# Patient Record
Sex: MALE | Race: WHITE | Employment: OTHER | URBAN - METROPOLITAN AREA
[De-identification: names, ages, dates, MRNs, and addresses within clinical notes are randomized per-mention and may not be internally consistent; named-entity substitution may affect disease eponyms.]

---

## 2017-01-09 PROBLEM — N40.0 BENIGN PROSTATIC HYPERPLASIA WITHOUT LOWER URINARY TRACT SYMPTOMS: Status: ACTIVE | Noted: 2017-01-09

## 2017-01-09 PROBLEM — M75.50 CHRONIC SHOULDER BURSITIS: Status: ACTIVE | Noted: 2017-01-09

## 2017-07-14 PROBLEM — R97.20 ELEVATED PSA: Status: ACTIVE | Noted: 2017-07-14

## 2017-07-14 PROBLEM — M19.012 PRIMARY OSTEOARTHRITIS OF LEFT SHOULDER: Status: ACTIVE | Noted: 2017-07-14

## 2017-09-10 ENCOUNTER — APPOINTMENT (OUTPATIENT)
Dept: CT IMAGING | Age: 77
DRG: 479 | End: 2017-09-10
Attending: EMERGENCY MEDICINE
Payer: MEDICARE

## 2017-09-10 ENCOUNTER — APPOINTMENT (OUTPATIENT)
Dept: GENERAL RADIOLOGY | Age: 77
DRG: 479 | End: 2017-09-10
Attending: HOSPITALIST
Payer: MEDICARE

## 2017-09-10 ENCOUNTER — APPOINTMENT (OUTPATIENT)
Dept: MRI IMAGING | Age: 77
DRG: 479 | End: 2017-09-10
Attending: HOSPITALIST
Payer: MEDICARE

## 2017-09-10 ENCOUNTER — HOSPITAL ENCOUNTER (INPATIENT)
Age: 77
LOS: 2 days | Discharge: HOME | DRG: 479 | End: 2017-09-13
Attending: EMERGENCY MEDICINE | Admitting: HOSPITALIST
Payer: MEDICARE

## 2017-09-10 ENCOUNTER — APPOINTMENT (OUTPATIENT)
Dept: CT IMAGING | Age: 77
DRG: 479 | End: 2017-09-10
Attending: HOSPITALIST
Payer: MEDICARE

## 2017-09-10 ENCOUNTER — APPOINTMENT (OUTPATIENT)
Dept: GENERAL RADIOLOGY | Age: 77
DRG: 479 | End: 2017-09-10
Attending: EMERGENCY MEDICINE
Payer: MEDICARE

## 2017-09-10 DIAGNOSIS — R56.9 NEW ONSET SEIZURE (HCC): Primary | ICD-10-CM

## 2017-09-10 LAB
ALBUMIN SERPL-MCNC: 3.3 G/DL (ref 3.2–4.6)
ALBUMIN/GLOB SERPL: 0.9 {RATIO} (ref 1.2–3.5)
ALP SERPL-CCNC: 74 U/L (ref 50–136)
ALT SERPL-CCNC: 33 U/L (ref 12–65)
ANION GAP SERPL CALC-SCNC: 13 MMOL/L (ref 7–16)
AST SERPL-CCNC: 25 U/L (ref 15–37)
ATRIAL RATE: 104 BPM
ATRIAL RATE: 104 BPM
BASOPHILS # BLD: 0 K/UL (ref 0–0.2)
BASOPHILS NFR BLD: 0 % (ref 0–2)
BILIRUB SERPL-MCNC: 0.4 MG/DL (ref 0.2–1.1)
BUN SERPL-MCNC: 14 MG/DL (ref 8–23)
CALCIUM SERPL-MCNC: 8.7 MG/DL (ref 8.3–10.4)
CALCULATED R AXIS, ECG10: 38 DEGREES
CALCULATED R AXIS, ECG10: 38 DEGREES
CALCULATED T AXIS, ECG11: 44 DEGREES
CALCULATED T AXIS, ECG11: 44 DEGREES
CHLORIDE SERPL-SCNC: 108 MMOL/L (ref 98–107)
CK SERPL-CCNC: 117 U/L (ref 21–215)
CO2 SERPL-SCNC: 23 MMOL/L (ref 21–32)
CREAT SERPL-MCNC: 1.16 MG/DL (ref 0.8–1.5)
DIAGNOSIS, 93000: NORMAL
DIAGNOSIS, 93000: NORMAL
DIFFERENTIAL METHOD BLD: ABNORMAL
EOSINOPHIL # BLD: 0.2 K/UL (ref 0–0.8)
EOSINOPHIL NFR BLD: 2 % (ref 0.5–7.8)
ERYTHROCYTE [DISTWIDTH] IN BLOOD BY AUTOMATED COUNT: 12.3 % (ref 11.9–14.6)
EST. AVERAGE GLUCOSE BLD GHB EST-MCNC: 117 MG/DL
GLOBULIN SER CALC-MCNC: 3.6 G/DL (ref 2.3–3.5)
GLUCOSE SERPL-MCNC: 154 MG/DL (ref 65–100)
HBA1C MFR BLD: 5.7 % (ref 4.8–6)
HCT VFR BLD AUTO: 43.7 % (ref 41.1–50.3)
HGB BLD-MCNC: 15.1 G/DL (ref 13.6–17.2)
IMM GRANULOCYTES # BLD: 0.1 K/UL (ref 0–0.5)
IMM GRANULOCYTES NFR BLD: 0.4 % (ref 0–5)
LACTATE BLD-SCNC: 5.5 MMOL/L (ref 0.5–1.9)
LIPASE SERPL-CCNC: 289 U/L (ref 73–393)
LYMPHOCYTES # BLD: 2.3 K/UL (ref 0.5–4.6)
LYMPHOCYTES NFR BLD: 17 % (ref 13–44)
MAGNESIUM SERPL-MCNC: 2.2 MG/DL (ref 1.8–2.4)
MCH RBC QN AUTO: 30.6 PG (ref 26.1–32.9)
MCHC RBC AUTO-ENTMCNC: 34.6 G/DL (ref 31.4–35)
MCV RBC AUTO: 88.5 FL (ref 79.6–97.8)
MONOCYTES # BLD: 0.8 K/UL (ref 0.1–1.3)
MONOCYTES NFR BLD: 5 % (ref 4–12)
MYOGLOBIN SERPL-MCNC: 478 NG/ML (ref 16–96)
NEUTS SEG # BLD: 10.4 K/UL (ref 1.7–8.2)
NEUTS SEG NFR BLD: 76 % (ref 43–78)
PLATELET # BLD AUTO: 198 K/UL (ref 150–450)
PMV BLD AUTO: 10 FL (ref 10.8–14.1)
POTASSIUM SERPL-SCNC: 3.9 MMOL/L (ref 3.5–5.1)
PROT SERPL-MCNC: 6.9 G/DL (ref 6.3–8.2)
Q-T INTERVAL, ECG07: 374 MS
Q-T INTERVAL, ECG07: 374 MS
QRS DURATION, ECG06: 90 MS
QRS DURATION, ECG06: 90 MS
QTC CALCULATION (BEZET), ECG08: 406 MS
QTC CALCULATION (BEZET), ECG08: 406 MS
RBC # BLD AUTO: 4.94 M/UL (ref 4.23–5.67)
SODIUM SERPL-SCNC: 144 MMOL/L (ref 136–145)
T4 FREE SERPL-MCNC: 1.2 NG/DL (ref 0.78–1.46)
TSH SERPL DL<=0.005 MIU/L-ACNC: 3.68 UIU/ML (ref 0.36–3.74)
VENTRICULAR RATE, ECG03: 71 BPM
VENTRICULAR RATE, ECG03: 71 BPM
WBC # BLD AUTO: 13.8 K/UL (ref 4.3–11.1)

## 2017-09-10 PROCEDURE — 85025 COMPLETE CBC W/AUTO DIFF WBC: CPT | Performed by: EMERGENCY MEDICINE

## 2017-09-10 PROCEDURE — 84439 ASSAY OF FREE THYROXINE: CPT | Performed by: HOSPITALIST

## 2017-09-10 PROCEDURE — 83690 ASSAY OF LIPASE: CPT | Performed by: EMERGENCY MEDICINE

## 2017-09-10 PROCEDURE — A9577 INJ MULTIHANCE: HCPCS | Performed by: HOSPITALIST

## 2017-09-10 PROCEDURE — 72100 X-RAY EXAM L-S SPINE 2/3 VWS: CPT

## 2017-09-10 PROCEDURE — 72128 CT CHEST SPINE W/O DYE: CPT

## 2017-09-10 PROCEDURE — 70450 CT HEAD/BRAIN W/O DYE: CPT

## 2017-09-10 PROCEDURE — 93005 ELECTROCARDIOGRAM TRACING: CPT | Performed by: HOSPITALIST

## 2017-09-10 PROCEDURE — 80053 COMPREHEN METABOLIC PANEL: CPT | Performed by: EMERGENCY MEDICINE

## 2017-09-10 PROCEDURE — 74011000258 HC RX REV CODE- 258: Performed by: HOSPITALIST

## 2017-09-10 PROCEDURE — 95816 EEG AWAKE AND DROWSY: CPT | Performed by: HOSPITALIST

## 2017-09-10 PROCEDURE — 74011250636 HC RX REV CODE- 250/636: Performed by: HOSPITALIST

## 2017-09-10 PROCEDURE — 81003 URINALYSIS AUTO W/O SCOPE: CPT | Performed by: EMERGENCY MEDICINE

## 2017-09-10 PROCEDURE — 99285 EMERGENCY DEPT VISIT HI MDM: CPT | Performed by: EMERGENCY MEDICINE

## 2017-09-10 PROCEDURE — 74176 CT ABD & PELVIS W/O CONTRAST: CPT

## 2017-09-10 PROCEDURE — 70553 MRI BRAIN STEM W/O & W/DYE: CPT

## 2017-09-10 PROCEDURE — 96365 THER/PROPH/DIAG IV INF INIT: CPT | Performed by: EMERGENCY MEDICINE

## 2017-09-10 PROCEDURE — 96375 TX/PRO/DX INJ NEW DRUG ADDON: CPT | Performed by: EMERGENCY MEDICINE

## 2017-09-10 PROCEDURE — 83605 ASSAY OF LACTIC ACID: CPT

## 2017-09-10 PROCEDURE — 83874 ASSAY OF MYOGLOBIN: CPT | Performed by: EMERGENCY MEDICINE

## 2017-09-10 PROCEDURE — 93005 ELECTROCARDIOGRAM TRACING: CPT | Performed by: EMERGENCY MEDICINE

## 2017-09-10 PROCEDURE — 0PS43ZZ REPOSITION THORACIC VERTEBRA, PERCUTANEOUS APPROACH: ICD-10-PCS | Performed by: NEUROLOGICAL SURGERY

## 2017-09-10 PROCEDURE — 84443 ASSAY THYROID STIM HORMONE: CPT | Performed by: HOSPITALIST

## 2017-09-10 PROCEDURE — 0P943ZX DRAINAGE OF THORACIC VERTEBRA, PERCUTANEOUS APPROACH, DIAGNOSTIC: ICD-10-PCS | Performed by: NEUROLOGICAL SURGERY

## 2017-09-10 PROCEDURE — 82550 ASSAY OF CK (CPK): CPT | Performed by: EMERGENCY MEDICINE

## 2017-09-10 PROCEDURE — 83735 ASSAY OF MAGNESIUM: CPT | Performed by: EMERGENCY MEDICINE

## 2017-09-10 PROCEDURE — 74011250637 HC RX REV CODE- 250/637: Performed by: HOSPITALIST

## 2017-09-10 PROCEDURE — 71010 XR CHEST PORT: CPT

## 2017-09-10 PROCEDURE — 74011250636 HC RX REV CODE- 250/636: Performed by: EMERGENCY MEDICINE

## 2017-09-10 PROCEDURE — 0PU43JZ SUPPLEMENT THORACIC VERTEBRA WITH SYNTHETIC SUBSTITUTE, PERCUTANEOUS APPROACH: ICD-10-PCS | Performed by: NEUROLOGICAL SURGERY

## 2017-09-10 PROCEDURE — 94762 N-INVAS EAR/PLS OXIMTRY CONT: CPT | Performed by: EMERGENCY MEDICINE

## 2017-09-10 PROCEDURE — 4A10X4Z MONITORING OF CENTRAL NERVOUS ELECTRICAL ACTIVITY, EXTERNAL APPROACH: ICD-10-PCS | Performed by: NEUROLOGICAL SURGERY

## 2017-09-10 PROCEDURE — 99218 HC RM OBSERVATION: CPT

## 2017-09-10 PROCEDURE — 83036 HEMOGLOBIN GLYCOSYLATED A1C: CPT | Performed by: HOSPITALIST

## 2017-09-10 RX ORDER — SODIUM CHLORIDE 0.9 % (FLUSH) 0.9 %
10 SYRINGE (ML) INJECTION
Status: COMPLETED | OUTPATIENT
Start: 2017-09-10 | End: 2017-09-10

## 2017-09-10 RX ORDER — AMLODIPINE BESYLATE 5 MG/1
5 TABLET ORAL DAILY
Status: DISCONTINUED | OUTPATIENT
Start: 2017-09-11 | End: 2017-09-13 | Stop reason: HOSPADM

## 2017-09-10 RX ORDER — ONDANSETRON 2 MG/ML
4 INJECTION INTRAMUSCULAR; INTRAVENOUS
Status: DISCONTINUED | OUTPATIENT
Start: 2017-09-10 | End: 2017-09-13 | Stop reason: HOSPADM

## 2017-09-10 RX ORDER — HEPARIN SODIUM 5000 [USP'U]/ML
5000 INJECTION, SOLUTION INTRAVENOUS; SUBCUTANEOUS EVERY 12 HOURS
Status: DISCONTINUED | OUTPATIENT
Start: 2017-09-10 | End: 2017-09-10 | Stop reason: SDUPTHER

## 2017-09-10 RX ORDER — LEVETIRACETAM 500 MG/1
500 TABLET ORAL 2 TIMES DAILY
Status: DISCONTINUED | OUTPATIENT
Start: 2017-09-10 | End: 2017-09-13 | Stop reason: HOSPADM

## 2017-09-10 RX ORDER — ASPIRIN 81 MG/1
81 TABLET ORAL DAILY
Status: DISCONTINUED | OUTPATIENT
Start: 2017-09-11 | End: 2017-09-13 | Stop reason: HOSPADM

## 2017-09-10 RX ORDER — SODIUM CHLORIDE 0.9 % (FLUSH) 0.9 %
5-10 SYRINGE (ML) INJECTION AS NEEDED
Status: DISCONTINUED | OUTPATIENT
Start: 2017-09-10 | End: 2017-09-13 | Stop reason: HOSPADM

## 2017-09-10 RX ORDER — SODIUM CHLORIDE 0.9 % (FLUSH) 0.9 %
5-10 SYRINGE (ML) INJECTION EVERY 8 HOURS
Status: DISCONTINUED | OUTPATIENT
Start: 2017-09-10 | End: 2017-09-13 | Stop reason: HOSPADM

## 2017-09-10 RX ORDER — BISACODYL 5 MG
5 TABLET, DELAYED RELEASE (ENTERIC COATED) ORAL DAILY PRN
Status: DISCONTINUED | OUTPATIENT
Start: 2017-09-10 | End: 2017-09-13 | Stop reason: HOSPADM

## 2017-09-10 RX ORDER — HEPARIN SODIUM 5000 [USP'U]/ML
5000 INJECTION, SOLUTION INTRAVENOUS; SUBCUTANEOUS EVERY 8 HOURS
Status: DISCONTINUED | OUTPATIENT
Start: 2017-09-10 | End: 2017-09-10

## 2017-09-10 RX ORDER — LIDOCAINE 50 MG/G
2 PATCH TOPICAL EVERY 24 HOURS
Status: DISCONTINUED | OUTPATIENT
Start: 2017-09-10 | End: 2017-09-13 | Stop reason: HOSPADM

## 2017-09-10 RX ORDER — LOSARTAN POTASSIUM 50 MG/1
50 TABLET ORAL DAILY
Status: DISCONTINUED | OUTPATIENT
Start: 2017-09-11 | End: 2017-09-13 | Stop reason: HOSPADM

## 2017-09-10 RX ORDER — LEVETIRACETAM 500 MG/1
500 TABLET ORAL 2 TIMES DAILY
Status: DISCONTINUED | OUTPATIENT
Start: 2017-09-10 | End: 2017-09-10 | Stop reason: SDUPTHER

## 2017-09-10 RX ORDER — DIPHENHYDRAMINE HCL 25 MG
25 CAPSULE ORAL
Status: DISCONTINUED | OUTPATIENT
Start: 2017-09-10 | End: 2017-09-13 | Stop reason: HOSPADM

## 2017-09-10 RX ORDER — ONDANSETRON 2 MG/ML
4 INJECTION INTRAMUSCULAR; INTRAVENOUS
Status: DISPENSED | OUTPATIENT
Start: 2017-09-10 | End: 2017-09-10

## 2017-09-10 RX ORDER — HEPARIN SODIUM 5000 [USP'U]/ML
5000 INJECTION, SOLUTION INTRAVENOUS; SUBCUTANEOUS EVERY 12 HOURS
Status: DISCONTINUED | OUTPATIENT
Start: 2017-09-10 | End: 2017-09-13 | Stop reason: HOSPADM

## 2017-09-10 RX ORDER — LORAZEPAM 2 MG/ML
1 INJECTION INTRAMUSCULAR
Status: DISCONTINUED | OUTPATIENT
Start: 2017-09-10 | End: 2017-09-13 | Stop reason: HOSPADM

## 2017-09-10 RX ORDER — HYDROMORPHONE HYDROCHLORIDE 1 MG/ML
1 INJECTION, SOLUTION INTRAMUSCULAR; INTRAVENOUS; SUBCUTANEOUS
Status: DISCONTINUED | OUTPATIENT
Start: 2017-09-10 | End: 2017-09-13 | Stop reason: HOSPADM

## 2017-09-10 RX ORDER — NALOXONE HYDROCHLORIDE 0.4 MG/ML
0.4 INJECTION, SOLUTION INTRAMUSCULAR; INTRAVENOUS; SUBCUTANEOUS AS NEEDED
Status: DISCONTINUED | OUTPATIENT
Start: 2017-09-10 | End: 2017-09-13 | Stop reason: HOSPADM

## 2017-09-10 RX ORDER — MORPHINE SULFATE 2 MG/ML
2 INJECTION, SOLUTION INTRAMUSCULAR; INTRAVENOUS
Status: COMPLETED | OUTPATIENT
Start: 2017-09-10 | End: 2017-09-10

## 2017-09-10 RX ORDER — EZETIMIBE 10 MG/1
10 TABLET ORAL
Status: DISCONTINUED | OUTPATIENT
Start: 2017-09-11 | End: 2017-09-13 | Stop reason: HOSPADM

## 2017-09-10 RX ORDER — ONDANSETRON 2 MG/ML
4 INJECTION INTRAMUSCULAR; INTRAVENOUS
Status: COMPLETED | OUTPATIENT
Start: 2017-09-10 | End: 2017-09-10

## 2017-09-10 RX ORDER — HYDROCODONE BITARTRATE AND ACETAMINOPHEN 5; 325 MG/1; MG/1
1 TABLET ORAL
Status: DISCONTINUED | OUTPATIENT
Start: 2017-09-10 | End: 2017-09-13 | Stop reason: HOSPADM

## 2017-09-10 RX ORDER — PANTOPRAZOLE SODIUM 40 MG/1
40 TABLET, DELAYED RELEASE ORAL
Status: DISCONTINUED | OUTPATIENT
Start: 2017-09-10 | End: 2017-09-13 | Stop reason: HOSPADM

## 2017-09-10 RX ORDER — SODIUM CHLORIDE 9 MG/ML
75 INJECTION, SOLUTION INTRAVENOUS CONTINUOUS
Status: DISCONTINUED | OUTPATIENT
Start: 2017-09-10 | End: 2017-09-13 | Stop reason: HOSPADM

## 2017-09-10 RX ADMIN — ONDANSETRON 4 MG: 2 INJECTION INTRAMUSCULAR; INTRAVENOUS at 09:27

## 2017-09-10 RX ADMIN — HYDROCODONE BITARTRATE AND ACETAMINOPHEN 1 TABLET: 5; 325 TABLET ORAL at 23:56

## 2017-09-10 RX ADMIN — LEVETIRACETAM 500 MG: 500 TABLET, FILM COATED ORAL at 20:18

## 2017-09-10 RX ADMIN — GADOBENATE DIMEGLUMINE 20 ML: 529 INJECTION, SOLUTION INTRAVENOUS at 17:18

## 2017-09-10 RX ADMIN — Medication 10 ML: at 17:18

## 2017-09-10 RX ADMIN — LEVETIRACETAM 1000 MG: 100 INJECTION, SOLUTION INTRAVENOUS at 13:10

## 2017-09-10 RX ADMIN — SODIUM CHLORIDE 75 ML/HR: 900 INJECTION, SOLUTION INTRAVENOUS at 13:10

## 2017-09-10 RX ADMIN — MORPHINE SULFATE 2 MG: 2 INJECTION, SOLUTION INTRAMUSCULAR; INTRAVENOUS at 11:29

## 2017-09-10 RX ADMIN — HYDROCODONE BITARTRATE AND ACETAMINOPHEN 1 TABLET: 5; 325 TABLET ORAL at 13:16

## 2017-09-10 RX ADMIN — Medication 10 ML: at 14:39

## 2017-09-10 RX ADMIN — HYDROCODONE BITARTRATE AND ACETAMINOPHEN 1 TABLET: 5; 325 TABLET ORAL at 18:35

## 2017-09-10 RX ADMIN — Medication 5 ML: at 20:18

## 2017-09-10 NOTE — ED TRIAGE NOTES
Brought in via EMS from home. States pt was in bed and began having seizure like activity witnessed by wife. States on their arrival pt was found to be postictal. No hx of seizures. States pt was combative at first with confusion. Pt is less confused on arrival but still repeating questions. 18G LFA. Pt is alert on arrival. Respirations are even and unlabored at this time.

## 2017-09-10 NOTE — IP AVS SNAPSHOT
[address] 
[phone] Patient: Onesimo Black MRN: [id number] SOFIA:[id number] You are allergic to the following Allergen Reactions Nka (No Known Allergies) Other (comments) Recent Documentation Height Weight BMI Smoking Status 1.803 m 98.4 kg 30.27 kg/m2 Never Smoker Emergency Contacts Name Discharge Info Relation Home Work Mobile Favian Kennedy  Spouse [3] [phone] Emmanuel Cooper  Child [2] [phone] [facility]  Child [2] [phone] About your hospitalization You were admitted on:  September 10, 2017 You last received care in the:  [facility] 7 MED SURG You were discharged on:  September 13, 2017 Unit phone number:  [phone] Why you were hospitalized Your primary diagnosis was:  Not on File Your diagnoses also included:  Seizure (Hcc), New Onset Seizure (Hcc) Providers Seen During Your Hospitalizations Provider Role Specialty Primary office phone Bry Chu DO Attending Provider Emergency Medicine [phone] Torey Rodriguez MD Attending Provider Internal Medicine [phone] Your Primary Care Physician (PCP) Primary Care Physician Office Phone Office Fax Tony Jose [phone] Follow-up Information Follow up With Details Comments Contact Info Amanda Esteves MD Call As needed [address] [address] 
[phone] Teofilo Springer MD On 9/21/2017 11:00 AM [address] [facility] [address] 
[phone] Marcelo Bergman [facility] [place] On 9/19/2017 at 9:15 a.m. with Dr. Kumar [address] 
General Record [id number] [phone] Your Appointments  Tuesday September 19, 2017  9:30 AM EDT  
 New Patient with Marilyn Skillern, DO Will[facility] [place] ([facility]) Rajiv [address]  
[phone] Thursday September 21, 2017 11:00 AM EDT  
WOUND CHECK with [facility] NURSE  
[facility] ([facility]) [address] [address] [place] [address] Amber Arthur 42  
[phone] Current Discharge Medication List  
  
START taking these medications Dose & Instructions Dispensing Information Comments Morning Noon Evening Bedtime  
 levETIRAcetam 500 mg tablet Commonly known as:  KEPPRA Your next dose is:  TODAY, evening Dose:  500 mg Take 1 Tab by mouth two (2) times a day. Quantity:  60 Tab Refills:  5 CONTINUE these medications which have NOT CHANGED Dose & Instructions Dispensing Information Comments Morning Noon Evening Bedtime  
 amLODIPine 5 mg tablet Commonly known as:  Haig Sleepy Eye Your next dose is:  Tomorrow Morning Dose:  5 mg Take 1 Tab by mouth daily. Quantity:  90 Tab Refills:  3  
     
  
   
   
   
  
 aspirin delayed-release 81 mg tablet Your next dose is:  Tomorrow Morning Dose:  81 mg Take 81 mg by mouth daily. Refills:  0  
     
  
   
   
   
  
 esomeprazole 40 mg capsule Commonly known as:  NexIUM Your next dose is:  Tomorrow Morning Dose:  40 mg Take 1 Cap by mouth daily. Quantity:  90 Cap Refills:  3  
     
  
   
   
   
  
 ezetimibe 10 mg tablet Commonly known as:  Ira Arrant Your next dose is:  Friday 9/15 Dose:  10 mg Take 1 Tab by mouth every Monday, Wednesday, Friday. Quantity:  90 Tab Refills:  3  
     
  
   
   
   
  
 losartan 50 mg tablet Commonly known as:  COZAAR Your next dose is:  Tomorrow Morning Dose:  50 mg Take 1 Tab by mouth daily. Quantity:  90 Tab Refills:  3 pitavastatin calcium 4 mg Tab tablet Commonly known as:  LIVALO Your next dose is:  Tomorrow Morning Dose:  2 mg Take 1 Tab by mouth daily. Quantity:  90 Tab Refills:  3 Where to Get Your Medications Information on where to get these meds will be given to you by the nurse or doctor. ! Ask your nurse or doctor about these medications  
  levETIRAcetam 500 mg tablet Discharge Instructions Kyphoplasty: What to Expect at [facility] Your Recovery After kyphoplasty to relieve pain from compression fractures, your back may feel sore where the hollow needle (trocar) went into your back. This should go away in a few days. Most people are able to return to their daily activities within a day after kyphoplasty. This care sheet gives you a general idea about how long it will take for you to recover. But each person recovers at a different pace. Follow the steps below to get better as quickly as possible. How can you care for yourself at home? Activity · Take it easy for the first 24 hours. Rest when you feel tired. Getting enough sleep will help you recover. · For the first day after the procedure, avoid lifting anything that would make you strain. This may include heavy grocery bags and milk containers, a heavy briefcase or backpack, cat litter or dog food bags, a vacuum , or a child. Diet · You can eat your normal diet. If your stomach is upset, try bland, low-fat foods like plain rice, broiled chicken, toast, and yogurt. Medicines · Your doctor will tell you if and when you can restart your medicines. He or she will also give you instructions about taking any new medicines. · If you take blood thinners, such as warfarin (Coumadin), clopidogrel (Plavix), or aspirin, be sure to talk to your doctor. He or she will tell you if and when to start taking those medicines again.  Make sure that you understand exactly what your doctor wants you to do. · Be safe with medicines. Take pain medicines exactly as directed. ¨ If the doctor gave you a prescription medicine for pain, take it as prescribed. ¨ If you are not taking a prescription pain medicine, ask your doctor if you can take an over-the-counter medicine. ¨ Do not take two or more pain medicines at the same time unless your doctor told you to. Many pain medicines have acetaminophen, which is Tylenol. Too much acetaminophen (Tylenol) can be harmful. Incision care · You will have a dressing over the cut (incision). A dressing helps the incision heal and protects it. Your doctor will tell you how to take care of this. Ice 
· If you are sore where the needle was inserted, put ice or a cold pack on your back for 10 to 20 minutes at a time. Try to do this every 1 to 2 hours for the next 3 days (when you are awake) or until the swelling goes down. Put a thin cloth between the ice and your skin. Follow-up care is a key part of your treatment and safety. Be sure to make and go to all appointments, and call your doctor if you are having problems. It's also a good idea to know your test results and keep a list of the medicines you take. When should you call for help? Call 911 anytime you think you may need emergency care. For example, call if: 
· You passed out (lost consciousness). · You have severe trouble breathing. · You have sudden chest pain and shortness of breath, or you cough up blood. · You are unable to move a leg at all. Call your doctor now or seek immediate medical care if: 
· You have new or worse symptoms in your legs, belly, or buttocks. Symptoms may include: ¨ Numbness or tingling. ¨ Weakness. ¨ Pain. · You lose bladder or bowel control. · You have signs of infection, such as: 
¨ Increased pain, swelling, warmth, or redness. ¨ Red streaks leading from the incision. ¨ Pus draining from the incision. ¨ Swollen lymph nodes in your neck, armpits, or groin. ¨ A fever. Watch closely for any changes in your health, and be sure to contact your doctor if: 
· You do not get better as expected. Where can you learn more? Go to http://cole-mark.info/. Enter [phone] in the search box to learn more about \"Kyphoplasty: What to Expect at Home. \" Current as of: March 21, 2017 Content Version: 11.3 © 9032-9005 [employer]. Care instructions adapted under license by [employer] (which disclaims liability or warranty for this information). If you have questions about a medical condition or this instruction, always ask your healthcare professional. [address] any warranty or liability for your use of this information. Seizure: Care Instructions Your Care Instructions Seizures are caused by abnormal patterns of electrical signals in the brain. They are different for each person. Seizures can affect movement, speech, vision, or awareness. Some people have only slight shaking of a hand and do not pass out. Other people may pass out and have violent shaking of the whole body. Some people appear to stare into space. They are awake, but they can't respond normally. Later, they may not remember what happened. You may need tests to identify the type and cause of the seizures. A seizure may occur only once, or you may have them more than one time. Taking medicines as directed and following up with your doctor may help keep you from having more seizures. The doctor has checked you carefully, but problems can develop later. If you notice any problems or new symptoms, get medical treatment right away. Follow-up care is a key part of your treatment and safety. Be sure to make and go to all appointments, and call your doctor if you are having problems. It's also a good idea to know your test results and keep a list of the medicines you take. How can you care for yourself at home? · Be safe with medicines. Take your medicines exactly as prescribed. Call your doctor if you think you are having a problem with your medicine. · Do not do any activity that could be dangerous to you or others until your doctor says it is safe to do so. For example, do not drive a car, operate machinery, swim, or climb ladders. · Be sure that anyone treating you for any health problem knows that you have had a seizure and what medicines you are taking for it. · Identify and avoid things that may make you more likely to have a seizure. These may include lack of sleep, alcohol or drug use, stress, or not eating. · Make sure you go to your follow-up appointment. When should you call for help? Call 911 anytime you think you may need emergency care. For example, call if: 
· You have another seizure. · You have more than one seizure in 24 hours. · You have new symptoms, such as trouble walking, speaking, or thinking clearly. Call your doctor now or seek immediate medical care if: 
· You are not acting normally. Watch closely for changes in your health, and be sure to contact your doctor if you have any problems. Where can you learn more? Go to http://cole-mark.info/. Enter O606 in the search box to learn more about \"Seizure: Care Instructions. \" Current as of: October 14, 2016 Content Version: 11.3 © 8054-3620 [employer]. Care instructions adapted under license by [employer] (which disclaims liability or warranty for this information). If you have questions about a medical condition or this instruction, always ask your healthcare professional. [address] any warranty or liability for your use of this information. DISCHARGE SUMMARY from Nurse The following personal items are in your possession at time of discharge: 
 
Dental Appliances: None Home Medications: None Jewelry: None Clothing: None Other Valuables: None PATIENT INSTRUCTIONS: 
 
 
F-face looks uneven A-arms unable to move or move unevenly S-speech slurred or non-existent T-time-call 911 as soon as signs and symptoms begin-DO NOT go Back to bed or wait to see if you get better-TIME IS BRAIN. Warning Signs of HEART ATTACK Call 911 if you have these symptoms: 
? Chest discomfort. Most heart attacks involve discomfort in the center of the chest that lasts more than a few minutes, or that goes away and comes back. It can feel like uncomfortable pressure, squeezing, fullness, or pain. ? Discomfort in other areas of the upper body. Symptoms can include pain or discomfort in one or both arms, the back, neck, jaw, or stomach. ? Shortness of breath with or without chest discomfort. ? Other signs may include breaking out in a cold sweat, nausea, or lightheadedness. Don't wait more than five minutes to call [address]! Fast action can save your life. Calling 911 is almost always the fastest way to get lifesaving treatment. Emergency Medical Services staff can begin treatment when they arrive  up to an hour sooner than if someone gets to the hospital by car. The discharge information has been reviewed with the patient. The patient verbalized understanding. Discharge medications reviewed with the patient and appropriate educational materials and side effects teaching were provided. Discharge Orders None ACO Transitions of Care Introducing [facility] [address] Ángela Kahn offers a voluntary care coordination program to provide high quality service and care to [facility] fee-for-service beneficiaries.   
 
Jason Reyez was designed to help you enhance your health and well-being through the following services: ? Transitions of Care  support for individuals who are transitioning from one care setting to another (example: Hospital to home). ? Chronic and Complex Care Coordination  support for individuals and caregivers of those with serious or chronic illnesses or with more than one chronic (ongoing) condition and those who take a number of different medications. If you meet specific medical criteria, a [facility] [address] may call you directly to coordinate your care with your primary care physician and your other care providers. For questions about the [facility] programs, please, contact your physicians office. For general questions or additional information about Accountable Care Organizations: 
Please visit www.medicare.gov/acos. html or call 1-800-MEDICARE (6-257.390.2037) TTY users should call 6-951.393.3106. [email or web address] Announcement We are excited to announce that we are making your provider's discharge notes available to you in [email or web address]. You will see these notes when they are completed and signed by the physician that discharged you from your recent hospital stay. If you have any questions or concerns about any information you see in [email or web address], please call the Health Information Department where you were seen or reach out to your Primary Care Provider for more information about your plan of care. Introducing [facility]! Severo Hazel introduces [email or web address] patient portal. Now you can access parts of your medical record, email your doctor's office, and request medication refills online. 1. In your internet browser, go to [email or web address] 2. Click on the First Time User? Click Here link in the Sign In box. You will see the New Member Sign Up page. 3. Enter your [email or web address] Access Code exactly as it appears below. You will not need to use this code after youve completed the sign-up process.  If you do not sign up before the expiration date, you must request a new code. · Merkle Access Code: [email or web address] Expires: 10/12/2017 12:05 PM 
 
4. Enter the last four digits of your Social Security Number (xxxx) and Date of Birth (mm/dd/yyyy) as indicated and click Submit. You will be taken to the next sign-up page. 5. Create a Merkle ID. This will be your Merkle login ID and cannot be changed, so think of one that is secure and easy to remember. 6. Create a Merkle password. You can change your password at any time. 7. Enter your Password Reset Question and Answer. This can be used at a later time if you forget your password. 8. Enter your e-mail address. You will receive e-mail notification when new information is available in [address] 9. Click Sign Up. You can now view and download portions of your medical record. 10. Click the Download Summary menu link to download a portable copy of your medical information. If you have questions, please visit the Frequently Asked Questions section of the Merkle website. Remember, Merkle is NOT to be used for urgent needs. For medical emergencies, dial 911. Now available from your iPhone and Android! General Information Please provide this summary of care documentation to your next provider. Patient Signature:  ____________________________________________________________ Date:  ____________________________________________________________  
  
Lissy Herringamento Provider Signature:  ____________________________________________________________ Date:  ____________________________________________________________

## 2017-09-10 NOTE — H&P
HOSPITALIST H&P/CONSULT  NAME:  Soheila Morris   Age:  78 y.o.  :   1940   MRN:   [id number]  PCP: Demario Brothers MD  Consulting MD:  Treatment Team: Primary Nurse: Nabil Godoy RN  HPI:   Most of the history was  Obtained from  His family at the bedside  Including his son who is  A cardiologist at this hospital.  Earlier this morning EMS  Was called by the patient wife after she noticed seizures like  Activity resembling a tonic clonic episode. The  Patient has no history of a seizure disorder in the past  and  According to the wife/ Son she awoke this morning with the bed shaking looked over and saw her  shaking and jerking with his arms in a tonic-clonic-type manner. He did not have any  Loss of bowel function, then a  short pause after he had  resumption of the same seizure-like activity there was some drooling and some foaming of the mouth noted followed by this sonorous respirations upon cessation of the seizure activity. The patient  Was postictal with  Confusion in the ambulance  Agitated   Requiring restraints this  Improved in the ED . He is now complaining of  lower lumbar back  pain    Lipase 370's . He denies any  etoh use  Recent change in medications,  Seizures in the past or trauma. Head CT was negative , CXR  Was negative for infections, latic acid was elevated UA analysis is currently pending. Past Medical History:   Diagnosis Date    Hypertension       Past Surgical History:   Procedure Laterality Date    HX HERNIA REPAIR      HX HIP REPLACEMENT      bilateral      Prior to Admission Medications   Prescriptions Last Dose Informant Patient Reported? Taking? amLODIPine (NORVASC) 5 mg tablet 2017 at Unknown time  No Yes   Sig: Take 1 Tab by mouth daily. aspirin delayed-release 81 mg tablet 2017 at Unknown time  Yes Yes   Sig: Take 81 mg by mouth daily.    esomeprazole (NEXIUM) 40 mg capsule 2017 at Unknown time  No Yes   Sig: Take 1 Cap by mouth daily. ezetimibe (ZETIA) 10 mg tablet 2017 at Unknown time  No Yes   Sig: Take 1 Tab by mouth every Monday, Wednesday, Friday. losartan (COZAAR) 50 mg tablet 2017 at Unknown time  No Yes   Sig: Take 1 Tab by mouth daily. pitavastatin (LIVALO) 4 mg tab tablet 2017 at Unknown time  No Yes   Sig: Take 1 Tab by mouth daily. Facility-Administered Medications: None     Home meds reconciled. Allergies   Allergen Reactions    Nka [No Known Allergies] Other (comments)      Social History   Substance Use Topics    Smoking status: Never Smoker    Smokeless tobacco: Never Used    Alcohol use No      Family History   Problem Relation Age of Onset    Family history unknown: Yes      Immunization History   Administered Date(s) Administered    Influenza High Dose Vaccine PF 2016     Objective:     Visit Vitals    /64    Pulse 73    Temp 97.6 °F (36.4 °C)    Resp 19    Ht 5' 11\" (1.803 m)    Wt 98.4 kg (217 lb)    SpO2 95%    BMI 30.27 kg/m2      Temp (24hrs), Av.6 °F (36.4 °C), Min:97.6 °F (36.4 °C), Max:97.6 °F (36.4 °C)    Oxygen Therapy  O2 Sat (%): 95 % (09/10/17 0941)  Pulse via Oximetry: 74 beats per minute (09/10/17 0941)  O2 Device: Room air (09/10/17 0858)  Physical Exam:  General:    Alert, cooperative, no distress on 2 L NC   Head:   NCAT. No obvious deformity  Nose:  Nares normal. No drainage  Lungs:   CTABL. No wheezing/rhonchi/rales  Heart:   RRR. No m/r/g. Abdomen:   S/nt/nd. Bowel sounds normal.  Protuberant   Extremities: No cyanosis. Skin:     No rashes or lesions. Not Jaundiced  Neurologic: Moves all extremities.   no gross focal deficits      Data Review:   Recent Results (from the past 24 hour(s))   EKG, 12 LEAD, INITIAL    Collection Time: 09/10/17  9:02 AM   Result Value Ref Range    Ventricular Rate 71 BPM    Atrial Rate 104 BPM    QRS Duration 90 ms    Q-T Interval 374 ms    QTC Calculation (Bezet) 406 ms    Calculated R Axis 38 degrees Calculated T Axis 44 degrees    Diagnosis       !! AGE AND GENDER SPECIFIC ECG ANALYSIS !! Accelerated Junctional rhythm  Abnormal ECG  No previous ECGs available     CBC WITH AUTOMATED DIFF    Collection Time: 09/10/17  9:09 AM   Result Value Ref Range    WBC 13.8 (H) 4.3 - 11.1 K/uL    RBC 4.94 4.23 - 5.67 M/uL    HGB 15.1 13.6 - 17.2 g/dL    HCT 43.7 41.1 - 50.3 %    MCV 88.5 79.6 - 97.8 FL    MCH 30.6 26.1 - 32.9 PG    MCHC 34.6 31.4 - 35.0 g/dL    RDW 12.3 11.9 - 14.6 %    PLATELET 317 461 - 710 K/uL    MPV 10.0 (L) 10.8 - 14.1 FL    DF AUTOMATED      NEUTROPHILS 76 43 - 78 %    LYMPHOCYTES 17 13 - 44 %    MONOCYTES 5 4.0 - 12.0 %    EOSINOPHILS 2 0.5 - 7.8 %    BASOPHILS 0 0.0 - 2.0 %    IMMATURE GRANULOCYTES 0.4 0.0 - 5.0 %    ABS. NEUTROPHILS 10.4 (H) 1.7 - 8.2 K/UL    ABS. LYMPHOCYTES 2.3 0.5 - 4.6 K/UL    ABS. MONOCYTES 0.8 0.1 - 1.3 K/UL    ABS. EOSINOPHILS 0.2 0.0 - 0.8 K/UL    ABS. BASOPHILS 0.0 0.0 - 0.2 K/UL    ABS. IMM. GRANS. 0.1 0.0 - 0.5 K/UL   MAGNESIUM    Collection Time: 09/10/17  9:09 AM   Result Value Ref Range    Magnesium 2.2 1.8 - 2.4 mg/dL   METABOLIC PANEL, COMPREHENSIVE    Collection Time: 09/10/17  9:09 AM   Result Value Ref Range    Sodium 144 136 - 145 mmol/L    Potassium 3.9 3.5 - 5.1 mmol/L    Chloride 108 (H) 98 - 107 mmol/L    CO2 23 21 - 32 mmol/L    Anion gap 13 7 - 16 mmol/L    Glucose 154 (H) 65 - 100 mg/dL    BUN 14 8 - 23 MG/DL    Creatinine 1.16 0.8 - 1.5 MG/DL    GFR est AA >60 >60 ml/min/1.73m2    GFR est non-AA >60 >60 ml/min/1.73m2    Calcium 8.7 8.3 - 10.4 MG/DL    Bilirubin, total 0.4 0.2 - 1.1 MG/DL    ALT (SGPT) 33 12 - 65 U/L    AST (SGOT) 25 15 - 37 U/L    Alk.  phosphatase 74 50 - 136 U/L    Protein, total 6.9 6.3 - 8.2 g/dL    Albumin 3.3 3.2 - 4.6 g/dL    Globulin 3.6 (H) 2.3 - 3.5 g/dL    A-G Ratio 0.9 (L) 1.2 - 3.5     LIPASE    Collection Time: 09/10/17  9:09 AM   Result Value Ref Range    Lipase 289 73 - 393 U/L   CK    Collection Time: 09/10/17  9:09 AM Result Value Ref Range     21 - 215 U/L   MYOGLOBIN    Collection Time: 09/10/17  9:09 AM   Result Value Ref Range    Myoglobin 478 (H) 16 - 96 ng/mL   POC LACTIC ACID    Collection Time: 09/10/17  9:11 AM   Result Value Ref Range    Lactic Acid (POC) 5.5 (H) 0.5 - 1.9 mmol/L     Imaging /Procedures /Studies:  I personally reviewed all labs, imaging, and other studies this admission:  CXR Results  (Last 48 hours)               09/10/17 0916  XR CHEST PORT Final result    Impression:  IMPRESSION: No acute abnormality       Narrative:  Portable AP upright chest dated 9/10/2017       CLINICAL INFORMATION: Seizure       Heart is upper normal in size and mediastinum unremarkable. Lungs clear and   pulmonary vascularity normal. No pleural effusion. CT Results  (Last 48 hours)               09/10/17 1003  CT UROGRAM WO CONT Final result    Impression:  IMPRESSION:        1. Nonobstructing renal collecting system stones. 2. No hydronephrosis or visible ureteral calculi. 3. Please note that the epigastric region and superior aspect of the abdomen is   excluded from this study. Narrative:  CT ABDOMEN AND PELVIS WITHOUT CONTRAST 9/10/2017 10:03 AM       History:  Abdominal pain and mid back pain. Nephrolithiasis. Technique: Noncontrast  axial images were obtained through the abdomen and   pelvis per the renal stone protocol. Coronal reformatted images were generated. Dose reduction techniques were used such as automated exposure control,   adjustment of the MA or KV according to patient size, and iterative   reconstruction. Comparison:  None       Findings: Included portions of the lung bases are clear. ABDOMEN:    Nonobstructing renal collecting system stones are present. There is   no hydronephrosis. There are no visible ureteral calculi.  Please note that the   distal ureters near the UV junction on partially obscured because of artifact   from hip prostheses. Evaluation of the abdominal viscera is limited without IV contrast.  There is a   small umbilical hernia containing fat. The unenhanced appearance of the limited   included portions of the gallbladder, liver, pancreas, spleen and adrenal glands   is normal.       The appendix is normal in appearance. PELVIS: The urinary bladder is distended. Artifact is present from bilateral hip   prostheses. Phleboliths are present in the pelvis. There is a left inguinal   hernia containing fat. The bladder is normal appearance. There is no free pelvic   fluid. Bone windows demonstrated no aggressive osseous lesions. 09/10/17 0956  CT HEAD WO CONT Final result    Impression:  IMPRESSION: No acute findings           Narrative:  HEAD CT WITHOUT CONTRAST  9/10/2017        HISTORY:   seizure       TECHNIQUE: Noncontrast axial images were obtained through the brain. All CT   scans at this facility used dose modulation, interactive reconstruction and/or   weight based dosing when appropriate to reduce radiation dose to as low as   reasonably achievable. COMPARISON: None       FINDINGS: There is no acute intracranial hemorrhage, significant mass effect or   CT evidence of acute large artery territorial infarction. Please note that a   hyperacute infarct or small vessel infarct may not be apparent on initial CT   imaging. There is no hydrocephalus , intra-axial mass or abnormal extra-axial fluid   collection. There are no displaced skull fractures. The mastoid air cells and   paranasal sinuses are clear where imaged. Assessment and Plan: Active Hospital Problems    Diagnosis Date Noted    Seizure [facility]) 09/10/2017     79 yo male HTN  HLD   with new onset seizures  While asleep.      New onset Tonicseizures with  Impaired awareness  Pt had Head imaging that was negative, will  Get  EEG   Place on cardiac monitored bed with continuous pulse oxymetry , since his oxygen saturation are currently in the low 90's  Will get MRI of the brain since this is  An atypical  Late presentation   Check  TSH  Free T4   Check urine for any infectious etiology  Place on seizure precautions  Given that this seizure occurred during sleep the potential of recurrence is possible will  Proceed  By loading with keppra and start the patient on Keppra 500mg BID   He will need outpatient  Follow up with a neurologist.    Leukocytosis  Most likely due to margination from stress episodes no signs of intections  Repeat  CBC in the AM     Acute Lumbar spinal pain  Will  Proceed and get  2-3 view lumbar series   Lidocaine patch  Percocet 5/325 mg 1 tab  q4h prn   Dilaudid 2 mg q4hr pprn       Hypertension  Cont home medications at  This ti me  Cardiac healthy diet    Hyper CPK  Most likely due to muscle release during the episode  Cont with gentle hydration     HLD  Cont statins    Lumbar series xxx   Imaging return positive for  Acute fracture of T11   Neurosurgery  Called  And stat consult placed for evaluation and further management       FEN:  Cardiac diet   DVT ppx:  Heparin   Code status:   Full  Estimated LOS:    Risk assessment:    Plan of care discussed with: patient     Signed By: Patrice Villeda MD     September 10, 2017

## 2017-09-10 NOTE — PROGRESS NOTES
Attempted to visit with patient   Staff providing care at bedside. Will continue to assess needs thru this admission.       Billy Aguirre, staff [facility] [address] [address]  C: [phone]  /  [email or web address]

## 2017-09-10 NOTE — ED PROVIDER NOTES
HPI Comments: Patient presents to the emergency department via EMS from home with a history of apparent seizure this morning. Patient has no history of a seizure disorder and  According to the wife she awoke this morning with the bed shaking looked over and saw her  shaking and jerking with his arms in a tonic-clonic-type manner with  A short pause and then resumption of the same seizure-like activity there was some drooling and some foaming of the mouth noted followed by this sonorous respirations upon cessation of the seizure activity and apparent postictal confusion. Upon arrival the patient was complaining of some lower abdominal pain as well as some back pain no incontinence was noted at the home however the patient still remains somewhat confused to date. EMS reports some confusion and combativeness during the transport which required physical restraint. Patient is a 78 y.o. male presenting with seizures. The history is provided by the patient, the spouse and a relative. Seizure    This is a new problem. The current episode started less than 1 hour ago. The problem has been resolved. There were 2 - 3 seizures. The most recent episode lasted 2 to 5 minutes. Characteristics include rhythmic jerking, loss of consciousness and apnea. Characteristics do not include eye blinking, eye deviation, bowel incontinence, bladder incontinence or cyanosis. The episode was witnessed. There was no sensation of an aura present. There was return to baseline postseizure. The seizures did not continue in the ED. Possible causes do not include medication or dosage change, sleep deprivation, missed seizure meds, recent illness, change in alcohol use, stress, head injury or missed seizure meds. There has been no fever.   There were no medications administered prior to arrival.        Past Medical History:   Diagnosis Date    Hypertension        Past Surgical History:   Procedure Laterality Date    HX HERNIA REPAIR  HX HIP REPLACEMENT      bilateral         Family History:   Problem Relation Age of Onset    Family history unknown: Yes       Social History     Social History    Marital status:      Spouse name: N/A    Number of children: N/A    Years of education: N/A     Occupational History    Not on file. Social History Main Topics    Smoking status: Never Smoker    Smokeless tobacco: Never Used    Alcohol use No    Drug use: No    Sexual activity: Not on file     Other Topics Concern    Not on file     Social History Narrative         ALLERGIES: Nka [no known allergies]    Review of Systems   Respiratory: Positive for apnea. Cardiovascular: Negative for cyanosis. Gastrointestinal: Negative for bowel incontinence. Genitourinary: Negative for bladder incontinence. Neurological: Positive for loss of consciousness. All other systems reviewed and are negative. Vitals:    09/10/17 0858   BP: 136/63   Pulse: 69   Resp: 15   Temp: 97.6 °F (36.4 °C)   SpO2: 93%   Weight: 98.4 kg (217 lb)   Height: 5' 11\" (1.803 m)            Physical Exam   Constitutional: He is oriented to person, place, and time. He appears well-developed and well-nourished. HENT:   Head: Normocephalic and atraumatic. There appears to be some trauma to the tongue   Eyes: Conjunctivae and EOM are normal. Pupils are equal, round, and reactive to light. Neck: Normal range of motion. Neck supple. Cardiovascular: Normal rate, regular rhythm and normal heart sounds. Pulmonary/Chest: Effort normal and breath sounds normal.   Abdominal: Soft. Bowel sounds are normal. There is tenderness. Some mild lower abdominal tenderness noted   Musculoskeletal: Normal range of motion. He exhibits tenderness. Patient is with tenderness noted primarily to the right flank   Neurological: He is alert and oriented to person, place, and time. Skin: Skin is warm and dry. Psychiatric: He has a normal mood and affect.  His behavior is normal.   Nursing note and vitals reviewed. MDM  Number of Diagnoses or Management Options     Amount and/or Complexity of Data Reviewed  Clinical lab tests: ordered and reviewed  Tests in the radiology section of CPT®: ordered and reviewed  Independent visualization of images, tracings, or specimens: yes    Risk of Complications, Morbidity, and/or Mortality  Presenting problems: high  Diagnostic procedures: high  Management options: moderate    Patient Progress  Patient progress: stable    ED Course       Procedures         patient mental status returned to baseline per family. There is no elevation of white blood cell count as well as lactic acid and myoglobin all suggestive, or consistent with history of seizure.   As this is a new onset of seizure and a 72-year-old male with no history of seizures or head injury the case was discussed with hospitalist for admission for further evaluation such as MRI EEG and observation

## 2017-09-10 NOTE — ED NOTES
Report from Michelle Burr, [facility] for continuation of care. Assumed pt care at this time.

## 2017-09-10 NOTE — ROUTINE PROCESS
Primary Nurse Caroline Landon and Mary Leal RN performed a dual skin assessment on this patient No impairment noted  Mateo score is 17

## 2017-09-10 NOTE — ED NOTES
TRANSFER - OUT REPORT:    Verbal report given to Jael Jacobs RN on Saud Flores  being transferred to [phone] for routine progression of care       Report consisted of patients Situation, Background, Assessment and   Recommendations(SBAR). Information from the following report(s) SBAR was reviewed with the receiving nurse. Lines:   Peripheral IV 09/10/17 Left Arm (Active)   Site Assessment Clean, dry, & intact 9/10/2017  9:30 AM   Phlebitis Assessment 0 9/10/2017  9:30 AM   Infiltration Assessment 0 9/10/2017  9:30 AM   Dressing Status Clean, dry, & intact 9/10/2017  9:30 AM   Dressing Type Tape;Transparent 9/10/2017  9:30 AM        Opportunity for questions and clarification was provided.

## 2017-09-11 ENCOUNTER — ANESTHESIA EVENT (OUTPATIENT)
Dept: SURGERY | Age: 77
DRG: 479 | End: 2017-09-11
Payer: MEDICARE

## 2017-09-11 PROBLEM — R56.9 NEW ONSET SEIZURE (HCC): Status: ACTIVE | Noted: 2017-09-11

## 2017-09-11 LAB
ANION GAP SERPL CALC-SCNC: 9 MMOL/L (ref 7–16)
BASOPHILS # BLD: 0 K/UL (ref 0–0.2)
BASOPHILS NFR BLD: 0 % (ref 0–2)
BUN SERPL-MCNC: 15 MG/DL (ref 8–23)
CALCIUM SERPL-MCNC: 8.2 MG/DL (ref 8.3–10.4)
CHLORIDE SERPL-SCNC: 107 MMOL/L (ref 98–107)
CK SERPL-CCNC: 372 U/L (ref 21–215)
CO2 SERPL-SCNC: 25 MMOL/L (ref 21–32)
CREAT SERPL-MCNC: 1.06 MG/DL (ref 0.8–1.5)
DIFFERENTIAL METHOD BLD: ABNORMAL
EOSINOPHIL # BLD: 0.2 K/UL (ref 0–0.8)
EOSINOPHIL NFR BLD: 2 % (ref 0.5–7.8)
ERYTHROCYTE [DISTWIDTH] IN BLOOD BY AUTOMATED COUNT: 12.6 % (ref 11.9–14.6)
GLUCOSE SERPL-MCNC: 133 MG/DL (ref 65–100)
HCT VFR BLD AUTO: 42.7 % (ref 41.1–50.3)
HGB BLD-MCNC: 14.9 G/DL (ref 13.6–17.2)
IMM GRANULOCYTES # BLD: 0 K/UL (ref 0–0.5)
IMM GRANULOCYTES NFR BLD: 0.2 % (ref 0–5)
LYMPHOCYTES # BLD: 1.1 K/UL (ref 0.5–4.6)
LYMPHOCYTES NFR BLD: 11 % (ref 13–44)
MCH RBC QN AUTO: 31.4 PG (ref 26.1–32.9)
MCHC RBC AUTO-ENTMCNC: 34.9 G/DL (ref 31.4–35)
MCV RBC AUTO: 89.9 FL (ref 79.6–97.8)
MONOCYTES # BLD: 1.3 K/UL (ref 0.1–1.3)
MONOCYTES NFR BLD: 14 % (ref 4–12)
NEUTS SEG # BLD: 7.1 K/UL (ref 1.7–8.2)
NEUTS SEG NFR BLD: 73 % (ref 43–78)
PLATELET # BLD AUTO: 183 K/UL (ref 150–450)
PMV BLD AUTO: 10 FL (ref 10.8–14.1)
POTASSIUM SERPL-SCNC: 3.9 MMOL/L (ref 3.5–5.1)
RBC # BLD AUTO: 4.75 M/UL (ref 4.23–5.67)
SODIUM SERPL-SCNC: 141 MMOL/L (ref 136–145)
WBC # BLD AUTO: 9.7 K/UL (ref 4.3–11.1)

## 2017-09-11 PROCEDURE — 65660000000 HC RM CCU STEPDOWN

## 2017-09-11 PROCEDURE — 99218 HC RM OBSERVATION: CPT

## 2017-09-11 PROCEDURE — 94762 N-INVAS EAR/PLS OXIMTRY CONT: CPT

## 2017-09-11 PROCEDURE — 74011250637 HC RX REV CODE- 250/637: Performed by: HOSPITALIST

## 2017-09-11 PROCEDURE — 82550 ASSAY OF CK (CPK): CPT | Performed by: HOSPITALIST

## 2017-09-11 PROCEDURE — 85025 COMPLETE CBC W/AUTO DIFF WBC: CPT | Performed by: HOSPITALIST

## 2017-09-11 PROCEDURE — 77010033678 HC OXYGEN DAILY

## 2017-09-11 PROCEDURE — 36415 COLL VENOUS BLD VENIPUNCTURE: CPT | Performed by: HOSPITALIST

## 2017-09-11 PROCEDURE — 74011250636 HC RX REV CODE- 250/636: Performed by: HOSPITALIST

## 2017-09-11 PROCEDURE — 80048 BASIC METABOLIC PNL TOTAL CA: CPT | Performed by: HOSPITALIST

## 2017-09-11 RX ADMIN — LEVETIRACETAM 500 MG: 500 TABLET, FILM COATED ORAL at 21:18

## 2017-09-11 RX ADMIN — LEVETIRACETAM 500 MG: 500 TABLET, FILM COATED ORAL at 08:31

## 2017-09-11 RX ADMIN — HYDROCODONE BITARTRATE AND ACETAMINOPHEN 1 TABLET: 5; 325 TABLET ORAL at 17:41

## 2017-09-11 RX ADMIN — HYDROCODONE BITARTRATE AND ACETAMINOPHEN 1 TABLET: 5; 325 TABLET ORAL at 09:53

## 2017-09-11 RX ADMIN — AMLODIPINE BESYLATE 5 MG: 5 TABLET ORAL at 08:31

## 2017-09-11 RX ADMIN — ASPIRIN 81 MG: 81 TABLET, COATED ORAL at 08:31

## 2017-09-11 RX ADMIN — PANTOPRAZOLE SODIUM 40 MG: 40 TABLET, DELAYED RELEASE ORAL at 05:28

## 2017-09-11 RX ADMIN — EZETIMIBE 10 MG: 10 TABLET ORAL at 17:12

## 2017-09-11 RX ADMIN — SODIUM CHLORIDE 75 ML/HR: 900 INJECTION, SOLUTION INTRAVENOUS at 21:18

## 2017-09-11 RX ADMIN — LOSARTAN POTASSIUM 50 MG: 50 TABLET ORAL at 08:30

## 2017-09-11 RX ADMIN — HEPARIN SODIUM 5000 UNITS: 5000 INJECTION, SOLUTION INTRAVENOUS; SUBCUTANEOUS at 17:13

## 2017-09-11 RX ADMIN — HEPARIN SODIUM 5000 UNITS: 5000 INJECTION, SOLUTION INTRAVENOUS; SUBCUTANEOUS at 05:27

## 2017-09-11 NOTE — PROGRESS NOTES
Problem: Interdisciplinary Rounds  Goal: Interdisciplinary Rounds  Outcome: Progressing Towards Goal  Interdisciplinary team rounds were held 9/11/2017 with the following team members:Care Management, Physical Therapy, Physician and . Plan of care discussed. See clinical pathway and/or care plan for interventions and desired outcomes.

## 2017-09-11 NOTE — ANESTHESIA PREPROCEDURE EVALUATION
Anesthetic History   No history of anesthetic complications            Review of Systems / Medical History  Patient summary reviewed and pertinent labs reviewed    Pulmonary  Within defined limits                 Neuro/Psych     seizures (New-onset seizure - normal head CT/MRI - loaded with Keppra)         Cardiovascular    Hypertension: well controlled          Hyperlipidemia    Exercise tolerance: >4 METS     GI/Hepatic/Renal     GERD: well controlled           Endo/Other        Arthritis     Other Findings              Physical Exam    Airway  Mallampati: II  TM Distance: > 6 cm  Neck ROM: normal range of motion   Mouth opening: Normal     Cardiovascular    Rhythm: regular  Rate: normal         Dental  No notable dental hx       Pulmonary  Breath sounds clear to auscultation               Abdominal         Other Findings            Anesthetic Plan    ASA: 2  Anesthesia type: general            Anesthetic plan and risks discussed with: Patient      Ton Corcoran' father-in-law
WDL

## 2017-09-11 NOTE — PROGRESS NOTES
Pt on oxinet #253 as ordered by Dr. Agata Menendez. SAT's 93% on 2 liter nasal cannula. Telemetry notified.

## 2017-09-11 NOTE — PROGRESS NOTES
Problem: Falls - Risk of  Goal: *Absence of Falls  Document Chloe Fall Risk and appropriate interventions in the flowsheet.    Outcome: Progressing Towards Goal  Fall Risk Interventions:              Medication Interventions: Bed/chair exit alarm, Evaluate medications/consider consulting pharmacy, Patient to call before getting OOB, Teach patient to arise slowly     Elimination Interventions: Call light in reach, Patient to call for help with toileting needs, Toilet paper/wipes in reach, Toileting schedule/hourly rounds

## 2017-09-11 NOTE — PHYSICIAN ADVISORY
Letter of Determination: Upgrade from Observation to Inpatient Status    This patient was originally hospitalized as observation status on 9/10/2017 for new onset seizure. This patient is appropriate for Inpatient Admission in accordance with CMS regulation Section 43 .3. Specifically, patient's stay is now expected to be more than Two Midnights and was medically necessary. The patient's stay was medically necessary based on new onset seizure, with postictal state. Lab results were significant for a CK of 372 U/L, and lactic acid of 5.5 mmol/L. Vital signs were significant for an oxygen saturation of 89% on 3 liters of oxygen by nasal canula. At presentation the patient was discovered to also have an acute T11 vertebral compression fracture sustained during the seizure event. Plan of care included evaluation by neurosurgery for vertebral fracture, EEG, and seizure precautions. Consistent with CMS guidelines, patient meets for inpatient status. It is our recommendation that this patient's hospitalization status should be upgraded from OBSERVATION to INPATIENT status.      The final decision regarding the patient's hospitalization status depends on the attending physician's judgement.     Jeffery Wilson MD, ABHIJEET,   Physician [place].

## 2017-09-11 NOTE — PROGRESS NOTES
Problem: Falls - Risk of  Goal: *Absence of Falls  Document Chloe Fall Risk and appropriate interventions in the flowsheet.    Outcome: Progressing Towards Goal  Fall Risk Interventions:              Medication Interventions: Bed/chair exit alarm, Patient to call before getting OOB, Teach patient to arise slowly     Elimination Interventions: Bed/chair exit alarm, Call light in reach, Patient to call for help with toileting needs

## 2017-09-11 NOTE — ACP (ADVANCE CARE PLANNING)
Completed advance directives with patient. Copies provided.     Yasmin Cross, staff [facility] [address] [facility]  /   [email or web address]

## 2017-09-11 NOTE — PROGRESS NOTES
Pt seen and examined, full consult to follow. T11 compression Fx, acute, sustained during a generalized tonic-clonic seizure. He wishes to proceed with a T11 kyphoplasty. Will plan surgery tomorrow early afternoon. Thanks!

## 2017-09-11 NOTE — PROCEDURES
[facility]       Name:  Katelyn Blas   MR#:  [id number]   :  1940   Account #:  [de-identified]   Date of Adm:  09/10/2017       AGE: 44-year-old male. EEG [id number]. Awake and asleep EEG. ORDERING PHYSICIAN: Torey Rodriguez MD.    TEST DATE: 2017      INDICATION: New onset of seizure. MEDICATIONS:    1. heparin. 2. Norco.   3. Keppra. 4. Protonix. TECHNIQUE: An 18-channel EEG was performed on [employer]   machine with EKG monitoring. The international 10/20   electrode placement system and standard montages were used. STATE OF CONSCIOUSNESS: Awake, drowsy, sleep. Background EEG showed diffuse theta activities mixed with   intermittent delta activities and scattered triphasic waves in   bilateral hemisphere. Posterior rhythm reached 8 Hz   occasionally while briefly awake. Beta 15-25 Hz, low voltage,   anterior regions, sometimes became diffuse and symmetrical. No   hythmic activities. No spike and slow wave complex. PHOTIC STIMULATION: Minimal driving, symmetrical.      EK/minute with PVCs. INTERPRETATION: EEG shows mild, diffuse, nonspecific cerebral   dysfunction accosociated with occasional   triphasic waves. No epileptiform discharges or rhythmic   activities are identified. No lateralized pathological slowing   activities. Findings suggest mild degree of encephalopathy. EKG   monitoring shows PVCs.         MD XUAN Benitez / Reynold Bean   D:  2017   15:28   T:  2017   16:09   Job #:  [id number]

## 2017-09-12 ENCOUNTER — ANESTHESIA (OUTPATIENT)
Dept: SURGERY | Age: 77
DRG: 479 | End: 2017-09-12
Payer: MEDICARE

## 2017-09-12 ENCOUNTER — APPOINTMENT (OUTPATIENT)
Dept: GENERAL RADIOLOGY | Age: 77
DRG: 479 | End: 2017-09-12
Attending: NEUROLOGICAL SURGERY
Payer: MEDICARE

## 2017-09-12 LAB
ALBUMIN SERPL-MCNC: 2.8 G/DL (ref 3.2–4.6)
ALBUMIN/GLOB SERPL: 0.8 {RATIO} (ref 1.2–3.5)
ALP SERPL-CCNC: 74 U/L (ref 50–136)
ALT SERPL-CCNC: 39 U/L (ref 12–65)
ANION GAP SERPL CALC-SCNC: 9 MMOL/L (ref 7–16)
AST SERPL-CCNC: 36 U/L (ref 15–37)
BILIRUB SERPL-MCNC: 0.6 MG/DL (ref 0.2–1.1)
BUN SERPL-MCNC: 9 MG/DL (ref 8–23)
CALCIUM SERPL-MCNC: 8.1 MG/DL (ref 8.3–10.4)
CHLORIDE SERPL-SCNC: 108 MMOL/L (ref 98–107)
CO2 SERPL-SCNC: 26 MMOL/L (ref 21–32)
CREAT SERPL-MCNC: 0.84 MG/DL (ref 0.8–1.5)
ERYTHROCYTE [DISTWIDTH] IN BLOOD BY AUTOMATED COUNT: 12.4 % (ref 11.9–14.6)
GLOBULIN SER CALC-MCNC: 3.7 G/DL (ref 2.3–3.5)
GLUCOSE SERPL-MCNC: 95 MG/DL (ref 65–100)
HCT VFR BLD AUTO: 42 % (ref 41.1–50.3)
HGB BLD-MCNC: 14.2 G/DL (ref 13.6–17.2)
MCH RBC QN AUTO: 30.5 PG (ref 26.1–32.9)
MCHC RBC AUTO-ENTMCNC: 33.8 G/DL (ref 31.4–35)
MCV RBC AUTO: 90.1 FL (ref 79.6–97.8)
PLATELET # BLD AUTO: 159 K/UL (ref 150–450)
PMV BLD AUTO: 9.8 FL (ref 10.8–14.1)
POTASSIUM SERPL-SCNC: 3.8 MMOL/L (ref 3.5–5.1)
PROT SERPL-MCNC: 6.5 G/DL (ref 6.3–8.2)
RBC # BLD AUTO: 4.66 M/UL (ref 4.23–5.67)
SODIUM SERPL-SCNC: 143 MMOL/L (ref 136–145)
WBC # BLD AUTO: 7.5 K/UL (ref 4.3–11.1)

## 2017-09-12 PROCEDURE — 88341 IMHCHEM/IMCYTCHM EA ADD ANTB: CPT | Performed by: NEUROLOGICAL SURGERY

## 2017-09-12 PROCEDURE — 74011250636 HC RX REV CODE- 250/636

## 2017-09-12 PROCEDURE — 77030002916 HC SUT ETHLN J&J -A: Performed by: NEUROLOGICAL SURGERY

## 2017-09-12 PROCEDURE — 77030008477 HC STYL SATN SLP COVD -A: Performed by: ANESTHESIOLOGY

## 2017-09-12 PROCEDURE — 74011250636 HC RX REV CODE- 250/636: Performed by: ANESTHESIOLOGY

## 2017-09-12 PROCEDURE — 77030020782 HC GWN BAIR PAWS FLX 3M -B: Performed by: ANESTHESIOLOGY

## 2017-09-12 PROCEDURE — 88305 TISSUE EXAM BY PATHOLOGIST: CPT | Performed by: NEUROLOGICAL SURGERY

## 2017-09-12 PROCEDURE — 88311 DECALCIFY TISSUE: CPT | Performed by: NEUROLOGICAL SURGERY

## 2017-09-12 PROCEDURE — 94762 N-INVAS EAR/PLS OXIMTRY CONT: CPT

## 2017-09-12 PROCEDURE — 65270000029 HC RM PRIVATE

## 2017-09-12 PROCEDURE — 74011000250 HC RX REV CODE- 250: Performed by: NEUROLOGICAL SURGERY

## 2017-09-12 PROCEDURE — 77030032951 HC BN CDM DEL AVAMAX PLS STRY -F: Performed by: NEUROLOGICAL SURGERY

## 2017-09-12 PROCEDURE — 77030008703 HC TU ET UNCUF COVD -A: Performed by: ANESTHESIOLOGY

## 2017-09-12 PROCEDURE — 77030019940 HC BLNKT HYPOTHRM STRY -B: Performed by: ANESTHESIOLOGY

## 2017-09-12 PROCEDURE — 76010000161 HC OR TIME 1 TO 1.5 HR INTENSV-TIER 1: Performed by: NEUROLOGICAL SURGERY

## 2017-09-12 PROCEDURE — 74011250637 HC RX REV CODE- 250/637: Performed by: HOSPITALIST

## 2017-09-12 PROCEDURE — 85027 COMPLETE CBC AUTOMATED: CPT | Performed by: INTERNAL MEDICINE

## 2017-09-12 PROCEDURE — 76210000017 HC OR PH I REC 1.5 TO 2 HR: Performed by: NEUROLOGICAL SURGERY

## 2017-09-12 PROCEDURE — 74011250636 HC RX REV CODE- 250/636: Performed by: NEUROLOGICAL SURGERY

## 2017-09-12 PROCEDURE — 77030032490 HC SLV COMPR SCD KNE COVD -B: Performed by: NEUROLOGICAL SURGERY

## 2017-09-12 PROCEDURE — 36415 COLL VENOUS BLD VENIPUNCTURE: CPT | Performed by: INTERNAL MEDICINE

## 2017-09-12 PROCEDURE — 77030037174 HC BLN VERT KYPH AVAFLEX STRY -H1: Performed by: NEUROLOGICAL SURGERY

## 2017-09-12 PROCEDURE — 80053 COMPREHEN METABOLIC PANEL: CPT | Performed by: INTERNAL MEDICINE

## 2017-09-12 PROCEDURE — 77010033678 HC OXYGEN DAILY

## 2017-09-12 PROCEDURE — 88342 IMHCHEM/IMCYTCHM 1ST ANTB: CPT | Performed by: NEUROLOGICAL SURGERY

## 2017-09-12 PROCEDURE — 76060000033 HC ANESTHESIA 1 TO 1.5 HR: Performed by: NEUROLOGICAL SURGERY

## 2017-09-12 PROCEDURE — 77030019908 HC STETH ESOPH SIMS -A: Performed by: ANESTHESIOLOGY

## 2017-09-12 PROCEDURE — 74011000250 HC RX REV CODE- 250

## 2017-09-12 PROCEDURE — 74011250636 HC RX REV CODE- 250/636: Performed by: HOSPITALIST

## 2017-09-12 DEVICE — IMPLANTABLE DEVICE: Type: IMPLANTABLE DEVICE | Site: SPINE THORACIC | Status: FUNCTIONAL

## 2017-09-12 RX ORDER — ROCURONIUM BROMIDE 10 MG/ML
INJECTION, SOLUTION INTRAVENOUS AS NEEDED
Status: DISCONTINUED | OUTPATIENT
Start: 2017-09-12 | End: 2017-09-12 | Stop reason: HOSPADM

## 2017-09-12 RX ORDER — GLYCOPYRROLATE 0.2 MG/ML
INJECTION INTRAMUSCULAR; INTRAVENOUS AS NEEDED
Status: DISCONTINUED | OUTPATIENT
Start: 2017-09-12 | End: 2017-09-12 | Stop reason: HOSPADM

## 2017-09-12 RX ORDER — CEFAZOLIN SODIUM IN 0.9 % NACL 2 G/50 ML
2 INTRAVENOUS SOLUTION, PIGGYBACK (ML) INTRAVENOUS
Status: COMPLETED | OUTPATIENT
Start: 2017-09-12 | End: 2017-09-12

## 2017-09-12 RX ORDER — SODIUM CHLORIDE, SODIUM LACTATE, POTASSIUM CHLORIDE, CALCIUM CHLORIDE 600; 310; 30; 20 MG/100ML; MG/100ML; MG/100ML; MG/100ML
75 INJECTION, SOLUTION INTRAVENOUS CONTINUOUS
Status: DISCONTINUED | OUTPATIENT
Start: 2017-09-12 | End: 2017-09-13 | Stop reason: HOSPADM

## 2017-09-12 RX ORDER — LIDOCAINE HYDROCHLORIDE 20 MG/ML
INJECTION, SOLUTION EPIDURAL; INFILTRATION; INTRACAUDAL; PERINEURAL AS NEEDED
Status: DISCONTINUED | OUTPATIENT
Start: 2017-09-12 | End: 2017-09-12 | Stop reason: HOSPADM

## 2017-09-12 RX ORDER — ONDANSETRON 2 MG/ML
INJECTION INTRAMUSCULAR; INTRAVENOUS AS NEEDED
Status: DISCONTINUED | OUTPATIENT
Start: 2017-09-12 | End: 2017-09-12 | Stop reason: HOSPADM

## 2017-09-12 RX ORDER — OXYCODONE AND ACETAMINOPHEN 10; 325 MG/1; MG/1
1 TABLET ORAL AS NEEDED
Status: DISCONTINUED | OUTPATIENT
Start: 2017-09-12 | End: 2017-09-12 | Stop reason: HOSPADM

## 2017-09-12 RX ORDER — BUPIVACAINE HYDROCHLORIDE AND EPINEPHRINE 5; 5 MG/ML; UG/ML
INJECTION, SOLUTION EPIDURAL; INTRACAUDAL; PERINEURAL AS NEEDED
Status: DISCONTINUED | OUTPATIENT
Start: 2017-09-12 | End: 2017-09-12 | Stop reason: HOSPADM

## 2017-09-12 RX ORDER — SODIUM CHLORIDE, SODIUM LACTATE, POTASSIUM CHLORIDE, CALCIUM CHLORIDE 600; 310; 30; 20 MG/100ML; MG/100ML; MG/100ML; MG/100ML
75 INJECTION, SOLUTION INTRAVENOUS CONTINUOUS
Status: CANCELLED | OUTPATIENT
Start: 2017-09-12 | End: 2017-09-13

## 2017-09-12 RX ORDER — HYDROMORPHONE HYDROCHLORIDE 1 MG/ML
0.5 INJECTION, SOLUTION INTRAMUSCULAR; INTRAVENOUS; SUBCUTANEOUS
Status: DISCONTINUED | OUTPATIENT
Start: 2017-09-12 | End: 2017-09-13 | Stop reason: HOSPADM

## 2017-09-12 RX ORDER — FENTANYL CITRATE 50 UG/ML
INJECTION, SOLUTION INTRAMUSCULAR; INTRAVENOUS AS NEEDED
Status: DISCONTINUED | OUTPATIENT
Start: 2017-09-12 | End: 2017-09-12 | Stop reason: HOSPADM

## 2017-09-12 RX ORDER — SODIUM CHLORIDE 0.9 % (FLUSH) 0.9 %
5-10 SYRINGE (ML) INJECTION AS NEEDED
Status: DISCONTINUED | OUTPATIENT
Start: 2017-09-12 | End: 2017-09-12 | Stop reason: HOSPADM

## 2017-09-12 RX ORDER — OXYCODONE HYDROCHLORIDE 5 MG/1
5 TABLET ORAL
Status: DISCONTINUED | OUTPATIENT
Start: 2017-09-12 | End: 2017-09-12 | Stop reason: HOSPADM

## 2017-09-12 RX ORDER — NEOSTIGMINE METHYLSULFATE 1 MG/ML
INJECTION INTRAVENOUS AS NEEDED
Status: DISCONTINUED | OUTPATIENT
Start: 2017-09-12 | End: 2017-09-12 | Stop reason: HOSPADM

## 2017-09-12 RX ORDER — MIDAZOLAM HYDROCHLORIDE 1 MG/ML
2 INJECTION, SOLUTION INTRAMUSCULAR; INTRAVENOUS
Status: CANCELLED | OUTPATIENT
Start: 2017-09-12

## 2017-09-12 RX ORDER — PROPOFOL 10 MG/ML
INJECTION, EMULSION INTRAVENOUS AS NEEDED
Status: DISCONTINUED | OUTPATIENT
Start: 2017-09-12 | End: 2017-09-12 | Stop reason: HOSPADM

## 2017-09-12 RX ORDER — HYDROMORPHONE HYDROCHLORIDE 2 MG/ML
0.5 INJECTION, SOLUTION INTRAMUSCULAR; INTRAVENOUS; SUBCUTANEOUS
Status: DISCONTINUED | OUTPATIENT
Start: 2017-09-12 | End: 2017-09-12 | Stop reason: HOSPADM

## 2017-09-12 RX ADMIN — LEVETIRACETAM 500 MG: 500 TABLET, FILM COATED ORAL at 06:10

## 2017-09-12 RX ADMIN — LEVETIRACETAM 500 MG: 500 TABLET, FILM COATED ORAL at 21:10

## 2017-09-12 RX ADMIN — CEFAZOLIN 2 G: 1 INJECTION, POWDER, FOR SOLUTION INTRAMUSCULAR; INTRAVENOUS; PARENTERAL at 14:25

## 2017-09-12 RX ADMIN — LIDOCAINE HYDROCHLORIDE 100 MG: 20 INJECTION, SOLUTION EPIDURAL; INFILTRATION; INTRACAUDAL; PERINEURAL at 14:14

## 2017-09-12 RX ADMIN — Medication 10 ML: at 21:10

## 2017-09-12 RX ADMIN — Medication 10 ML: at 18:08

## 2017-09-12 RX ADMIN — ONDANSETRON 4 MG: 2 INJECTION INTRAMUSCULAR; INTRAVENOUS at 15:02

## 2017-09-12 RX ADMIN — PANTOPRAZOLE SODIUM 40 MG: 40 TABLET, DELAYED RELEASE ORAL at 06:09

## 2017-09-12 RX ADMIN — NEOSTIGMINE METHYLSULFATE 3 MG: 1 INJECTION INTRAVENOUS at 15:06

## 2017-09-12 RX ADMIN — ASPIRIN 81 MG: 81 TABLET, COATED ORAL at 06:10

## 2017-09-12 RX ADMIN — HYDROMORPHONE HYDROCHLORIDE 0.5 MG: 2 INJECTION, SOLUTION INTRAMUSCULAR; INTRAVENOUS; SUBCUTANEOUS at 15:32

## 2017-09-12 RX ADMIN — FENTANYL CITRATE 50 MCG: 50 INJECTION, SOLUTION INTRAMUSCULAR; INTRAVENOUS at 14:14

## 2017-09-12 RX ADMIN — SODIUM CHLORIDE 75 ML/HR: 900 INJECTION, SOLUTION INTRAVENOUS at 21:15

## 2017-09-12 RX ADMIN — PROPOFOL 50 MG: 10 INJECTION, EMULSION INTRAVENOUS at 14:15

## 2017-09-12 RX ADMIN — PANTOPRAZOLE SODIUM 40 MG: 40 TABLET, DELAYED RELEASE ORAL at 07:30

## 2017-09-12 RX ADMIN — HYDROMORPHONE HYDROCHLORIDE 0.5 MG: 1 INJECTION, SOLUTION INTRAMUSCULAR; INTRAVENOUS; SUBCUTANEOUS at 11:32

## 2017-09-12 RX ADMIN — SODIUM CHLORIDE, SODIUM LACTATE, POTASSIUM CHLORIDE, AND CALCIUM CHLORIDE: 600; 310; 30; 20 INJECTION, SOLUTION INTRAVENOUS at 14:46

## 2017-09-12 RX ADMIN — AMLODIPINE BESYLATE 5 MG: 5 TABLET ORAL at 06:09

## 2017-09-12 RX ADMIN — SODIUM CHLORIDE, SODIUM LACTATE, POTASSIUM CHLORIDE, AND CALCIUM CHLORIDE 75 ML/HR: 600; 310; 30; 20 INJECTION, SOLUTION INTRAVENOUS at 09:51

## 2017-09-12 RX ADMIN — ROCURONIUM BROMIDE 40 MG: 10 INJECTION, SOLUTION INTRAVENOUS at 14:15

## 2017-09-12 RX ADMIN — HYDROCODONE BITARTRATE AND ACETAMINOPHEN 1 TABLET: 5; 325 TABLET ORAL at 02:37

## 2017-09-12 RX ADMIN — GLYCOPYRROLATE 0.4 MG: 0.2 INJECTION INTRAMUSCULAR; INTRAVENOUS at 15:06

## 2017-09-12 RX ADMIN — ROCURONIUM BROMIDE 10 MG: 10 INJECTION, SOLUTION INTRAVENOUS at 14:35

## 2017-09-12 RX ADMIN — PROPOFOL 100 MG: 10 INJECTION, EMULSION INTRAVENOUS at 14:14

## 2017-09-12 NOTE — OP NOTES
Fletcher Armando [id number]  [id number]    1940  78 y.o.  male       Operative Report    Date of Surgery: 9/12/2017     Preoperative Diagnosis: T11 COMPRESSION FRACTURE; OSTEOPOROSIS    Postoperative Diagnosis: SAME    Surgeon(s) and Role:     * Cherelle Sandoval MD - Primary    Anesthesia: GET     Procedure:   1. T11 kyphoplasty. 2. Intraoperative fluoroscopic guidance. 3. T11 bone biopsy. Procedure in Detail:   After appropriate informed consent was obtained, the patient was taken to the operating suite where satisfactory general anesthesia was induced. He was turned into the prone position on the operating table on chest rolls. All pressure points were carefully padded. Perioperative antibiotics were given. The thoracolumbar region was prepped and draped in the usual sterile fashion. Intraoperative fluoroscopy was used to localize an entry point for access to the left T11 pedicle. The skin at this entry point was infiltrated with 1% Lidocaine with Epinephrine. A small stab incision was placed at the entry point. A trochar was then driven down the pedicle under fluoroscopic guidance. The needle tip was parked in the posterior third of the vertebral body. A core bone biopsy was taken via the needle and was sent to the lab as specimen. A  hole was drilled into the vertebral body under continuous fluoroscopic guidance. A balloon was then inserted into the vertebral body and was inflated under fluoroscopic control. This allowed for partial reduction of the compression fracture. The balloon was then deflated and removed from the patient. Bone cement was then prepared appropriately with an acceptable consistency. This cement was then injected into the vertebral body under continuous fluoroscopic control. This allowed for a satisfactory bilateral fill of the vertebral body with the cement. The needle was then removed from the patient.   The stab incision was closed with a 3-0 nylon suture. A sterile dressing was applied. The patient was then turned back into the supine position on the stretcher where general anesthesia was reversed. He was taken to the recovery room in satisfactory condition. Estimated Blood Loss:  5cc    Specimens: T11 bone biopsy     Drains: None                Complications: None    Counts: Sponge and needle counts were correct times two.     Signed By:  Teofilo Springer MD     September 12, 2017

## 2017-09-12 NOTE — ROUTINE PROCESS
TRANSFER - OUT REPORT:    Verbal report given to Derrick RN on Nica Chamber  being transferred to [phone] for routine post - op       Report consisted of patients Situation, Background, Assessment and   Recommendations(SBAR). Information from the following report(s) SBAR, Kardex, OR Summary, Procedure Summary, Intake/Output and MAR was reviewed with the receiving nurse. Lines:   Peripheral IV 09/11/17 Right Arm (Active)   Site Assessment Clean, dry, & intact 9/12/2017  3:24 PM   Phlebitis Assessment 0 9/12/2017  3:24 PM   Infiltration Assessment 0 9/12/2017  3:24 PM   Dressing Status Clean, dry, & intact 9/12/2017  3:24 PM   Dressing Type Transparent;Tape 9/12/2017  3:24 PM   Hub Color/Line Status Capped 9/12/2017  3:24 PM   Alcohol Cap Used No 9/12/2017  3:24 PM       Peripheral IV 09/12/17 Left Arm (Active)   Site Assessment Clean, dry, & intact 9/12/2017  3:24 PM   Phlebitis Assessment 0 9/12/2017  3:24 PM   Infiltration Assessment 0 9/12/2017  3:24 PM   Dressing Status Clean, dry, & intact 9/12/2017  3:24 PM   Dressing Type Transparent;Tape 9/12/2017  3:24 PM   Hub Color/Line Status Infusing 9/12/2017  3:24 PM   Alcohol Cap Used No 9/12/2017  3:24 PM        Opportunity for questions and clarification was provided. Patient transported with:   O2 @ 3 liters    VTE prophylaxis orders have been written for Florence Chamber. Patient and family given floor number and nurses name. Family updated re: pt status after security code verified.

## 2017-09-12 NOTE — CONSULTS
Neurosurgery Consult     Subjective:              Yumiko Sanchez is a 78 y.o. [address] [facility] male who presented on 9/10 after a generalized tonic-clonic seizure. This occurred while sleeping. The patient's wife awoke to the bed shaking and witnessed the seizure activity. He did bite his tongue during this episode. He has never had a seizure before. Currently, he is doing better apart from some new pain in the mid back. This pain is purely mechanical in nature. There is no radicular component. He has no paresthesias or focal weakness with this.        Patient Active Problem List    Diagnosis Date Noted    New onset seizure ([facility] [address] 09/11/2017    Seizure ([facility] [address] 09/10/2017    Primary osteoarthritis of left shoulder 07/14/2017    Elevated PSA 07/14/2017    Benign prostatic hyperplasia without lower urinary tract symptoms 01/09/2017    Chronic shoulder bursitis 01/09/2017    Hypertension 08/05/2016    Hyperlipidemia 08/05/2016         Mark Watt MD       Past Medical History:   Diagnosis Date    Hypertension            Past Surgical History:   Procedure Laterality Date    HX HERNIA REPAIR      HX HIP REPLACEMENT      bilateral          Allergies   Allergen Reactions    Nka [No Known Allergies] Other (comments)           Family History   Problem Relation Age of Onset    Family history unknown: Yes        Current Facility-Administered Medications   Medication Dose Route Frequency    magic mouthwash (SAPNA) oral suspension 5 mL  5 mL Oral Q4H PRN    sodium chloride (NS) flush 5-10 mL  5-10 mL IntraVENous Q8H    sodium chloride (NS) flush 5-10 mL  5-10 mL IntraVENous PRN    amLODIPine (NORVASC) tablet 5 mg  5 mg Oral DAILY    aspirin delayed-release tablet 81 mg  81 mg Oral DAILY    pantoprazole (PROTONIX) tablet 40 mg  40 mg Oral ACB    ezetimibe (ZETIA) tablet 10 mg  10 mg Oral Q MON, WED & FRI    losartan (COZAAR) tablet 50 mg  50 mg Oral DAILY    pitavastatin calcium (LIVALO) tablet 4 mg  4 mg Oral DAILY    sodium chloride (NS) flush 5-10 mL  5-10 mL IntraVENous Q8H    sodium chloride (NS) flush 5-10 mL  5-10 mL IntraVENous PRN    HYDROcodone-acetaminophen (NORCO) 5-325 mg per tablet 1 Tab  1 Tab Oral Q4H PRN    naloxone (NARCAN) injection 0.4 mg  0.4 mg IntraVENous PRN    diphenhydrAMINE (BENADRYL) capsule 25 mg  25 mg Oral Q4H PRN    ondansetron (ZOFRAN) injection 4 mg  4 mg IntraVENous Q4H PRN    bisacodyl (DULCOLAX) tablet 5 mg  5 mg Oral DAILY PRN    levETIRAcetam (KEPPRA) tablet 500 mg  500 mg Oral BID    0.9% sodium chloride infusion  75 mL/hr IntraVENous CONTINUOUS    lidocaine (LIDODERM) 5 % patch 2 Patch  2 Patch TransDERmal Q24H    HYDROmorphone (PF) (DILAUDID) injection 1 mg  1 mg IntraVENous Q4H PRN    heparin (porcine) injection 5,000 Units  5,000 Units SubCUTAneous Q12H    LORazepam (ATIVAN) injection 1 mg  1 mg IntraVENous Q5MIN PRN         Review of Systems: A comprehensive review of systems was negative except for that written in the HPI. Objective:     /80 (BP 1 Location: Right arm, BP Patient Position: Supine)  Pulse 69  Temp 98.5 °F (36.9 °C)  Resp 18  Ht 5' 11\" (1.803 m)  Wt 98.4 kg (217 lb)  SpO2 94%  BMI 30.27 kg/m2    Physical Exam:   General:  Alert, cooperative, no distress, appears stated age. Eyes:  Conjunctivae/corneas clear. PERRL, EOMs intact. Fundi benign   Ears:  Normal TMs and external ear canals both ears. Nose: Nares normal. Septum midline. Mucosa normal. No drainage or sinus tenderness. Mouth/Throat: Lips, mucosa, and tongue normal. Teeth and gums normal.   Neck: Supple, symmetrical, trachea midline, no adenopathy, thyroid: no enlargment/tenderness/nodules, no carotid bruit and no JVD. Back:   Symmetric, no curvature. ROM normal. No CVA tenderness. Lungs:   Clear to auscultation bilaterally. Heart:  Regular rate and rhythm, S1, S2 normal, no murmur, click, rub or gallop. Abdomen:   Soft, non-tender.  Bowel sounds normal. No masses,  No organomegaly. Extremities: Extremities normal, atraumatic, no cyanosis or edema. Pulses: 2+ and symmetric all extremities. Skin: Skin color, texture, turgor normal. No rashes or lesions   Lymph nodes: Cervical, supraclavicular, and axillary nodes normal.   Appearance: The patient is well developed, well nourished, provides a coherent history and is in no acute distress. GCS15, A&Ox3. Speech is fluent and appropriate. Cranial Nerves:   Intact visual fields. Fundi are benign. TONNY, EOM's full, no nystagmus, no ptosis. Facial sensation is normal. Corneal reflexes are intact. Facial movement is symmetric. Hearing is normal bilaterally. Palate is midline with normal sternocleidomastoid and trapezius muscles are normal. Tongue is midline. Motor:  5/5 strength in upper and lower proximal and distal muscles. Normal bulk and tone. No fasciculations. Reflexes:   Deep tendon reflexes 2+/4 and symmetrical.  Mckay's sign is negative bilaterally. There is no clonus present. Sensory:   Normal to touch, pinprick and vibration. Gait:  Not tested   Tremor:   No tremor noted. Cerebellar:  No cerebellar signs present. Assessment:     The CT T/L-spine reveals a T11 compression Fx with about 40% loss of vertebral body height. Plan:     T11 compression Fx: This is likely due to the seizure and was likely facilitated by osteoporosis. The options for treatment include bracing for 3 months vs T11 kyphoplasty. He wishes to proceed with the kyphoplasty. He understands the nature of the procedure and the risks and benefits including but not limited to death, bleeding, infection, and paralysis. We will proceed on Tuesday. His workup for new onset seizure disorder is in progress. Thank you for allowing me to participate in his care!     I have spent more that 50% of this 30 minute visit discussing extensively with the patient various treatment options, alternative treatments, diagnostic studies and explained the findings of x-rays, MRI's, CT scans and other data such as EMG's prior notes, operative reports, etc.      ICD-10-CM ICD-9-CM    1. New onset seizure ([facility] [address] R56.9 780.39

## 2017-09-12 NOTE — PROGRESS NOTES
Problem: Falls - Risk of  Goal: *Absence of Falls  Document Chloe Fall Risk and appropriate interventions in the flowsheet.    Outcome: Progressing Towards Goal  Fall Risk Interventions:              Medication Interventions: Bed/chair exit alarm, Assess postural VS orthostatic hypotension, Patient to call before getting OOB, Teach patient to arise slowly     Elimination Interventions: Bed/chair exit alarm, Call light in reach, Patient to call for help with toileting needs, Toilet paper/wipes in reach, Toileting schedule/hourly rounds, Urinal in reach

## 2017-09-12 NOTE — ANESTHESIA POSTPROCEDURE EVALUATION
Post-Anesthesia Evaluation and Assessment    Patient: Yaima Bustamante MRN: [id number]  SSN: [id number]    YOB: 1940  Age: 78 y.o. Sex: male       Cardiovascular Function/Vital Signs  Visit Vitals    /76    Pulse 71    Temp 36.7 °C (98 °F)    Resp 14    Ht 5' 11\" (1.803 m)    Wt 98.4 kg (217 lb)    SpO2 94%    BMI 30.27 kg/m2       Patient is status post general anesthesia for Procedure(s):  KYPHOPLASTY T11  ROOM 707. Nausea/Vomiting: None    Postoperative hydration reviewed and adequate. Pain:  Pain Scale 1: Visual (09/12/17 1549)  Pain Intensity 1: 0 (09/12/17 1549)   Managed    Neurological Status:   Neuro (WDL): Exceptions to WDL (09/12/17 1524)  Neuro  Neurologic State: Drowsy (09/12/17 1524)  Orientation Level: Oriented X4 (09/12/17 1524)  Cognition: Follows commands (09/12/17 1524)  Speech: Clear (09/12/17 1524)  Assessment L Pupil: Brisk;Round (09/10/17 2013)  Size L Pupil (mm): 2 (09/10/17 2013)  Assessment R Pupil: Brisk;Round (09/10/17 2013)  Size R Pupil (mm): 2 (09/10/17 2013)  LUE Motor Response: Spontaneous ; Purposeful (09/12/17 1524)  LLE Motor Response: Spontaneous ; Purposeful (09/12/17 1524)  RUE Motor Response: Spontaneous ; Purposeful (09/12/17 1524)  RLE Motor Response: Spontaneous ; Purposeful (09/12/17 1524)   At baseline    Mental Status and Level of Consciousness: Arousable    Pulmonary Status:   O2 Device: Nasal cannula (09/12/17 1524)   Adequate oxygenation and airway patent    Complications related to anesthesia: None    Post-anesthesia assessment completed.  No concerns    Signed By: Salvatore Troncoso MD     September 12, 2017

## 2017-09-12 NOTE — PROGRESS NOTES
Problem: Falls - Risk of  Goal: *Absence of Falls  Document Chloe Fall Risk and appropriate interventions in the flowsheet.    Outcome: Progressing Towards Goal  Fall Risk Interventions:              Medication Interventions: Bed/chair exit alarm, Evaluate medications/consider consulting pharmacy, Patient to call before getting OOB, Teach patient to arise slowly     Elimination Interventions: Bed/chair exit alarm, Call light in reach, Elevated toilet seat, Patient to call for help with toileting needs, Toilet paper/wipes in reach, Toileting schedule/hourly rounds

## 2017-09-12 NOTE — PERIOP NOTES
TRANSFER - IN REPORT:    Verbal report received from MARTHA Barakat on Joan Shelly  being received from 7th floor for ordered procedure      Report consisted of patients Situation, Background, Assessment and   Recommendations(SBAR). Information from the following report(s) SBAR was reviewed with the receiving nurse. Opportunity for questions and clarification was provided. Assessment completed upon patients arrival to unit and care assumed.

## 2017-09-12 NOTE — PERIOP NOTES
Patient and family informed of surgery delay. Addressed questions and concerns. Meal tickets provided to family and DVD player offered to patient. Instructed to call for assistance/needs.

## 2017-09-12 NOTE — PROGRESS NOTES
TRANSFER - OUT REPORT:    Verbal report given to [address] [place] [address], RN (name) on Birdia Belt  being transferred to preop(unit) for ordered procedure       Report consisted of patients Situation, Background, Assessment and   Recommendations(SBAR). Information from the following report(s) SBAR, Kardex, [facility] and Recent Results was reviewed with the receiving nurse. Lines:   Peripheral IV 09/11/17 Right Arm (Active)   Site Assessment Clean, dry, & intact 9/11/2017  7:15 PM   Phlebitis Assessment 0 9/11/2017  7:15 PM   Infiltration Assessment 0 9/11/2017  7:15 PM   Dressing Status Clean, dry, & intact 9/11/2017  7:15 PM   Dressing Type Transparent 9/11/2017  7:15 PM   Hub Color/Line Status Pink; Infusing 9/11/2017  7:15 PM   Alcohol Cap Used No 9/11/2017  7:15 PM        Opportunity for questions and clarification was provided.       Patient transported with:

## 2017-09-12 NOTE — PERIOP NOTES
Resting comfortable with no s/s distress. Denies any pain or other complaints. Wife and daughter at bedside.

## 2017-09-12 NOTE — PROGRESS NOTES
Progress Note    Patient: Yaima Bustamante MRN: [id number]  SSN: [id number]    YOB: 1940  Age: 78 y.o. Sex: male      Admit Date: 9/10/2017    LOS: 1 day     Subjective:   Ricardo Mar is a 77 yo male with PMH of seizures, dyslipidemia who was admitted in light of new onset seizure. His wife noted tonic-clonic movements while he was in his bed with apparent tongue bite. He had a post-ictal episode in the ambulance, agitated. He was loaded with keppra. Brain CT and EEG unrevealing. Upon arrival to ER he started complaining of back pain and a  lumbar CT scan showed the followin. Acute T11 compression deformity causing moderate anterior height loss.     2. Age-indeterminate T7 compression deformity causing mild anterior height loss.     3. Old right-sided transverse process fractures at L1, L2 and L3.         After these findings,neurosurgery was consulted with plan to undergo T11 kyphoplasty tomorrow. ; seen at bedside, pre-operatively  No overnight events, no fever, chest/abdominal pain, SOB, nausea or vomiting. Objective:     Vitals:    17 2151 17 2239 17 0733 17 0739   BP: 166/67 156/79 154/80    Pulse: 71 72 69    Resp: 19 19 18    Temp: 99 °F (37.2 °C) 99 °F (37.2 °C) 98.5 °F (36.9 °C)    SpO2: 96% 98% 94% 94%   Weight:       Height:            Intake and Output:  Current Shift: 701 - 1900  In: -   Out: 600 [Urine:600]  Last three shifts: 09/10 1901 -  07  In:  [P.O.:420; I.V.:1583]  Out: 2265 [Urine:2265]    Physical Exam:   GENERAL: alert, cooperative, no distress, appears stated age  EYE: negative  LYMPHATIC: Cervical, supraclavicular, and axillary nodes normal.   THROAT & NECK: normal and no erythema or exudates noted. LUNG: clear to auscultation bilaterally  HEART: regular rate and rhythm, S1, S2 normal, no murmur, click, rub or gallop  ABDOMEN: soft, non-tender.  Bowel sounds normal. No masses,  no organomegaly  EXTREMITIES:  extremities normal, atraumatic, no cyanosis or edema  SKIN: Normal.  NEUROLOGIC: GCS 15, no motor or sensory deficits, CN 2-12 intact  PSYCHIATRIC: AO x 3, mood and affect appropriate    Lab/Data Review:  Lab results reviewed. For significant abnormal values and values requiring intervention, see assessment and plan.          Assessment:     Active Problems:    Seizure ([facility] [address] (9/10/2017)      New onset seizure ([facility] [address] (9/11/2017)        Plan:     #New onset Tonic-seizures with  Impaired awareness  Negative brain CT, MRI, EEG    Continue keppra 500mg po bid          Acute Lumbar T11 compression fracture:  Lidocaine patch  Percocet 5/325 mg 1 tab  q4h prn   Dilaudid 2 mg q4hr pprn     for surgery today     Hypertension  Cont home medications   Cardiac diet     Hyper CPK:    Cont with gentle hydration      HLD  Cont statins     Disposition: continue management-    Risk: high with opioids on board  Disposition: pending, will need STR    Signed By: Kandy Amanda MD     September 12, 2017

## 2017-09-13 VITALS
WEIGHT: 217 LBS | DIASTOLIC BLOOD PRESSURE: 73 MMHG | BODY MASS INDEX: 30.38 KG/M2 | RESPIRATION RATE: 18 BRPM | HEIGHT: 71 IN | HEART RATE: 73 BPM | SYSTOLIC BLOOD PRESSURE: 154 MMHG | OXYGEN SATURATION: 92 % | TEMPERATURE: 98.4 F

## 2017-09-13 PROCEDURE — 97161 PT EVAL LOW COMPLEX 20 MIN: CPT

## 2017-09-13 PROCEDURE — 74011250636 HC RX REV CODE- 250/636: Performed by: HOSPITALIST

## 2017-09-13 PROCEDURE — 97530 THERAPEUTIC ACTIVITIES: CPT

## 2017-09-13 PROCEDURE — 74011250637 HC RX REV CODE- 250/637: Performed by: HOSPITALIST

## 2017-09-13 RX ORDER — LEVETIRACETAM 500 MG/1
500 TABLET ORAL 2 TIMES DAILY
Qty: 60 TAB | Refills: 5 | Status: SHIPPED | OUTPATIENT
Start: 2017-09-13 | End: 2018-12-05 | Stop reason: SDUPTHER

## 2017-09-13 RX ADMIN — AMLODIPINE BESYLATE 5 MG: 5 TABLET ORAL at 08:28

## 2017-09-13 RX ADMIN — HEPARIN SODIUM 5000 UNITS: 5000 INJECTION, SOLUTION INTRAVENOUS; SUBCUTANEOUS at 05:38

## 2017-09-13 RX ADMIN — LEVETIRACETAM 500 MG: 500 TABLET, FILM COATED ORAL at 08:27

## 2017-09-13 RX ADMIN — ASPIRIN 81 MG: 81 TABLET, COATED ORAL at 08:28

## 2017-09-13 RX ADMIN — PANTOPRAZOLE SODIUM 40 MG: 40 TABLET, DELAYED RELEASE ORAL at 05:37

## 2017-09-13 RX ADMIN — HYDROCODONE BITARTRATE AND ACETAMINOPHEN 1 TABLET: 5; 325 TABLET ORAL at 08:27

## 2017-09-13 RX ADMIN — LOSARTAN POTASSIUM 50 MG: 50 TABLET ORAL at 08:28

## 2017-09-13 RX ADMIN — Medication 10 ML: at 05:38

## 2017-09-13 NOTE — PROGRESS NOTES
Spoke with patient and wife Jordy Corona regarding d/c planning. Alert and oriented x 4  Lives at home with wife in two story home but only lives on the main floor. No steps  Wife and daughter provide all his support and assistance. Able to perform all of his own ADL's and drive. PCP-Dr. Magan Haney and last seen 2 months ago. Patient and family do not feel the need for any follow up at home. CM will continue to follow for any further needs. Care Management Interventions  PCP Verified by CM: Yes (2 months)  Mode of Transport at Discharge:  Other (see comment) (wife Jordy Corona)  Transition of Care Consult (CM Consult): Discharge Planning  Discharge Durable Medical Equipment: No  Physical Therapy Consult: No  Occupational Therapy Consult: No  Speech Therapy Consult: No  Current Support Network: Lives with Spouse  Confirm Follow Up Transport: Family  Plan discussed with Pt/Family/Caregiver: Yes  Freedom of Choice Offered: Yes  Discharge Location  Discharge Placement: Home with family assistance

## 2017-09-13 NOTE — PROGRESS NOTES
Pt doing well, some back discomfort but preop pain gone. No N/V. Voiding, beginning to mobilize. AFVSS  Neuro intact  Wound OK    Stable, OK for home today from NSR standpoint. Pt understands that he may not drive for 6 months after a seizure.

## 2017-09-13 NOTE — DISCHARGE SUMMARY
Discharge Summary     Patient: Jus Angulo MRN: [id number]  SSN: [id number]    YOB: 1940  Age: 78 y.o. Sex: male       Admit Date: 9/10/2017    Discharge Date: 9/13/2017      Admission Diagnoses: Closed fracture of eleventh thoracic vertebra, unspecified fracture morphology, initial encounter [facility]) [G24.646F]    Discharge Diagnoses:   Problem List as of 9/13/2017  Date Reviewed: 9/12/2017          Codes Class Noted - Resolved    New onset seizure ([facility] [address] ICD-10-CM: R56.9  ICD-9-CM: 780.39  9/11/2017 - Present        Seizure ([facility] [address] ICD-10-CM: R56.9  ICD-9-CM: 780.39  9/10/2017 - Present        Primary osteoarthritis of left shoulder ICD-10-CM: M19.012  ICD-9-CM: 715.11  7/14/2017 - Present        Elevated PSA ICD-10-CM: R97.20  ICD-9-CM: 790.93  7/14/2017 - Present        Benign prostatic hyperplasia without lower urinary tract symptoms ICD-10-CM: N40.0  ICD-9-CM: 600.00  1/9/2017 - Present        Chronic shoulder bursitis ICD-10-CM: M75.50  ICD-9-CM: 726.10  1/9/2017 - Present        Hypertension ICD-10-CM: I10  ICD-9-CM: 401.9  8/5/2016 - Present        Hyperlipidemia ICD-10-CM: E78.5  ICD-9-CM: 272.4  8/5/2016 - Present               Discharge Condition: Good    Hospital Course: The pt was admitted following a witnessed tonic-clonic (new-onset) seizure. In the violent shaking that accompanied this, he had acute back pain which ended up being a compression fx of T11. He was seen by Neurosurgery who felt performed a kyphoplasty with resolution of pain. He ambulated w/o sig pain as well. Anm MRI and EEG showed no sig etiology for the seizure. He will be d/c'ed on Keppra for 6 months and outpt follow up with Neurology. Consults: Neurology and Neurosurgery    Significant Diagnostic Studies: EEG without epileptiform activity.     Disposition: home    Discharge Medications:   Current Discharge Medication List      START taking these medications    Details   levETIRAcetam (KEPPRA) 500 mg tablet Take 1 Tab by mouth two (2) times a day. Qty: 60 Tab, Refills: 5         CONTINUE these medications which have NOT CHANGED    Details   amLODIPine (NORVASC) 5 mg tablet Take 1 Tab by mouth daily. Qty: 90 Tab, Refills: 3    Associated Diagnoses: Essential hypertension      esomeprazole (NEXIUM) 40 mg capsule Take 1 Cap by mouth daily. Qty: 90 Cap, Refills: 3      ezetimibe (ZETIA) 10 mg tablet Take 1 Tab by mouth every Monday, Wednesday, Friday. Qty: 90 Tab, Refills: 3    Associated Diagnoses: Hyperlipidemia, unspecified hyperlipidemia type      losartan (COZAAR) 50 mg tablet Take 1 Tab by mouth daily. Qty: 90 Tab, Refills: 3    Associated Diagnoses: Essential hypertension      pitavastatin (LIVALO) 4 mg tab tablet Take 1 Tab by mouth daily. Qty: 90 Tab, Refills: 3    Associated Diagnoses: Hyperlipidemia, unspecified hyperlipidemia type      aspirin delayed-release 81 mg tablet Take 81 mg by mouth daily. Activity: no driving 6 months. Diet: Regular Diet  Wound Care: Ice to area for comfort    Follow-up Appointments   Procedures    FOLLOW UP VISIT Appointment in: One Week Mathieu's office for wound check     Mathieu's office for wound check     Standing Status:   Standing     Number of Occurrences:   1     Order Specific Question:   Appointment in     Answer: One Week       Signed By: Kat Carlisle MD     September 13, 2017    [facility].

## 2017-09-13 NOTE — DISCHARGE INSTRUCTIONS
Kyphoplasty: What to Expect at [address]  After kyphoplasty to relieve pain from compression fractures, your back may feel sore where the hollow needle (trocar) went into your back. This should go away in a few days. Most people are able to return to their daily activities within a day after kyphoplasty. This care sheet gives you a general idea about how long it will take for you to recover. But each person recovers at a different pace. Follow the steps below to get better as quickly as possible. How can you care for yourself at home? Activity  · Take it easy for the first 24 hours. Rest when you feel tired. Getting enough sleep will help you recover. · For the first day after the procedure, avoid lifting anything that would make you strain. This may include heavy grocery bags and milk containers, a heavy briefcase or backpack, cat litter or dog food bags, a vacuum , or a child. Diet  · You can eat your normal diet. If your stomach is upset, try bland, low-fat foods like plain rice, broiled chicken, toast, and yogurt. Medicines  · Your doctor will tell you if and when you can restart your medicines. He or she will also give you instructions about taking any new medicines. · If you take blood thinners, such as warfarin (Coumadin), clopidogrel (Plavix), or aspirin, be sure to talk to your doctor. He or she will tell you if and when to start taking those medicines again. Make sure that you understand exactly what your doctor wants you to do. · Be safe with medicines. Take pain medicines exactly as directed. ¨ If the doctor gave you a prescription medicine for pain, take it as prescribed. ¨ If you are not taking a prescription pain medicine, ask your doctor if you can take an over-the-counter medicine. ¨ Do not take two or more pain medicines at the same time unless your doctor told you to. Many pain medicines have acetaminophen, which is Tylenol.  Too much acetaminophen (Tylenol) can be harmful. Incision care  · You will have a dressing over the cut (incision). A dressing helps the incision heal and protects it. Your doctor will tell you how to take care of this. Ice  · If you are sore where the needle was inserted, put ice or a cold pack on your back for 10 to 20 minutes at a time. Try to do this every 1 to 2 hours for the next 3 days (when you are awake) or until the swelling goes down. Put a thin cloth between the ice and your skin. Follow-up care is a key part of your treatment and safety. Be sure to make and go to all appointments, and call your doctor if you are having problems. It's also a good idea to know your test results and keep a list of the medicines you take. When should you call for help? Call 911 anytime you think you may need emergency care. For example, call if:  · You passed out (lost consciousness). · You have severe trouble breathing. · You have sudden chest pain and shortness of breath, or you cough up blood. · You are unable to move a leg at all. Call your doctor now or seek immediate medical care if:  · You have new or worse symptoms in your legs, belly, or buttocks. Symptoms may include:  ¨ Numbness or tingling. ¨ Weakness. ¨ Pain. · You lose bladder or bowel control. · You have signs of infection, such as:  ¨ Increased pain, swelling, warmth, or redness. ¨ Red streaks leading from the incision. ¨ Pus draining from the incision. ¨ Swollen lymph nodes in your neck, armpits, or groin. ¨ A fever. Watch closely for any changes in your health, and be sure to contact your doctor if:  · You do not get better as expected. Where can you learn more? Go to http://cole-mark.info/. Enter [phone] in the search box to learn more about \"Kyphoplasty: What to Expect at Home. \"  Current as of: March 21, 2017  Content Version: 11.3  © 2231-9915 [employer], [employer].  Care instructions adapted under license by [employer] (which disclaims liability or warranty for this information). If you have questions about a medical condition or this instruction, always ask your healthcare professional. [address] any warranty or liability for your use of this information. Seizure: Care Instructions  Your Care Instructions    Seizures are caused by abnormal patterns of electrical signals in the brain. They are different for each person. Seizures can affect movement, speech, vision, or awareness. Some people have only slight shaking of a hand and do not pass out. Other people may pass out and have violent shaking of the whole body. Some people appear to stare into space. They are awake, but they can't respond normally. Later, they may not remember what happened. You may need tests to identify the type and cause of the seizures. A seizure may occur only once, or you may have them more than one time. Taking medicines as directed and following up with your doctor may help keep you from having more seizures. The doctor has checked you carefully, but problems can develop later. If you notice any problems or new symptoms, get medical treatment right away. Follow-up care is a key part of your treatment and safety. Be sure to make and go to all appointments, and call your doctor if you are having problems. It's also a good idea to know your test results and keep a list of the medicines you take. How can you care for yourself at home? · Be safe with medicines. Take your medicines exactly as prescribed. Call your doctor if you think you are having a problem with your medicine. · Do not do any activity that could be dangerous to you or others until your doctor says it is safe to do so. For example, do not drive a car, operate machinery, swim, or climb ladders. · Be sure that anyone treating you for any health problem knows that you have had a seizure and what medicines you are taking for it.   · Identify and avoid things that may make you more likely to have a seizure. These may include lack of sleep, alcohol or drug use, stress, or not eating. · Make sure you go to your follow-up appointment. When should you call for help? Call 911 anytime you think you may need emergency care. For example, call if:  · You have another seizure. · You have more than one seizure in 24 hours. · You have new symptoms, such as trouble walking, speaking, or thinking clearly. Call your doctor now or seek immediate medical care if:  · You are not acting normally. Watch closely for changes in your health, and be sure to contact your doctor if you have any problems. Where can you learn more? Go to http://cole-mark.info/. Enter T611 in the search box to learn more about \"Seizure: Care Instructions. \"  Current as of: October 14, 2016  Content Version: 11.3  © 2077-1130 [employer]. Care instructions adapted under license by [employer] (which disclaims liability or warranty for this information). If you have questions about a medical condition or this instruction, always ask your healthcare professional. [place] [address] any warranty or liability for your use of this information. DISCHARGE SUMMARY from Nurse    The following personal items are in your possession at time of discharge:    Dental Appliances: None        Home Medications: None  Jewelry: None  Clothing: None  Other Valuables: None             PATIENT INSTRUCTIONS:    After general anesthesia or intravenous sedation, for 24 hours or while taking prescription Narcotics:  · Limit your activities  · Do not drive and operate hazardous machinery  · Do not make important personal or business decisions  · Do  not drink alcoholic beverages  · If you have not urinated within 8 hours after discharge, please contact your surgeon on call.     Report the following to your surgeon:  · Excessive pain, swelling, redness or odor of or around the surgical area  · Temperature over 100.5  · Nausea and vomiting lasting longer than 4 hours or if unable to take medications  · Any signs of decreased circulation or nerve impairment to extremity: change in color, persistent  numbness, tingling, coldness or increase pain  · Any questions        What to do at Home:  Recommended activity: See surgical instructions, NO DRIVING, diet as tolerated. *  Please give a list of your current medications to your Primary Care Provider. *  Please update this list whenever your medications are discontinued, doses are      changed, or new medications (including over-the-counter products) are added. *  Please carry medication information at all times in case of emergency situations. These are general instructions for a healthy lifestyle:    No smoking/ No tobacco products/ Avoid exposure to second hand smoke    Surgeon General's Warning:  Quitting smoking now greatly reduces serious risk to your health. Obesity, smoking, and sedentary lifestyle greatly increases your risk for illness    A healthy diet, regular physical exercise & weight monitoring are important for maintaining a healthy lifestyle    You may be retaining fluid if you have a history of heart failure or if you experience any of the following symptoms:  Weight gain of 3 pounds or more overnight or 5 pounds in a week, increased swelling in our hands or feet or shortness of breath while lying flat in bed. Please call your doctor as soon as you notice any of these symptoms; do not wait until your next office visit. Recognize signs and symptoms of STROKE:    F-face looks uneven    A-arms unable to move or move unevenly    S-speech slurred or non-existent    T-time-call 911 as soon as signs and symptoms begin-DO NOT go       Back to bed or wait to see if you get better-TIME IS BRAIN. Warning Signs of HEART ATTACK     Call 911 if you have these symptoms:   Chest discomfort.  Most heart attacks involve discomfort in the center of the chest that lasts more than a few minutes, or that goes away and comes back. It can feel like uncomfortable pressure, squeezing, fullness, or pain.  Discomfort in other areas of the upper body. Symptoms can include pain or discomfort in one or both arms, the back, neck, jaw, or stomach.  Shortness of breath with or without chest discomfort.  Other signs may include breaking out in a cold sweat, nausea, or lightheadedness. Don't wait more than five minutes to call 911 - MINUTES MATTER! Fast action can save your life. Calling 911 is almost always the fastest way to get lifesaving treatment. Emergency Medical Services staff can begin treatment when they arrive -- up to an hour sooner than if someone gets to the hospital by car. The discharge information has been reviewed with the patient. The patient verbalized understanding. Discharge medications reviewed with the patient and appropriate educational materials and side effects teaching were provided.

## 2017-09-13 NOTE — PROGRESS NOTES
Problem: Mobility Impaired (Adult and Pediatric)  Goal: *Acute Goals and Plan of Care (Insert Text)  Discharge Goals:  (1.)Mr. Marilu Chester will perform bed mobility via log roll and transfers with INDEPENDENCE within 7 day(s). (2.)Mr. Marilu Chester will demonstrate good dynamic standing balance within 7 day(s). (3.)Mr. Marilu Chester will ambulate with INDEPENDENCE for 250+ feet with the least restrictive device within 7 day(s). (4.)Mr. Marilu Chester will verbalize and follow 3/3 spinal precautions 100% of the time with 0 verbal cues within 7 day(s). ________________________________________________________________________________________________      PHYSICAL THERAPY: INITIAL ASSESSMENT, TREATMENT DAY: DAY OF ASSESSMENT, AM    9/13/2017  INPATIENT: Hospital Day: 4  [place] / [place] / Product Type: Medicare /      NAME/AGE/GENDER: Fletcher Armando is a 78 y.o. male            PRIMARY DIAGNOSIS: Closed fracture of eleventh thoracic vertebra, unspecified fracture morphology, [facility] David [place] <principal problem not specified> <principal problem not specified>  Procedure(s) (LRB):  KYPHOPLASTY T11  ROOM 707 (N/A)  1 Day Post-Op  ICD-10: Treatment Diagnosis:       · Generalized Muscle Weakness (M62.81)  · Difficulty in walking, Not elsewhere classified (R26.2)   Precaution/Allergies:  Kevin Paul [no known allergies]       ASSESSMENT:      Mr. Marilu Chester is a 78year old male admitted seizures, compression fracture and now 1 day s/p T11 kyphoplasty. Patient seen this AM for initial physical therapy evaluation: presents supine in bed, endorses 0/10 pain, and motivated to mobilize. Patient lives with his spouse in a two story residence with all needs on first floor and 0 steps to enter. At baseline, patient is independent with ADLs, ambulates without utilizing an assistive device, and drives.  Today, B UE strength WFL, mild limitations in B shoulder flexion (patient's baseline), B LE ROM WFL, B LE strength functionally 4+/5 proximally with 5/5 dorsiflexion/plantarflexion power, and sensation is intact to light touch C4-T1 and L3-S1 B. Reviewed spinal precautions, activity modifications/progression, and home safety with patient/spouse. Mr. Stacey Peng performed bed mobility via log roll with modified independence, transfers with SBA, and ambulation x40ft within room. Demonstrated a slow, step-through gait pattern with good step clearance bilaterally, normalized mechanics, and fair+ dynamic balance throughout only requiring SBA without use of an assistive device. No increase in pain with ambulation. Mr. Stacey Peng is progressing well post operatively; presents with decreased functional activity tolerance and balance/gait status from independent baseline. Recommend continued skilled PT services to address stated deficits. Will follow with daily and progress toward stated goals during acute stay. Anticipate patient will progress quickly toward stated goals. This section established at most recent assessment   PROBLEM LIST (Impairments causing functional limitations):  1. Decreased Ambulation Ability/Technique  2. Decreased Balance  3. Decreased Activity Tolerance  4. Decreased Knowledge of Precautions  5. Decreased [place] with Home Exercise Program    INTERVENTIONS PLANNED: (Benefits and precautions of physical therapy have been discussed with the patient.)  1. Bed Mobility  2. Family Education  3. Gait Training  4. Transfer Training      TREATMENT PLAN: Frequency/Duration: daily for until stated goals are met. Rehabilitation Potential For Stated Goals: GOOD      RECOMMENDED REHABILITATION/EQUIPMENT: (at time of discharge pending progress): Due to the probability of continued deficits (see above) this patient will likely need continued skilled physical therapy after discharge.   Equipment:   · None at this time                   HISTORY:   History of Present Injury/Illness (Reason for Referral):  S/p T11 kyphoplasty  Past Medical History/Comorbidities:   Mr. Lalita Parnell  has a past medical history of Hypertension. Mr. Lalita Parnell  has a past surgical history that includes hip replacement and hernia repair. Social History/Living Environment:   Home Environment: Private residence  # Steps to Enter: 0  One/Two Story Residence: Two story, live on 1st floor  Living Alone: No  Support Systems: Spouse/Significant Other/Partner  Patient Expects to be Discharged to[de-identified] Private residence  Current DME Used/Available at Home: None  Prior Level of Function/Work/Activity:  Patient lives with his spouse in a two story residence with all needs on first floor and 0 steps to enter. At baseline, patient is independent with ADLs, ambulates without utilizing an assistive device, and drives. Number of Personal Factors/Comorbidities that affect the Plan of Care: 0: LOW COMPLEXITY   EXAMINATION:   Most Recent Physical Functioning:   Gross Assessment:  AROM: Generally decreased, functional (B shoulder flexion (baseline); B LE WFL)  Strength: Generally decreased, functional (Functionally proximal B LE)  Coordination: Within functional limits (B UE/LE)  Sensation: Intact (C4-T1 and L3-S1 B)               Posture:  Posture (WDL): Exceptions to WDL  Posture Assessment: Forward head  Balance:  Sitting: Intact  Standing: Impaired  Standing - Static: Good  Standing - Dynamic : Fair (+) Bed Mobility:  Rolling: Modified independent  Supine to Sit: Stand-by asssistance  Scooting: Modified independent  Wheelchair Mobility:     Transfers:  Sit to Stand: Stand-by asssistance  Stand to Sit: Stand-by asssistance  Bed to Chair: Stand-by asssistance  Gait:     Base of Support: Center of gravity altered  Speed/Vonda: Slow  Gait Abnormalities: Trunk sway increased (Mild)  Distance (ft): 40 Feet (ft)  Assistive Device:  (None)  Ambulation - Level of Assistance: Stand-by asssistance  Interventions: Safety awareness training; Tactile cues;Verbal cues       Body Structures Involved:  1. Nerves  2. Bones  3. Muscles Body Functions Affected:  1. Neuromusculoskeletal  2. Movement Related Activities and Participation Affected:  1. Mobility  2. Self Care   Number of elements that affect the Plan of Care: 4+: HIGH COMPLEXITY   CLINICAL PRESENTATION:   Presentation: Stable and uncomplicated: LOW COMPLEXITY   CLINICAL DECISION MAKIN[address] [place] [address] AM-PAC 6 Clicks   Basic Mobility Inpatient Short Form  How much difficulty does the patient currently have. .. Unable A Lot A Little None   1. Turning over in bed (including adjusting bedclothes, sheets and blankets)? [ ] 1   [ ] 2   [ ] 3   [X] 4   2. Sitting down on and standing up from a chair with arms ( e.g., wheelchair, bedside commode, etc.)   [ ] 1   [ ] 2   [ ] 3   [X] 4   3. Moving from lying on back to sitting on the side of the bed? [ ] 1   [ ] 2   [ ] 3   [X] 4   How much help from another person does the patient currently need. .. Total A Lot A Little None   4. Moving to and from a bed to a chair (including a wheelchair)? [ ] 1   [ ] 2   [ ] 3   [X] 4   5. Need to walk in hospital room? [ ] 1   [ ] 2   [X] 3   [ ] 4   6. Climbing 3-5 steps with a railing? [ ] 1   [ ] 2   [X] 3   [ ] 4   © , Trustees of [address] [place] [address], under license to [employer]. All rights reserved    Score:  Initial: 22 Most Recent: 22 (Date: 17 )     Interpretation of Tool:  Represents activities that are increasingly more difficult (i.e. Bed mobility, Transfers, Gait).        Score 24 23 22-20 19-15 14-10 9-7 6       Modifier CH CI CJ CK CL CM CN         · Mobility - Walking and Moving Around:               - CURRENT STATUS:    CJ - 20%-39% impaired, limited or restricted               - GOAL STATUS:           CI - 1%-19% impaired, limited or restricted               - D/C STATUS:                       ---------------To be determined---------------  [place] / [place] / Product Type: Medicare /       Medical Necessity:     · Patient demonstrates good rehab potential due to higher previous functional level. Reason for Services/Other Comments:  · Patient continues to require skilled intervention due to decreased functional activity tolerance and balance/gait status from baseline. .   Use of outcome tool(s) and clinical judgement create a POC that gives a: Clear prediction of patient's progress: LOW COMPLEXITY                 TREATMENT:   (In addition to Assessment/Re-Assessment sessions the following treatments were rendered)   Pre-treatment Symptoms/Complaints:    Pain: Initial:   Pain Intensity 1: 0  Post Session:  Unchanged 0/10      Assessment/Reassessment only, no treatment provided today     Braces/Orthotics/Lines/Etc:   · IV  · Oxinet  Treatment/Session Assessment:    · Response to Treatment:  See above. · Interdisciplinary Collaboration:  · Physical Therapist  · Registered Nurse  · After treatment position/precautions:  · Up in chair  · Bed alarm/tab alert on  · Bed in low position  · Call light within reach  · RN notified  · Family at bedside  · Instructed patient to call for assistance with changing positions within 30 minutes; verbalized understanding  · Compliance with Program/Exercises: Will assess as treatment progresses. · Recommendations/Intent for next treatment session: \"Next visit will focus on advancements to more challenging activities and reduction in assistance provided\".   Total Treatment Duration:  PT Patient Time In/Time Out  Time In: 0900  Time Out: 0920     Adan Alex DPT

## 2017-09-21 PROBLEM — M80.00XA AGE-RELATED OSTEOPOROSIS WITH CURRENT PATHOLOGICAL FRACTURE: Status: ACTIVE | Noted: 2017-09-21

## 2017-09-21 PROBLEM — M85.9 DISORDER OF BONE DENSITY AND STRUCTURE, UNSPECIFIED: Status: ACTIVE | Noted: 2017-09-21

## 2017-09-21 PROBLEM — Z87.19 HISTORY OF GASTROESOPHAGEAL REFLUX (GERD): Status: ACTIVE | Noted: 2017-09-21

## 2017-10-13 ENCOUNTER — APPOINTMENT (OUTPATIENT)
Dept: GENERAL RADIOLOGY | Age: 77
End: 2017-10-13
Attending: EMERGENCY MEDICINE
Payer: MEDICARE

## 2017-10-13 ENCOUNTER — HOSPITAL ENCOUNTER (EMERGENCY)
Age: 77
Discharge: HOME | End: 2017-10-13
Attending: EMERGENCY MEDICINE
Payer: MEDICARE

## 2017-10-13 VITALS
SYSTOLIC BLOOD PRESSURE: 122 MMHG | BODY MASS INDEX: 30.38 KG/M2 | WEIGHT: 217 LBS | DIASTOLIC BLOOD PRESSURE: 74 MMHG | OXYGEN SATURATION: 97 % | TEMPERATURE: 97.6 F | HEART RATE: 80 BPM | HEIGHT: 71 IN | RESPIRATION RATE: 16 BRPM

## 2017-10-13 DIAGNOSIS — R56.9 SEIZURE (HCC): Primary | ICD-10-CM

## 2017-10-13 LAB
ALBUMIN SERPL-MCNC: 3.3 G/DL (ref 3.2–4.6)
ALBUMIN/GLOB SERPL: 0.9 {RATIO} (ref 1.2–3.5)
ALP SERPL-CCNC: 105 U/L (ref 50–136)
ALT SERPL-CCNC: 34 U/L (ref 12–65)
ANION GAP SERPL CALC-SCNC: 10 MMOL/L (ref 7–16)
AST SERPL-CCNC: 27 U/L (ref 15–37)
ATRIAL RATE: 88 BPM
BASOPHILS # BLD: 0 K/UL (ref 0–0.2)
BASOPHILS NFR BLD: 0 % (ref 0–2)
BILIRUB SERPL-MCNC: 0.5 MG/DL (ref 0.2–1.1)
BUN SERPL-MCNC: 20 MG/DL (ref 8–23)
CALCIUM SERPL-MCNC: 8.9 MG/DL (ref 8.3–10.4)
CALCULATED P AXIS, ECG09: 1 DEGREES
CALCULATED R AXIS, ECG10: 5 DEGREES
CALCULATED T AXIS, ECG11: 4 DEGREES
CHLORIDE SERPL-SCNC: 109 MMOL/L (ref 98–107)
CO2 SERPL-SCNC: 23 MMOL/L (ref 21–32)
CREAT SERPL-MCNC: 1.2 MG/DL (ref 0.8–1.5)
DIAGNOSIS, 93000: NORMAL
DIFFERENTIAL METHOD BLD: ABNORMAL
EOSINOPHIL # BLD: 0.4 K/UL (ref 0–0.8)
EOSINOPHIL NFR BLD: 4 % (ref 0.5–7.8)
ERYTHROCYTE [DISTWIDTH] IN BLOOD BY AUTOMATED COUNT: 12.6 % (ref 11.9–14.6)
GLOBULIN SER CALC-MCNC: 3.5 G/DL (ref 2.3–3.5)
GLUCOSE BLD STRIP.AUTO-MCNC: 117 MG/DL (ref 65–100)
GLUCOSE SERPL-MCNC: 132 MG/DL (ref 65–100)
HCT VFR BLD AUTO: 43.8 % (ref 41.1–50.3)
HGB BLD-MCNC: 15.9 G/DL (ref 13.6–17.2)
IMM GRANULOCYTES # BLD: 0 K/UL (ref 0–0.5)
IMM GRANULOCYTES NFR BLD: 0.2 % (ref 0–5)
LYMPHOCYTES # BLD: 1.4 K/UL (ref 0.5–4.6)
LYMPHOCYTES NFR BLD: 15 % (ref 13–44)
MCH RBC QN AUTO: 31.6 PG (ref 26.1–32.9)
MCHC RBC AUTO-ENTMCNC: 36.3 G/DL (ref 31.4–35)
MCV RBC AUTO: 87.1 FL (ref 79.6–97.8)
MONOCYTES # BLD: 0.7 K/UL (ref 0.1–1.3)
MONOCYTES NFR BLD: 7 % (ref 4–12)
NEUTS SEG # BLD: 7 K/UL (ref 1.7–8.2)
NEUTS SEG NFR BLD: 74 % (ref 43–78)
P-R INTERVAL, ECG05: 224 MS
PLATELET # BLD AUTO: 199 K/UL (ref 150–450)
PMV BLD AUTO: 10.1 FL (ref 10.8–14.1)
POTASSIUM SERPL-SCNC: 3.8 MMOL/L (ref 3.5–5.1)
PROT SERPL-MCNC: 6.8 G/DL (ref 6.3–8.2)
Q-T INTERVAL, ECG07: 352 MS
QRS DURATION, ECG06: 90 MS
QTC CALCULATION (BEZET), ECG08: 411 MS
RBC # BLD AUTO: 5.03 M/UL (ref 4.23–5.67)
SODIUM SERPL-SCNC: 142 MMOL/L (ref 136–145)
TROPONIN I SERPL-MCNC: <0.02 NG/ML (ref 0.02–0.05)
VENTRICULAR RATE, ECG03: 82 BPM
WBC # BLD AUTO: 9.5 K/UL (ref 4.3–11.1)

## 2017-10-13 PROCEDURE — 80053 COMPREHEN METABOLIC PANEL: CPT | Performed by: EMERGENCY MEDICINE

## 2017-10-13 PROCEDURE — 99285 EMERGENCY DEPT VISIT HI MDM: CPT | Performed by: EMERGENCY MEDICINE

## 2017-10-13 PROCEDURE — 74011000258 HC RX REV CODE- 258: Performed by: EMERGENCY MEDICINE

## 2017-10-13 PROCEDURE — 84484 ASSAY OF TROPONIN QUANT: CPT | Performed by: EMERGENCY MEDICINE

## 2017-10-13 PROCEDURE — 96365 THER/PROPH/DIAG IV INF INIT: CPT | Performed by: EMERGENCY MEDICINE

## 2017-10-13 PROCEDURE — 71010 XR CHEST PORT: CPT

## 2017-10-13 PROCEDURE — 85025 COMPLETE CBC W/AUTO DIFF WBC: CPT | Performed by: EMERGENCY MEDICINE

## 2017-10-13 PROCEDURE — 82962 GLUCOSE BLOOD TEST: CPT

## 2017-10-13 PROCEDURE — 93005 ELECTROCARDIOGRAM TRACING: CPT | Performed by: EMERGENCY MEDICINE

## 2017-10-13 PROCEDURE — 74011250636 HC RX REV CODE- 250/636: Performed by: EMERGENCY MEDICINE

## 2017-10-13 RX ADMIN — SODIUM CHLORIDE 1000 MG: 900 INJECTION, SOLUTION INTRAVENOUS at 11:04

## 2017-10-13 NOTE — ED NOTES
Pt ambulated to and from restroom with no complications. States he is feeling tired but good and ready to go home. Dr. Cass Aly updated.

## 2017-10-13 NOTE — DISCHARGE INSTRUCTIONS

## 2017-10-13 NOTE — ED NOTES
I have reviewed discharge instructions with the patient and spouse. The patient and spouse verbalized understanding. Patient left ED via Discharge Method: wheelchair to Home with family. Opportunity for questions and clarification provided. Patient given 0 scripts.

## 2017-10-13 NOTE — ED PROVIDER NOTES
Patient is a 78 y.o. male presenting with seizures. The history is provided by the patient and the spouse. Seizure    This is a recurrent problem. The current episode started less than 1 hour ago. The problem has been resolved. There was 1 seizure. The most recent episode lasted more than 5 minutes. Pertinent negatives include no headaches, no sore throat, no chest pain, no cough, no nausea, no vomiting and no diarrhea. Characteristics include eye blinking, rhythmic jerking, loss of consciousness and bit tongue. The episode was witnessed. There was no return to baseline postseizure. The seizures did not continue in the ED. The seizure(s) had facial focality. Possible causes include medication or dosage change. There has been no fever. He reports no chest pain, no diarrhea, no vomiting, no headaches, no sore throat, no cough. Meds prior to arrival: IM versed. Home seizure medications include: no seizure medications (Keppra stopped 2-3 weeks ago). Past Medical History:   Diagnosis Date    Hypertension        Past Surgical History:   Procedure Laterality Date    HX HERNIA REPAIR      HX HIP REPLACEMENT      bilateral         Family History:   Problem Relation Age of Onset    Family history unknown: Yes       Social History     Social History    Marital status:      Spouse name: N/A    Number of children: N/A    Years of education: N/A     Occupational History    Not on file. Social History Main Topics    Smoking status: Never Smoker    Smokeless tobacco: Never Used    Alcohol use No    Drug use: No    Sexual activity: Not on file     Other Topics Concern    Not on file     Social History Narrative         ALLERGIES: Nka [no known allergies]    Review of Systems   Constitutional: Negative for chills and fever. HENT: Negative for congestion, rhinorrhea and sore throat. Eyes: Negative for photophobia and redness. Respiratory: Negative for cough and shortness of breath. Cardiovascular: Negative for chest pain and leg swelling. Gastrointestinal: Negative for abdominal pain, blood in stool, diarrhea, nausea and vomiting. Genitourinary: Negative for dysuria and hematuria. Musculoskeletal: Negative for back pain and myalgias. Neurological: Positive for loss of consciousness. Negative for weakness, numbness and headaches. Vitals:    10/13/17 1009 10/13/17 1010 10/13/17 1011   BP: 115/65  115/65   Pulse: 86 83 94   Resp: 28 (!) 116 16   Temp:   97.6 °F (36.4 °C)   SpO2:  (!) 85% (!) 86%   Weight:   98.4 kg (217 lb)   Height:   5' 11\" (1.803 m)            Physical Exam   Constitutional: He is oriented to person, place, and time. He appears well-developed and well-nourished. HENT:   Mouth/Throat: Oropharynx is clear and moist. No oropharyngeal exudate. Eyes: Conjunctivae are normal. Pupils are equal, round, and reactive to light. Neck: Neck supple. Cardiovascular: Normal rate, regular rhythm and normal heart sounds. Pulmonary/Chest: Effort normal and breath sounds normal.   Abdominal: Soft. Bowel sounds are normal. He exhibits no distension. There is no tenderness. There is no rebound and no guarding. Musculoskeletal: Normal range of motion. He exhibits no edema or tenderness. Lymphadenopathy:     He has no cervical adenopathy. Neurological: He is alert and oriented to person, place, and time. No cranial nerve deficit. He exhibits normal muscle tone. Skin: Skin is warm and dry. Nursing note and vitals reviewed. MDM  Number of Diagnoses or Management Options  Diagnosis management comments: Patient post ictal and hypoxic secondary to respiratory depression from IM Versed. Will monitor oxygenation for improvement. Discussed with the patient's neurologist and asked to load with IV Keppra 1000 mg. Check urine and x-ray for infection. Denies any fevers. Denies pain, cp/sob. .    11:33 AM  Patient has been weaned off oxygen.   We will ambulate him. Awaiting pointing carrier. Patient more awake and alert. Currently asymptomatic other than lower legs and a little uncomfortable from hanging off the bed. Improved with readjustment.        Amount and/or Complexity of Data Reviewed  Clinical lab tests: ordered and reviewed  Tests in the radiology section of CPT®: ordered and reviewed      ED Course       Procedures

## 2017-10-13 NOTE — ED TRIAGE NOTES
Patient brought in via EMS for focal seizure lasting approx 5 minutes. EEG done last Wednesday, taken off Keppra about 3 weeks ago. Seizure 1 month ago as well. Took unisom last evening. 5 versed given by EMS.

## 2017-10-14 ENCOUNTER — PATIENT OUTREACH (OUTPATIENT)
Dept: CASE MANAGEMENT | Age: 77
End: 2017-10-14

## 2017-10-15 NOTE — PROGRESS NOTES
Transition of Care Discharge Follow-up Questionnaire   Date/Time of Call:   10/14/17 3:39p   What was the patient hospitalized for? Seizure [facility])   Does the patient understand his/her diagnosis and/or treatment and what happened during the hospitalization? Patients spouse verbalizes that she understands the diagnosis and treatments that occurred. Did the patient receive discharge instructions? Yes   Review any discharge instructions (see notes in [facility]). Ask patient if they understand these. Do they have any questions? Patient understands discharge instructions. Were home services ordered (nursing, PT, OT, ST, etc.)? No   If so, has the first visit occurred? If not, why? (Assist with coordination of services if necessary.) n/a   Was any DME ordered? No   If so, has it been received? If not, why?  (Assist with coordination of arranging DME orders if necessary.) n/a   Complete a review of all medications (new, continued and discontinued meds per the D/C instructions and medication tab in [facility]). Patients spouse and care coordinator reviewed current medications. Were all new prescriptions filled? If not, why?  (Assist with obtainment of medications if necessary.) No medication changes   Does the patient understand the purpose and dosing instructions for all medications? (If patient has questions, provide explanation and education.) Patient understands the dosing instructions for all medications. Does the patient have any problems in performing ADLs? (If patient is unable to perform ADLs  what is the limiting factor(s)? Do they have a support system that can assist? If no support system is present, discuss possible assistance that they may be able to obtain.) Spouse does not verbalize the patient having any problems in performing ADLs. Spouse verbalizes patient is sleepy from medication.     Does the patient have all follow-up appointments scheduled?       7 day f/up with PCP?    7-14 day f/up with specialist?    If f/up has not been made  what actions has the care coordinator made to accomplish this? Has transportation been arranged? [facility] Pulmonary follow-up should be within 7 days of discharge; all others should have PCP follow-up within 7 days of discharge; follow-ups with other specialists should be within 7-14 days of discharge.) Patient has the following appt(s):  11/13 with Neurosurgery  Spouse verbalizes that patient is awaiting a follow-up appt with PCP. CC will notify PCP via [facility] of appt needs. Any other questions or concerns expressed by the patient? No. She was thankful for follow-up call. Patient provided with CC contact information in case any needs or concerns arise in the future. Schedule next appointment with EDU Peters or refer to RN Case Manager/  per the workflow guidelines. When is care coordinators next follow-up call scheduled? If referred for CCM  what RN care manager was the referral assigned? N/A      N/A   [facility] Call Completed By:   Hulen Lefort, LPN  Care Coordinator       This note will not be viewable in [address]     *late entry*

## 2017-10-18 ENCOUNTER — HOSPITAL ENCOUNTER (OUTPATIENT)
Dept: MRI IMAGING | Age: 77
Discharge: HOME | End: 2017-10-18
Attending: INTERNAL MEDICINE
Payer: MEDICARE

## 2017-10-18 DIAGNOSIS — R56.9 SEIZURE (HCC): ICD-10-CM

## 2017-10-18 PROCEDURE — A9577 INJ MULTIHANCE: HCPCS | Performed by: INTERNAL MEDICINE

## 2017-10-18 PROCEDURE — 70553 MRI BRAIN STEM W/O & W/DYE: CPT

## 2017-10-18 PROCEDURE — 74011250636 HC RX REV CODE- 250/636: Performed by: INTERNAL MEDICINE

## 2017-10-18 RX ORDER — SODIUM CHLORIDE 0.9 % (FLUSH) 0.9 %
10 SYRINGE (ML) INJECTION
Status: COMPLETED | OUTPATIENT
Start: 2017-10-18 | End: 2017-10-18

## 2017-10-18 RX ADMIN — Medication 10 ML: at 14:36

## 2017-10-18 RX ADMIN — GADOBENATE DIMEGLUMINE 20 ML: 529 INJECTION, SOLUTION INTRAVENOUS at 14:36

## 2017-10-24 ENCOUNTER — HOSPITAL ENCOUNTER (OUTPATIENT)
Dept: SLEEP MEDICINE | Age: 77
Discharge: HOME | End: 2017-10-24
Payer: MEDICARE

## 2017-10-24 PROCEDURE — 95811 POLYSOM 6/>YRS CPAP 4/> PARM: CPT

## 2017-10-30 PROBLEM — G47.00 INSOMNIA: Status: ACTIVE | Noted: 2017-10-30

## 2017-10-30 PROBLEM — E66.3 OVERWEIGHT (BMI 25.0-29.9): Status: ACTIVE | Noted: 2017-10-30

## 2017-10-30 PROBLEM — G47.34 NOCTURNAL HYPOXEMIA: Status: ACTIVE | Noted: 2017-10-30

## 2017-10-30 PROBLEM — G47.33 OBSTRUCTIVE SLEEP APNEA: Status: ACTIVE | Noted: 2017-10-30

## 2018-01-24 PROBLEM — G47.34 NOCTURNAL HYPOXEMIA: Status: RESOLVED | Noted: 2017-10-30 | Resolved: 2018-01-24

## 2018-02-21 PROBLEM — E66.9 OBESITY (BMI 30-39.9): Status: ACTIVE | Noted: 2018-02-21

## 2018-06-07 ENCOUNTER — HOSPITAL ENCOUNTER (OUTPATIENT)
Dept: SURGERY | Age: 78
Discharge: HOME | End: 2018-06-07
Payer: MEDICARE

## 2018-06-07 VITALS
RESPIRATION RATE: 14 BRPM | OXYGEN SATURATION: 96 % | DIASTOLIC BLOOD PRESSURE: 78 MMHG | SYSTOLIC BLOOD PRESSURE: 142 MMHG | HEART RATE: 57 BPM | TEMPERATURE: 97 F | BODY MASS INDEX: 29.74 KG/M2 | HEIGHT: 72 IN | WEIGHT: 219.56 LBS

## 2018-06-07 LAB
ANION GAP SERPL CALC-SCNC: 8 MMOL/L (ref 7–16)
BACTERIA SPEC CULT: NORMAL
BUN SERPL-MCNC: 16 MG/DL (ref 8–23)
CALCIUM SERPL-MCNC: 9 MG/DL (ref 8.3–10.4)
CHLORIDE SERPL-SCNC: 105 MMOL/L (ref 98–107)
CO2 SERPL-SCNC: 26 MMOL/L (ref 21–32)
CREAT SERPL-MCNC: 1.13 MG/DL (ref 0.8–1.5)
ERYTHROCYTE [DISTWIDTH] IN BLOOD BY AUTOMATED COUNT: 12.4 % (ref 11.9–14.6)
GLUCOSE SERPL-MCNC: 98 MG/DL (ref 65–100)
HCT VFR BLD AUTO: 46.1 % (ref 41.1–50.3)
HGB BLD-MCNC: 15.9 G/DL (ref 13.6–17.2)
MCH RBC QN AUTO: 30.2 PG (ref 26.1–32.9)
MCHC RBC AUTO-ENTMCNC: 34.5 G/DL (ref 31.4–35)
MCV RBC AUTO: 87.5 FL (ref 79.6–97.8)
PLATELET # BLD AUTO: 231 K/UL (ref 150–450)
PMV BLD AUTO: 9.8 FL (ref 10.8–14.1)
POTASSIUM SERPL-SCNC: 4 MMOL/L (ref 3.5–5.1)
RBC # BLD AUTO: 5.27 M/UL (ref 4.23–5.67)
SERVICE CMNT-IMP: NORMAL
SODIUM SERPL-SCNC: 139 MMOL/L (ref 136–145)
WBC # BLD AUTO: 8.6 K/UL (ref 4.3–11.1)

## 2018-06-07 PROCEDURE — 87641 MR-STAPH DNA AMP PROBE: CPT | Performed by: ORTHOPAEDIC SURGERY

## 2018-06-07 PROCEDURE — 85027 COMPLETE CBC AUTOMATED: CPT | Performed by: ANESTHESIOLOGY

## 2018-06-07 PROCEDURE — 80048 BASIC METABOLIC PNL TOTAL CA: CPT | Performed by: ANESTHESIOLOGY

## 2018-06-07 RX ORDER — GEMFIBROZIL 600 MG/1
600 TABLET, FILM COATED ORAL 2 TIMES DAILY
COMMUNITY
End: 2018-06-07

## 2018-06-07 RX ORDER — FENOFIBRATE 120 MG/1
TABLET ORAL
COMMUNITY
End: 2018-06-07

## 2018-06-07 NOTE — PERIOP NOTES
Lab results within anesthesia guidelines; MRSA swab result pending.      Recent Results (from the past 12 hour(s))   METABOLIC PANEL, BASIC    Collection Time: 06/07/18 12:58 PM   Result Value Ref Range    Sodium 139 136 - 145 mmol/L    Potassium 4.0 3.5 - 5.1 mmol/L    Chloride 105 98 - 107 mmol/L    CO2 26 21 - 32 mmol/L    Anion gap 8 7 - 16 mmol/L    Glucose 98 65 - 100 mg/dL    BUN 16 8 - 23 MG/DL    Creatinine 1.13 0.8 - 1.5 MG/DL    GFR est AA >60 >60 ml/min/1.73m2    GFR est non-AA >60 >60 ml/min/1.73m2    Calcium 9.0 8.3 - 10.4 MG/DL   CBC W/O DIFF    Collection Time: 06/07/18 12:58 PM   Result Value Ref Range    WBC 8.6 4.3 - 11.1 K/uL    RBC 5.27 4.23 - 5.67 M/uL    HGB 15.9 13.6 - 17.2 g/dL    HCT 46.1 41.1 - 50.3 %    MCV 87.5 79.6 - 97.8 FL    MCH 30.2 26.1 - 32.9 PG    MCHC 34.5 31.4 - 35.0 g/dL    RDW 12.4 11.9 - 14.6 %    PLATELET 020 518 - 903 K/uL    MPV 9.8 (L) 10.8 - 14.1 FL

## 2018-06-07 NOTE — PERIOP NOTES
Patient verified name, , and surgery as listed in [facility]. Type 3 surgery, joint PAT assessment complete. Labs per surgeon: MRSA swab collected- results pending  Labs per anesthesia protocol: CBC and BMP collected- results pending  EKG: not needed    Pt with hx of seizures x 2 (2017)- had consult with Dr Adelaide Wong (neuro) 10/2017- note in EMR for anesthesia reference. Hibiclens and instructions to return bottle on DOS given per hospital policy. Nasal Swab collected per MD order and instructions for Mupirocin nasal ointment if required. Patient provided with handouts including Guide to Surgery, Pain Management, Hand Hygiene, Blood Transfusion Education, and [facility] Brochure. Patient answered medical/surgical history questions at their best of ability. All prior to admission medications documented in [facility]. Original medication prescription bottles visualized during patient appointment. Patient instructed to hold all vitamins 7 days prior to surgery and NSAIDS 5 days prior to surgery. Medications to be held: ibuprofen. Patient instructed to continue previous medications as prescribed prior to surgery and to take the following medications the day of surgery according to anesthesia guidelines with a small sip of water: amlodipine, asa 81mg, keppra, livalo. Patient teach back successful and patient demonstrates knowledge of instruction.

## 2018-06-08 NOTE — PERIOP NOTES
6/7/2018  2:23 PM - [place], Lab In [employer]   Component Results   Component Value Flag Ref Range Units Status   Special Requests: NO SPECIAL REQUESTS     Final   Culture result:      Final   SA target not detected.                                 A MRSA NEGATIVE, SA NEGATIVE test result does not preclude MRSA or SA nasal colonization.

## 2018-06-12 ENCOUNTER — ANESTHESIA EVENT (OUTPATIENT)
Dept: SURGERY | Age: 78
DRG: 483 | End: 2018-06-12
Payer: MEDICARE

## 2018-06-13 ENCOUNTER — HOSPITAL ENCOUNTER (INPATIENT)
Age: 78
LOS: 2 days | Discharge: HOME | DRG: 483 | End: 2018-06-15
Attending: ORTHOPAEDIC SURGERY | Admitting: ORTHOPAEDIC SURGERY
Payer: MEDICARE

## 2018-06-13 ENCOUNTER — APPOINTMENT (OUTPATIENT)
Dept: GENERAL RADIOLOGY | Age: 78
DRG: 483 | End: 2018-06-13
Attending: ORTHOPAEDIC SURGERY
Payer: MEDICARE

## 2018-06-13 ENCOUNTER — ANESTHESIA (OUTPATIENT)
Dept: SURGERY | Age: 78
DRG: 483 | End: 2018-06-13
Payer: MEDICARE

## 2018-06-13 DIAGNOSIS — M19.012 PRIMARY OSTEOARTHRITIS OF LEFT SHOULDER: Primary | ICD-10-CM

## 2018-06-13 PROCEDURE — 77030027138 HC INCENT SPIROMETER -A: Performed by: ORTHOPAEDIC SURGERY

## 2018-06-13 PROCEDURE — 77010033678 HC OXYGEN DAILY

## 2018-06-13 PROCEDURE — 0RRK0JZ REPLACEMENT OF LEFT SHOULDER JOINT WITH SYNTHETIC SUBSTITUTE, OPEN APPROACH: ICD-10-PCS | Performed by: ORTHOPAEDIC SURGERY

## 2018-06-13 PROCEDURE — 77030011640 HC PAD GRND REM COVD -A: Performed by: ORTHOPAEDIC SURGERY

## 2018-06-13 PROCEDURE — 77030002933 HC SUT MCRYL J&J -A: Performed by: ORTHOPAEDIC SURGERY

## 2018-06-13 PROCEDURE — 74011250636 HC RX REV CODE- 250/636: Performed by: ORTHOPAEDIC SURGERY

## 2018-06-13 PROCEDURE — 76010010054 HC POST OP PAIN BLOCK: Performed by: ORTHOPAEDIC SURGERY

## 2018-06-13 PROCEDURE — 77030018836 HC SOL IRR NACL ICUM -A: Performed by: ORTHOPAEDIC SURGERY

## 2018-06-13 PROCEDURE — 77030003602 HC NDL NRV BLK BBMI -B: Performed by: ANESTHESIOLOGY

## 2018-06-13 PROCEDURE — 77030039266 HC ADH SKN EXOFIN S2SG -A: Performed by: ORTHOPAEDIC SURGERY

## 2018-06-13 PROCEDURE — 76210000006 HC OR PH I REC 0.5 TO 1 HR: Performed by: ORTHOPAEDIC SURGERY

## 2018-06-13 PROCEDURE — A4565 SLINGS: HCPCS | Performed by: ORTHOPAEDIC SURGERY

## 2018-06-13 PROCEDURE — 74011000250 HC RX REV CODE- 250: Performed by: ORTHOPAEDIC SURGERY

## 2018-06-13 PROCEDURE — 74011250636 HC RX REV CODE- 250/636

## 2018-06-13 PROCEDURE — 73020 X-RAY EXAM OF SHOULDER: CPT

## 2018-06-13 PROCEDURE — 94762 N-INVAS EAR/PLS OXIMTRY CONT: CPT

## 2018-06-13 PROCEDURE — 77030006777 HC BLD SAW OSC CNMD -B: Performed by: ORTHOPAEDIC SURGERY

## 2018-06-13 PROCEDURE — 74011250637 HC RX REV CODE- 250/637: Performed by: ORTHOPAEDIC SURGERY

## 2018-06-13 PROCEDURE — 77030021678 HC GLIDESCP STAT DISP VERT -B: Performed by: ANESTHESIOLOGY

## 2018-06-13 PROCEDURE — 94760 N-INVAS EAR/PLS OXIMETRY 1: CPT

## 2018-06-13 PROCEDURE — 76010000173 HC OR TIME 3 TO 3.5 HR INTENSV-TIER 1: Performed by: ORTHOPAEDIC SURGERY

## 2018-06-13 PROCEDURE — 74011250636 HC RX REV CODE- 250/636: Performed by: ANESTHESIOLOGY

## 2018-06-13 PROCEDURE — 77030020782 HC GWN BAIR PAWS FLX 3M -B: Performed by: ANESTHESIOLOGY

## 2018-06-13 PROCEDURE — 65270000029 HC RM PRIVATE

## 2018-06-13 PROCEDURE — 76942 ECHO GUIDE FOR BIOPSY: CPT | Performed by: ORTHOPAEDIC SURGERY

## 2018-06-13 PROCEDURE — 77030031139 HC SUT VCRL2 J&J -A: Performed by: ORTHOPAEDIC SURGERY

## 2018-06-13 PROCEDURE — 77030019908 HC STETH ESOPH SIMS -A: Performed by: ANESTHESIOLOGY

## 2018-06-13 PROCEDURE — 77030002913 HC SUT ETHBND J&J -B: Performed by: ORTHOPAEDIC SURGERY

## 2018-06-13 PROCEDURE — 74011000250 HC RX REV CODE- 250

## 2018-06-13 PROCEDURE — 77030018673: Performed by: ORTHOPAEDIC SURGERY

## 2018-06-13 PROCEDURE — C1776 JOINT DEVICE (IMPLANTABLE): HCPCS | Performed by: ORTHOPAEDIC SURGERY

## 2018-06-13 PROCEDURE — 77030008703 HC TU ET UNCUF COVD -A: Performed by: ANESTHESIOLOGY

## 2018-06-13 PROCEDURE — 76060000038 HC ANESTHESIA 3.5 TO 4 HR: Performed by: ORTHOPAEDIC SURGERY

## 2018-06-13 DEVICE — SCREW BNE AD PED L47MM DIA15MM CANC SHLDR NONLOCKING: Type: IMPLANTABLE DEVICE | Site: SHOULDER | Status: FUNCTIONAL

## 2018-06-13 DEVICE — STEM HUM DIA17MM SHLDR PRI PRESSFIT EQUINOXE: Type: IMPLANTABLE DEVICE | Site: SHOULDER | Status: FUNCTIONAL

## 2018-06-13 DEVICE — PLATE BNE 4.5MM ANAT REPLICATOR O/S EQUINOXE: Type: IMPLANTABLE DEVICE | Site: SHOULDER | Status: FUNCTIONAL

## 2018-06-13 DEVICE — IMPLANTABLE DEVICE: Type: IMPLANTABLE DEVICE | Site: SHOULDER | Status: FUNCTIONAL

## 2018-06-13 DEVICE — HEAD HUM DIA47MM SHT SHLDR FOR HEMIARTHROPLASTY EQUINOXE: Type: IMPLANTABLE DEVICE | Site: SHOULDER | Status: FUNCTIONAL

## 2018-06-13 RX ORDER — FENTANYL CITRATE 50 UG/ML
INJECTION, SOLUTION INTRAMUSCULAR; INTRAVENOUS
Status: DISCONTINUED
Start: 2018-06-13 | End: 2018-06-13 | Stop reason: ALTCHOICE

## 2018-06-13 RX ORDER — EZETIMIBE 10 MG/1
10 TABLET ORAL
Status: DISCONTINUED | OUTPATIENT
Start: 2018-06-13 | End: 2018-06-15 | Stop reason: HOSPADM

## 2018-06-13 RX ORDER — OXYCODONE AND ACETAMINOPHEN 5; 325 MG/1; MG/1
1 TABLET ORAL
Status: DISCONTINUED | OUTPATIENT
Start: 2018-06-13 | End: 2018-06-15 | Stop reason: HOSPADM

## 2018-06-13 RX ORDER — NEOSTIGMINE METHYLSULFATE 1 MG/ML
INJECTION INTRAVENOUS AS NEEDED
Status: DISCONTINUED | OUTPATIENT
Start: 2018-06-13 | End: 2018-06-13 | Stop reason: HOSPADM

## 2018-06-13 RX ORDER — DEXAMETHASONE SODIUM PHOSPHATE 4 MG/ML
INJECTION, SOLUTION INTRA-ARTICULAR; INTRALESIONAL; INTRAMUSCULAR; INTRAVENOUS; SOFT TISSUE AS NEEDED
Status: DISCONTINUED | OUTPATIENT
Start: 2018-06-13 | End: 2018-06-13 | Stop reason: HOSPADM

## 2018-06-13 RX ORDER — HYDROMORPHONE HYDROCHLORIDE 2 MG/ML
0.5 INJECTION, SOLUTION INTRAMUSCULAR; INTRAVENOUS; SUBCUTANEOUS
Status: DISCONTINUED | OUTPATIENT
Start: 2018-06-13 | End: 2018-06-13 | Stop reason: HOSPADM

## 2018-06-13 RX ORDER — NALOXONE HYDROCHLORIDE 0.4 MG/ML
0.04 INJECTION, SOLUTION INTRAMUSCULAR; INTRAVENOUS; SUBCUTANEOUS
Status: DISCONTINUED | OUTPATIENT
Start: 2018-06-13 | End: 2018-06-13 | Stop reason: HOSPADM

## 2018-06-13 RX ORDER — ASPIRIN 81 MG/1
81 TABLET ORAL DAILY
Status: DISCONTINUED | OUTPATIENT
Start: 2018-06-14 | End: 2018-06-15 | Stop reason: HOSPADM

## 2018-06-13 RX ORDER — ADHESIVE BANDAGE
30 BANDAGE TOPICAL DAILY PRN
Status: DISCONTINUED | OUTPATIENT
Start: 2018-06-13 | End: 2018-06-15 | Stop reason: HOSPADM

## 2018-06-13 RX ORDER — LIDOCAINE HYDROCHLORIDE 10 MG/ML
0.1 INJECTION INFILTRATION; PERINEURAL AS NEEDED
Status: DISCONTINUED | OUTPATIENT
Start: 2018-06-13 | End: 2018-06-13 | Stop reason: HOSPADM

## 2018-06-13 RX ORDER — BUPIVACAINE HYDROCHLORIDE AND EPINEPHRINE 5; 5 MG/ML; UG/ML
INJECTION, SOLUTION EPIDURAL; INTRACAUDAL; PERINEURAL AS NEEDED
Status: DISCONTINUED | OUTPATIENT
Start: 2018-06-13 | End: 2018-06-13 | Stop reason: HOSPADM

## 2018-06-13 RX ORDER — ALPRAZOLAM 0.5 MG/1
0.25 TABLET ORAL
Status: DISCONTINUED | OUTPATIENT
Start: 2018-06-13 | End: 2018-06-15 | Stop reason: HOSPADM

## 2018-06-13 RX ORDER — PANTOPRAZOLE SODIUM 40 MG/1
40 TABLET, DELAYED RELEASE ORAL
Status: DISCONTINUED | OUTPATIENT
Start: 2018-06-14 | End: 2018-06-15 | Stop reason: HOSPADM

## 2018-06-13 RX ORDER — ZOLPIDEM TARTRATE 10 MG/1
10 TABLET ORAL
Status: DISCONTINUED | OUTPATIENT
Start: 2018-06-13 | End: 2018-06-13

## 2018-06-13 RX ORDER — SODIUM CHLORIDE, SODIUM LACTATE, POTASSIUM CHLORIDE, CALCIUM CHLORIDE 600; 310; 30; 20 MG/100ML; MG/100ML; MG/100ML; MG/100ML
100 INJECTION, SOLUTION INTRAVENOUS CONTINUOUS
Status: DISCONTINUED | OUTPATIENT
Start: 2018-06-13 | End: 2018-06-13 | Stop reason: HOSPADM

## 2018-06-13 RX ORDER — SODIUM CHLORIDE 0.9 % (FLUSH) 0.9 %
5-10 SYRINGE (ML) INJECTION AS NEEDED
Status: DISCONTINUED | OUTPATIENT
Start: 2018-06-13 | End: 2018-06-15 | Stop reason: HOSPADM

## 2018-06-13 RX ORDER — FENTANYL CITRATE 50 UG/ML
100 INJECTION, SOLUTION INTRAMUSCULAR; INTRAVENOUS ONCE
Status: COMPLETED | OUTPATIENT
Start: 2018-06-13 | End: 2018-06-13

## 2018-06-13 RX ORDER — IBUPROFEN 600 MG/1
600 TABLET ORAL AS NEEDED
Status: DISCONTINUED | OUTPATIENT
Start: 2018-06-13 | End: 2018-06-13 | Stop reason: ALTCHOICE

## 2018-06-13 RX ORDER — ONDANSETRON 2 MG/ML
INJECTION INTRAMUSCULAR; INTRAVENOUS AS NEEDED
Status: DISCONTINUED | OUTPATIENT
Start: 2018-06-13 | End: 2018-06-13 | Stop reason: HOSPADM

## 2018-06-13 RX ORDER — PROPOFOL 10 MG/ML
INJECTION, EMULSION INTRAVENOUS AS NEEDED
Status: DISCONTINUED | OUTPATIENT
Start: 2018-06-13 | End: 2018-06-13 | Stop reason: HOSPADM

## 2018-06-13 RX ORDER — DEXTROSE MONOHYDRATE AND SODIUM CHLORIDE 5; .45 G/100ML; G/100ML
100 INJECTION, SOLUTION INTRAVENOUS CONTINUOUS
Status: DISCONTINUED | OUTPATIENT
Start: 2018-06-13 | End: 2018-06-13

## 2018-06-13 RX ORDER — MIDAZOLAM HYDROCHLORIDE 1 MG/ML
2 INJECTION, SOLUTION INTRAMUSCULAR; INTRAVENOUS
Status: DISCONTINUED | OUTPATIENT
Start: 2018-06-13 | End: 2018-06-13 | Stop reason: HOSPADM

## 2018-06-13 RX ORDER — HYDROMORPHONE HYDROCHLORIDE 1 MG/ML
0.5 INJECTION, SOLUTION INTRAMUSCULAR; INTRAVENOUS; SUBCUTANEOUS
Status: ACTIVE | OUTPATIENT
Start: 2018-06-13 | End: 2018-06-14

## 2018-06-13 RX ORDER — GLYCOPYRROLATE 0.2 MG/ML
INJECTION INTRAMUSCULAR; INTRAVENOUS AS NEEDED
Status: DISCONTINUED | OUTPATIENT
Start: 2018-06-13 | End: 2018-06-13 | Stop reason: HOSPADM

## 2018-06-13 RX ORDER — OXYCODONE HYDROCHLORIDE 5 MG/1
5 TABLET ORAL
Status: DISCONTINUED | OUTPATIENT
Start: 2018-06-13 | End: 2018-06-13 | Stop reason: HOSPADM

## 2018-06-13 RX ORDER — FENTANYL CITRATE 50 UG/ML
INJECTION, SOLUTION INTRAMUSCULAR; INTRAVENOUS AS NEEDED
Status: DISCONTINUED | OUTPATIENT
Start: 2018-06-13 | End: 2018-06-13 | Stop reason: HOSPADM

## 2018-06-13 RX ORDER — CEFAZOLIN SODIUM/WATER 2 G/20 ML
2 SYRINGE (ML) INTRAVENOUS
Status: COMPLETED | OUTPATIENT
Start: 2018-06-13 | End: 2018-06-13

## 2018-06-13 RX ORDER — LIDOCAINE HYDROCHLORIDE 20 MG/ML
INJECTION, SOLUTION EPIDURAL; INFILTRATION; INTRACAUDAL; PERINEURAL AS NEEDED
Status: DISCONTINUED | OUTPATIENT
Start: 2018-06-13 | End: 2018-06-13 | Stop reason: HOSPADM

## 2018-06-13 RX ORDER — LEVETIRACETAM 500 MG/1
500 TABLET ORAL 2 TIMES DAILY
Status: DISCONTINUED | OUTPATIENT
Start: 2018-06-13 | End: 2018-06-13 | Stop reason: SDUPTHER

## 2018-06-13 RX ORDER — MIDAZOLAM HYDROCHLORIDE 1 MG/ML
2 INJECTION, SOLUTION INTRAMUSCULAR; INTRAVENOUS ONCE
Status: COMPLETED | OUTPATIENT
Start: 2018-06-13 | End: 2018-06-13

## 2018-06-13 RX ORDER — LEVETIRACETAM 500 MG/1
500 TABLET ORAL 2 TIMES DAILY
Status: DISCONTINUED | OUTPATIENT
Start: 2018-06-13 | End: 2018-06-15 | Stop reason: HOSPADM

## 2018-06-13 RX ORDER — LOSARTAN POTASSIUM 50 MG/1
50 TABLET ORAL DAILY
Status: DISCONTINUED | OUTPATIENT
Start: 2018-06-14 | End: 2018-06-15 | Stop reason: HOSPADM

## 2018-06-13 RX ORDER — ACETAMINOPHEN 325 MG/1
650 TABLET ORAL
Status: DISCONTINUED | OUTPATIENT
Start: 2018-06-13 | End: 2018-06-15 | Stop reason: HOSPADM

## 2018-06-13 RX ORDER — AMLODIPINE BESYLATE 5 MG/1
5 TABLET ORAL DAILY
Status: DISCONTINUED | OUTPATIENT
Start: 2018-06-14 | End: 2018-06-15 | Stop reason: HOSPADM

## 2018-06-13 RX ORDER — SUCCINYLCHOLINE CHLORIDE 20 MG/ML
INJECTION INTRAMUSCULAR; INTRAVENOUS AS NEEDED
Status: DISCONTINUED | OUTPATIENT
Start: 2018-06-13 | End: 2018-06-13 | Stop reason: HOSPADM

## 2018-06-13 RX ORDER — CEFAZOLIN SODIUM IN 0.9 % NACL 2 G/50 ML
2 INTRAVENOUS SOLUTION, PIGGYBACK (ML) INTRAVENOUS EVERY 8 HOURS
Status: DISCONTINUED | OUTPATIENT
Start: 2018-06-13 | End: 2018-06-13

## 2018-06-13 RX ORDER — EPHEDRINE SULFATE 50 MG/ML
INJECTION, SOLUTION INTRAVENOUS AS NEEDED
Status: DISCONTINUED | OUTPATIENT
Start: 2018-06-13 | End: 2018-06-13 | Stop reason: HOSPADM

## 2018-06-13 RX ORDER — ROCURONIUM BROMIDE 10 MG/ML
INJECTION, SOLUTION INTRAVENOUS AS NEEDED
Status: DISCONTINUED | OUTPATIENT
Start: 2018-06-13 | End: 2018-06-13 | Stop reason: HOSPADM

## 2018-06-13 RX ADMIN — LIDOCAINE HYDROCHLORIDE 60 MG: 20 INJECTION, SOLUTION EPIDURAL; INFILTRATION; INTRACAUDAL; PERINEURAL at 07:40

## 2018-06-13 RX ADMIN — EPHEDRINE SULFATE 5 MG: 50 INJECTION, SOLUTION INTRAVENOUS at 09:30

## 2018-06-13 RX ADMIN — OXYCODONE HYDROCHLORIDE AND ACETAMINOPHEN 1 TABLET: 5; 325 TABLET ORAL at 21:52

## 2018-06-13 RX ADMIN — ONDANSETRON 4 MG: 2 INJECTION INTRAMUSCULAR; INTRAVENOUS at 09:34

## 2018-06-13 RX ADMIN — NEOSTIGMINE METHYLSULFATE 1 MG: 1 INJECTION INTRAVENOUS at 10:48

## 2018-06-13 RX ADMIN — ROCURONIUM BROMIDE 10 MG: 10 INJECTION, SOLUTION INTRAVENOUS at 07:40

## 2018-06-13 RX ADMIN — FENTANYL CITRATE 100 MCG: 50 INJECTION, SOLUTION INTRAMUSCULAR; INTRAVENOUS at 07:40

## 2018-06-13 RX ADMIN — GLYCOPYRROLATE 0.2 MG: 0.2 INJECTION INTRAMUSCULAR; INTRAVENOUS at 10:48

## 2018-06-13 RX ADMIN — EPHEDRINE SULFATE 10 MG: 50 INJECTION, SOLUTION INTRAVENOUS at 09:56

## 2018-06-13 RX ADMIN — EPHEDRINE SULFATE 10 MG: 50 INJECTION, SOLUTION INTRAVENOUS at 08:10

## 2018-06-13 RX ADMIN — GLYCOPYRROLATE 0.2 MG: 0.2 INJECTION INTRAMUSCULAR; INTRAVENOUS at 10:47

## 2018-06-13 RX ADMIN — SODIUM CHLORIDE, SODIUM LACTATE, POTASSIUM CHLORIDE, AND CALCIUM CHLORIDE: 600; 310; 30; 20 INJECTION, SOLUTION INTRAVENOUS at 08:29

## 2018-06-13 RX ADMIN — EPHEDRINE SULFATE 10 MG: 50 INJECTION, SOLUTION INTRAVENOUS at 09:11

## 2018-06-13 RX ADMIN — Medication 2 G: at 07:30

## 2018-06-13 RX ADMIN — ROCURONIUM BROMIDE 15 MG: 10 INJECTION, SOLUTION INTRAVENOUS at 09:07

## 2018-06-13 RX ADMIN — DEXAMETHASONE SODIUM PHOSPHATE 10 MG: 4 INJECTION, SOLUTION INTRA-ARTICULAR; INTRALESIONAL; INTRAMUSCULAR; INTRAVENOUS; SOFT TISSUE at 09:43

## 2018-06-13 RX ADMIN — EPHEDRINE SULFATE 10 MG: 50 INJECTION, SOLUTION INTRAVENOUS at 09:03

## 2018-06-13 RX ADMIN — EPHEDRINE SULFATE 5 MG: 50 INJECTION, SOLUTION INTRAVENOUS at 08:32

## 2018-06-13 RX ADMIN — FENTANYL CITRATE 100 MCG: 50 INJECTION INTRAMUSCULAR; INTRAVENOUS at 06:57

## 2018-06-13 RX ADMIN — EPHEDRINE SULFATE 10 MG: 50 INJECTION, SOLUTION INTRAVENOUS at 08:08

## 2018-06-13 RX ADMIN — LEVETIRACETAM 500 MG: 500 TABLET ORAL at 21:52

## 2018-06-13 RX ADMIN — ROCURONIUM BROMIDE 15 MG: 10 INJECTION, SOLUTION INTRAVENOUS at 09:43

## 2018-06-13 RX ADMIN — PROPOFOL 200 MG: 10 INJECTION, EMULSION INTRAVENOUS at 07:40

## 2018-06-13 RX ADMIN — DEXAMETHASONE SODIUM PHOSPHATE 8 MG: 4 INJECTION, SOLUTION INTRA-ARTICULAR; INTRALESIONAL; INTRAMUSCULAR; INTRAVENOUS; SOFT TISSUE at 07:48

## 2018-06-13 RX ADMIN — ROCURONIUM BROMIDE 40 MG: 10 INJECTION, SOLUTION INTRAVENOUS at 07:44

## 2018-06-13 RX ADMIN — NEOSTIGMINE METHYLSULFATE 1 MG: 1 INJECTION INTRAVENOUS at 10:50

## 2018-06-13 RX ADMIN — SODIUM CHLORIDE, SODIUM LACTATE, POTASSIUM CHLORIDE, AND CALCIUM CHLORIDE 100 ML/HR: 600; 310; 30; 20 INJECTION, SOLUTION INTRAVENOUS at 06:02

## 2018-06-13 RX ADMIN — SUCCINYLCHOLINE CHLORIDE 180 MG: 20 INJECTION INTRAMUSCULAR; INTRAVENOUS at 07:40

## 2018-06-13 RX ADMIN — EPHEDRINE SULFATE 10 MG: 50 INJECTION, SOLUTION INTRAVENOUS at 08:29

## 2018-06-13 RX ADMIN — MIDAZOLAM HYDROCHLORIDE 2 MG: 1 INJECTION, SOLUTION INTRAMUSCULAR; INTRAVENOUS at 06:56

## 2018-06-13 RX ADMIN — NEOSTIGMINE METHYLSULFATE 1 MG: 1 INJECTION INTRAVENOUS at 10:47

## 2018-06-13 RX ADMIN — EZETIMIBE 10 MG: 10 TABLET ORAL at 21:52

## 2018-06-13 RX ADMIN — ROCURONIUM BROMIDE 10 MG: 10 INJECTION, SOLUTION INTRAVENOUS at 08:43

## 2018-06-13 RX ADMIN — EPHEDRINE SULFATE 15 MG: 50 INJECTION, SOLUTION INTRAVENOUS at 07:53

## 2018-06-13 NOTE — BRIEF OP NOTE
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 Item Serial No.  Lot No. LRB No. Used Action   STEM HUM PF PRIMARY 17. 0MM -- Tulio Kimbrough - [id number]  STEM HUM PF PRIMARY 17. 0MM -- Tulio Kimbrough [id number] [employer] [id number] Left 1 Implanted   CAGE DONY EQUIN BETA LRG --  - [id number]  CAGE [place] Rolling BETA LRG --  [id number] [facility] [employer] [id number] Left 1 Implanted   PLATE JENARO REPLICTR 9.0IU O/S -- [employer] - [id number]  PLATE JENARO REPLICTR 8.1OD O/S -- [employer] [id number] [employer] [id number] Left 1 Implanted   SCR KIT BNE SHLDR TORQ DEFIN 2 -- [employer] - [id number]  SCR KIT BNE SHLDR TORQ DEFIN 2 -- Tulio Ctahys [id number] [employer] [id number] Left 1 Implanted   HEAD HUM SHORT BETA 47MM -- Tulio Kimbrough - [id number]   HEAD HUM SHORT BETA 47MM -- Tulio Kimbrough [id number] [employer] [id number] Left 1 Implanted

## 2018-06-13 NOTE — PROGRESS NOTES
06/13/18 1620   Oxygen Therapy   O2 Sat (%) 93 %   Pulse via Oximetry 87 beats per minute   O2 Device Nasal cannula   O2 Flow Rate (L/min) 2 l/min   C/S monitor #10 at the bedside.

## 2018-06-13 NOTE — ANESTHESIA PROCEDURE NOTES
Peripheral Block    Start time: 6/13/2018 6:57 AM  End time: 6/13/2018 7:02 AM  Performed by: Juni De Anda  Authorized by: Juni De Anda       Pre-procedure: Indications: at surgeon's request, post-op pain management and procedure for pain    Preanesthetic Checklist: patient identified, risks and benefits discussed, site marked, timeout performed, anesthesia consent given and patient being monitored    Timeout Time: 06:56          Block Type:   Block Type: Interscalene  Laterality:  Left  Monitoring:  Standard ASA monitoring, continuous pulse ox, frequent vital sign checks, heart rate, oxygen and responsive to questions  Injection Technique:  Single shot  Procedures: ultrasound guided and nerve stimulator    Prep: chlorhexidine    Needle Type:  Stimuplex (4 Inch)  Needle Gauge:  20 G  Needle Localization:  Ultrasound guidance and nerve stimulator  Motor Response: minimal motor response >0.4 mA    Medication Injected:  0.375%  ropivacaine  Adds:  Epi 1:400K  Volume (mL):  40    Assessment:  Number of attempts:  1  Injection Assessment:  Incremental injection every 5 mL, local visualized surrounding nerve on ultrasound, negative aspiration for blood, no paresthesia, no intravascular symptoms and ultrasound image on chart  Patient tolerance:  Patient tolerated the procedure well with no immediate complications  3 cc 1% lidocaine injected at site of needle insertion.

## 2018-06-13 NOTE — PROGRESS NOTES
Admission Assessment Complete. Pt had a LTSA today. Pt is A&Ox 3.  +2 radial pulses with LUE pharmacologically paralyzed. . Dermabond is clean, dry and intact. Shoulder in sling. IVF inusing. Pt denies any pain or need at this time. Bed low and locked. Side rails x3. Call light with in reach. Pt verbalizes understanding of call light.

## 2018-06-13 NOTE — PROGRESS NOTES
TRANSFER - IN REPORT:    Verbal report received from [facility] (name) on [employer]  being received from PACU (unit) for routine progression of care      Report consisted of patients Situation, Background, Assessment and   Recommendations(SBAR). Information from the following report(s) SBAR, Kardex, OR Summary, Procedure Summary, Intake/Output, MAR, Accordion and Recent Results was reviewed with the receiving nurse. Opportunity for questions and clarification was provided. Assessment completed upon patients arrival to unit and care assumed.

## 2018-06-13 NOTE — ANESTHESIA POSTPROCEDURE EVALUATION
Post-Anesthesia Evaluation and Assessment    Patient: Beverly Mckoy MRN: [id number]  SSN: [id number]    YOB: 1940  Age: 70 y.o. Sex: male       Cardiovascular Function/Vital Signs  Visit Vitals    /73 (BP 1 Location: Right arm, BP Patient Position: At rest)    Pulse 87    Temp 37.7 °C (99.8 °F)    Resp 16    Ht 6' (1.829 m)    Wt 99.3 kg (219 lb)    SpO2 94%    BMI 29.7 kg/m2       Patient is status post general anesthesia for Procedure(s):  LEFT SHOULDER ARTHROPLASTY/TOTAL . Nausea/Vomiting: None    Postoperative hydration reviewed and adequate. Pain:  Pain Scale 1: Numeric (0 - 10) (06/13/18 1147)  Pain Intensity 1: 0 (06/13/18 1147)   Managed    Neurological Status:   Left arm numb. Mental Status and Level of Consciousness: Awake. Pulmonary Status:   O2 Device: Nasal cannula (06/13/18 1147)   Adequate oxygenation and airway patent    Complications related to anesthesia: None    Post-anesthesia assessment completed.  No concerns    Signed By: Wenceslao Moreno MD     June 13, 2018

## 2018-06-13 NOTE — PROGRESS NOTES
06/13/18 1302   Oxygen Therapy   O2 Sat (%) 94 %   Pulse via Oximetry 89 beats per minute   O2 Device Nasal cannula   O2 Flow Rate (L/min) 4 l/min   Incentive Spirometry Treatment   Actual Volume (ml) 1750 ml   Number of Attempts 505 Debra Robert Notes Reviewed. Pt working on IS. Pt encouraged to do 10 breaths per hour while awake on IS. Good NPC. No respiratory distress noted at this time. No complications noted at this time.  Pt did not bring home CPAP, he states the doctor told him \"to take a time-out from the CPAP for a month\"

## 2018-06-13 NOTE — ANESTHESIA PREPROCEDURE EVALUATION
Anesthetic History   No history of anesthetic complications            Review of Systems / Medical History  Pertinent labs reviewed    Pulmonary        Sleep apnea: No treatment           Neuro/Psych     seizures: well controlled         Cardiovascular    Hypertension              Exercise tolerance: >4 METS     GI/Hepatic/Renal     GERD: well controlled           Endo/Other        Obesity and arthritis     Other Findings            Physical Exam    Airway  Mallampati: III  TM Distance: 4 - 6 cm  Neck ROM: normal range of motion   Mouth opening: Normal     Cardiovascular  Regular rate and rhythm,  S1 and S2 normal,  no murmur, click, rub, or gallop             Dental  No notable dental hx       Pulmonary  Breath sounds clear to auscultation               Abdominal  GI exam deferred       Other Findings            Anesthetic Plan    ASA: 2  Anesthesia type: general      Post-op pain plan if not by surgeon: peripheral nerve block single    Induction: Intravenous  Anesthetic plan and risks discussed with: Patient

## 2018-06-13 NOTE — IP AVS SNAPSHOT
[address] 
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 [address] 
[phone] Lizette Griffin MD On 2018 9:00 am @ [address] [facility] [address] 
[phone] Discharge Orders None A check vale indicates which time of day the medication should be taken. My Medications START taking these medications Instructions Each Dose to Equal  
 Morning Noon Evening Bedtime  
 oxyCODONE-acetaminophen 5-325 mg per tablet Commonly known as:  PERCOCET Your next dose is: Today Take 1 Tab by mouth every four (4) hours as needed. Max Daily Amount: 6 Tabs. 1 Tab CHANGE how you take these medications Instructions Each Dose to Equal  
 Morning Noon Evening Bedtime  
 ibuprofen 600 mg tablet Commonly known as:  MOTRIN What changed:  when to take this Your next dose is: Today Take 1 Tab by mouth every eight (8) hours as needed for Pain. 600 mg CONTINUE taking these medications Instructions Each Dose to Equal  
 Morning Noon Evening Bedtime ALPRAZolam 0.25 mg tablet Commonly known as:  Bennett Al Your next dose is: Today Take 1 Tab by mouth nightly as needed for Anxiety or Sleep. Max Daily Amount: 0.25 mg.  
 0.25 mg  
    
   
   
   
  
  
 amLODIPine 5 mg tablet Commonly known as:  Liss Kimbrough Your next dose is:  Tomorrow Take 1 Tab by mouth daily. 5 mg  
    
  
   
   
   
  
 aspirin delayed-release 81 mg tablet Your next dose is:  Tomorrow Take 81 mg by mouth daily. 81 mg  
    
  
   
   
   
  
 cpap machine kit Your next dose is: Today  
   
 by Does Not Apply route.  
     
   
   
   
  
  
 esomeprazole 40 mg capsule Commonly known as:  NexIUM Your next dose is:  Tomorrow Take 1 Cap by mouth daily. 40 mg  
    
  
   
   
   
  
 ezetimibe 10 mg tablet Commonly known as:  Rikki Hadley Your next dose is:  As scheduled Take 1 Tab by mouth every Monday, Wednesday, Friday. 10 mg  
    
   
   
   
  
 * levETIRAcetam 500 mg tablet Commonly known as:  KEPPRA Your next dose is: Today Take 1 Tab by mouth two (2) times a day. 500 mg  
    
   
   
  
   
  
 * levETIRAcetam 500 mg tablet Commonly known as:  KEPPRA Your next dose is: Today One in AM and 2 at hs  
     
   
   
   
  
  
 losartan 50 mg tablet Commonly known as:  COZAAR Your next dose is:  Tomorrow Take 1 Tab by mouth daily. 50 mg  
    
  
   
   
   
  
 pitavastatin calcium 2 mg tablet Commonly known as:  LIVALO Your next dose is:  Tomorrow Take 1 Tab by mouth daily. 2 mg * Notice: This list has 2 medication(s) that are the same as other medications prescribed for you. Read the directions carefully, and ask your doctor or other care provider to review them with you. Where to Get Your Medications Information on where to get these meds will be given to you by the nurse or doctor. ! Ask your nurse or doctor about these medications  
  oxyCODONE-acetaminophen 5-325 mg per tablet Opioid Education  Prescription Opioids: What You Need to Know: 
 
Prescription opioids can be used to help relieve moderate-to-severe pain and are often prescribed following a surgery or injury, or for certain health conditions. These medications can be an important part of treatment but also come with serious risks. Opioids are strong pain medicines. Examples include hydrocodone, oxycodone, fentanyl, and morphine. Heroin is an example of an illegal opioid. It is important to work with your health care provider to make sure you are getting the safest, most effective care. WHAT ARE THE RISKS AND SIDE EFFECTS OF OPIOID USE? Prescription opioids carry serious risks of addiction and overdose, especially with prolonged use. An opioid overdose, often marked by slow breathing, can cause sudden death. The use of prescription opioids can have a number of side effects as well, even when taken as directed. · Tolerance-meaning you might need to take more of a medication for the same pain relief · Physical dependence-meaning you have symptoms of withdrawal when the medication is stopped. Withdrawal symptoms can include nausea, sweating, chills, diarrhea, stomach cramps, and muscle aches. Withdrawal can last up to several weeks, depending on which drug you took and how long you took it. · Increased sensitivity to pain · Constipation · Nausea, vomiting, and dry mouth · Sleepiness and dizziness · Confusion · Depression · Low levels of testosterone that can result in lower sex drive, energy, and strength · Itching and sweating RISKS ARE GREATER WITH:      
· History of drug misuse, substance use disorder, or overdose · Mental health conditions (such as depression or anxiety) · Sleep apnea · Older age (72 years or older) · Pregnancy Avoid alcohol while taking prescription opioids. Also, unless specifically advised by your health care provider, medications to avoid include: · Benzodiazepines (such as Xanax or Valium) · Muscle relaxants (such as Soma or Flexeril) · Hypnotics (such as Ambien or Lunesta) · Other prescription opioids KNOW YOUR OPTIONS Talk to your health care provider about ways to manage your pain that don't involve prescription opioids. Some of these options may actually work better and have fewer risks and side effects. Options may include: 
· Pain relievers such as acetaminophen, ibuprofen, and naproxen · Some medications that are also used for depression or seizures · Physical therapy and exercise · Counseling to help patients learn how to cope better with triggers of pain and stress. · Application of heat or cold compress · Massage therapy · Relaxation techniques Be Informed Make sure you know the name of your medication, how much and how often to take it, and its potential risks & side effects. IF YOU ARE PRESCRIBED OPIOIDS FOR PAIN: 
· Never take opioids in greater amounts or more often than prescribed. Remember the goal is not to be pain-free but to manage your pain at a tolerable level. · Follow up with your primary care provider to: · Work together to create a plan on how to manage your pain. · Talk about ways to help manage your pain that don't involve prescription opioids. · Talk about any and all concerns and side effects. · Help prevent misuse and abuse. · Never sell or share prescription opioids · Help prevent misuse and abuse. · Store prescription opioids in a secure place and out of reach of others (this may include visitors, children, friends, and family). · Safely dispose of unused/unwanted prescription opioids: Find your community drug take-back program or your pharmacy mail-back program, or flush them down the toilet, following guidance from the Food and Drug Administration (www.fda.gov/Drugs/ResourcesForYou). · Visit www.cdc.gov/drugoverdose to learn about the risks of opioid abuse and overdose. · If you believe you may be struggling with addiction, tell your health care provider and ask for guidance or call [employer] at 2-640-450-EHGR. Discharge Instructions Orthopedic Total  Shoulder Discharge Instructions ACTIVITY:  
- As tolerated and as directed by Dr. Liz Murphy - Remember to \"stay within the box\" with shoulder movements - Use arm sling as needed. It's fine to go without it if you're comfortable without it - Bath or shower is OK - Please use the incentive spirometer for at least two more days DIET: 
- Please resume your usual diet PAIN: 
- Take pain medication(s) as directed by the prescription you were given - Remember that it often helps to take acetaminophen with your pain medicine IF IT DOES NOT CONTAIN ACETAMINOPHEN. You may take up to 6 extra-strength tylenol or up to 9 regular tylenol per 24 hours. - You may also use ibuprofen 800 mg every six hours or aleve 440 mg every 8 hours IN ADDITION TO your prescribed pain medicine - Call Tr Zambrano if pain is NOT relieved by medication. If it's after hours please call our answering service at [phone]. They will have the provider on call for our group call you. - If nausea and vomiting occurs,use the phenergan prescription you were given by Dr Liz Murphy. If the phenergan doesn't work please call our doctor on call. (Call [phone] if it  is after regular office hours) DRESSING CARE: 
- The surgical glue on your wound may be treated like normal skin. It is OK to wash the wound. It is fine to get it wet. CALL YOUR DOCTOR IF: 
- Excessive bleeding that does not stop after holding mild pressure over the area - Temperature of 100.5°F or greater - Redness, excessive swelling or bruising, and/or green or yellow, smelly discharge from incision MEDICATIONS: 
- Continue home medications as previously prescribed DISCHARGE SUMMARY from Nurse The following personal items collected during your admission are returned to you:  
Dental Appliance: Dental Appliances: None Vision: Visual Aid: Glasses Hearing Aid:   NA 
Jewelry: Jewelry: None Clothing:   self Other Valuables:   na 
Valuables sent to safe:   na 
 
PATIENT INSTRUCTIONS: 
 
After general anesthesia or intravenous sedation, for 24 hours or while taking prescription Narcotics: · Limit your activities · Do not drive and operate hazardous machinery · Do not make important personal or business decisions · Do  not drink alcoholic beverages · If you have not urinated within 8 hours after discharge, please contact your surgeon on call. Report the following to your surgeon: 
· Excessive pain, swelling, redness or odor of or around the surgical area · Temperature over 101 · Nausea and vomiting lasting longer than 4 hours or if unable to take medications · Any signs of decreased circulation or nerve impairment to extremity: change in color, persistent  numbness, tingling, coldness or increase pain · Any questions, call office @ [phone] Keep scheduled follow up appointment. If need to change, call office @ [phone]. *  Please give a list of your current medications to your Primary Care Provider. *  Please update this list whenever your medications are discontinued, doses are 
    changed, or new medications (including over-the-counter products) are added. *  Please carry medication information at all times in case of emergency situations. Shoulder Replacement Surgery: What to Expect at Home Your Recovery Shoulder replacement surgery replaces the worn parts of your shoulder joint. When you leave the hospital, your arm will be in a sling. It will be helpful if there is someone to help you at home for the next few weeks or until you have more energy and can move around better. You will go home with a bandage and stitches or staples. You can remove the bandage when your doctor tells you to. If the stitches are not the type that dissolve, your doctor will remove them in 10 to 14 days.  You may still have some mild pain, and the area may be swollen for several months after surgery. Your doctor will give you medicine for the pain. You will continue the rehabilitation program (rehab) you started in the hospital. The better you do with your rehab exercises, the sooner you will get your strength and movement back. Depending on your job, you may be able to return to work as early as 2 to 3 weeks after surgery, as long as you avoid certain arm movements, such as lifting. This care sheet gives you a general idea about how long it will take for you to recover. But each person recovers at a different pace. Follow the steps below to get better as quickly as possible. How can you care for yourself at home? Activity ? · Rest when you feel tired. You may take a nap, but don't stay in bed all day. ? · Work with your physical therapist to learn the best way to exercise. ? · You will have a sling to wear at night. And it's a good idea to also put a small stack of folded sheets or towels under your upper arm while you are in bed to keep your arm from dropping too far back. ? · Your arm should stay next to your body or in front of it for several weeks, both while you are up and during sleep. ? · Don't lift anything with the affected arm for 6 weeks. ? · You may need to take sponge baths until your stitches or staples have been removed. You will probably be able to shower 24 hours after they are removed. ? · Ask your doctor when you can drive again. ? · Ask your doctor when it is okay for you to have sex. ? · Your doctor may advise you to give up activities that put stress on that shoulder. This includes sports such as weight lifting or tennis, unless your tennis arm was not the one affected. Diet ? · By the time you leave the hospital, you will probably be eating your normal diet.  If your stomach is upset, try bland, low-fat foods like plain rice, broiled chicken, toast, and yogurt. Your doctor may recommend that you take iron and vitamin supplements. ? · Drink plenty of fluids (unless your doctor tells you not to). ? · You may notice that your bowel movements are not regular right after your surgery. This is common. Try to avoid constipation and straining with bowel movements. You may want to take a fiber supplement every day. If you have not had a bowel movement after a couple of days, ask your doctor about taking a mild laxative. Medicines ? · Your doctor will tell you if and when you can restart your medicines. He or she will also give you instructions about taking any new medicines. ? · If you take blood thinners, such as warfarin (Coumadin), clopidogrel (Plavix), or aspirin, be sure to talk to your doctor. He or she will tell you if and when to start taking those medicines again. Make sure that you understand exactly what your doctor wants you to do. ? · Be safe with medicines. Take pain medicines exactly as directed. ¨ If the doctor gave you a prescription medicine for pain, take it as prescribed. ¨ If you are not taking a prescription pain medicine, ask your doctor if you can take an over-the-counter medicine. ? · If you think your pain medicine is making you sick to your stomach: 
¨ Take your medicine after meals (unless your doctor has told you not to). ¨ Ask your doctor for a different pain medicine. ? · If your doctor prescribed antibiotics, take them as directed. Don't stop taking them just because you feel better. You need to take the full course of antibiotics. ? · If you take a blood thinner, be sure you get instructions about how to take your medicine safely. Blood thinners can cause serious bleeding problems. Incision care ? · You will have a dressing over the cut (incision). A dressing helps the incision heal and protects it. Your doctor will tell you how to take care of this. ? · Keep the area clean and dry. Exercise ? · Shoulder rehabilitation is a series of exercises you do after your surgery. This helps you get back your shoulder's range of motion and strength. You will work with your doctor and physical therapist to plan this exercise program. To get the best results, you need to do the exercises correctly and as often and as long as your doctor tells you. ?Ice 
? · For pain, put ice or a cold pack on the area for 10 to 20 minutes at a time. Put a thin cloth between the ice and your skin. Follow-up care is a key part of your treatment and safety. Be sure to make and go to all appointments, and call your doctor if you are having problems. It's also a good idea to know your test results and keep a list of the medicines you take. When should you call for help? Call 911 anytime you think you may need emergency care. For example, call if: 
? · You have severe trouble breathing. ? · You have symptoms of a blood clot in your lung (called a pulmonary embolism). These may include: 
¨ Sudden chest pain. ¨ Trouble breathing. ¨ Coughing up blood. ?Call your doctor now or seek immediate medical care if: 
? · You have severe or increasing pain. ? · You have symptoms of infection, such as: 
¨ Increased pain, swelling, warmth, or redness. ¨ Red streaks or pus. ¨ A fever. ? · You have tingling, weakness, or numbness in your arm. ? · Your arm turns cold or changes color. ? · You have symptoms of a blood clot in your leg (called a deep vein thrombosis). These may include: 
¨ Pain in the calf, back of the knee, thigh, or groin. ¨ Redness and swelling in the leg or groin. ? Watch closely for changes in your health, and be sure to contact your doctor if: 
? · You do not get better as expected. Where can you learn more? Go to http://cole-mark.info/. Enter O835 in the search box to learn more about \"Shoulder Replacement Surgery: What to Expect at Home. \" 
 Current as of: March 21, 2017 Content Version: 11.4 © 6308-3361 [employer]. Care instructions adapted under license by [employer] (which disclaims liability or warranty for this information). If you have questions about a medical condition or this instruction, always ask your healthcare professional. [address] any warranty or liability for your use of this information. These are general instructions for a healthy lifestyle: No smoking/ No tobacco products/ Avoid exposure to second hand smoke Surgeon General's Warning:  Quitting smoking now greatly reduces serious risk to your health. Obesity, smoking, and sedentary lifestyle greatly increases your risk for illness A healthy diet, regular physical exercise & weight monitoring are important for maintaining a healthy lifestyle You may be retaining fluid if you have a history of heart failure or if you experience any of the following symptoms:  Weight gain of 3 pounds or more overnight or 5 pounds in a week, increased swelling in our hands or feet or shortness of breath while lying flat in bed. Please call your doctor as soon as you notice any of these symptoms; do not wait until your next office visit. Recognize signs and symptoms of STROKE: 
 
F-face looks uneven A-arms unable to move or move even S-speech slurred or non-existent T-time-call 911 as soon as signs and symptoms begin-DO NOT go Back to bed or wait to see if you get better-TIME IS BRAIN. The discharge information has been reviewed with the patient. The patient verbalized understanding. Signed: Caryn May MD 
6/15/2018 ACO Transitions of Care Introducing [facility] [address] [facility] offers a voluntary care coordination program to provide high quality service and care to [facility] fee-for-service beneficiaries. Susana Moreno was designed to help you enhance your health and well-being through the following services: ? Transitions of Care  support for individuals who are transitioning from one care setting to another (example: Hospital to home). ? Chronic and Complex Care Coordination  support for individuals and caregivers of those with serious or chronic illnesses or with more than one chronic (ongoing) condition and those who take a number of different medications. If you meet specific medical criteria, a [address] [place] [address] Rd may call you directly to coordinate your care with your primary care physician and your other care providers. For questions about the [facility] programs, please, contact your physicians office. For general questions or additional information about Accountable Care Organizations: 
Please visit www.medicare.gov/acos. html or call 1-800-MEDICARE (7-289.782.4847) TTY users should call 7-720.543.2941. Introducing [facility]! [employer] introduces [employer] patient portal. Now you can access parts of your medical record, email your doctor's office, and request medication refills online. 1. In your internet browser, go to [email or web address] 2. Click on the First Time User? Click Here link in the Sign In box. You will see the New Member Sign Up page. 3. Enter your ColorChip Access Code exactly as it appears below. You will not need to use this code after youve completed the sign-up process. If you do not sign up before the expiration date, you must request a new code. · ColorChip Access Code: [email or web address] Expires: 7/24/2018  2:09 PM 
 
4. Enter the last four digits of your Social Security Number (xxxx) and Date of Birth (mm/dd/yyyy) as indicated and click Submit. You will be taken to the next sign-up page. 5. Create a ColorChip ID.  This will be your ColorChip login ID and cannot be changed, so think of one that is secure and easy to remember. 6. Create a [employer] password. You can change your password at any time. 7. Enter your Password Reset Question and Answer. This can be used at a later time if you forget your password. 8. Enter your e-mail address. You will receive e-mail notification when new information is available in [address] 9. Click Sign Up. You can now view and download portions of your medical record. 10. Click the Download Summary menu link to download a portable copy of your medical information. If you have questions, please visit the Frequently Asked Questions section of the [employer] website. Remember, [employer] is NOT to be used for urgent needs. For medical emergencies, dial 911. Now available from your iPhone and Android! Introducing Bari Baeza As a [employer] [facility] patient, I wanted to make you aware of our electronic visit tool called Bari Baeza. [employer] allows you to connect within minutes with a medical provider 24 hours a day, seven days a week via a mobile device or tablet or logging into a secure website from your computer. You can access Bari Baeza from anywhere in the [place]. A virtual visit might be right for you when you have a simple condition and feel like you just dont want to get out of bed, or cant get away from work for an appointment, when your regular [facility] Vera "[employer] [facility] provider is not available (evenings, weekends or holidays), or when youre out of town and need minor care. Electronic visits cost only $49 and if the [employer] provider determines a prescription is needed to treat your condition, one can be electronically transmitted to a nearby pharmacy*. Please take a moment to enroll today if you have not already done so. The enrollment process is free and takes just a few minutes.   To enroll, please download the [employer] madison to your tablet or phone, or visit [email or web address] to enroll on your computer. And, as an [address] [place] [address] patient with a [employer] account, the results of your visits will be scanned into your electronic medical record and your primary care provider will be able to view the scanned results. We urge you to continue to see your regular [facility] provider for your ongoing medical care. And while your primary care provider may not be the one available when you seek a Bari MooBellawilburfin virtual visit, the peace of mind you get from getting a real diagnosis real time can be priceless. For more information on [employer], view our Frequently Asked Questions (FAQs) at [email or web address]. Sincerely, 
 
Isi Camargo MD 
Chief Medical Officer [facility] Ángela Kahn *:  certain medications cannot be prescribed via [employer] Unresulted tests-please follow up with your PCP on these results Procedure/Test Authorizing Provider Abrahan Barber MD  
 XR SHOULDER LT 1 V Jennifer Greene MD  
  
Providers Seen During Your Hospitalization Provider Specialty Primary office phone Jennifer Greene MD Orthopedic Surgery [phone] Your Primary Care Physician (PCP) Primary Care Physician Office Phone Office Fax [facility] [phone] You are allergic to the following Allergen Reactions Nka (No Known Allergies) Other (comments) Recent Documentation Height Weight BMI Smoking Status 1.829 m 99.3 kg 29.7 kg/m2 Never Smoker Emergency Contacts Name Discharge Info Relation Home Work Mobile Teresa Public  Spouse [3] [phone] Emmanuel Rider  Child [2] [phone] [employer]  Child [2] [phone] Patient Belongings The following personal items are in your possession at time of discharge: Dental Appliances: None  Visual Aid: Glasses          Jewelry: None Please provide this summary of care documentation to your next provider. Signatures-by signing, you are acknowledging that this After Visit Summary has been reviewed with you and you have received a copy. Patient Signature:  ____________________________________________________________ Date:  ____________________________________________________________  
  
Delma Oakland Provider Signature:  ____________________________________________________________ Date:  ____________________________________________________________

## 2018-06-13 NOTE — OP NOTES
[facility] REPORT    Yeimi Powers  MR#: [id number]  : 1940  ACCOUNT #: [de-identified]   DATE OF SERVICE: 2018    PREOPERATIVE DIAGNOSIS:  Painful osteoarthritis of the left shoulder. POSTOPERATIVE DIAGNOSIS:  Painful osteoarthritis of the left shoulder. PROCEDURE:  Left total shoulder arthroplasty. SURGEON:  Alan Pineda MD    ASSISTANT:      ANESTHESIA:  .    COMPLICATIONS:  none. IMPLANTS:  .    FINDINGS:  His humeral head was bare bone in a central area of 38 x 30 mm. The entirety of the glenoid was bare bone. His rotator cuff tendon was intact. His subscapularis tendon was very healthy. At the completion of the arthroplasty, I could easily touch his hand to his contralateral shoulder and I could perch the humeral head inferiorly or posteriorly. PROCEDURE IN DETAIL:  In the operating room, he was anesthetized. In the beach chair position, his left shoulder was prepped and draped in a sterile fashion. A deltopectoral incision was made through the subcutaneous fat layer. The cephalic vein was retracted laterally and the deltopectoral interval was bluntly developed. A bridging vein was tied with silk. The anterior circumflex vessels were ligated with silk. The biceps tendon sheath was incised and the biceps tendon was transected about 2 inches below the transverse humeral ligament. It was markedly abnormal with severe synovitis and longitudinal tearing. The subscapularis was transected a few millimeters from its insertion along with the underlying capsule. The shoulder was dislocated. The proximal cutting guide was used to guide a saw cut that removed the humeral head. The peripheral osteophytes were removed with a rongeur and a chisel. The labrum was removed, as was the biceps tendon stump.   I reamed the medullary canal to 17, broached to 17, and impacted an [employer] uncemented humeral component in place after using the rotary reamer to smooth the cut surface in appropriate alignment. A vertical crack 1.5 cm was noted at the 5 o'clock position. A central hole was drilled in the glenoid and this was used to guide rotary reamers that reamed a concentric hard bone surface. Three holes were made around that using the appropriate guide and a glenoid was chosen by trial reduction. A large glenoid was packed with bone graft taken from the humeral head and the glenoid was impacted into the 4 holes with an excellent interference fit and excellent peripheral support from the bone. A replicator was secured in appropriate rotation and alignment to the humeral component and head was chosen by trial reduction. The actual head was impacted onto the [facility] and the joint was reduced with the above findings noted. The rotator cuff interval was closed with simple interrupted Ethibond sutures. The subscapularis was reapproximated with vertical mattress Ethibond sutures. The deltopectoral interval was closed with running Vicryl, interrupted Monocryl, subcuticular Monocryl, and surgical glue on the skin. A shoulder immobilizer was applied. He tolerated the procedure very well. ESTIMATED BLOOD LOSS:  His blood loss was estimated at 300 mL. SPECIMENS:  There were no specimens sent. He left for the recovery room in good condition.       Magdalene Morrow MD       [facility] / Michi Wheeler  D: 06/13/2018 10:55     T: 06/13/2018 11:14  JOB #: [id number]  CC: Analy Phillip MD

## 2018-06-13 NOTE — PERIOP NOTES
TRANSFER - OUT REPORT:    Verbal report given to MARTHA Sanchez on [employer]  being transferred to [address] [place] [address] for routine post - op       Report consisted of patients Situation, Background, Assessment and   Recommendations(SBAR). Information from the following report(s) SBAR, Kardex, OR Summary, Intake/Output and MAR was reviewed with the receiving nurse. Lines:   Peripheral IV 06/13/18 Right Hand (Active)   Site Assessment Clean, dry, & intact 6/13/2018 11:03 AM   Phlebitis Assessment 0 6/13/2018 11:03 AM   Infiltration Assessment 0 6/13/2018 11:03 AM   Dressing Status Clean, dry, & intact 6/13/2018 11:03 AM   Dressing Type Tape;Transparent 6/13/2018 11:03 AM   Hub Color/Line Status Green; Infusing 6/13/2018 11:03 AM        Opportunity for questions and clarification was provided.       Patient transported with:   [employer]

## 2018-06-14 PROCEDURE — 97530 THERAPEUTIC ACTIVITIES: CPT

## 2018-06-14 PROCEDURE — 74011250637 HC RX REV CODE- 250/637: Performed by: ORTHOPAEDIC SURGERY

## 2018-06-14 PROCEDURE — 65270000029 HC RM PRIVATE

## 2018-06-14 PROCEDURE — 94760 N-INVAS EAR/PLS OXIMETRY 1: CPT

## 2018-06-14 PROCEDURE — 94762 N-INVAS EAR/PLS OXIMTRY CONT: CPT

## 2018-06-14 PROCEDURE — 97161 PT EVAL LOW COMPLEX 20 MIN: CPT

## 2018-06-14 PROCEDURE — 97110 THERAPEUTIC EXERCISES: CPT

## 2018-06-14 RX ORDER — POLYETHYLENE GLYCOL 3350 17 G/17G
17 POWDER, FOR SOLUTION ORAL DAILY
Status: DISCONTINUED | OUTPATIENT
Start: 2018-06-14 | End: 2018-06-15 | Stop reason: HOSPADM

## 2018-06-14 RX ORDER — SODIUM CHLORIDE 0.9 % (FLUSH) 0.9 %
5-10 SYRINGE (ML) INJECTION EVERY 8 HOURS
Status: DISCONTINUED | OUTPATIENT
Start: 2018-06-14 | End: 2018-06-15 | Stop reason: HOSPADM

## 2018-06-14 RX ORDER — ONDANSETRON 8 MG/1
8 TABLET, ORALLY DISINTEGRATING ORAL
Status: DISCONTINUED | OUTPATIENT
Start: 2018-06-14 | End: 2018-06-15 | Stop reason: HOSPADM

## 2018-06-14 RX ADMIN — OXYCODONE HYDROCHLORIDE AND ACETAMINOPHEN 1 TABLET: 5; 325 TABLET ORAL at 11:02

## 2018-06-14 RX ADMIN — LEVETIRACETAM 500 MG: 500 TABLET ORAL at 08:28

## 2018-06-14 RX ADMIN — MAGNESIUM HYDROXIDE 30 ML: 400 SUSPENSION ORAL at 21:41

## 2018-06-14 RX ADMIN — AMLODIPINE BESYLATE 5 MG: 5 TABLET ORAL at 08:28

## 2018-06-14 RX ADMIN — Medication 5 ML: at 22:30

## 2018-06-14 RX ADMIN — ONDANSETRON 8 MG: 8 TABLET, ORALLY DISINTEGRATING ORAL at 10:58

## 2018-06-14 RX ADMIN — LEVETIRACETAM 500 MG: 500 TABLET ORAL at 21:37

## 2018-06-14 RX ADMIN — LOSARTAN POTASSIUM 50 MG: 50 TABLET ORAL at 08:27

## 2018-06-14 RX ADMIN — OXYCODONE HYDROCHLORIDE AND ACETAMINOPHEN 1 TABLET: 5; 325 TABLET ORAL at 08:27

## 2018-06-14 RX ADMIN — POLYETHYLENE GLYCOL (3350) 17 G: 17 POWDER, FOR SOLUTION ORAL at 09:41

## 2018-06-14 RX ADMIN — PANTOPRAZOLE SODIUM 40 MG: 40 TABLET, DELAYED RELEASE ORAL at 05:31

## 2018-06-14 RX ADMIN — ASPIRIN 81 MG: 81 TABLET, COATED ORAL at 08:27

## 2018-06-14 RX ADMIN — OXYCODONE HYDROCHLORIDE AND ACETAMINOPHEN 1 TABLET: 5; 325 TABLET ORAL at 13:44

## 2018-06-14 RX ADMIN — OXYCODONE HYDROCHLORIDE AND ACETAMINOPHEN 1 TABLET: 5; 325 TABLET ORAL at 05:30

## 2018-06-14 RX ADMIN — OXYCODONE HYDROCHLORIDE AND ACETAMINOPHEN 1 TABLET: 5; 325 TABLET ORAL at 02:13

## 2018-06-14 NOTE — PROGRESS NOTES
Pt resting in bed. Call light within reach. Bed low to ground. Side rails up x 3. Shift assessment complete. Radial pulses +2. Moves fingers well. Pain well controled at this time. IS at bedside, pt educated with teach back. No need voiced at this time.

## 2018-06-14 NOTE — PROGRESS NOTES
06/13/18 2213   Oxygen Therapy   O2 Sat (%) 95 %   Pulse via Oximetry 82 beats per minute   O2 Device Nasal cannula   O2 Flow Rate (L/min) 2 l/min     Pt placed on cs monitor#10 no signs of sob or distress.

## 2018-06-14 NOTE — PROGRESS NOTES
Problem: Mobility Impaired (Adult and Pediatric)  Goal: *Acute Goals and Plan of Care (Insert Text)  GOALS (1-4 days):  (1.)  Patient will move from supine to sit and sit to supine  in bed with STAND BY ASSIST.    (2.)  Patient will transfer from bed to chair and chair to bed with INDEPENDENT using the least restrictive device. (3.)  Patient will be independent with shoulder HEP to increase range of motion per MD orders. ________________________________________________________________________________________________    PHYSICAL THERAPY: Daily Note, PM 6/14/2018  INPATIENT: Hospital Day: 2  [place] / Product Type: Medicare /      NAME/AGE/GENDER: Génesis Guthrie is a 70 y.o. male   PRIMARY DIAGNOSIS: Localized osteoarthritis of shoulder, left [M19.012] Arthritis of shoulder region, left, degenerative Arthritis of shoulder region, left, degenerative  Procedure(s) (LRB):  LEFT SHOULDER ARTHROPLASTY/TOTAL  (Left)  1 Day Post-Op  ICD-10: Treatment Diagnosis:   · Pain in Left Shoulder (M25.512)  · Stiffness of Left Shoulder, Not elsewhere classified (M25.612)  · Other abnormalities of gait and mobility (R26.89)   Precaution/Allergies:  Nka [no known allergies]      ASSESSMENT:     Mr. Lori Cheney presents with limited ROM and strength following his L TSA. He participated well with therapy and will benefit from PT to increase his knowledge of his HEP and functional independence. He will go home tomorrow. He ambulated with therapy in the halls working on dynamic balance and returned to sit EOB to perform therex. He was very dizzy this afternoon after walking so did not do standing therex. He returned supine following therapy. RN notified that patient wants to skip next pain med dose. He feels that the medicine is too strong for him. This section established at most recent assessment   PROBLEM LIST (Impairments causing functional limitations):  1.  Decreased [place] with Bed Mobility  2. Decreased [place] with Transfers  3. Decreased [place] with Ambulation   4. Decreased [place] with shoulder HEP   INTERVENTIONS PLANNED: (Benefits and precautions of physical therapy have been discussed with the patient.)  1. Bed Mobility Training  2. Transfer Training  3. Gait Training  4. Therapeutic Exercises per MD orders  5. Modalities for Pain     TREATMENT PLAN: Frequency/Duration: twice daily for duration of hospital stay  Rehabilitation Potential For Stated Goals: Excellent     RECOMMENDED REHABILITATION/EQUIPMENT: (at time of discharge pending progress): Continue Skilled Therapy. HISTORY:   History of Present Injury/Illness (Reason for Referral): Admitted for L TSA  Past Medical History/Comorbidities:   Mr. Veda Hanna  has a past medical history of GERD (gastroesophageal reflux disease); Hyperlipidemia (8/5/2016); Hypertension; Insomnia (10/30/2017); Obstructive sleep apnea (10/30/2017); Overweight (BMI 25.0-29.9) (10/30/2017); Primary osteoarthritis of left shoulder (7/14/2017); and Seizure ([address] (09/10/2017). Mr. Veda Hanna  has a past surgical history that includes hx hip replacement (Bilateral); hx hernia repair; and hx colonoscopy. Social History/Living Environment:   Home Environment: Private residence  One/Two Story Residence: One story  Living Alone: No  Support Systems: Spouse/Significant Other/Partner  Patient Expects to be Discharged to[de-identified] Private residence  Current DME Used/Available at Home: None  Prior Level of Function/Work/Activity:  Independent prior to admit   Number of Personal Factors/Comorbidities that affect the Plan of Care: 0: LOW COMPLEXITY   EXAMINATION:   Most Recent Physical Functioning:   Gross Assessment:  AROM: Within functional limits (limited L UE)  Strength: Within functional limits (limited L UE)               Posture:  Posture (WDL): Within defined limits  Balance:  Sitting: Intact  Standing: Pull to stand; Without support Bed Mobility:  Supine to Sit: Minimum assistance  Sit to Supine: Stand-by assistance  Wheelchair Mobility:     Transfers:  Sit to Stand: Stand-by assistance  Stand to Sit: Stand-by assistance  Bed to Chair: Stand-by assistance  Gait:     Distance (ft): 320 Feet (ft)  Assistive Device:  (none)  Ambulation - Level of Assistance: Stand-by assistance      Body Structures Involved:  1. Joints  2. Muscles Body Functions Affected:  1. Movement Related Activities and Participation Affected:  1. Mobility   Number of elements that affect the Plan of Care: 4+: HIGH COMPLEXITY   CLINICAL PRESENTATION:   Presentation: Stable and uncomplicated: LOW COMPLEXITY   CLINICAL DECISION MAKIN[address] [place] [address] AM-PAC 6 Clicks   Basic Mobility Inpatient Short Form  How much difficulty does the patient currently have. .. Unable A Lot A Little None   1. Turning over in bed (including adjusting bedclothes, sheets and blankets)? [] 1   [] 2   [x] 3   [] 4   2. Sitting down on and standing up from a chair with arms ( e.g., wheelchair, bedside commode, etc.)   [] 1   [] 2   [] 3   [x] 4   3. Moving from lying on back to sitting on the side of the bed? [] 1   [] 2   [x] 3   [] 4   How much help from another person does the patient currently need. .. Total A Lot A Little None   4. Moving to and from a bed to a chair (including a wheelchair)? [] 1   [] 2   [] 3   [x] 4   5. Need to walk in hospital room? [] 1   [] 2   [] 3   [x] 4   6. Climbing 3-5 steps with a railing? [] 1   [] 2   [] 3   [x] 4   © 2007, Trustees of [address] [place] [address], under license to [employer]. All rights reserved      Score:  Initial: 22 Most Recent: X (Date: -- )    Interpretation of Tool:  Represents activities that are increasingly more difficult (i.e. Bed mobility, Transfers, Gait). Score 24 23 22-20 19-15 14-10 9-7 6     Modifier CH CI CJ CK CL CM CN      ?  Mobility - Walking and Moving Around:     - CURRENT STATUS: CJ - 20%-39% impaired, limited or restricted    - GOAL STATUS: CI - 1%-19% impaired, limited or restricted    - D/C STATUS:  ---------------To be determined---------------  [place] / Product Type: Medicare /      Medical Necessity:     · Patient is expected to demonstrate progress in strength and range of motion to increase independence with HEP and functional mobility. Reason for Services/Other Comments:  · Patient continues to require skilled intervention due to limited functional independence. Use of outcome tool(s) and clinical judgement create a POC that gives a: Clear prediction of patient's progress: LOW COMPLEXITY            TREATMENT:   (In addition to Assessment/Re-Assessment sessions the following treatments were rendered)   Pre-treatment Symptoms/Complaints:  none  Pain: Initial:   Pain Intensity 1: 0 0 Post Session:  0   Therapeutic Activity: (    15 minutes): Therapeutic activities including Bed transfers and Ambulation on level ground to improve mobility, strength and balance. Required minimal  tactile cues to promote dynamic balance in standing. Therapeutic Exercise: (10 Minutes):  Exercises per grid below to improve mobility and strength. Required minimal verbal cues to promote proper body alignment.     Date:  6/14 Date:   Date:     ACTIVITY/EXERCISE AM PM AM PM AM PM   Gripping 10 10       Wrist Flexion/Extension 10 10       Wrist Ulnar/Radial Deviation         Pronation/Supination 10 10       Elbow Flexion/Extension 10        Shoulder Flexion/Extension         Shoulder AB/ADduction         Shoulder IR/ER         Pulleys         Pendulums 10        Shrugs 10        Isometric:                 Flexion         Extension         ABduction         ADduction         Biceps/Triceps                  B = bilateral; AA = active assistive; A = active; P = passive  Education:  [x]  Home Exercises  [x]  Sling Application   [x]  Movement Precautions   []  Pulleys   [x] Use of Ice   []  Other:   Treatment/Session Assessment:    · Response to Treatment:  Willing to participate but very dizzy due to pain meds so did not feel like doing as much this afternoon. · Interdisciplinary Collaboration:   o Physical Therapist  o Registered Nurse  · After treatment position/precautions:   o Supine in bed  o Bed/Chair-wheels locked  o Bed in low position  o Call light within reach  o RN notified   · Compliance with Program/Exercises: compliant all of the time. · Recommendations/Intent for next treatment session:  Treatment next visit will focus on increasing Mr. Chelita Song independence with bed mobility, transfers, gait training, strength/ROM exercises, modalities for pain, and patient education.    Total Treatment Duration:  PT Patient Time In/Time Out  Time In: 1505  Time Out: [facility], PT

## 2018-06-14 NOTE — PROGRESS NOTES
Problem: Mobility Impaired (Adult and Pediatric)  Goal: *Acute Goals and Plan of Care (Insert Text)  GOALS (1-4 days):  (1.)  Patient will move from supine to sit and sit to supine  in bed with STAND BY ASSIST.    (2.)  Patient will transfer from bed to chair and chair to bed with INDEPENDENT using the least restrictive device. (3.)  Patient will be independent with shoulder HEP to increase range of motion per MD orders. ________________________________________________________________________________________________    PHYSICAL THERAPY: Initial Assessment 6/14/2018  INPATIENT: Hospital Day: 2  [place] / Product Type: Medicare /      NAME/AGE/GENDER: Jimmie Perkins is a 70 y.o. male   PRIMARY DIAGNOSIS: Localized osteoarthritis of shoulder, left [M19.012] Arthritis of shoulder region, left, degenerative Arthritis of shoulder region, left, degenerative  Procedure(s) (LRB):  LEFT SHOULDER ARTHROPLASTY/TOTAL  (Left)  1 Day Post-Op  ICD-10: Treatment Diagnosis:   · Pain in Left Shoulder (M25.512)  · Stiffness of Left Shoulder, Not elsewhere classified (M25.612)  · Other abnormalities of gait and mobility (R26.89)   Precaution/Allergies:  Nka [no known allergies]      ASSESSMENT:     Mr. Tucker Low presents with limited ROM and strength following his L TSA. He participated well with therapy and will benefit from PT to increase his knowledge of his HEP and functional independence. He will go home tomorrow. This section established at most recent assessment   PROBLEM LIST (Impairments causing functional limitations):  1. Decreased [place] with Bed Mobility  2. Decreased [place] with Transfers  3. Decreased [place] with Ambulation   4. Decreased [place] with shoulder HEP   INTERVENTIONS PLANNED: (Benefits and precautions of physical therapy have been discussed with the patient.)  1. Bed Mobility Training  2. Transfer Training  3.  Gait Training  4. Therapeutic Exercises per MD orders  5. Modalities for Pain     TREATMENT PLAN: Frequency/Duration: twice daily for duration of hospital stay  Rehabilitation Potential For Stated Goals: Excellent     RECOMMENDED REHABILITATION/EQUIPMENT: (at time of discharge pending progress): Continue Skilled Therapy. HISTORY:   History of Present Injury/Illness (Reason for Referral): Admitted for L TSA  Past Medical History/Comorbidities:   Mr. Jt Laughlin  has a past medical history of GERD (gastroesophageal reflux disease); Hyperlipidemia (8/5/2016); Hypertension; Insomnia (10/30/2017); Obstructive sleep apnea (10/30/2017); Overweight (BMI 25.0-29.9) (10/30/2017); Primary osteoarthritis of left shoulder (7/14/2017); and Seizure ([facility] [address] (09/10/2017). Mr. Jt Laughlin  has a past surgical history that includes hx hip replacement (Bilateral); hx hernia repair; and hx colonoscopy. Social History/Living Environment:   Home Environment: Private residence  One/Two Story Residence: One story  Living Alone: No  Support Systems: Spouse/Significant Other/Partner  Patient Expects to be Discharged to[de-identified] Private residence  Current DME Used/Available at Home: None  Prior Level of Function/Work/Activity:  Independent prior to admit   Number of Personal Factors/Comorbidities that affect the Plan of Care: 0: LOW COMPLEXITY   EXAMINATION:   Most Recent Physical Functioning:   Gross Assessment:  AROM: Within functional limits (limited L UE)  Strength: Within functional limits (limited L UE)               Posture:  Posture (WDL): Within defined limits  Balance:  Sitting: Intact  Standing: Pull to stand; Without support Bed Mobility:  Supine to Sit: Minimum assistance  Sit to Supine: Stand-by assistance  Wheelchair Mobility:     Transfers:  Sit to Stand: Stand-by assistance  Stand to Sit: Stand-by assistance  Bed to Chair: Stand-by assistance  Gait:            Body Structures Involved:  1. Joints  2.  Muscles Body Functions Affected:  1. Movement Related Activities and Participation Affected:  1. Mobility   Number of elements that affect the Plan of Care: 4+: HIGH COMPLEXITY   CLINICAL PRESENTATION:   Presentation: Stable and uncomplicated: LOW COMPLEXITY   CLINICAL DECISION MAKIN [facility] [address] [address] AM-PAC 6 Clicks   Basic Mobility Inpatient Short Form  How much difficulty does the patient currently have. .. Unable A Lot A Little None   1. Turning over in bed (including adjusting bedclothes, sheets and blankets)? [] 1   [] 2   [x] 3   [] 4   2. Sitting down on and standing up from a chair with arms ( e.g., wheelchair, bedside commode, etc.)   [] 1   [] 2   [] 3   [x] 4   3. Moving from lying on back to sitting on the side of the bed? [] 1   [] 2   [x] 3   [] 4   How much help from another person does the patient currently need. .. Total A Lot A Little None   4. Moving to and from a bed to a chair (including a wheelchair)? [] 1   [] 2   [] 3   [x] 4   5. Need to walk in hospital room? [] 1   [] 2   [] 3   [x] 4   6. Climbing 3-5 steps with a railing? [] 1   [] 2   [] 3   [x] 4   © , Trustees of [address] [place] [address] [address] [address], under license to [employer]. All rights reserved      Score:  Initial: 22 Most Recent: X (Date: -- )    Interpretation of Tool:  Represents activities that are increasingly more difficult (i.e. Bed mobility, Transfers, Gait). Score 24 23 22-20 19-15 14-10 9-7 6     Modifier CH CI CJ CK CL CM CN      ? Mobility - Walking and Moving Around:     - CURRENT STATUS: CJ - 20%-39% impaired, limited or restricted    - GOAL STATUS: CI - 1%-19% impaired, limited or restricted    - D/C STATUS:  ---------------To be determined---------------  [place] / Product Type: Medicare /      Medical Necessity:     · Patient is expected to demonstrate progress in strength and range of motion to increase independence with HEP and functional mobility.   Reason for Services/Other Comments:  · Patient continues to require skilled intervention due to limited functional independence. Use of outcome tool(s) and clinical judgement create a POC that gives a: Clear prediction of patient's progress: LOW COMPLEXITY            TREATMENT:   (In addition to Assessment/Re-Assessment sessions the following treatments were rendered)   Pre-treatment Symptoms/Complaints:  none  Pain: Initial:     0 Post Session:  0     Therapeutic Exercise: (10 Minutes):  Exercises per grid below to improve mobility and strength. Required minimal verbal cues to promote proper body alignment. Date:  6/14 Date:   Date:     ACTIVITY/EXERCISE AM PM AM PM AM PM   Gripping 10        Wrist Flexion/Extension 10        Wrist Ulnar/Radial Deviation         Pronation/Supination 10        Elbow Flexion/Extension 10        Shoulder Flexion/Extension         Shoulder AB/ADduction         Shoulder IR/ER         Pulleys         Pendulums 10        Shrugs 10        Isometric:                 Flexion         Extension         ABduction         ADduction         Biceps/Triceps                  B = bilateral; AA = active assistive; A = active; P = passive  Education:  [x]  Home Exercises  [x]  Sling Application   []  Movement Precautions   []  Pulleys   [x]  Use of Ice   []  Other:   Treatment/Session Assessment:    · Response to Treatment:  Tolerated well but got nauseated at the end of therapy  · Interdisciplinary Collaboration:   o Physical Therapist  o Registered Nurse  · After treatment position/precautions:   o Up in chair   · Compliance with Program/Exercises: compliant all of the time. · Recommendations/Intent for next treatment session:  Treatment next visit will focus on increasing Mr. Vika Damico independence with bed mobility, transfers, gait training, strength/ROM exercises, modalities for pain, and patient education.    Total Treatment Duration:  PT Patient Time In/Time Out  Time In: 1030  Time Out: [facility]

## 2018-06-14 NOTE — PROGRESS NOTES
06/14/18 0912   Oxygen Therapy   O2 Sat (%) 90 %   Pulse via Oximetry 86 beats per minute   O2 Device Room air   O2 Flow Rate (L/min) 0 l/min   Good npc. Pt working on IS. Pt encouraged to do 10 breaths per hour while awake on IS. No respiratory distress noted at this time.

## 2018-06-15 VITALS
HEART RATE: 70 BPM | SYSTOLIC BLOOD PRESSURE: 148 MMHG | RESPIRATION RATE: 20 BRPM | WEIGHT: 219 LBS | OXYGEN SATURATION: 93 % | HEIGHT: 72 IN | BODY MASS INDEX: 29.66 KG/M2 | DIASTOLIC BLOOD PRESSURE: 72 MMHG | TEMPERATURE: 98 F

## 2018-06-15 LAB — HCT VFR BLD AUTO: 38.5 % (ref 41.1–50.3)

## 2018-06-15 PROCEDURE — 85014 HEMATOCRIT: CPT | Performed by: ORTHOPAEDIC SURGERY

## 2018-06-15 PROCEDURE — 97116 GAIT TRAINING THERAPY: CPT

## 2018-06-15 PROCEDURE — 74011250637 HC RX REV CODE- 250/637: Performed by: ORTHOPAEDIC SURGERY

## 2018-06-15 PROCEDURE — 36415 COLL VENOUS BLD VENIPUNCTURE: CPT | Performed by: ORTHOPAEDIC SURGERY

## 2018-06-15 PROCEDURE — 94760 N-INVAS EAR/PLS OXIMETRY 1: CPT

## 2018-06-15 PROCEDURE — 97110 THERAPEUTIC EXERCISES: CPT

## 2018-06-15 RX ORDER — OXYCODONE AND ACETAMINOPHEN 5; 325 MG/1; MG/1
1 TABLET ORAL
Qty: 20 TAB | Refills: 0 | Status: SHIPPED | OUTPATIENT
Start: 2018-06-15 | End: 2020-06-23

## 2018-06-15 RX ADMIN — ACETAMINOPHEN 650 MG: 325 TABLET ORAL at 08:11

## 2018-06-15 RX ADMIN — LEVETIRACETAM 500 MG: 500 TABLET ORAL at 08:15

## 2018-06-15 RX ADMIN — PANTOPRAZOLE SODIUM 40 MG: 40 TABLET, DELAYED RELEASE ORAL at 06:19

## 2018-06-15 RX ADMIN — ASPIRIN 81 MG: 81 TABLET, COATED ORAL at 08:15

## 2018-06-15 RX ADMIN — AMLODIPINE BESYLATE 5 MG: 5 TABLET ORAL at 08:15

## 2018-06-15 RX ADMIN — POLYETHYLENE GLYCOL (3350) 17 G: 17 POWDER, FOR SOLUTION ORAL at 08:12

## 2018-06-15 RX ADMIN — LOSARTAN POTASSIUM 50 MG: 50 TABLET ORAL at 08:15

## 2018-06-15 RX ADMIN — OXYCODONE HYDROCHLORIDE AND ACETAMINOPHEN 1 TABLET: 5; 325 TABLET ORAL at 11:33

## 2018-06-15 RX ADMIN — ONDANSETRON 8 MG: 8 TABLET, ORALLY DISINTEGRATING ORAL at 09:00

## 2018-06-15 RX ADMIN — Medication 5 ML: at 06:21

## 2018-06-15 NOTE — PROGRESS NOTES
Alert and oriented  Wound area is benign with no obvious infection  Acute blood loss anemia does not require transfusion .  Hct 38  Denies chest pain or dyspnea  No inordinate pain  OK to go home today

## 2018-06-15 NOTE — PROGRESS NOTES
Problem: Mobility Impaired (Adult and Pediatric)  Goal: *Acute Goals and Plan of Care (Insert Text)  GOALS (1-4 days):  (1.)  Patient will move from supine to sit and sit to supine  in bed with STAND BY ASSIST.    (2.)  Patient will transfer from bed to chair and chair to bed with INDEPENDENT using the least restrictive device. (3.)  Patient will be independent with shoulder HEP to increase range of motion per MD orders. ________________________________________________________________________________________________    PHYSICAL THERAPY: Daily Note, AM 6/15/2018  INPATIENT: Hospital Day: 3  [place] / [place] / Product Type: Medicare /      NAME/AGE/GENDER: Nuvia Tobias is a 70 y.o. male   PRIMARY DIAGNOSIS: Localized osteoarthritis of shoulder, left [M19.012] Arthritis of shoulder region, left, degenerative Arthritis of shoulder region, left, degenerative  Procedure(s) (LRB):  LEFT SHOULDER ARTHROPLASTY/TOTAL  (Left)  2 Days Post-Op  ICD-10: Treatment Diagnosis:   · Pain in Left Shoulder (M25.512)  · Stiffness of Left Shoulder, Not elsewhere classified (M25.612)  · Other abnormalities of gait and mobility (R26.89)   Precaution/Allergies:  Nka [no known allergies]      ASSESSMENT:     Mr. Zack Bass is doing well with mobility and participated well with mobility but felt like he was going to pass out at the end of exercises. Vitals WNL. Pt had nausea after exercises and returned to supine. Pt does well with gait and requires SBA. This section established at most recent assessment   PROBLEM LIST (Impairments causing functional limitations):  1. Decreased [place] with Bed Mobility  2. Decreased [place] with Transfers  3. Decreased [place] with Ambulation   4. Decreased [place] with shoulder HEP   INTERVENTIONS PLANNED: (Benefits and precautions of physical therapy have been discussed with the patient.)  1. Bed Mobility Training  2.  Transfer Training  3. Gait Training  4. Therapeutic Exercises per MD orders  5. Modalities for Pain     TREATMENT PLAN: Frequency/Duration: twice daily for duration of hospital stay  Rehabilitation Potential For Stated Goals: Excellent     RECOMMENDED REHABILITATION/EQUIPMENT: (at time of discharge pending progress): Continue Skilled Therapy. HISTORY:   History of Present Injury/Illness (Reason for Referral): Admitted for L TSA  Past Medical History/Comorbidities:   Mr. Franko Ignacio  has a past medical history of GERD (gastroesophageal reflux disease); Hyperlipidemia (8/5/2016); Hypertension; Insomnia (10/30/2017); Obstructive sleep apnea (10/30/2017); Overweight (BMI 25.0-29.9) (10/30/2017); Primary osteoarthritis of left shoulder (7/14/2017); and Seizure ([facility] [address] (09/10/2017). Mr. Franko Ignacio  has a past surgical history that includes hx hip replacement (Bilateral); hx hernia repair; and hx colonoscopy. Social History/Living Environment:   Home Environment: Private residence  One/Two Story Residence: One story  Living Alone: No  Support Systems: Spouse/Significant Other/Partner  Patient Expects to be Discharged to[de-identified] Private residence  Current DME Used/Available at Home: None  Prior Level of Function/Work/Activity:  Independent prior to admit   Number of Personal Factors/Comorbidities that affect the Plan of Care: 0: LOW COMPLEXITY   EXAMINATION:   Most Recent Physical Functioning:   Gross Assessment:                  Posture:     Balance:  Sitting: Intact  Standing: Impaired Bed Mobility:  Supine to Sit: Minimum assistance  Sit to Supine: Stand-by assistance  Wheelchair Mobility:     Transfers:  Sit to Stand: Stand-by assistance  Stand to Sit: Stand-by assistance  Gait:     Distance (ft): 300 Feet (ft)  Ambulation - Level of Assistance: Stand-by assistance  Duration: 10 Minutes      Body Structures Involved:  1. Joints  2. Muscles Body Functions Affected:  1.  Movement Related Activities and Participation Affected:  1. Mobility   Number of elements that affect the Plan of Care: 4+: HIGH COMPLEXITY   CLINICAL PRESENTATION:   Presentation: Stable and uncomplicated: LOW COMPLEXITY   CLINICAL DECISION MAKIN [facility] [address] [id number] AM-PAC 6 Clicks   Basic Mobility Inpatient Short Form  How much difficulty does the patient currently have. .. Unable A Lot A Little None   1. Turning over in bed (including adjusting bedclothes, sheets and blankets)? [] 1   [] 2   [x] 3   [] 4   2. Sitting down on and standing up from a chair with arms ( e.g., wheelchair, bedside commode, etc.)   [] 1   [] 2   [] 3   [x] 4   3. Moving from lying on back to sitting on the side of the bed? [] 1   [] 2   [x] 3   [] 4   How much help from another person does the patient currently need. .. Total A Lot A Little None   4. Moving to and from a bed to a chair (including a wheelchair)? [] 1   [] 2   [] 3   [x] 4   5. Need to walk in hospital room? [] 1   [] 2   [] 3   [x] 4   6. Climbing 3-5 steps with a railing? [] 1   [] 2   [] 3   [x] 4   © , Trustees of [facility] [address] [address], under license to [employer]. All rights reserved      Score:  Initial: 22 Most Recent: X (Date: -- )    Interpretation of Tool:  Represents activities that are increasingly more difficult (i.e. Bed mobility, Transfers, Gait). Score 24 23 22-20 19-15 14-10 9-7 6     Modifier CH CI CJ CK CL CM CN      ? Mobility - Walking and Moving Around:    Y209 - CURRENT STATUS: CJ - 20%-39% impaired, limited or restricted    - GOAL STATUS: CI - 1%-19% impaired, limited or restricted    - D/C STATUS:  ---------------To be determined---------------  [place] / [place] / Product Type: Medicare /      Medical Necessity:     · Patient is expected to demonstrate progress in strength and range of motion to increase independence with HEP and functional mobility.   Reason for Services/Other Comments:  · Patient continues to require skilled intervention due to limited functional independence. Use of outcome tool(s) and clinical judgement create a POC that gives a: Clear prediction of patient's progress: LOW COMPLEXITY            TREATMENT:   (In addition to Assessment/Re-Assessment sessions the following treatments were rendered)   Pre-treatment Symptoms/Complaints:  No complaints  Pain: Initial:   Pain Intensity 1: 0 (before and after treatment) 0 Post Session:  0     Gait Training (10 Minutes):  Gait training to improve and/or restore physical functioning as related to mobility, strength and balance. Ambulated 300 Feet (ft) with Stand-by assistance using a   and minimal   related to their maintaining straight path to promote proper body alignment, promote proper body posture and promote proper body mechanics. Instruction in performance of the above deficits to correct overall gait pattern. Therapeutic Exercise: (15 Minutes):  Exercises per grid below to improve mobility and strength. Required minimal verbal cues to promote proper body alignment.     Date:  6/14 Date:  6/15 Date:     ACTIVITY/EXERCISE AM PM AM PM AM PM   Gripping 10 10 20      Wrist Flexion/Extension 10 10 20      Wrist Ulnar/Radial Deviation         Pronation/Supination 10 10 20      Elbow Flexion/Extension 10  20      Shoulder Flexion/Extension         Shoulder AB/ADduction         Shoulder IR/ER         Pulleys         Pendulums 10  20      Shrugs 10  20      Isometric:                 Flexion         Extension         ABduction         ADduction         Biceps/Triceps                  B = bilateral; AA = active assistive; A = active; P = passive  Education:  [x]  Home Exercises  [x]  Sling Application   [x]  Movement Precautions   []  Pulleys   [x]  Use of Ice   []  Other:   Treatment/Session Assessment:    · Response to Treatment: feels like he is going to pass out after exercises  · Interdisciplinary Collaboration:   o Physical Therapist  o Registered Nurse  · After treatment position/precautions:   o Supine in bed  o Bed/Chair-wheels locked  o Bed in low position  o Call light within reach  o RN notified   · Compliance with Program/Exercises: compliant all of the time. · Recommendations/Intent for next treatment session:  Treatment next visit will focus on increasing Mr. Parul Fleming independence with bed mobility, transfers, gait training, strength/ROM exercises, modalities for pain, and patient education.    Total Treatment Duration:  PT Patient Time In/Time Out  Time In: 0830  Time Out: Bhavin Sandoval PTA

## 2018-06-15 NOTE — PROGRESS NOTES
Patient sitting on side of bed eating breakfast. Lungs clear to auscultation, using IS. Left shoulder dressing dry and intact with sling in place. Patient denies needs at present. Neurovascular status WDL. Palpable pulses. Strong equal  to bilateral hands. Instructed to call for assistance or any needs. Patient verbalized understanding. Call bell within reach. Side rails up x3. Bed low and locked. No distress noted. Family member at bedside.

## 2018-06-15 NOTE — DISCHARGE INSTRUCTIONS
Orthopedic Total  Shoulder Discharge Instructions    ACTIVITY:   - As tolerated and as directed by Dr. Liz Murphy  - Remember to \"stay within the box\" with shoulder movements  - Use arm sling as needed. It's fine to go without it if you're comfortable without it  - Bath or shower is OK  - Please use the incentive spirometer for at least two more days    DIET:  - Please resume your usual diet    PAIN:  - Take pain medication(s) as directed by the prescription you were given  - Remember that it often helps to take acetaminophen with your pain medicine IF IT DOES NOT CONTAIN ACETAMINOPHEN. You may take up to 6 extra-strength tylenol or up to 9 regular tylenol per 24 hours. - You may also use ibuprofen 800 mg every six hours or aleve 440 mg every 8 hours IN ADDITION TO your prescribed pain medicine  - Call [facility]ano if pain is NOT relieved by medication. If it's after hours please call our answering service at [phone]. They will have the provider on call for our group call you. - If nausea and vomiting occurs,use the phenergan prescription you were given by Dr Liz Murphy. If the phenergan doesn't work please call our doctor on call. (Call [phone] if it  is after regular office hours)        DRESSING CARE:  - The surgical glue on your wound may be treated like normal skin. It is OK to wash the wound. It is fine to get it wet.      CALL YOUR DOCTOR IF:  - Excessive bleeding that does not stop after holding mild pressure over the area  - Temperature of 100.5°F or greater  - Redness, excessive swelling or bruising, and/or green or yellow, smelly discharge from incision    MEDICATIONS:  - Continue home medications as previously prescribed       DISCHARGE SUMMARY from Nurse    The following personal items collected during your admission are returned to you:   Dental Appliance: Dental Appliances: None  Vision: Visual Aid: Glasses  Hearing Aid:   NA  Jewelry: Jewelry: None  Clothing:   self  Other Valuables:   na  Valuables sent to safe:   na    PATIENT INSTRUCTIONS:    After general anesthesia or intravenous sedation, for 24 hours or while taking prescription Narcotics:  · Limit your activities  · Do not drive and operate hazardous machinery  · Do not make important personal or business decisions  · Do  not drink alcoholic beverages  · If you have not urinated within 8 hours after discharge, please contact your surgeon on call. Report the following to your surgeon:  · Excessive pain, swelling, redness or odor of or around the surgical area  · Temperature over 101  · Nausea and vomiting lasting longer than 4 hours or if unable to take medications  · Any signs of decreased circulation or nerve impairment to extremity: change in color, persistent  numbness, tingling, coldness or increase pain  · Any questions, call office @ [phone]      Keep scheduled follow up appointment. If need to change, call office @ [phone]. *  Please give a list of your current medications to your Primary Care Provider. *  Please update this list whenever your medications are discontinued, doses are      changed, or new medications (including over-the-counter products) are added. *  Please carry medication information at all times in case of emergency situations. Shoulder Replacement Surgery: What to Expect at Home  Your Recovery    Shoulder replacement surgery replaces the worn parts of your shoulder joint. When you leave the hospital, your arm will be in a sling. It will be helpful if there is someone to help you at home for the next few weeks or until you have more energy and can move around better. You will go home with a bandage and stitches or staples. You can remove the bandage when your doctor tells you to. If the stitches are not the type that dissolve, your doctor will remove them in 10 to 14 days. You may still have some mild pain, and the area may be swollen for several months after surgery.  Your doctor will give you medicine for the pain. You will continue the rehabilitation program (rehab) you started in the hospital. The better you do with your rehab exercises, the sooner you will get your strength and movement back. Depending on your job, you may be able to return to work as early as 2 to 3 weeks after surgery, as long as you avoid certain arm movements, such as lifting. This care sheet gives you a general idea about how long it will take for you to recover. But each person recovers at a different pace. Follow the steps below to get better as quickly as possible. How can you care for yourself at home? Activity  ? · Rest when you feel tired. You may take a nap, but don't stay in bed all day. ? · Work with your physical therapist to learn the best way to exercise. ? · You will have a sling to wear at night. And it's a good idea to also put a small stack of folded sheets or towels under your upper arm while you are in bed to keep your arm from dropping too far back. ? · Your arm should stay next to your body or in front of it for several weeks, both while you are up and during sleep. ? · Don't lift anything with the affected arm for 6 weeks. ? · You may need to take sponge baths until your stitches or staples have been removed. You will probably be able to shower 24 hours after they are removed. ? · Ask your doctor when you can drive again. ? · Ask your doctor when it is okay for you to have sex. ? · Your doctor may advise you to give up activities that put stress on that shoulder. This includes sports such as weight lifting or tennis, unless your tennis arm was not the one affected. Diet  ? · By the time you leave the hospital, you will probably be eating your normal diet. If your stomach is upset, try bland, low-fat foods like plain rice, broiled chicken, toast, and yogurt. Your doctor may recommend that you take iron and vitamin supplements. ? · Drink plenty of fluids (unless your doctor tells you not to).    ? · You may notice that your bowel movements are not regular right after your surgery. This is common. Try to avoid constipation and straining with bowel movements. You may want to take a fiber supplement every day. If you have not had a bowel movement after a couple of days, ask your doctor about taking a mild laxative. Medicines  ? · Your doctor will tell you if and when you can restart your medicines. He or she will also give you instructions about taking any new medicines. ? · If you take blood thinners, such as warfarin (Coumadin), clopidogrel (Plavix), or aspirin, be sure to talk to your doctor. He or she will tell you if and when to start taking those medicines again. Make sure that you understand exactly what your doctor wants you to do. ? · Be safe with medicines. Take pain medicines exactly as directed. ¨ If the doctor gave you a prescription medicine for pain, take it as prescribed. ¨ If you are not taking a prescription pain medicine, ask your doctor if you can take an over-the-counter medicine. ? · If you think your pain medicine is making you sick to your stomach:  ¨ Take your medicine after meals (unless your doctor has told you not to). ¨ Ask your doctor for a different pain medicine. ? · If your doctor prescribed antibiotics, take them as directed. Don't stop taking them just because you feel better. You need to take the full course of antibiotics. ? · If you take a blood thinner, be sure you get instructions about how to take your medicine safely. Blood thinners can cause serious bleeding problems. Incision care  ? · You will have a dressing over the cut (incision). A dressing helps the incision heal and protects it. Your doctor will tell you how to take care of this. ? · Keep the area clean and dry. Exercise  ? · Shoulder rehabilitation is a series of exercises you do after your surgery. This helps you get back your shoulder's range of motion and strength.  You will work with your doctor and physical therapist to plan this exercise program. To get the best results, you need to do the exercises correctly and as often and as long as your doctor tells you. ?Ice  ? · For pain, put ice or a cold pack on the area for 10 to 20 minutes at a time. Put a thin cloth between the ice and your skin. Follow-up care is a key part of your treatment and safety. Be sure to make and go to all appointments, and call your doctor if you are having problems. It's also a good idea to know your test results and keep a list of the medicines you take. When should you call for help? Call 911 anytime you think you may need emergency care. For example, call if:  ? · You have severe trouble breathing. ? · You have symptoms of a blood clot in your lung (called a pulmonary embolism). These may include:  ¨ Sudden chest pain. ¨ Trouble breathing. ¨ Coughing up blood. ?Call your doctor now or seek immediate medical care if:  ? · You have severe or increasing pain. ? · You have symptoms of infection, such as:  ¨ Increased pain, swelling, warmth, or redness. ¨ Red streaks or pus. ¨ A fever. ? · You have tingling, weakness, or numbness in your arm. ? · Your arm turns cold or changes color. ? · You have symptoms of a blood clot in your leg (called a deep vein thrombosis). These may include:  ¨ Pain in the calf, back of the knee, thigh, or groin. ¨ Redness and swelling in the leg or groin. ? Watch closely for changes in your health, and be sure to contact your doctor if:  ? · You do not get better as expected. Where can you learn more? Go to http://cole-mark.info/. Enter W592 in the search box to learn more about \"Shoulder Replacement Surgery: What to Expect at Home. \"  Current as of: March 21, 2017  Content Version: 11.4  © 8289-4575 [employer].  Care instructions adapted under license by [employer] (which disclaims liability or warranty for this information). If you have questions about a medical condition or this instruction, always ask your healthcare professional. [address] any warranty or liability for your use of this information. These are general instructions for a healthy lifestyle:    No smoking/ No tobacco products/ Avoid exposure to second hand smoke    Surgeon General's Warning:  Quitting smoking now greatly reduces serious risk to your health. Obesity, smoking, and sedentary lifestyle greatly increases your risk for illness    A healthy diet, regular physical exercise & weight monitoring are important for maintaining a healthy lifestyle    You may be retaining fluid if you have a history of heart failure or if you experience any of the following symptoms:  Weight gain of 3 pounds or more overnight or 5 pounds in a week, increased swelling in our hands or feet or shortness of breath while lying flat in bed. Please call your doctor as soon as you notice any of these symptoms; do not wait until your next office visit. Recognize signs and symptoms of STROKE:    F-face looks uneven    A-arms unable to move or move even    S-speech slurred or non-existent    T-time-call 911 as soon as signs and symptoms begin-DO NOT go       Back to bed or wait to see if you get better-TIME IS BRAIN. The discharge information has been reviewed with the patient. The patient verbalized understanding.           Signed:  Nnamdi Lundy MD  6/15/2018

## 2018-06-16 NOTE — DISCHARGE SUMMARY
[facility]    DISCHARGE SUMMARY    Rachel Mejia  MR#: [id number]  : 1940  ACCOUNT #: [de-identified]   ADMIT DATE: 2018  DISCHARGE DATE: 06/15/2018    ADMISSION DIAGNOSIS:  Painful osteoarthritis of the left shoulder. DISCHARGE DIAGNOSIS:  Painful osteoarthritis of the left shoulder. HOSPITAL COURSE:  He underwent elective left total shoulder arthroplasty. His hematocrit stabilized at 38. By the time of discharge, his pain was well controlled with oral medication and he was doing his exercises well. DISPOSITION:  He is discharged home with detailed instructions in the electronic portion of this chart.       MD TYRELL Payne/LEANNE  D: 06/15/2018 11:47     T: 2018 06:44  JOB #: [id number]  CC: Camila Lerner MD

## 2018-06-18 ENCOUNTER — PATIENT OUTREACH (OUTPATIENT)
Dept: CASE MANAGEMENT | Age: 78
End: 2018-06-18

## 2018-06-18 NOTE — PROGRESS NOTES
This note will not be viewable in [address] Date/Time of Call:   06/18/2018 2:49pm   What was the patient hospitalized for? L TSA 6/13/18   Does the patient understand his/her diagnosis and/or treatment and what happened during the hospitalization? Spoke with patient, who verbalized understanding of Dx and Tx plan. Reports he is doing well, with minimal pain controlled with pain meds. Denies any new problems. Did the patient receive discharge instructions? yes   CM Assessed Risk for Readmission:      Patient stated Risk for Readmission:      Low risk for readmit related to complications from surgery. Per patient, problems from my surgery, but I dont expect any. Review any discharge instructions (see discharge instructions/AVS in [facility]). Ask patient if they understand these. Do they have any questions? Discharge instructions reviewed with patient, who verbalized understanding. Focused on education, medication compliance, and follow up appointments. Were home services ordered (nursing, PT, OT, ST, etc.)? No   If so, has the first visit occurred? If not, why? (Assist with coordination of services if necessary.)   N/A   Was any DME ordered? no   If so, has it been received? If not, why?  (Assist patient in obtaining DME orders &/or equipment if necessary.) N/A   Complete a review of all medications (new, continued and discontinued meds per the D/C instructions and medication tab in [facility]). Medications reviewed with patient, who verbalized understanding. Were all new prescriptions filled? If not, why?  (Assist patient in obtaining medications if necessary  escalate for CCM &/or SW if ongoing issues are verbalized by pt or anticipated)   New Rxs were filled and in the home. Patient denies any problems obtaining medications. Does the patient understand the purpose and dosing instructions for all medications?   (If patient has questions, provide explanation and education.)   Mr. Chasidy Valencia states understanding of purpose for and dosing for medications. Does the patient have any problems in performing ADLs? (If patient is unable to perform ADLs  what is the limiting factor(s)? Do they have a support system that can assist? If no support system is present, discuss possible assistance that they may be able to obtain. Escalate for CCM/SW if ongoing issues are verbalized by pt or anticipated)   Patient is normally independent with ADLs and drives. Lives with spouse who is available to assist.   Does the patient have all follow-up appointments scheduled? 7 day f/up with PCP?   (f/up with PCP may be w/in 14 days if patient has a f/up with their specialist w/in 7 days)    7-14 day f/up with specialist?   (or per discharge instructions)    If f/up has not been made  what actions has the care coordinator made to accomplish this? Has transportation been arranged? Follow Up appointments scheduled with orthopedic surgeon. Patient declined to schedule with PCP at this time. Patient denies any problems with transportation. Any other questions or concerns expressed by the patient? No other questions or concerns were verbalized. No immediate needs identified. No further [facility] outreach indicated. Schedule next appointment with [facility] Coordinator or refer to RN Case Manager/ per the workflow guidelines. When is care coordinators next follow-up call scheduled? If referred for CCM  what RN care manager was the referral assigned? Patient has Care Coordinator contact information if needed for any new concerns or questions.    [facility] Call Completed By: Tierra Little LPN, [facility] Coordinator

## 2018-07-02 ENCOUNTER — HOSPITAL ENCOUNTER (OUTPATIENT)
Dept: PHYSICAL THERAPY | Age: 78
Discharge: HOME | End: 2018-07-02
Payer: MEDICARE

## 2018-07-02 PROCEDURE — G8984 CARRY CURRENT STATUS: HCPCS

## 2018-07-02 PROCEDURE — 97161 PT EVAL LOW COMPLEX 20 MIN: CPT

## 2018-07-02 PROCEDURE — G8985 CARRY GOAL STATUS: HCPCS

## 2018-07-02 PROCEDURE — 97140 MANUAL THERAPY 1/> REGIONS: CPT

## 2018-07-02 NOTE — THERAPY EVALUATION
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 5 = High Risk)  MD Orders: evaluate and treat  MEDICAL/REFERRING DIAGNOSIS:  Left shoulder pain [M25.512]   DATE OF ONSET: Date of surgery: 18  REFERRING PHYSICIAN: Cyndee Vora MD  RETURN PHYSICIAN APPOINTMENT: unknown      INITIAL ASSESSMENT:  Mr. Remy Sandoval is a 70year old male with left shoulder pain and stiffness following a total shoulder arthroplasty performed by Dr Franci White on 18. He reports a long history of progressively worsening shoulder pain and dysfunction. He presents to PT with decreased passive ROM, active ROM, functional reaching that limits his ability to get dressed, reach, and open a door. Pt will benefit from skilled PT to address above listed impairments and functional limitations to facilitate return to prior level of function. Due to surgical precautions and history of significantly restricted motion and function, patient will likely require extended care. PROBLEM LIST (Impacting functional limitations):  1. Decreased Strength  2. Decreased ADL/Functional Activities  3. Increased Pain  4. Decreased Flexibility/Joint Mobility INTERVENTIONS PLANNED:  1. Cold  2. Electrical Stimulation  3. Heat  4. Home Exercise Program (HEP)  5. Manual Therapy  6. Neuromuscular Re-education/Strengthening  7. Range of Motion (ROM)  8. Therapeutic Activites  9.  Therapeutic Exercise/Strengthening   TREATMENT PLAN:  Effective Dates: 2018 TO 9/30/2018 (90 days). Frequency/Duration: 2 times a week for 90 Days  GOALS: (Goals have been discussed and agreed upon with patient.)  Short-Term Functional Goals: Time Frame: 8/13/18  1. Pt will demonstrate passive ROM of flexion to 110 degrees, ER to 50 degrees, and IR to 60 degrees. 2. Pt will report no limitation in dressing (donning socks). Discharge Goals: Time Frame: 9/30/18  1. Pt will demonstrate Apley's ER to back of head to allow independent grooming and dressing without modification. 2. Pt will demonstrate Apley's IR to L5 to allow donning belt and pants without modification. 3. Pt will demonstrate active flexion to 110 degrees to allow reaching overhead. 4. Pt will demonstrate improvement in function with quickDASH to 14% or less disability. Rehabilitation Potential For Stated Goals: Good            The information in this section was collected on 7/2/18 (except where otherwise noted). HISTORY:   History of Present Injury/Illness (Reason for Referral):  7/2/18: Cheryl Hussein reports a long history of left shoulder pain that progressively worsened and limited his UE use significantly over the last 2-3 years. He had a left TSA performed by Dr Cuate Swan on 6/13/18. He reports improvement in pain from pre-operative to post-operative and reports only discomfort at present. He reports limitation in all UE use due to weakness and post-operative restrictions. He reports difficulty dressing, reaching, opening a door, and lifting due to pain and shoulder precautions. Past Medical History/Comorbidities:   Mr. Francisco Solomon  has a past medical history of GERD (gastroesophageal reflux disease); Hyperlipidemia (8/5/2016); Hypertension; Insomnia (10/30/2017); Obstructive sleep apnea (10/30/2017); Overweight (BMI 25.0-29.9) (10/30/2017); Primary osteoarthritis of left shoulder (7/14/2017); and Seizure ([facility] [address] (09/10/2017).   Mr. Francisco Solomon  has a past surgical history that includes hx hip replacement (Bilateral); hx hernia repair; and hx colonoscopy. Social History/Living Environment:     Pt lives in private home with his family. Prior Level of Function/Work/Activity:  Pt reports long, progressive history of shoulder dysfunction with inability to lift his arm for several years prior to surgery. Dominant Side:         RIGHT  Current Medications:       Current Outpatient Prescriptions:     oxyCODONE-acetaminophen (PERCOCET) 5-325 mg per tablet, Take 1 Tab by mouth every four (4) hours as needed. Max Daily Amount: 6 Tabs., Disp: 20 Tab, Rfl: 0    cpap machine kit, by Does Not Apply route., Disp: , Rfl:     pitavastatin calcium (LIVALO) 2 mg tablet, Take 1 Tab by mouth daily. , Disp: 90 Tab, Rfl: 3    ALPRAZolam (XANAX) 0.25 mg tablet, Take 1 Tab by mouth nightly as needed for Anxiety or Sleep. Max Daily Amount: 0.25 mg., Disp: 30 Tab, Rfl: 2    amLODIPine (NORVASC) 5 mg tablet, Take 1 Tab by mouth daily. , Disp: 90 Tab, Rfl: 3    esomeprazole (NEXIUM) 40 mg capsule, Take 1 Cap by mouth daily. , Disp: 90 Cap, Rfl: 3    ezetimibe (ZETIA) 10 mg tablet, Take 1 Tab by mouth every Monday, Wednesday, Friday., Disp: 90 Tab, Rfl: 3    losartan (COZAAR) 50 mg tablet, Take 1 Tab by mouth daily. , Disp: 90 Tab, Rfl: 3    ibuprofen (MOTRIN) 600 mg tablet, Take 1 Tab by mouth every eight (8) hours as needed for Pain. (Patient taking differently: Take 600 mg by mouth as needed for Pain.), Disp: 60 Tab, Rfl: 2    levETIRAcetam (KEPPRA) 500 mg tablet, One in AM and 2 at hs, Disp: 90 Tab, Rfl: 5    levETIRAcetam (KEPPRA) 500 mg tablet, Take 1 Tab by mouth two (2) times a day., Disp: 60 Tab, Rfl: 5    aspirin delayed-release 81 mg tablet, Take 81 mg by mouth daily. , Disp: , Rfl:    Date Last Reviewed:  7/2/2018   Number of Personal Factors/Comorbidities that affect the Plan of Care: 1-2: MODERATE COMPLEXITY   EXAMINATION:   Observation/palpation/joint mobility: incision well healed. All active motion with compensatory scapular hiking. Range of Motion:  Passive (active)  Left* Right   Flexion  70 (40) 150 (150)    External Rotation  0 50   Internal Rotation  55 60      Special Tests: none performed     Strength:      Manual Muscle Test out of 5   Flexion Not assessed at initial evaluation/ surgical precautions    Abduction Not assessed at initial evaluation/ surgical precautions    External Rotation Not assessed at initial evaluation/ surgical precautions    Internal Rotaton Not assessed at initial evaluation/ surgical precautions      Functional Tests:    Apley's Internal Rotation: L: unable to perform, R: T12  Apley's External Rotation: L: unable to perform, R: T3       Body Structures Involved:  1. Bones  2. Joints  3. Muscles Body Functions Affected:  1. Sensory/Pain  2. Neuromusculoskeletal  3. Movement Related Activities and Participation Affected:  1. General Tasks and Demands  2. Mobility  3. Self Care  4. Domestic Life  5. Interpersonal Interactions and Relationships  6. Community, Social and [place]   Number of elements (examined above) that affect the Plan of Care: 4+: HIGH COMPLEXITY   CLINICAL PRESENTATION:   Presentation: Stable and uncomplicated: LOW COMPLEXITY   CLINICAL DECISION MAKING:   Outcome Measure: Tool Used: Disabilities of the Arm, Shoulder and Hand (DASH) Questionnaire - Quick Version  Score:  Initial: 32/55 (48% disability)   Most Recent: X/55 (Date: -- )   Interpretation of Score: The DASH is designed to measure the activities of daily living in person's with upper extremity dysfunction or pain. Each section is scored on a 1-5 scale, 5 representing the greatest disability. The scores of each section are added together for a total score of 55. Score 11 12-19 20-28 29-37 38-45 46-54 55   Modifier CH CI CJ CK CL CM CN     ?  Carrying, Moving, and Handling Objects:     - CURRENT STATUS: CK - 40%-59% impaired, limited or restricted    - GOAL STATUS: CI - 1%-19% impaired, limited or restricted    - D/C STATUS:  ---------------To be determined---------------      Medical Necessity:   · Patient is expected to demonstrate progress in strength and range of motion to increase independence with reaching and lifting. Reason for Services/Other Comments:  · Patient continues to require present interventions due to patient's inability to reach, dress, and lift. Use of outcome tool(s) and clinical judgement create a POC that gives a: Clear prediction of patient's progress: LOW COMPLEXITY            TREATMENT:   (In addition to Assessment/Re-Assessment sessions the following treatments were rendered)  Pre-treatment Symptoms/Complaints:  Pt reports that he has discomfort at present but that he has less pain than before surgery. Pain: Initial:   Pain Intensity 1: 1  Post Session:  0/10      Manual Therapy (    Soft Tissue Mobilization Duration  Duration: 15 Minutes): Manual techniques to facilitate improved motion and decreased pain. (Used abbreviations: MET - muscle energy technique; PNF - proprioceptive neuromuscular facilitation; NMR - neuromuscular re-education; a/p - anterior to posterior; p/a - posterior to anterior)   · Manual ROM: flexion, ER, and IR within surgical precautions    Therapeutic Exercise: ( ):  Exercises per grid below to improve mobility, strength and dynamic movement of shoulder - left to improve functional lifting, reaching and overhead activites. Required minimal visual and verbal cues to promote proper body mechanics. Progressed resistance, range, repetitions and complexity of movement as indicated. 7/2/2018   Activity/Exercise Parameters                 Patient education Plan of care, HEP with pendulum and scapular retractions                                                                                  [facility] Portal  Treatment/Session Assessment:    · Response to Treatment:  PT reports that he feels a little better after his treatment today.   · Compliance with Program/Exercises: compliant most of the time. · Recommendations/Intent for next treatment session: \"Next visit will focus on advancements to more challenging activities\".   Total Treatment Duration:  PT Patient Time In/Time Out  Time In: 1100  Time Out: 1200    Jet Tripathi PT

## 2018-07-02 NOTE — PROGRESS NOTES
Ambulatory/Rehab Services H2 Model Falls Risk Assessment    Risk Factor Pts. ·   Confusion/Disorientation/Impulsivity  []    4 ·   Symptomatic Depression  []   2 ·   Altered Elimination  []   1 ·   Dizziness/Vertigo  []   1 ·   Gender (Male)  [x]   1 ·   Any administered antiepileptics (anticonvulsants):  []   2 ·   Any administered benzodiazepines:  []   1 ·   Visual Impairment (specify):  []   1 ·   Portable Oxygen Use  []   1 ·   Orthostatic ? BP  []   1 ·   History of Recent Falls (within 3 mos.)  []   5     Ability to Rise from Chair (choose one) Pts. ·   Ability to rise in a single movement  [x]   0 ·   Pushes up, successful in one attempt  []   1 ·   Multiple attempts, but successful  []   3 ·   Unable to rise without assistance  []   4   Total: (5 or greater = High Risk) 1     Falls Prevention Plan:   []                Physical Limitations to Exercise (specify):   []                Mobility Assistance Device (type):   []                Exercise/Equipment Adaptation (specify):    ©2010 [facility] [place] Patent #[id number].  Federal Law prohibits the replication, distribution or use without written permission from [facility] [employer]

## 2018-07-10 ENCOUNTER — HOSPITAL ENCOUNTER (OUTPATIENT)
Dept: PHYSICAL THERAPY | Age: 78
Discharge: HOME | End: 2018-07-10
Payer: MEDICARE

## 2018-07-10 PROCEDURE — 97140 MANUAL THERAPY 1/> REGIONS: CPT

## 2018-07-10 PROCEDURE — 97110 THERAPEUTIC EXERCISES: CPT

## 2018-07-10 NOTE — PROGRESS NOTES
Ricki Apple  : 1940  [place]  Secondary: [address] at [facility], [address] [facility]  [phone]   [phone]       OUTPATIENT PHYSICAL THERAPY:Daily Note 7/10/2018   ICD-10: Treatment Diagnosis: pain in joint, shoulder, left (M25.512); stiffness in joint, not elsewhere classified, shoulder, left (M25.612); primary OA of shoulder, left (M19.012)  Precautions/Allergies:   Audrey Hendricks known allergies]   Fall Risk Score: 1 (? 5 = High Risk)  MD Orders: evaluate and treat  MEDICAL/REFERRING DIAGNOSIS:  Left shoulder pain [M25.512]   DATE OF ONSET: Date of surgery: 18  REFERRING PHYSICIAN: Lamar Copeland MD  RETURN PHYSICIAN APPOINTMENT: unknown      INITIAL ASSESSMENT:  Mr. Delores Rollins is a 70year old male with left shoulder pain and stiffness following a total shoulder arthroplasty performed by Dr Anastacio Stephen on 18. He reports a long history of progressively worsening shoulder pain and dysfunction. He presents to PT with decreased passive ROM, active ROM, functional reaching that limits his ability to get dressed, reach, and open a door. Pt will benefit from skilled PT to address above listed impairments and functional limitations to facilitate return to prior level of function. Due to surgical precautions and history of significantly restricted motion and function, patient will likely require extended care. PROBLEM LIST (Impacting functional limitations):  1. Decreased Strength  2. Decreased ADL/Functional Activities  3. Increased Pain  4. Decreased Flexibility/Joint Mobility INTERVENTIONS PLANNED:  1. Cold  2. Electrical Stimulation  3. Heat  4. Home Exercise Program (HEP)  5. Manual Therapy  6. Neuromuscular Re-education/Strengthening  7. Range of Motion (ROM)  8. Therapeutic Activites  9.  Therapeutic Exercise/Strengthening   TREATMENT PLAN:  Effective Dates: 2018 TO 2018 (90 days). Frequency/Duration: 2 times a week for 90 Days  GOALS: (Goals have been discussed and agreed upon with patient.)  Short-Term Functional Goals: Time Frame: 8/13/18  1. Pt will demonstrate passive ROM of flexion to 110 degrees, ER to 50 degrees, and IR to 60 degrees. ONGOING  2. Pt will report no limitation in dressing (donning socks). ONGOING  Discharge Goals: Time Frame: 9/30/18  1. Pt will demonstrate Apley's ER to back of head to allow independent grooming and dressing without modification. ONGOING  2. Pt will demonstrate Apley's IR to L5 to allow donning belt and pants without modification. ONGOING  3. Pt will demonstrate active flexion to 110 degrees to allow reaching overhead. ONGOING  4. Pt will demonstrate improvement in function with quickDASH to 14% or less disability. ONGOING  Rehabilitation Potential For Stated Goals: Good            The information in this section was collected on 7/2/18 (except where otherwise noted). HISTORY:   History of Present Injury/Illness (Reason for Referral):  7/2/18: Rick Griffin reports a long history of left shoulder pain that progressively worsened and limited his UE use significantly over the last 2-3 years. He had a left TSA performed by Dr Augustin Alpers on 6/13/18. He reports improvement in pain from pre-operative to post-operative and reports only discomfort at present. He reports limitation in all UE use due to weakness and post-operative restrictions. He reports difficulty dressing, reaching, opening a door, and lifting due to pain and shoulder precautions. Past Medical History/Comorbidities:   Mr. Villa Maldonado  has a past medical history of GERD (gastroesophageal reflux disease); Hyperlipidemia (8/5/2016); Hypertension; Insomnia (10/30/2017); Obstructive sleep apnea (10/30/2017); Overweight (BMI 25.0-29.9) (10/30/2017); Primary osteoarthritis of left shoulder (7/14/2017); and Seizure ([facility] [address] (09/10/2017).   Mr. Villa Maldonado  has a past surgical history that includes hx hip replacement (Bilateral); hx hernia repair; and hx colonoscopy. Social History/Living Environment:     Pt lives in private home with his family. Prior Level of Function/Work/Activity:  Pt reports long, progressive history of shoulder dysfunction with inability to lift his arm for several years prior to surgery. Dominant Side:         RIGHT  Current Medications:       Current Outpatient Prescriptions:     oxyCODONE-acetaminophen (PERCOCET) 5-325 mg per tablet, Take 1 Tab by mouth every four (4) hours as needed. Max Daily Amount: 6 Tabs., Disp: 20 Tab, Rfl: 0    cpap machine kit, by Does Not Apply route., Disp: , Rfl:     pitavastatin calcium (LIVALO) 2 mg tablet, Take 1 Tab by mouth daily. , Disp: 90 Tab, Rfl: 3    ALPRAZolam (XANAX) 0.25 mg tablet, Take 1 Tab by mouth nightly as needed for Anxiety or Sleep. Max Daily Amount: 0.25 mg., Disp: 30 Tab, Rfl: 2    amLODIPine (NORVASC) 5 mg tablet, Take 1 Tab by mouth daily. , Disp: 90 Tab, Rfl: 3    esomeprazole (NEXIUM) 40 mg capsule, Take 1 Cap by mouth daily. , Disp: 90 Cap, Rfl: 3    ezetimibe (ZETIA) 10 mg tablet, Take 1 Tab by mouth every Monday, Wednesday, Friday., Disp: 90 Tab, Rfl: 3    losartan (COZAAR) 50 mg tablet, Take 1 Tab by mouth daily. , Disp: 90 Tab, Rfl: 3    ibuprofen (MOTRIN) 600 mg tablet, Take 1 Tab by mouth every eight (8) hours as needed for Pain. (Patient taking differently: Take 600 mg by mouth as needed for Pain.), Disp: 60 Tab, Rfl: 2    levETIRAcetam (KEPPRA) 500 mg tablet, One in AM and 2 at hs, Disp: 90 Tab, Rfl: 5    levETIRAcetam (KEPPRA) 500 mg tablet, Take 1 Tab by mouth two (2) times a day., Disp: 60 Tab, Rfl: 5    aspirin delayed-release 81 mg tablet, Take 81 mg by mouth daily. , Disp: , Rfl:    Date Last Reviewed:  7/10/2018   Number of Personal Factors/Comorbidities that affect the Plan of Care: 1-2: MODERATE COMPLEXITY   EXAMINATION:   Observation/palpation/joint mobility: incision well healed.   All active motion with compensatory scapular hiking. Range of Motion:  Passive (active)  Left* Right   Flexion  70 (40) 150 (150)    External Rotation  0 50   Internal Rotation  55 60      Special Tests: none performed     Strength:      Manual Muscle Test out of 5   Flexion Not assessed at initial evaluation/ surgical precautions    Abduction Not assessed at initial evaluation/ surgical precautions    External Rotation Not assessed at initial evaluation/ surgical precautions    Internal Rotaton Not assessed at initial evaluation/ surgical precautions      Functional Tests:    Apley's Internal Rotation: L: unable to perform, R: T12  Apley's External Rotation: L: unable to perform, R: T3       Body Structures Involved:  1. Bones  2. Joints  3. Muscles Body Functions Affected:  1. Sensory/Pain  2. Neuromusculoskeletal  3. Movement Related Activities and Participation Affected:  1. General Tasks and Demands  2. Mobility  3. Self Care  4. Domestic Life  5. Interpersonal Interactions and Relationships  6. Community, Social and [place]   Number of elements (examined above) that affect the Plan of Care: 4+: HIGH COMPLEXITY   CLINICAL PRESENTATION:   Presentation: Stable and uncomplicated: LOW COMPLEXITY   CLINICAL DECISION MAKING:   Outcome Measure: Tool Used: Disabilities of the Arm, Shoulder and Hand (DASH) Questionnaire - Quick Version  Score:  Initial: 32/55 (48% disability)   Most Recent: X/55 (Date: -- )   Interpretation of Score: The DASH is designed to measure the activities of daily living in person's with upper extremity dysfunction or pain. Each section is scored on a 1-5 scale, 5 representing the greatest disability. The scores of each section are added together for a total score of 55. Score 11 12-19 20-28 29-37 38-45 46-54 55   Modifier CH CI CJ CK CL CM CN     ?  Carrying, Moving, and Handling Objects:     - CURRENT STATUS: CK - 40%-59% impaired, limited or restricted    - GOAL STATUS: CI - 1%-19% impaired, limited or restricted    - D/C STATUS:  ---------------To be determined---------------      Medical Necessity:   · Patient is expected to demonstrate progress in strength and range of motion to increase independence with reaching and lifting. Reason for Services/Other Comments:  · Patient continues to require present interventions due to patient's inability to reach, dress, and lift. Use of outcome tool(s) and clinical judgement create a POC that gives a: Clear prediction of patient's progress: LOW COMPLEXITY            TREATMENT:   (In addition to Assessment/Re-Assessment sessions the following treatments were rendered)  Pre-treatment Symptoms/Complaints:  Pt reports that he has been doing well with his exercises and that his shoulder is still improving, but that he cannot lift it. Pain: Initial:   Pain Intensity 1: 0  Post Session:  0/10      Manual Therapy (    Soft Tissue Mobilization Duration  Duration: 30 Minutes): Manual techniques to facilitate improved motion and decreased pain. (Used abbreviations: MET - muscle energy technique; PNF - proprioceptive neuromuscular facilitation; NMR - neuromuscular re-education; a/p - anterior to posterior; p/a - posterior to anterior)   · Manual ROM: flexion, ER, and IR within surgical precautions  · STM to upper trap and levator scapula    Therapeutic Exercise: (30 Minutes):  Exercises per grid below to improve mobility, strength and dynamic movement of shoulder - left to improve functional lifting, reaching and overhead activites. Required minimal visual and verbal cues to promote proper body mechanics. Progressed resistance, range, repetitions and complexity of movement as indicated.        7/10/2018   Activity/Exercise Parameters                 Patient education --   Supine punches  Manually assisted AAROM flexion to 90 degrees: 2 x 10    Isometric holds  Supine at 90 degrees: 5 reps x 10 sec holds   Supine IR/ER AAROM: 2 x 10    Sidelying scapular retraction  3 x 10    UE ranger  AAROM flexion: 2 x 10--manual cuing at scapula    ER PROM With cane in sitting: small range x 10 reps        [employer] Portal  Treatment/Session Assessment:    · Response to Treatment:  PT reports that he feels confident about his rehab and that he will eventually be able to raise his arm. No pain with treatment today. · Compliance with Program/Exercises: compliant most of the time. · Recommendations/Intent for next treatment session: \"Next visit will focus on advancements to more challenging activities\".   Total Treatment Duration:  PT Patient Time In/Time Out  Time In: 1300  Time Out: 1400    Skyla Epps, PT

## 2018-07-12 ENCOUNTER — HOSPITAL ENCOUNTER (OUTPATIENT)
Dept: PHYSICAL THERAPY | Age: 78
Discharge: HOME | End: 2018-07-12
Payer: MEDICARE

## 2018-07-12 PROCEDURE — 97110 THERAPEUTIC EXERCISES: CPT

## 2018-07-12 PROCEDURE — 97140 MANUAL THERAPY 1/> REGIONS: CPT

## 2018-07-12 NOTE — PROGRESS NOTES
Progress Note    Patient: Porfirio Cox MRN: [id number]  SSN: [id number]    YOB: 1940  Age: 78 y.o. Sex: male      Admit Date: 9/10/2017    LOS: 0 days     Subjective:   Giovani Black is a 77 yo male with PMH of seizures, dyslipidemia who was admitted in light of new onset seizure. His wife noted tonic-clonic movements while he was in his bed with apparent tongue bite. He had a post-ictal episode in the ambulance, agitated. He was loaded with keppra. Brain CT and EEG unrevealing. Upon arrival to ER he started complaining of back pain and a  lumbar CT scan showed the followin. Acute T11 compression deformity causing moderate anterior height loss.     2. Age-indeterminate T7 compression deformity causing mild anterior height loss.     3. Old right-sided transverse process fractures at L1, L2 and L3.         After these findings,neurosurgery was consulted with plan to undergo T11 kyphoplasty tomorrow. He was found today alert, oriented in no distress. Objective:     Vitals:    17 0014 17 0510 17 0802 17 0830   BP:  127/73 143/78 143/78   Pulse:  79 80 80   Resp:  16 17    Temp:  98.9 °F (37.2 °C) 98.3 °F (36.8 °C)    SpO2: 92% 92% 99%    Weight:       Height:            Intake and Output:  Current Shift: 701 - 1900  In: 5676 [P.O.:250; I.V.:1005]  Out: 215 [Urine:215]  Last three shifts: 1901 - 700  In: -   Out: 450 [Urine:450]    Physical Exam:   GENERAL: alert, cooperative, no distress, appears stated age  EYE: negative  LYMPHATIC: Cervical, supraclavicular, and axillary nodes normal.   THROAT & NECK: normal and no erythema or exudates noted. LUNG: clear to auscultation bilaterally  HEART: regular rate and rhythm, S1, S2 normal, no murmur, click, rub or gallop  ABDOMEN: soft, non-tender.  Bowel sounds normal. No masses,  no organomegaly  EXTREMITIES:  extremities normal, atraumatic, no cyanosis or edema  SKIN: [Provider aware of all medications taken (including OTC)] : Patient stated provider is aware of all medications ~he/she~ is taking including OTC  Normal.  NEUROLOGIC: negative  PSYCHIATRIC: non focal    Lab/Data Review:  Lab results reviewed. For significant abnormal values and values requiring intervention, see assessment and plan. Assessment:     Active Problems:    Seizure ([address] (9/10/2017)        Plan:     #New onset Tonic-seizures with  Impaired awareness  Negative brain CT, MRI, EEG  No signs of overt infection   Continue keppra 500mg po bid     Leukocytosis: resolved      Acute Lumbar T11 compression fracture:  Lidocaine patch  Percocet 5/325 mg 1 tab  q4h prn   Dilaudid 2 mg q4hr pprn     for surgery tomorrow     Hypertension  Cont home medications   Cardiac healthy diet     Hyper CPK: improving   Cont with gentle hydration      HLD  Cont statins     Disposition: continue management-    *Switched to inpatient status today.  PMH, family Hx, social Hx and ROS reviewed and remains the same as H&P typed on 9/10/17    Signed By: Jona Jorge MD     September 11, 2017 [Reports Changes In Medication (including OTC)] : Patient reports no changes in medication

## 2018-07-12 NOTE — PROGRESS NOTES
Britt Carter  : 1940  [place]  Secondary: [address] at [facility], [address] [facility]  [phone]   [phone]       OUTPATIENT PHYSICAL THERAPY:Daily Note 2018   ICD-10: Treatment Diagnosis: pain in joint, shoulder, left (M25.512); stiffness in joint, not elsewhere classified, shoulder, left (M25.612); primary OA of shoulder, left (M19.012)  Precautions/Allergies:   Tha Reach known allergies]   Fall Risk Score: 1 (? 5 = High Risk)  MD Orders: evaluate and treat  MEDICAL/REFERRING DIAGNOSIS:  Left shoulder pain [M25.512]   DATE OF ONSET: Date of surgery: 18  REFERRING PHYSICIAN: Derek Prado MD  RETURN PHYSICIAN APPOINTMENT: unknown      INITIAL ASSESSMENT:  Mr. Gregoria Adan is a 70year old male with left shoulder pain and stiffness following a total shoulder arthroplasty performed by Dr Gentry Sosa on 18. He reports a long history of progressively worsening shoulder pain and dysfunction. He presents to PT with decreased passive ROM, active ROM, functional reaching that limits his ability to get dressed, reach, and open a door. Pt will benefit from skilled PT to address above listed impairments and functional limitations to facilitate return to prior level of function. Due to surgical precautions and history of significantly restricted motion and function, patient will likely require extended care. PROBLEM LIST (Impacting functional limitations):  1. Decreased Strength  2. Decreased ADL/Functional Activities  3. Increased Pain  4. Decreased Flexibility/Joint Mobility INTERVENTIONS PLANNED:  1. Cold  2. Electrical Stimulation  3. Heat  4. Home Exercise Program (HEP)  5. Manual Therapy  6. Neuromuscular Re-education/Strengthening  7. Range of Motion (ROM)  8. Therapeutic Activites  9.  Therapeutic Exercise/Strengthening   TREATMENT PLAN:  Effective Dates: 2018 TO 2018 (90 days). Frequency/Duration: 2 times a week for 90 Days  GOALS: (Goals have been discussed and agreed upon with patient.)  Short-Term Functional Goals: Time Frame: 8/13/18  1. Pt will demonstrate passive ROM of flexion to 110 degrees, ER to 50 degrees, and IR to 60 degrees. ONGOING  2. Pt will report no limitation in dressing (donning socks). ONGOING  Discharge Goals: Time Frame: 9/30/18  1. Pt will demonstrate Apley's ER to back of head to allow independent grooming and dressing without modification. ONGOING  2. Pt will demonstrate Apley's IR to L5 to allow donning belt and pants without modification. ONGOING  3. Pt will demonstrate active flexion to 110 degrees to allow reaching overhead. ONGOING  4. Pt will demonstrate improvement in function with quickDASH to 14% or less disability. ONGOING  Rehabilitation Potential For Stated Goals: Good            The information in this section was collected on 7/2/18 (except where otherwise noted). HISTORY:   History of Present Injury/Illness (Reason for Referral):  7/2/18: Hi Arita reports a long history of left shoulder pain that progressively worsened and limited his UE use significantly over the last 2-3 years. He had a left TSA performed by Dr Gentry Sosa on 6/13/18. He reports improvement in pain from pre-operative to post-operative and reports only discomfort at present. He reports limitation in all UE use due to weakness and post-operative restrictions. He reports difficulty dressing, reaching, opening a door, and lifting due to pain and shoulder precautions. Past Medical History/Comorbidities:   Mr. Gregoria Adan  has a past medical history of GERD (gastroesophageal reflux disease); Hyperlipidemia (8/5/2016); Hypertension; Insomnia (10/30/2017); Obstructive sleep apnea (10/30/2017); Overweight (BMI 25.0-29.9) (10/30/2017); Primary osteoarthritis of left shoulder (7/14/2017); and Seizure ([facility] [address] (09/10/2017).   Mr. Gregoria Adan  has a past surgical history that includes hx hip replacement (Bilateral); hx hernia repair; and hx colonoscopy. Social History/Living Environment:     Pt lives in private home with his family. Prior Level of Function/Work/Activity:  Pt reports long, progressive history of shoulder dysfunction with inability to lift his arm for several years prior to surgery. Dominant Side:         RIGHT  Current Medications:       Current Outpatient Prescriptions:     oxyCODONE-acetaminophen (PERCOCET) 5-325 mg per tablet, Take 1 Tab by mouth every four (4) hours as needed. Max Daily Amount: 6 Tabs., Disp: 20 Tab, Rfl: 0    cpap machine kit, by Does Not Apply route., Disp: , Rfl:     pitavastatin calcium (LIVALO) 2 mg tablet, Take 1 Tab by mouth daily. , Disp: 90 Tab, Rfl: 3    ALPRAZolam (XANAX) 0.25 mg tablet, Take 1 Tab by mouth nightly as needed for Anxiety or Sleep. Max Daily Amount: 0.25 mg., Disp: 30 Tab, Rfl: 2    amLODIPine (NORVASC) 5 mg tablet, Take 1 Tab by mouth daily. , Disp: 90 Tab, Rfl: 3    esomeprazole (NEXIUM) 40 mg capsule, Take 1 Cap by mouth daily. , Disp: 90 Cap, Rfl: 3    ezetimibe (ZETIA) 10 mg tablet, Take 1 Tab by mouth every Monday, Wednesday, Friday., Disp: 90 Tab, Rfl: 3    losartan (COZAAR) 50 mg tablet, Take 1 Tab by mouth daily. , Disp: 90 Tab, Rfl: 3    ibuprofen (MOTRIN) 600 mg tablet, Take 1 Tab by mouth every eight (8) hours as needed for Pain. (Patient taking differently: Take 600 mg by mouth as needed for Pain.), Disp: 60 Tab, Rfl: 2    levETIRAcetam (KEPPRA) 500 mg tablet, One in AM and 2 at hs, Disp: 90 Tab, Rfl: 5    levETIRAcetam (KEPPRA) 500 mg tablet, Take 1 Tab by mouth two (2) times a day., Disp: 60 Tab, Rfl: 5    aspirin delayed-release 81 mg tablet, Take 81 mg by mouth daily. , Disp: , Rfl:    Date Last Reviewed:  7/12/2018   Number of Personal Factors/Comorbidities that affect the Plan of Care: 1-2: MODERATE COMPLEXITY   EXAMINATION:   Observation/palpation/joint mobility: incision well healed.   All active motion with compensatory scapular hiking. Range of Motion:  Passive (active)  Left* Right   Flexion  90 (40) 150 (150)    External Rotation  0 50   Internal Rotation  55 60      Special Tests: none performed     Strength:      Manual Muscle Test out of 5   Flexion Not assessed at initial evaluation/ surgical precautions    Abduction Not assessed at initial evaluation/ surgical precautions    External Rotation Not assessed at initial evaluation/ surgical precautions    Internal Rotaton Not assessed at initial evaluation/ surgical precautions      Functional Tests:    Apley's Internal Rotation: L: unable to perform, R: T12  Apley's External Rotation: L: unable to perform, R: T3       Body Structures Involved:  1. Bones  2. Joints  3. Muscles Body Functions Affected:  1. Sensory/Pain  2. Neuromusculoskeletal  3. Movement Related Activities and Participation Affected:  1. General Tasks and Demands  2. Mobility  3. Self Care  4. Domestic Life  5. Interpersonal Interactions and Relationships  6. Community, Social and [place]   Number of elements (examined above) that affect the Plan of Care: 4+: HIGH COMPLEXITY   CLINICAL PRESENTATION:   Presentation: Stable and uncomplicated: LOW COMPLEXITY   CLINICAL DECISION MAKING:   Outcome Measure: Tool Used: Disabilities of the Arm, Shoulder and Hand (DASH) Questionnaire - Quick Version  Score:  Initial: 32/55 (48% disability)   Most Recent: X/55 (Date: -- )   Interpretation of Score: The DASH is designed to measure the activities of daily living in person's with upper extremity dysfunction or pain. Each section is scored on a 1-5 scale, 5 representing the greatest disability. The scores of each section are added together for a total score of 55. Score 11 12-19 20-28 29-37 38-45 46-54 55   Modifier CH CI CJ CK CL CM CN     ?  Carrying, Moving, and Handling Objects:     - CURRENT STATUS: CK - 40%-59% impaired, limited or restricted    - GOAL STATUS: CI - 1%-19% impaired, limited or restricted    - D/C STATUS:  ---------------To be determined---------------      Medical Necessity:   · Patient is expected to demonstrate progress in strength and range of motion to increase independence with reaching and lifting. Reason for Services/Other Comments:  · Patient continues to require present interventions due to patient's inability to reach, dress, and lift. Use of outcome tool(s) and clinical judgement create a POC that gives a: Clear prediction of patient's progress: LOW COMPLEXITY            TREATMENT:   (In addition to Assessment/Re-Assessment sessions the following treatments were rendered)  Pre-treatment Symptoms/Complaints:  Pt reports that he is doing well and that he feels confident that he is going to be able to raise his shoulder eventually. Pain: Initial:   Pain Intensity 1: 0  Post Session:  0/10      Manual Therapy (    Soft Tissue Mobilization Duration  Duration: 30 Minutes): Manual techniques to facilitate improved motion and decreased pain. (Used abbreviations: MET - muscle energy technique; PNF - proprioceptive neuromuscular facilitation; NMR - neuromuscular re-education; a/p - anterior to posterior; p/a - posterior to anterior)   · Manual ROM: flexion, ER, and IR within surgical precautions  · STM to upper trap and levator scapula    Therapeutic Exercise: (30 Minutes):  Exercises per grid below to improve mobility, strength and dynamic movement of shoulder - left to improve functional lifting, reaching and overhead activites. Required minimal visual and verbal cues to promote proper body mechanics. Progressed resistance, range, repetitions and complexity of movement as indicated.        7/12/2018   Activity/Exercise Parameters                 Patient education --   Supine punches  Manually assisted AAROM flexion to 90 degrees: 3 x 10    Isometric holds  Supine at 90 degrees: 5 reps x 10 sec holds   Supine flexion   range 2 x 10    Supine IR/ER -- Sidelying scapular retraction  --    UE ranger  --    ER PROM --    Flexion PROM  Counter walk backs: 2 x 10    Flexion AAROM  Ball rolls on table: 2 x 10        [employer] Portal  Treatment/Session Assessment:    · Response to Treatment:  Pt demonstrated improved ROM and tolerance with AAROM exercises. · Compliance with Program/Exercises: compliant most of the time. · Recommendations/Intent for next treatment session: \"Next visit will focus on advancements to more challenging activities\".   Total Treatment Duration:  PT Patient Time In/Time Out  Time In: 1300  Time Out: 1400    Jerrod Guadalupe PT

## 2018-07-17 ENCOUNTER — HOSPITAL ENCOUNTER (OUTPATIENT)
Dept: PHYSICAL THERAPY | Age: 78
Discharge: HOME | End: 2018-07-17
Payer: MEDICARE

## 2018-07-17 PROCEDURE — 97140 MANUAL THERAPY 1/> REGIONS: CPT

## 2018-07-17 PROCEDURE — 97110 THERAPEUTIC EXERCISES: CPT

## 2018-07-17 NOTE — PROGRESS NOTES
Kaity Vicente  : 1940  [place]  Secondary: [address] at [facility], [address] [facility]  [phone]   [phone]       OUTPATIENT PHYSICAL THERAPY:Daily Note 2018   ICD-10: Treatment Diagnosis: pain in joint, shoulder, left (M25.512); stiffness in joint, not elsewhere classified, shoulder, left (M25.612); primary OA of shoulder, left (M19.012)  Precautions/Allergies:   Marlise Cap known allergies]   Fall Risk Score: 1 (? 5 = High Risk)  MD Orders: evaluate and treat  MEDICAL/REFERRING DIAGNOSIS:  Left shoulder pain [M25.512]   DATE OF ONSET: Date of surgery: 18  REFERRING PHYSICIAN: Reilly Cabezas MD  RETURN PHYSICIAN APPOINTMENT: unknown      INITIAL ASSESSMENT:  Mr. Man Barnhart is a 70year old male with left shoulder pain and stiffness following a total shoulder arthroplasty performed by Dr Ame Turner on 18. He reports a long history of progressively worsening shoulder pain and dysfunction. He presents to PT with decreased passive ROM, active ROM, functional reaching that limits his ability to get dressed, reach, and open a door. Pt will benefit from skilled PT to address above listed impairments and functional limitations to facilitate return to prior level of function. Due to surgical precautions and history of significantly restricted motion and function, patient will likely require extended care. PROBLEM LIST (Impacting functional limitations):  1. Decreased Strength  2. Decreased ADL/Functional Activities  3. Increased Pain  4. Decreased Flexibility/Joint Mobility INTERVENTIONS PLANNED:  1. Cold  2. Electrical Stimulation  3. Heat  4. Home Exercise Program (HEP)  5. Manual Therapy  6. Neuromuscular Re-education/Strengthening  7. Range of Motion (ROM)  8. Therapeutic Activites  9.  Therapeutic Exercise/Strengthening   TREATMENT PLAN:  Effective Dates: 2018 TO 2018 (90 days). Frequency/Duration: 2 times a week for 90 Days  GOALS: (Goals have been discussed and agreed upon with patient.)  Short-Term Functional Goals: Time Frame: 8/13/18  1. Pt will demonstrate passive ROM of flexion to 110 degrees, ER to 50 degrees, and IR to 60 degrees. ONGOING  2. Pt will report no limitation in dressing (donning socks). ONGOING  Discharge Goals: Time Frame: 9/30/18  1. Pt will demonstrate Apley's ER to back of head to allow independent grooming and dressing without modification. ONGOING  2. Pt will demonstrate Apley's IR to L5 to allow donning belt and pants without modification. ONGOING  3. Pt will demonstrate active flexion to 110 degrees to allow reaching overhead. ONGOING  4. Pt will demonstrate improvement in function with quickDASH to 14% or less disability. ONGOING  Rehabilitation Potential For Stated Goals: Good            The information in this section was collected on 7/2/18 (except where otherwise noted). HISTORY:   History of Present Injury/Illness (Reason for Referral):  7/2/18: Shin Valladares reports a long history of left shoulder pain that progressively worsened and limited his UE use significantly over the last 2-3 years. He had a left TSA performed by Dr Caleb German on 6/13/18. He reports improvement in pain from pre-operative to post-operative and reports only discomfort at present. He reports limitation in all UE use due to weakness and post-operative restrictions. He reports difficulty dressing, reaching, opening a door, and lifting due to pain and shoulder precautions. Past Medical History/Comorbidities:   Mr. Lyn Rodriguez  has a past medical history of GERD (gastroesophageal reflux disease); Hyperlipidemia (8/5/2016); Hypertension; Insomnia (10/30/2017); Obstructive sleep apnea (10/30/2017); Overweight (BMI 25.0-29.9) (10/30/2017); Primary osteoarthritis of left shoulder (7/14/2017); and Seizure ([facility] [address] (09/10/2017).   Mr. Lyn Rodriguez  has a past surgical history that includes hx hip replacement (Bilateral); hx hernia repair; and hx colonoscopy. Social History/Living Environment:     Pt lives in private home with his family. Prior Level of Function/Work/Activity:  Pt reports long, progressive history of shoulder dysfunction with inability to lift his arm for several years prior to surgery. Dominant Side:         RIGHT  Current Medications:       Current Outpatient Prescriptions:     oxyCODONE-acetaminophen (PERCOCET) 5-325 mg per tablet, Take 1 Tab by mouth every four (4) hours as needed. Max Daily Amount: 6 Tabs., Disp: 20 Tab, Rfl: 0    cpap machine kit, by Does Not Apply route., Disp: , Rfl:     pitavastatin calcium (LIVALO) 2 mg tablet, Take 1 Tab by mouth daily. , Disp: 90 Tab, Rfl: 3    ALPRAZolam (XANAX) 0.25 mg tablet, Take 1 Tab by mouth nightly as needed for Anxiety or Sleep. Max Daily Amount: 0.25 mg., Disp: 30 Tab, Rfl: 2    amLODIPine (NORVASC) 5 mg tablet, Take 1 Tab by mouth daily. , Disp: 90 Tab, Rfl: 3    esomeprazole (NEXIUM) 40 mg capsule, Take 1 Cap by mouth daily. , Disp: 90 Cap, Rfl: 3    ezetimibe (ZETIA) 10 mg tablet, Take 1 Tab by mouth every Monday, Wednesday, Friday., Disp: 90 Tab, Rfl: 3    losartan (COZAAR) 50 mg tablet, Take 1 Tab by mouth daily. , Disp: 90 Tab, Rfl: 3    ibuprofen (MOTRIN) 600 mg tablet, Take 1 Tab by mouth every eight (8) hours as needed for Pain. (Patient taking differently: Take 600 mg by mouth as needed for Pain.), Disp: 60 Tab, Rfl: 2    levETIRAcetam (KEPPRA) 500 mg tablet, One in AM and 2 at hs, Disp: 90 Tab, Rfl: 5    levETIRAcetam (KEPPRA) 500 mg tablet, Take 1 Tab by mouth two (2) times a day., Disp: 60 Tab, Rfl: 5    aspirin delayed-release 81 mg tablet, Take 81 mg by mouth daily. , Disp: , Rfl:    Date Last Reviewed:  7/17/2018   Number of Personal Factors/Comorbidities that affect the Plan of Care: 1-2: MODERATE COMPLEXITY   EXAMINATION:   Observation/palpation/joint mobility: incision well healed.   All active motion with compensatory scapular hiking. Range of Motion:  Passive (active)  Left* Right   Flexion  110 (40) 150 (150)    External Rotation  45 50   Internal Rotation  55 60      Special Tests: none performed     Strength:      Manual Muscle Test out of 5   Flexion Not assessed at initial evaluation/ surgical precautions    Abduction Not assessed at initial evaluation/ surgical precautions    External Rotation Not assessed at initial evaluation/ surgical precautions    Internal Rotaton Not assessed at initial evaluation/ surgical precautions      Functional Tests:    Apley's Internal Rotation: L: unable to perform, R: T12  Apley's External Rotation: L: unable to perform, R: T3       Body Structures Involved:  1. Bones  2. Joints  3. Muscles Body Functions Affected:  1. Sensory/Pain  2. Neuromusculoskeletal  3. Movement Related Activities and Participation Affected:  1. General Tasks and Demands  2. Mobility  3. Self Care  4. Domestic Life  5. Interpersonal Interactions and Relationships  6. Community, Social and [place]   Number of elements (examined above) that affect the Plan of Care: 4+: HIGH COMPLEXITY   CLINICAL PRESENTATION:   Presentation: Stable and uncomplicated: LOW COMPLEXITY   CLINICAL DECISION MAKING:   Outcome Measure: Tool Used: Disabilities of the Arm, Shoulder and Hand (DASH) Questionnaire - Quick Version  Score:  Initial: 32/55 (48% disability)   Most Recent: X/55 (Date: -- )   Interpretation of Score: The DASH is designed to measure the activities of daily living in person's with upper extremity dysfunction or pain. Each section is scored on a 1-5 scale, 5 representing the greatest disability. The scores of each section are added together for a total score of 55. Score 11 12-19 20-28 29-37 38-45 46-54 55   Modifier CH CI CJ CK CL CM CN     ?  Carrying, Moving, and Handling Objects:     - CURRENT STATUS: CK - 40%-59% impaired, limited or restricted    - GOAL STATUS: CI - 1%-19% impaired, limited or restricted    - D/C STATUS:  ---------------To be determined---------------      Medical Necessity:   · Patient is expected to demonstrate progress in strength and range of motion to increase independence with reaching and lifting. Reason for Services/Other Comments:  · Patient continues to require present interventions due to patient's inability to reach, dress, and lift. Use of outcome tool(s) and clinical judgement create a POC that gives a: Clear prediction of patient's progress: LOW COMPLEXITY            TREATMENT:   (In addition to Assessment/Re-Assessment sessions the following treatments were rendered)  Pre-treatment Symptoms/Complaints:  Pt reports that he is doing well and that he can tell that he is better able to use his arm but that it is still difficult to reach at all. Pain: Initial:   Pain Intensity 1: 0  Post Session:  0/10      Manual Therapy (    Soft Tissue Mobilization Duration  Duration: 30 Minutes): Manual techniques to facilitate improved motion and decreased pain. (Used abbreviations: MET - muscle energy technique; PNF - proprioceptive neuromuscular facilitation; NMR - neuromuscular re-education; a/p - anterior to posterior; p/a - posterior to anterior)   · Manual ROM: flexion, ER, and IR within surgical precautions  · STM to upper trap and levator scapula    Therapeutic Exercise: (35 Minutes):  Exercises per grid below to improve mobility, strength and dynamic movement of shoulder - left to improve functional lifting, reaching and overhead activites. Required minimal visual and verbal cues to promote proper body mechanics. Progressed resistance, range, repetitions and complexity of movement as indicated.        7/17/2018   Activity/Exercise Parameters                 Patient education --   Supine punches  10 punches to 90 degrees, 2 x 10 punch to enrange    Isometric holds  ER out into yellow band to neutral 2 x 10 with 5 sec hold    Supine flexion  -- Sidelying scapular retraction  10 reps    Sidelying ER  Comfortable range only: 2 x 10    UE ranger  Sidelying punches to enrange flexion: 2 x 10    ER PROM Seated with cane 10 reps    Flexion PROM  --    Flexion AAROM  Ball rolls on table: 2 x 10 on table with wedge        MedBridge Portal  Treatment/Session Assessment:    · Response to Treatment:  Pt continues to progress in passive ROM and requires less assistance in supine punches. · Compliance with Program/Exercises: compliant most of the time. · Recommendations/Intent for next treatment session: \"Next visit will focus on advancements to more challenging activities\".   Total Treatment Duration:  PT Patient Time In/Time Out  Time In: 1300  Time Out: 1305 [address],

## 2018-07-19 ENCOUNTER — HOSPITAL ENCOUNTER (OUTPATIENT)
Dept: PHYSICAL THERAPY | Age: 78
Discharge: HOME | End: 2018-07-19
Payer: MEDICARE

## 2018-07-19 PROCEDURE — 97140 MANUAL THERAPY 1/> REGIONS: CPT

## 2018-07-19 PROCEDURE — 97110 THERAPEUTIC EXERCISES: CPT

## 2018-07-19 NOTE — PROGRESS NOTES
Natacha Alvarez  : 1940  [place]  Secondary: [address] at [facility], [address] [facility]  [phone]   [phone]       OUTPATIENT PHYSICAL THERAPY:Daily Note 2018   ICD-10: Treatment Diagnosis: pain in joint, shoulder, left (M25.512); stiffness in joint, not elsewhere classified, shoulder, left (M25.612); primary OA of shoulder, left (M19.012)  Precautions/Allergies:   Kayla Bilberry known allergies]   Fall Risk Score: 1 (? 5 = High Risk)  MD Orders: evaluate and treat  MEDICAL/REFERRING DIAGNOSIS:  Left shoulder pain [M25.512]   DATE OF ONSET: Date of surgery: 18  REFERRING PHYSICIAN: Rosemary Tripathi MD  RETURN PHYSICIAN APPOINTMENT: unknown      INITIAL ASSESSMENT:  Mr. Hilton Leyden is a 70year old male with left shoulder pain and stiffness following a total shoulder arthroplasty performed by Dr Yonathan Connolly on 18. He reports a long history of progressively worsening shoulder pain and dysfunction. He presents to PT with decreased passive ROM, active ROM, functional reaching that limits his ability to get dressed, reach, and open a door. Pt will benefit from skilled PT to address above listed impairments and functional limitations to facilitate return to prior level of function. Due to surgical precautions and history of significantly restricted motion and function, patient will likely require extended care. PROBLEM LIST (Impacting functional limitations):  1. Decreased Strength  2. Decreased ADL/Functional Activities  3. Increased Pain  4. Decreased Flexibility/Joint Mobility INTERVENTIONS PLANNED:  1. Cold  2. Electrical Stimulation  3. Heat  4. Home Exercise Program (HEP)  5. Manual Therapy  6. Neuromuscular Re-education/Strengthening  7. Range of Motion (ROM)  8. Therapeutic Activites  9.  Therapeutic Exercise/Strengthening   TREATMENT PLAN:  Effective Dates: 2018 TO 2018 (90 days). Frequency/Duration: 2 times a week for 90 Days  GOALS: (Goals have been discussed and agreed upon with patient.)  Short-Term Functional Goals: Time Frame: 8/13/18  1. Pt will demonstrate passive ROM of flexion to 110 degrees, ER to 50 degrees, and IR to 60 degrees. ONGOING  2. Pt will report no limitation in dressing (donning socks). ONGOING  Discharge Goals: Time Frame: 9/30/18  1. Pt will demonstrate Apley's ER to back of head to allow independent grooming and dressing without modification. ONGOING  2. Pt will demonstrate Apley's IR to L5 to allow donning belt and pants without modification. ONGOING  3. Pt will demonstrate active flexion to 110 degrees to allow reaching overhead. ONGOING  4. Pt will demonstrate improvement in function with quickDASH to 14% or less disability. ONGOING  Rehabilitation Potential For Stated Goals: Good            The information in this section was collected on 7/2/18 (except where otherwise noted). HISTORY:   History of Present Injury/Illness (Reason for Referral):  7/2/18: Silas Mclean reports a long history of left shoulder pain that progressively worsened and limited his UE use significantly over the last 2-3 years. He had a left TSA performed by Dr Hugo Jones on 6/13/18. He reports improvement in pain from pre-operative to post-operative and reports only discomfort at present. He reports limitation in all UE use due to weakness and post-operative restrictions. He reports difficulty dressing, reaching, opening a door, and lifting due to pain and shoulder precautions. Past Medical History/Comorbidities:   Mr. Tory Lesch  has a past medical history of GERD (gastroesophageal reflux disease); Hyperlipidemia (8/5/2016); Hypertension; Insomnia (10/30/2017); Obstructive sleep apnea (10/30/2017); Overweight (BMI 25.0-29.9) (10/30/2017); Primary osteoarthritis of left shoulder (7/14/2017); and Seizure ([facility] [address] (09/10/2017).   Mr. Tory Lesch  has a past surgical history that includes hx hip replacement (Bilateral); hx hernia repair; and hx colonoscopy. Social History/Living Environment:     Pt lives in private home with his family. Prior Level of Function/Work/Activity:  Pt reports long, progressive history of shoulder dysfunction with inability to lift his arm for several years prior to surgery. Dominant Side:         RIGHT  Current Medications:       Current Outpatient Prescriptions:     oxyCODONE-acetaminophen (PERCOCET) 5-325 mg per tablet, Take 1 Tab by mouth every four (4) hours as needed. Max Daily Amount: 6 Tabs., Disp: 20 Tab, Rfl: 0    cpap machine kit, by Does Not Apply route., Disp: , Rfl:     pitavastatin calcium (LIVALO) 2 mg tablet, Take 1 Tab by mouth daily. , Disp: 90 Tab, Rfl: 3    ALPRAZolam (XANAX) 0.25 mg tablet, Take 1 Tab by mouth nightly as needed for Anxiety or Sleep. Max Daily Amount: 0.25 mg., Disp: 30 Tab, Rfl: 2    amLODIPine (NORVASC) 5 mg tablet, Take 1 Tab by mouth daily. , Disp: 90 Tab, Rfl: 3    esomeprazole (NEXIUM) 40 mg capsule, Take 1 Cap by mouth daily. , Disp: 90 Cap, Rfl: 3    ezetimibe (ZETIA) 10 mg tablet, Take 1 Tab by mouth every Monday, Wednesday, Friday., Disp: 90 Tab, Rfl: 3    losartan (COZAAR) 50 mg tablet, Take 1 Tab by mouth daily. , Disp: 90 Tab, Rfl: 3    ibuprofen (MOTRIN) 600 mg tablet, Take 1 Tab by mouth every eight (8) hours as needed for Pain. (Patient taking differently: Take 600 mg by mouth as needed for Pain.), Disp: 60 Tab, Rfl: 2    levETIRAcetam (KEPPRA) 500 mg tablet, One in AM and 2 at hs, Disp: 90 Tab, Rfl: 5    levETIRAcetam (KEPPRA) 500 mg tablet, Take 1 Tab by mouth two (2) times a day., Disp: 60 Tab, Rfl: 5    aspirin delayed-release 81 mg tablet, Take 81 mg by mouth daily. , Disp: , Rfl:    Date Last Reviewed:  7/19/2018   Number of Personal Factors/Comorbidities that affect the Plan of Care: 1-2: MODERATE COMPLEXITY   EXAMINATION:   Observation/palpation/joint mobility: incision well healed.   All active motion with compensatory scapular hiking. Range of Motion:  Passive (active)  Left* Right   Flexion  110 (40) 150 (150)    External Rotation  45 50   Internal Rotation  55 60      Special Tests: none performed     Strength:      Manual Muscle Test out of 5   Flexion Not assessed at initial evaluation/ surgical precautions    Abduction Not assessed at initial evaluation/ surgical precautions    External Rotation Not assessed at initial evaluation/ surgical precautions    Internal Rotaton Not assessed at initial evaluation/ surgical precautions      Functional Tests:    Apley's Internal Rotation: L: unable to perform, R: T12  Apley's External Rotation: L: unable to perform, R: T3       Body Structures Involved:  1. Bones  2. Joints  3. Muscles Body Functions Affected:  1. Sensory/Pain  2. Neuromusculoskeletal  3. Movement Related Activities and Participation Affected:  1. General Tasks and Demands  2. Mobility  3. Self Care  4. Domestic Life  5. Interpersonal Interactions and Relationships  6. Community, Social and [place]   Number of elements (examined above) that affect the Plan of Care: 4+: HIGH COMPLEXITY   CLINICAL PRESENTATION:   Presentation: Stable and uncomplicated: LOW COMPLEXITY   CLINICAL DECISION MAKING:   Outcome Measure: Tool Used: Disabilities of the Arm, Shoulder and Hand (DASH) Questionnaire - Quick Version  Score:  Initial: 32/55 (48% disability)   Most Recent: X/55 (Date: -- )   Interpretation of Score: The DASH is designed to measure the activities of daily living in person's with upper extremity dysfunction or pain. Each section is scored on a 1-5 scale, 5 representing the greatest disability. The scores of each section are added together for a total score of 55. Score 11 12-19 20-28 29-37 38-45 46-54 55   Modifier CH CI CJ CK CL CM CN     ?  Carrying, Moving, and Handling Objects:     - CURRENT STATUS: CK - 40%-59% impaired, limited or restricted    - GOAL STATUS: CI - 1%-19% impaired, limited or restricted    - D/C STATUS:  ---------------To be determined---------------      Medical Necessity:   · Patient is expected to demonstrate progress in strength and range of motion to increase independence with reaching and lifting. Reason for Services/Other Comments:  · Patient continues to require present interventions due to patient's inability to reach, dress, and lift. Use of outcome tool(s) and clinical judgement create a POC that gives a: Clear prediction of patient's progress: LOW COMPLEXITY            TREATMENT:   (In addition to Assessment/Re-Assessment sessions the following treatments were rendered)  Pre-treatment Symptoms/Complaints:  Pt reports that his shoulder feels a little more awkward today. Pain: Initial:   Pain Intensity 1: 0  Post Session:  0/10      Manual Therapy (    Soft Tissue Mobilization Duration  Duration: 30 Minutes): Manual techniques to facilitate improved motion and decreased pain. (Used abbreviations: MET - muscle energy technique; PNF - proprioceptive neuromuscular facilitation; NMR - neuromuscular re-education; a/p - anterior to posterior; p/a - posterior to anterior)   · Manual ROM: flexion, ER, and IR within surgical precautions  · STM to upper trap and levator scapula, subscapularis and latissimus dorsi     Therapeutic Exercise: (30 Minutes):  Exercises per grid below to improve mobility, strength and dynamic movement of shoulder - left to improve functional lifting, reaching and overhead activites. Required minimal visual and verbal cues to promote proper body mechanics. Progressed resistance, range, repetitions and complexity of movement as indicated.        7/19/2018   Activity/Exercise Parameters                 Patient education --   Supine punches  2 x 10 punches to 90 degrees   Isometric holds  Flexion into wall with 5 sec holds: 10 at neutral, 5 with small step back (10 degrees flexion)     Passive flexion  Pulleys: 3 minutes flexion, 3 minutes scaption   Sidelying scapular retraction  10 reps    Sidelying ER  Comfortable range only: 3 x 10    UE ranger  AAROM flexion 2 x 10    ER PROM --    Flexion AAROM  --       MedBridge Portal  Treatment/Session Assessment:    · Response to Treatment:  Pt with small regression of flexion ROM that required stretching and soft tissue techniques to recover. INitiated pulley flexion today and patient provided with pulleys to use at home for flexion and scaption stretches. · Compliance with Program/Exercises: compliant most of the time. · Recommendations/Intent for next treatment session: \"Next visit will focus on advancements to more challenging activities\".   Total Treatment Duration:  PT Patient Time In/Time Out  Time In: 1300  Time Out: 1400    Jayne Lao, PT

## 2018-07-24 ENCOUNTER — HOSPITAL ENCOUNTER (OUTPATIENT)
Dept: PHYSICAL THERAPY | Age: 78
Discharge: HOME | End: 2018-07-24
Payer: MEDICARE

## 2018-07-24 PROCEDURE — 97110 THERAPEUTIC EXERCISES: CPT

## 2018-07-24 PROCEDURE — 97140 MANUAL THERAPY 1/> REGIONS: CPT

## 2018-07-24 NOTE — PROGRESS NOTES
[facility]  : 1940  [place]  Secondary: [address] at [facility], [address] [facility]  [phone]   [phone]       OUTPATIENT PHYSICAL THERAPY:Daily Note 2018   ICD-10: Treatment Diagnosis: pain in joint, shoulder, left (M25.512); stiffness in joint, not elsewhere classified, shoulder, left (M25.612); primary OA of shoulder, left (M19.012)  Precautions/Allergies:   Heron Griffin known allergies]   Fall Risk Score: 1 (? 5 = High Risk)  MD Orders: evaluate and treat  MEDICAL/REFERRING DIAGNOSIS:  Left shoulder pain [M25.512]   DATE OF ONSET: Date of surgery: 18  REFERRING PHYSICIAN: Bry Cheng MD  RETURN PHYSICIAN APPOINTMENT: unknown      INITIAL ASSESSMENT:  Mr. Shin Brothers is a 70year old male with left shoulder pain and stiffness following a total shoulder arthroplasty performed by Dr Rafael Prince on 18. He reports a long history of progressively worsening shoulder pain and dysfunction. He presents to PT with decreased passive ROM, active ROM, functional reaching that limits his ability to get dressed, reach, and open a door. Pt will benefit from skilled PT to address above listed impairments and functional limitations to facilitate return to prior level of function. Due to surgical precautions and history of significantly restricted motion and function, patient will likely require extended care. PROBLEM LIST (Impacting functional limitations):  1. Decreased Strength  2. Decreased ADL/Functional Activities  3. Increased Pain  4. Decreased Flexibility/Joint Mobility INTERVENTIONS PLANNED:  1. Cold  2. Electrical Stimulation  3. Heat  4. Home Exercise Program (HEP)  5. Manual Therapy  6. Neuromuscular Re-education/Strengthening  7. Range of Motion (ROM)  8. Therapeutic Activites  9.  Therapeutic Exercise/Strengthening   TREATMENT PLAN:  Effective Dates: 2018 TO 2018 (90 days). Frequency/Duration: 2 times a week for 90 Days  GOALS: (Goals have been discussed and agreed upon with patient.)  Short-Term Functional Goals: Time Frame: 8/13/18  1. Pt will demonstrate passive ROM of flexion to 110 degrees, ER to 50 degrees, and IR to 60 degrees. ONGOING, excellent progress   2. Pt will report no limitation in dressing (donning socks). ONGOING  Discharge Goals: Time Frame: 9/30/18  1. Pt will demonstrate Apley's ER to back of head to allow independent grooming and dressing without modification. ONGOING  2. Pt will demonstrate Apley's IR to L5 to allow donning belt and pants without modification. ONGOING  3. Pt will demonstrate active flexion to 110 degrees to allow reaching overhead. ONGOING  4. Pt will demonstrate improvement in function with quickDASH to 14% or less disability. ONGOING  Rehabilitation Potential For Stated Goals: Good            The information in this section was collected on 7/2/18 (except where otherwise noted). HISTORY:   History of Present Injury/Illness (Reason for Referral):  7/2/18: Josselin Mcgarry reports a long history of left shoulder pain that progressively worsened and limited his UE use significantly over the last 2-3 years. He had a left TSA performed by Dr Yonathan Connolly on 6/13/18. He reports improvement in pain from pre-operative to post-operative and reports only discomfort at present. He reports limitation in all UE use due to weakness and post-operative restrictions. He reports difficulty dressing, reaching, opening a door, and lifting due to pain and shoulder precautions. Past Medical History/Comorbidities:   Mr. Hilton Leyden  has a past medical history of GERD (gastroesophageal reflux disease); Hyperlipidemia (8/5/2016); Hypertension; Insomnia (10/30/2017); Obstructive sleep apnea (10/30/2017); Overweight (BMI 25.0-29.9) (10/30/2017); Primary osteoarthritis of left shoulder (7/14/2017); and Seizure ([facility] [address] (09/10/2017).   Mr. Hilton Leyden  has a past surgical history that includes hx hip replacement (Bilateral); hx hernia repair; and hx colonoscopy. Social History/Living Environment:     Pt lives in private home with his family. Prior Level of Function/Work/Activity:  Pt reports long, progressive history of shoulder dysfunction with inability to lift his arm for several years prior to surgery. Dominant Side:         RIGHT  Current Medications:       Current Outpatient Prescriptions:     oxyCODONE-acetaminophen (PERCOCET) 5-325 mg per tablet, Take 1 Tab by mouth every four (4) hours as needed. Max Daily Amount: 6 Tabs., Disp: 20 Tab, Rfl: 0    cpap machine kit, by Does Not Apply route., Disp: , Rfl:     pitavastatin calcium (LIVALO) 2 mg tablet, Take 1 Tab by mouth daily. , Disp: 90 Tab, Rfl: 3    ALPRAZolam (XANAX) 0.25 mg tablet, Take 1 Tab by mouth nightly as needed for Anxiety or Sleep. Max Daily Amount: 0.25 mg., Disp: 30 Tab, Rfl: 2    amLODIPine (NORVASC) 5 mg tablet, Take 1 Tab by mouth daily. , Disp: 90 Tab, Rfl: 3    esomeprazole (NEXIUM) 40 mg capsule, Take 1 Cap by mouth daily. , Disp: 90 Cap, Rfl: 3    ezetimibe (ZETIA) 10 mg tablet, Take 1 Tab by mouth every Monday, Wednesday, Friday., Disp: 90 Tab, Rfl: 3    losartan (COZAAR) 50 mg tablet, Take 1 Tab by mouth daily. , Disp: 90 Tab, Rfl: 3    ibuprofen (MOTRIN) 600 mg tablet, Take 1 Tab by mouth every eight (8) hours as needed for Pain. (Patient taking differently: Take 600 mg by mouth as needed for Pain.), Disp: 60 Tab, Rfl: 2    levETIRAcetam (KEPPRA) 500 mg tablet, One in AM and 2 at hs, Disp: 90 Tab, Rfl: 5    levETIRAcetam (KEPPRA) 500 mg tablet, Take 1 Tab by mouth two (2) times a day., Disp: 60 Tab, Rfl: 5    aspirin delayed-release 81 mg tablet, Take 81 mg by mouth daily. , Disp: , Rfl:    Date Last Reviewed:  7/24/2018   Number of Personal Factors/Comorbidities that affect the Plan of Care: 1-2: MODERATE COMPLEXITY   EXAMINATION:   Observation/palpation/joint mobility: incision well healed.   All active motion with compensatory scapular hiking. Range of Motion:  Passive (active)  Left* Right   Flexion  120 (40) 150 (150)    External Rotation  45 50   Internal Rotation  55 60      Special Tests: none performed     Strength:      Manual Muscle Test out of 5   Flexion Not assessed at initial evaluation/ surgical precautions    Abduction Not assessed at initial evaluation/ surgical precautions    External Rotation Not assessed at initial evaluation/ surgical precautions    Internal Rotaton Not assessed at initial evaluation/ surgical precautions      Functional Tests:    Apley's Internal Rotation: L: unable to perform, R: T12  Apley's External Rotation: L: unable to perform, R: T3       Body Structures Involved:  1. Bones  2. Joints  3. Muscles Body Functions Affected:  1. Sensory/Pain  2. Neuromusculoskeletal  3. Movement Related Activities and Participation Affected:  1. General Tasks and Demands  2. Mobility  3. Self Care  4. Domestic Life  5. Interpersonal Interactions and Relationships  6. Community, Social and [place]   Number of elements (examined above) that affect the Plan of Care: 4+: HIGH COMPLEXITY   CLINICAL PRESENTATION:   Presentation: Stable and uncomplicated: LOW COMPLEXITY   CLINICAL DECISION MAKING:   Outcome Measure: Tool Used: Disabilities of the Arm, Shoulder and Hand (DASH) Questionnaire - Quick Version  Score:  Initial: 32/55 (48% disability)   Most Recent: X/55 (Date: -- )   Interpretation of Score: The DASH is designed to measure the activities of daily living in person's with upper extremity dysfunction or pain. Each section is scored on a 1-5 scale, 5 representing the greatest disability. The scores of each section are added together for a total score of 55. Score 11 12-19 20-28 29-37 38-45 46-54 55   Modifier CH CI CJ CK CL CM CN     ?  Carrying, Moving, and Handling Objects:     - CURRENT STATUS: CK - 40%-59% impaired, limited or restricted    - GOAL STATUS: CI - 1%-19% impaired, limited or restricted    - D/C STATUS:  ---------------To be determined---------------      Medical Necessity:   · Patient is expected to demonstrate progress in strength and range of motion to increase independence with reaching and lifting. Reason for Services/Other Comments:  · Patient continues to require present interventions due to patient's inability to reach, dress, and lift. Use of outcome tool(s) and clinical judgement create a POC that gives a: Clear prediction of patient's progress: LOW COMPLEXITY            TREATMENT:   (In addition to Assessment/Re-Assessment sessions the following treatments were rendered)  Pre-treatment Symptoms/Complaints:  Pt reports that his shoulder seems to be progressing. He reports that he has been using his pulleys    Pain: Initial:   Pain Intensity 1: 0  Post Session:  0/10      Manual Therapy (    Soft Tissue Mobilization Duration  Duration: 35 Minutes): Manual techniques to facilitate improved motion and decreased pain. (Used abbreviations: MET - muscle energy technique; PNF - proprioceptive neuromuscular facilitation; NMR - neuromuscular re-education; a/p - anterior to posterior; p/a - posterior to anterior)   · Manual ROM: flexion, ER, and IR within surgical precautions  · STM to upper trap and levator scapula, subscapularis and latissimus dorsi     Therapeutic Exercise: (25 Minutes):  Exercises per grid below to improve mobility, strength and dynamic movement of shoulder - left to improve functional lifting, reaching and overhead activites. Required minimal visual and verbal cues to promote proper body mechanics. Progressed resistance, range, repetitions and complexity of movement as indicated.        7/24/2018   Activity/Exercise Parameters                 Patient education --   Supine punches  2 x 10 punches to 90 degrees; punch to endrange: 10 active, 10 with 1 lb weight    Isometric holds  --   Passive flexion  --   Sidelying scapular retraction  --   Sidelying ER  Comfortable range only: 3 x 10    UE ranger  --   ER theraband  Orange: 10 reps max verbal cuing    Flexion AAROM  2 x 10 rolls on foam roller seated    Horizontal abduction  Supine: red band: 2 x 10        Med[employer] Portal  Treatment/Session Assessment:    · Response to Treatment:  Pt continues to demonstrate progress in passive motion. · Compliance with Program/Exercises: compliant most of the time. · Recommendations/Intent for next treatment session: \"Next visit will focus on advancements to more challenging activities\".   Total Treatment Duration:  PT Patient Time In/Time Out  Time In: 1300  Time Out: 1400    Bronwyn Mendez, PT

## 2018-07-26 ENCOUNTER — HOSPITAL ENCOUNTER (OUTPATIENT)
Dept: PHYSICAL THERAPY | Age: 78
Discharge: HOME | End: 2018-07-26
Payer: MEDICARE

## 2018-07-26 PROCEDURE — 97140 MANUAL THERAPY 1/> REGIONS: CPT

## 2018-07-26 PROCEDURE — 97110 THERAPEUTIC EXERCISES: CPT

## 2018-07-26 NOTE — PROGRESS NOTES
Lary Carrion  : 1940  [place]  Secondary: [address] at [facility], [address] [facility]  [phone]   [phone]       OUTPATIENT PHYSICAL THERAPY:Daily Note 2018   ICD-10: Treatment Diagnosis: pain in joint, shoulder, left (M25.512); stiffness in joint, not elsewhere classified, shoulder, left (M25.612); primary OA of shoulder, left (M19.012)  Precautions/Allergies:   Bailey Manuels known allergies]   Fall Risk Score: 1 (? 5 = High Risk)  MD Orders: evaluate and treat  MEDICAL/REFERRING DIAGNOSIS:  Left shoulder pain [M25.512]   DATE OF ONSET: Date of surgery: 18  REFERRING PHYSICIAN: Cyndee Vora MD  RETURN PHYSICIAN APPOINTMENT: unknown      INITIAL ASSESSMENT:  Mr. Remy Sandoval is a 70year old male with left shoulder pain and stiffness following a total shoulder arthroplasty performed by Dr Franci White on 18. He reports a long history of progressively worsening shoulder pain and dysfunction. He presents to PT with decreased passive ROM, active ROM, functional reaching that limits his ability to get dressed, reach, and open a door. Pt will benefit from skilled PT to address above listed impairments and functional limitations to facilitate return to prior level of function. Due to surgical precautions and history of significantly restricted motion and function, patient will likely require extended care. PROBLEM LIST (Impacting functional limitations):  1. Decreased Strength  2. Decreased ADL/Functional Activities  3. Increased Pain  4. Decreased Flexibility/Joint Mobility INTERVENTIONS PLANNED:  1. Cold  2. Electrical Stimulation  3. Heat  4. Home Exercise Program (HEP)  5. Manual Therapy  6. Neuromuscular Re-education/Strengthening  7. Range of Motion (ROM)  8. Therapeutic Activites  9.  Therapeutic Exercise/Strengthening   TREATMENT PLAN:  Effective Dates: 2018 TO 2018 (90 days). Frequency/Duration: 2 times a week for 90 Days  GOALS: (Goals have been discussed and agreed upon with patient.)  Short-Term Functional Goals: Time Frame: 8/13/18  1. Pt will demonstrate passive ROM of flexion to 110 degrees, ER to 50 degrees, and IR to 60 degrees. ONGOING, excellent progress   2. Pt will report no limitation in dressing (donning socks). ONGOING  Discharge Goals: Time Frame: 9/30/18  1. Pt will demonstrate Apley's ER to back of head to allow independent grooming and dressing without modification. ONGOING  2. Pt will demonstrate Apley's IR to L5 to allow donning belt and pants without modification. ONGOING  3. Pt will demonstrate active flexion to 110 degrees to allow reaching overhead. ONGOING  4. Pt will demonstrate improvement in function with quickDASH to 14% or less disability. ONGOING  Rehabilitation Potential For Stated Goals: Good            The information in this section was collected on 7/2/18 (except where otherwise noted). HISTORY:   History of Present Injury/Illness (Reason for Referral):  7/2/18: Rick Griffin reports a long history of left shoulder pain that progressively worsened and limited his UE use significantly over the last 2-3 years. He had a left TSA performed by Dr Augustin Alpers on 6/13/18. He reports improvement in pain from pre-operative to post-operative and reports only discomfort at present. He reports limitation in all UE use due to weakness and post-operative restrictions. He reports difficulty dressing, reaching, opening a door, and lifting due to pain and shoulder precautions. Past Medical History/Comorbidities:   Mr. Villa Maldonado  has a past medical history of GERD (gastroesophageal reflux disease); Hyperlipidemia (8/5/2016); Hypertension; Insomnia (10/30/2017); Obstructive sleep apnea (10/30/2017); Overweight (BMI 25.0-29.9) (10/30/2017); Primary osteoarthritis of left shoulder (7/14/2017); and Seizure ([facility] [address] (09/10/2017).   Mr. Villa Maldonado  has a past surgical history that includes hx hip replacement (Bilateral); hx hernia repair; and hx colonoscopy. Social History/Living Environment:     Pt lives in private home with his family. Prior Level of Function/Work/Activity:  Pt reports long, progressive history of shoulder dysfunction with inability to lift his arm for several years prior to surgery. Dominant Side:         RIGHT  Current Medications:       Current Outpatient Prescriptions:     oxyCODONE-acetaminophen (PERCOCET) 5-325 mg per tablet, Take 1 Tab by mouth every four (4) hours as needed. Max Daily Amount: 6 Tabs., Disp: 20 Tab, Rfl: 0    cpap machine kit, by Does Not Apply route., Disp: , Rfl:     pitavastatin calcium (LIVALO) 2 mg tablet, Take 1 Tab by mouth daily. , Disp: 90 Tab, Rfl: 3    ALPRAZolam (XANAX) 0.25 mg tablet, Take 1 Tab by mouth nightly as needed for Anxiety or Sleep. Max Daily Amount: 0.25 mg., Disp: 30 Tab, Rfl: 2    amLODIPine (NORVASC) 5 mg tablet, Take 1 Tab by mouth daily. , Disp: 90 Tab, Rfl: 3    esomeprazole (NEXIUM) 40 mg capsule, Take 1 Cap by mouth daily. , Disp: 90 Cap, Rfl: 3    ezetimibe (ZETIA) 10 mg tablet, Take 1 Tab by mouth every Monday, Wednesday, Friday., Disp: 90 Tab, Rfl: 3    losartan (COZAAR) 50 mg tablet, Take 1 Tab by mouth daily. , Disp: 90 Tab, Rfl: 3    ibuprofen (MOTRIN) 600 mg tablet, Take 1 Tab by mouth every eight (8) hours as needed for Pain. (Patient taking differently: Take 600 mg by mouth as needed for Pain.), Disp: 60 Tab, Rfl: 2    levETIRAcetam (KEPPRA) 500 mg tablet, One in AM and 2 at hs, Disp: 90 Tab, Rfl: 5    levETIRAcetam (KEPPRA) 500 mg tablet, Take 1 Tab by mouth two (2) times a day., Disp: 60 Tab, Rfl: 5    aspirin delayed-release 81 mg tablet, Take 81 mg by mouth daily. , Disp: , Rfl:    Date Last Reviewed:  7/26/2018   Number of Personal Factors/Comorbidities that affect the Plan of Care: 1-2: MODERATE COMPLEXITY   EXAMINATION:   Observation/palpation/joint mobility: incision well healed.   All active motion with compensatory scapular hiking. Range of Motion:  Passive (active)  Left* Right   Flexion  120 (60) 150 (150)    External Rotation  45 50   Internal Rotation  55 60      Special Tests: none performed     Strength:      Manual Muscle Test out of 5   Flexion Not assessed at initial evaluation/ surgical precautions    Abduction Not assessed at initial evaluation/ surgical precautions    External Rotation Not assessed at initial evaluation/ surgical precautions    Internal Rotaton Not assessed at initial evaluation/ surgical precautions      Functional Tests:    Apley's Internal Rotation: L: unable to perform, R: T12  Apley's External Rotation: L: unable to perform, R: T3       Body Structures Involved:  1. Bones  2. Joints  3. Muscles Body Functions Affected:  1. Sensory/Pain  2. Neuromusculoskeletal  3. Movement Related Activities and Participation Affected:  1. General Tasks and Demands  2. Mobility  3. Self Care  4. Domestic Life  5. Interpersonal Interactions and Relationships  6. Community, Social and [place]   Number of elements (examined above) that affect the Plan of Care: 4+: HIGH COMPLEXITY   CLINICAL PRESENTATION:   Presentation: Stable and uncomplicated: LOW COMPLEXITY   CLINICAL DECISION MAKING:   Outcome Measure: Tool Used: Disabilities of the Arm, Shoulder and Hand (DASH) Questionnaire - Quick Version  Score:  Initial: 32/55 (48% disability)   Most Recent: X/55 (Date: -- )   Interpretation of Score: The DASH is designed to measure the activities of daily living in person's with upper extremity dysfunction or pain. Each section is scored on a 1-5 scale, 5 representing the greatest disability. The scores of each section are added together for a total score of 55. Score 11 12-19 20-28 29-37 38-45 46-54 55   Modifier CH CI CJ CK CL CM CN     ?  Carrying, Moving, and Handling Objects:     - CURRENT STATUS: CK - 40%-59% impaired, limited or restricted    - GOAL STATUS: CI - 1%-19% impaired, limited or restricted    - D/C STATUS:  ---------------To be determined---------------      Medical Necessity:   · Patient is expected to demonstrate progress in strength and range of motion to increase independence with reaching and lifting. Reason for Services/Other Comments:  · Patient continues to require present interventions due to patient's inability to reach, dress, and lift. Use of outcome tool(s) and clinical judgement create a POC that gives a: Clear prediction of patient's progress: LOW COMPLEXITY            TREATMENT:   (In addition to Assessment/Re-Assessment sessions the following treatments were rendered)  Pre-treatment Symptoms/Complaints:  Pt reports that he is noticing more activities that he is able to do like lift his arm onto the table and reach the faucet in the kitchen. Pain: Initial:   Pain Intensity 1: 0  Post Session:  0/10      Manual Therapy (    Soft Tissue Mobilization Duration  Duration: 35 Minutes): Manual techniques to facilitate improved motion and decreased pain. (Used abbreviations: MET - muscle energy technique; PNF - proprioceptive neuromuscular facilitation; NMR - neuromuscular re-education; a/p - anterior to posterior; p/a - posterior to anterior)   · Manual ROM: flexion, ER, and IR within surgical precautions  · STM to upper trap and levator scapula, subscapularis and latissimus dorsi     Therapeutic Exercise: (25 Minutes):  Exercises per grid below to improve mobility, strength and dynamic movement of shoulder - left to improve functional lifting, reaching and overhead activites. Required minimal visual and verbal cues to promote proper body mechanics. Progressed resistance, range, repetitions and complexity of movement as indicated.        7/26/2018   Activity/Exercise Parameters                 Patient education --   Supine punches  2 x 10 punches to 90 degrees; punch to endrange: 10 active, 2 x 10 with cane supine and elevated table (10 degrees)    UE ranger  10 AAROM, 10 with endrange stretch    ER theraband  Yellow band: bilateral ER 3 x 10    Flexion AAROM  2 x 10 rolls on foam roller seated    Horizontal abduction  --       [employer] Portal  Treatment/Session Assessment:    · Response to Treatment:  Pt with improvement in flexion AROM demonstrated today. · Compliance with Program/Exercises: compliant most of the time. · Recommendations/Intent for next treatment session: \"Next visit will focus on advancements to more challenging activities\".   Total Treatment Duration:  PT Patient Time In/Time Out  Time In: 1400  Time Out: 1500    Georgiana Stratton PT

## 2018-08-01 ENCOUNTER — HOSPITAL ENCOUNTER (OUTPATIENT)
Dept: PHYSICAL THERAPY | Age: 78
Discharge: HOME | End: 2018-08-01
Payer: MEDICARE

## 2018-08-01 PROCEDURE — 97140 MANUAL THERAPY 1/> REGIONS: CPT

## 2018-08-01 PROCEDURE — 97110 THERAPEUTIC EXERCISES: CPT

## 2018-08-01 NOTE — PROGRESS NOTES
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 5 = High Risk)  MD Orders: evaluate and treat  MEDICAL/REFERRING DIAGNOSIS:  Left shoulder pain [M25.512]   DATE OF ONSET: Date of surgery: 18  REFERRING PHYSICIAN: Jelani Tan MD  RETURN PHYSICIAN APPOINTMENT: unknown      INITIAL ASSESSMENT:  Mr. Villa Maldonado is a 70year old male with left shoulder pain and stiffness following a total shoulder arthroplasty performed by Dr Augustin Alpers on 18. He reports a long history of progressively worsening shoulder pain and dysfunction. He presents to PT with decreased passive ROM, active ROM, functional reaching that limits his ability to get dressed, reach, and open a door. Pt will benefit from skilled PT to address above listed impairments and functional limitations to facilitate return to prior level of function. Due to surgical precautions and history of significantly restricted motion and function, patient will likely require extended care. PROBLEM LIST (Impacting functional limitations):  1. Decreased Strength  2. Decreased ADL/Functional Activities  3. Increased Pain  4. Decreased Flexibility/Joint Mobility INTERVENTIONS PLANNED:  1. Cold  2. Electrical Stimulation  3. Heat  4. Home Exercise Program (HEP)  5. Manual Therapy  6. Neuromuscular Re-education/Strengthening  7. Range of Motion (ROM)  8. Therapeutic Activites  9.  Therapeutic Exercise/Strengthening   TREATMENT PLAN:  Effective Dates: 2018 TO 2018 (90 days). Frequency/Duration: 2 times a week for 90 Days  GOALS: (Goals have been discussed and agreed upon with patient.)  Short-Term Functional Goals: Time Frame: 8/13/18  1. Pt will demonstrate passive ROM of flexion to 110 degrees, ER to 50 degrees, and IR to 60 degrees. ONGOING, excellent progress   2. Pt will report no limitation in dressing (donning socks). ONGOING  Discharge Goals: Time Frame: 9/30/18  1. Pt will demonstrate Apley's ER to back of head to allow independent grooming and dressing without modification. ONGOING  2. Pt will demonstrate Apley's IR to L5 to allow donning belt and pants without modification. ONGOING  3. Pt will demonstrate active flexion to 110 degrees to allow reaching overhead. ONGOING  4. Pt will demonstrate improvement in function with quickDASH to 14% or less disability. ONGOING  Rehabilitation Potential For Stated Goals: Good            The information in this section was collected on 7/2/18 (except where otherwise noted). HISTORY:   History of Present Injury/Illness (Reason for Referral):  7/2/18: Alejandro Ruggiero reports a long history of left shoulder pain that progressively worsened and limited his UE use significantly over the last 2-3 years. He had a left TSA performed by Dr Peg Jean on 6/13/18. He reports improvement in pain from pre-operative to post-operative and reports only discomfort at present. He reports limitation in all UE use due to weakness and post-operative restrictions. He reports difficulty dressing, reaching, opening a door, and lifting due to pain and shoulder precautions. Past Medical History/Comorbidities:   Mr. Peggy Romero  has a past medical history of GERD (gastroesophageal reflux disease); Hyperlipidemia (8/5/2016); Hypertension; Insomnia (10/30/2017); Obstructive sleep apnea (10/30/2017); Overweight (BMI 25.0-29.9) (10/30/2017); Primary osteoarthritis of left shoulder (7/14/2017); and Seizure ([facility] [address] (09/10/2017).   Mr. Peggy Romero  has a past surgical history that includes hx hip replacement (Bilateral); hx hernia repair; and hx colonoscopy. Social History/Living Environment:     Pt lives in private home with his family. Prior Level of Function/Work/Activity:  Pt reports long, progressive history of shoulder dysfunction with inability to lift his arm for several years prior to surgery. Dominant Side:         RIGHT  Current Medications:       Current Outpatient Prescriptions:     oxyCODONE-acetaminophen (PERCOCET) 5-325 mg per tablet, Take 1 Tab by mouth every four (4) hours as needed. Max Daily Amount: 6 Tabs., Disp: 20 Tab, Rfl: 0    cpap machine kit, by Does Not Apply route., Disp: , Rfl:     pitavastatin calcium (LIVALO) 2 mg tablet, Take 1 Tab by mouth daily. , Disp: 90 Tab, Rfl: 3    ALPRAZolam (XANAX) 0.25 mg tablet, Take 1 Tab by mouth nightly as needed for Anxiety or Sleep. Max Daily Amount: 0.25 mg., Disp: 30 Tab, Rfl: 2    amLODIPine (NORVASC) 5 mg tablet, Take 1 Tab by mouth daily. , Disp: 90 Tab, Rfl: 3    esomeprazole (NEXIUM) 40 mg capsule, Take 1 Cap by mouth daily. , Disp: 90 Cap, Rfl: 3    ezetimibe (ZETIA) 10 mg tablet, Take 1 Tab by mouth every Monday, Wednesday, Friday., Disp: 90 Tab, Rfl: 3    losartan (COZAAR) 50 mg tablet, Take 1 Tab by mouth daily. , Disp: 90 Tab, Rfl: 3    ibuprofen (MOTRIN) 600 mg tablet, Take 1 Tab by mouth every eight (8) hours as needed for Pain. (Patient taking differently: Take 600 mg by mouth as needed for Pain.), Disp: 60 Tab, Rfl: 2    levETIRAcetam (KEPPRA) 500 mg tablet, One in AM and 2 at hs, Disp: 90 Tab, Rfl: 5    levETIRAcetam (KEPPRA) 500 mg tablet, Take 1 Tab by mouth two (2) times a day., Disp: 60 Tab, Rfl: 5    aspirin delayed-release 81 mg tablet, Take 81 mg by mouth daily. , Disp: , Rfl:    Date Last Reviewed:  8/1/2018   Number of Personal Factors/Comorbidities that affect the Plan of Care: 1-2: MODERATE COMPLEXITY   EXAMINATION:   Observation/palpation/joint mobility: incision well healed.   All active motion with compensatory scapular hiking. Range of Motion:  Passive (active)  Left* Right   Flexion  120 (60) 150 (150)    External Rotation  45 50   Internal Rotation  55 60      Special Tests: none performed     Strength:      Manual Muscle Test out of 5   Flexion Not assessed at initial evaluation/ surgical precautions    Abduction Not assessed at initial evaluation/ surgical precautions    External Rotation Not assessed at initial evaluation/ surgical precautions    Internal Rotaton Not assessed at initial evaluation/ surgical precautions      Functional Tests:    Apley's Internal Rotation: L: unable to perform, R: T12  Apley's External Rotation: L: unable to perform, R: T3       Body Structures Involved:  1. Bones  2. Joints  3. Muscles Body Functions Affected:  1. Sensory/Pain  2. Neuromusculoskeletal  3. Movement Related Activities and Participation Affected:  1. General Tasks and Demands  2. Mobility  3. Self Care  4. Domestic Life  5. Interpersonal Interactions and Relationships  6. Community, Social and [place]   Number of elements (examined above) that affect the Plan of Care: 4+: HIGH COMPLEXITY   CLINICAL PRESENTATION:   Presentation: Stable and uncomplicated: LOW COMPLEXITY   CLINICAL DECISION MAKING:   Outcome Measure: Tool Used: Disabilities of the Arm, Shoulder and Hand (DASH) Questionnaire - Quick Version  Score:  Initial: 32/55 (48% disability)   Most Recent: X/55 (Date: -- )   Interpretation of Score: The DASH is designed to measure the activities of daily living in person's with upper extremity dysfunction or pain. Each section is scored on a 1-5 scale, 5 representing the greatest disability. The scores of each section are added together for a total score of 55. Score 11 12-19 20-28 29-37 38-45 46-54 55   Modifier CH CI CJ CK CL CM CN     ?  Carrying, Moving, and Handling Objects:     - CURRENT STATUS: CK - 40%-59% impaired, limited or restricted    - GOAL STATUS: CI - 1%-19% impaired, limited or restricted    - D/C STATUS:  ---------------To be determined---------------      Medical Necessity:   · Patient is expected to demonstrate progress in strength and range of motion to increase independence with reaching and lifting. Reason for Services/Other Comments:  · Patient continues to require present interventions due to patient's inability to reach, dress, and lift. Use of outcome tool(s) and clinical judgement create a POC that gives a: Clear prediction of patient's progress: LOW COMPLEXITY            TREATMENT:   (In addition to Assessment/Re-Assessment sessions the following treatments were rendered)  Pre-treatment Symptoms/Complaints:  Pt reports that he is doing well and continues to progress. Pain: Initial:   Pain Intensity 1: 0  Post Session:  0/10      Manual Therapy (    Soft Tissue Mobilization Duration  Duration: 30 Minutes): Manual techniques to facilitate improved motion and decreased pain. (Used abbreviations: MET - muscle energy technique; PNF - proprioceptive neuromuscular facilitation; NMR - neuromuscular re-education; a/p - anterior to posterior; p/a - posterior to anterior)   · Manual ROM: flexion, ER, and IR within surgical precautions  · STM to upper trap and levator scapula, subscapularis and latissimus dorsi     Therapeutic Exercise: (30 Minutes):  Exercises per grid below to improve mobility, strength and dynamic movement of shoulder - left to improve functional lifting, reaching and overhead activites. Required minimal visual and verbal cues to promote proper body mechanics. Progressed resistance, range, repetitions and complexity of movement as indicated.        8/1/2018   Activity/Exercise Parameters                 Patient education --   Supine punches  2 x 10  punch to endrange: 10 active, 2 x 10 with cane supine and elevated table (10 degrees)    UE ranger  10 AAROM, 10 with endrange stretch, 10 each diagonal     ER theraband Yellow band: bilateral ER 3 x 10    IR theraband  Light resistance cord 2 x 10    Flexion AAROM  2 x 10 rolls on foam roller seated    Horizontal abduction  --   Isometric flexion  Isometric holds with walkback from wall 2 x 5        [employer] Portal  Treatment/Session Assessment:    · Response to Treatment:  Pt continues to progress well with passive and active motion in addition to function. Plan to continue therapy to facilitate improved flexion and reaching function. · Compliance with Program/Exercises: compliant most of the time. · Recommendations/Intent for next treatment session: \"Next visit will focus on advancements to more challenging activities\".   Total Treatment Duration:  PT Patient Time In/Time Out  Time In: 1400  Time Out: 1500    Katelyn Lyons PT

## 2018-08-08 ENCOUNTER — HOSPITAL ENCOUNTER (OUTPATIENT)
Dept: PHYSICAL THERAPY | Age: 78
Discharge: HOME | End: 2018-08-08
Payer: MEDICARE

## 2018-08-08 PROCEDURE — 97140 MANUAL THERAPY 1/> REGIONS: CPT

## 2018-08-08 PROCEDURE — G8984 CARRY CURRENT STATUS: HCPCS

## 2018-08-08 PROCEDURE — G8985 CARRY GOAL STATUS: HCPCS

## 2018-08-08 PROCEDURE — 97110 THERAPEUTIC EXERCISES: CPT

## 2018-08-08 NOTE — PROGRESS NOTES
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 5 = High Risk)  MD Orders: evaluate and treat  MEDICAL/REFERRING DIAGNOSIS:  Left shoulder pain [M25.512]   DATE OF ONSET: Date of surgery: 18  REFERRING PHYSICIAN: Anthony Fermin MD  RETURN PHYSICIAN APPOINTMENT: unknown      INITIAL ASSESSMENT:  Mr. Debo Wilkerson is a 70year old male with left shoulder pain and stiffness following a total shoulder arthroplasty performed by Dr Corinne Devi on 18. He reports a long history of progressively worsening shoulder pain and dysfunction. He presents to PT with decreased passive ROM, active ROM, functional reaching that limits his ability to get dressed, reach, and open a door. Pt will benefit from skilled PT to address above listed impairments and functional limitations to facilitate return to prior level of function. Due to surgical precautions and history of significantly restricted motion and function, patient will likely require extended care. PROBLEM LIST (Impacting functional limitations):  1. Decreased Strength  2. Decreased ADL/Functional Activities  3. Increased Pain  4. Decreased Flexibility/Joint Mobility INTERVENTIONS PLANNED:  1. Cold  2. Electrical Stimulation  3. Heat  4. Home Exercise Program (HEP)  5. Manual Therapy  6. Neuromuscular Re-education/Strengthening  7. Range of Motion (ROM)  8. Therapeutic Activites  9.  Therapeutic Exercise/Strengthening   TREATMENT PLAN:  Effective Dates: 7/2/2018 TO 9/30/2018 (90 days). Frequency/Duration: 2 times a week for 90 Days  GOALS: (Goals have been discussed and agreed upon with patient.)  Short-Term Functional Goals: Time Frame: 8/13/18  1. Pt will demonstrate passive ROM of flexion to 110 degrees, ER to 50 degrees, and IR to 60 degrees. Achieved 8/8/18   2. Pt will report no limitation in dressing (donning socks). Achieved 8/8/18  Discharge Goals: Time Frame: 9/30/18  1. Pt will demonstrate Apley's ER to back of head to allow independent grooming and dressing without modification. ONGOING, good progress   2. Pt will demonstrate Apley's IR to L5 to allow donning belt and pants without modification. ONGOING, some progress   3. Pt will demonstrate active flexion to 110 degrees to allow reaching overhead. ONGOING, some progress   4. Pt will demonstrate improvement in function with quickDASH to 14% or less disability. ONGOING  Rehabilitation Potential For Stated Goals: Good            The information in this section was collected on 7/2/18 (except where otherwise noted). HISTORY:   History of Present Injury/Illness (Reason for Referral):  7/2/18: Cheryl Hussein reports a long history of left shoulder pain that progressively worsened and limited his UE use significantly over the last 2-3 years. He had a left TSA performed by Dr Cuate Swan on 6/13/18. He reports improvement in pain from pre-operative to post-operative and reports only discomfort at present. He reports limitation in all UE use due to weakness and post-operative restrictions. He reports difficulty dressing, reaching, opening a door, and lifting due to pain and shoulder precautions. Past Medical History/Comorbidities:   Mr. Francisco Solomon  has a past medical history of GERD (gastroesophageal reflux disease); Hyperlipidemia (8/5/2016); Hypertension; Insomnia (10/30/2017); Obstructive sleep apnea (10/30/2017); Overweight (BMI 25.0-29.9) (10/30/2017);  Primary osteoarthritis of left shoulder (7/14/2017); and [facility] ([facility] (09/10/2017). Mr. Rogerio Soto  has a past surgical history that includes hx hip replacement (Bilateral); hx hernia repair; and hx colonoscopy. Social History/Living Environment:     Pt lives in private home with his family. Prior Level of Function/Work/Activity:  Pt reports long, progressive history of shoulder dysfunction with inability to lift his arm for several years prior to surgery. Dominant Side:         RIGHT  Current Medications:       Current Outpatient Prescriptions:     oxyCODONE-acetaminophen (PERCOCET) 5-325 mg per tablet, Take 1 Tab by mouth every four (4) hours as needed. Max Daily Amount: 6 Tabs., Disp: 20 Tab, Rfl: 0    cpap machine kit, by Does Not Apply route., Disp: , Rfl:     pitavastatin calcium (LIVALO) 2 mg tablet, Take 1 Tab by mouth daily. , Disp: 90 Tab, Rfl: 3    ALPRAZolam (XANAX) 0.25 mg tablet, Take 1 Tab by mouth nightly as needed for Anxiety or Sleep. Max Daily Amount: 0.25 mg., Disp: 30 Tab, Rfl: 2    amLODIPine (NORVASC) 5 mg tablet, Take 1 Tab by mouth daily. , Disp: 90 Tab, Rfl: 3    esomeprazole (NEXIUM) 40 mg capsule, Take 1 Cap by mouth daily. , Disp: 90 Cap, Rfl: 3    ezetimibe (ZETIA) 10 mg tablet, Take 1 Tab by mouth every Monday, Wednesday, Friday., Disp: 90 Tab, Rfl: 3    losartan (COZAAR) 50 mg tablet, Take 1 Tab by mouth daily. , Disp: 90 Tab, Rfl: 3    ibuprofen (MOTRIN) 600 mg tablet, Take 1 Tab by mouth every eight (8) hours as needed for Pain. (Patient taking differently: Take 600 mg by mouth as needed for Pain.), Disp: 60 Tab, Rfl: 2    levETIRAcetam (KEPPRA) 500 mg tablet, One in AM and 2 at hs, Disp: 90 Tab, Rfl: 5    levETIRAcetam (KEPPRA) 500 mg tablet, Take 1 Tab by mouth two (2) times a day., Disp: 60 Tab, Rfl: 5    aspirin delayed-release 81 mg tablet, Take 81 mg by mouth daily. , Disp: , Rfl:    Date Last Reviewed:  8/8/2018   Number of Personal Factors/Comorbidities that affect the Plan of Care: 1-2: MODERATE COMPLEXITY EXAMINATION:   Observation/palpation/joint mobility: incision well healed. All active motion with compensatory scapular hiking. Range of Motion:  Passive (active)  Left* Right   Flexion  125 (84) 150 (150)    External Rotation  50 50   Internal Rotation  55 60      Special Tests: none performed     Strength:      Manual Muscle Test out of 5   Flexion Not assessed at initial evaluation/ surgical precautions    Abduction Not assessed at initial evaluation/ surgical precautions    External Rotation Not assessed at initial evaluation/ surgical precautions    Internal Rotaton Not assessed at initial evaluation/ surgical precautions      Functional Tests:    Apley's Internal Rotation: L: lateral buttock, R: T12  Apley's External Rotation: L: ear, R: T3       Body Structures Involved:  1. Bones  2. Joints  3. Muscles Body Functions Affected:  1. Sensory/Pain  2. Neuromusculoskeletal  3. Movement Related Activities and Participation Affected:  1. General Tasks and Demands  2. Mobility  3. Self Care  4. Domestic Life  5. Interpersonal Interactions and Relationships  6. Community, Social and [place]   Number of elements (examined above) that affect the Plan of Care: 4+: HIGH COMPLEXITY   CLINICAL PRESENTATION:   Presentation: Stable and uncomplicated: LOW COMPLEXITY   CLINICAL DECISION MAKING:   Outcome Measure: Tool Used: Disabilities of the Arm, Shoulder and Hand (DASH) Questionnaire - Quick Version  Score:  Initial: 32/55 (48% disability)   8/8/18: 18/55 (16% disability)    Interpretation of Score: The DASH is designed to measure the activities of daily living in person's with upper extremity dysfunction or pain. Each section is scored on a 1-5 scale, 5 representing the greatest disability. The scores of each section are added together for a total score of 55. Score 11 12-19 20-28 29-37 38-45 46-54 55   Modifier CH CI CJ CK CL CM CN     ?  Carrying, Moving, and Handling Objects:     - CURRENT STATUS: CI - 1%-19% impaired, limited or restricted    - GOAL STATUS: CI - 1%-19% impaired, limited or restricted    - D/C STATUS:  ---------------To be determined---------------      Medical Necessity:   · Patient is expected to demonstrate progress in strength and range of motion to increase independence with reaching and lifting. Reason for Services/Other Comments:  · Patient continues to require present interventions due to patient's inability to reach, dress, and lift. Use of outcome tool(s) and clinical judgement create a POC that gives a: Clear prediction of patient's progress: LOW COMPLEXITY            TREATMENT:   (In addition to Assessment/Re-Assessment sessions the following treatments were rendered)  Pre-treatment Symptoms/Complaints:  Pt reports that he continues to tell things that he is able to do now like reaching into the fridge. Pain: Initial:   Pain Intensity 1: 0  Post Session:  0/10      Manual Therapy (    Soft Tissue Mobilization Duration  Duration: 30 Minutes): Manual techniques to facilitate improved motion and decreased pain. (Used abbreviations: MET - muscle energy technique; PNF - proprioceptive neuromuscular facilitation; NMR - neuromuscular re-education; a/p - anterior to posterior; p/a - posterior to anterior)   · Manual ROM: flexion, ER, and IR within surgical precautions  · STM to upper trap and levator scapula, subscapularis and latissimus dorsi     Therapeutic Exercise: (30 Minutes):  Exercises per grid below to improve mobility, strength and dynamic movement of shoulder - left to improve functional lifting, reaching and overhead activites. Required minimal visual and verbal cues to promote proper body mechanics. Progressed resistance, range, repetitions and complexity of movement as indicated.        8/8/2018   Activity/Exercise Parameters                 Patient education --   Supine punches  2 x 10  punch to endrange: 10 active, 2 x 10 with elevated table (20 degrees), resisted 90 to endrange with yellow band   UE ranger  --   ER theraband  --   IR theraband  --   Flexion AAROM  2 x 10 rolls on foam roller seated, ball roll up incline and up wall flexion, ball roll on wall into abduction    Horizontal abduction  Supine with yellow band: 2 x 10    Isometric flexion  --       [employer] Portal  Treatment/Session Assessment:    · Response to Treatment:  Pt has demonstrated significant gains in all goals and function. Plan to continue therapy per plan. · Compliance with Program/Exercises: compliant most of the time. · Recommendations/Intent for next treatment session: \"Next visit will focus on advancements to more challenging activities\".   Total Treatment Duration:  PT Patient Time In/Time Out  Time In: 1300  Time Out: 1400    Hemanth Pappas PT

## 2018-08-10 ENCOUNTER — HOSPITAL ENCOUNTER (OUTPATIENT)
Dept: PHYSICAL THERAPY | Age: 78
Discharge: HOME | End: 2018-08-10
Payer: MEDICARE

## 2018-08-10 PROCEDURE — 97140 MANUAL THERAPY 1/> REGIONS: CPT

## 2018-08-10 PROCEDURE — 97110 THERAPEUTIC EXERCISES: CPT

## 2018-08-10 NOTE — PROGRESS NOTES
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 5 = High Risk)  MD Orders: evaluate and treat  MEDICAL/REFERRING DIAGNOSIS:  Left shoulder pain [M25.512]   DATE OF ONSET: Date of surgery: 18  REFERRING PHYSICIAN: Jitendra Bell MD  RETURN PHYSICIAN APPOINTMENT: unknown      INITIAL ASSESSMENT:  Mr. Belén Carpenter is a 70year old male with left shoulder pain and stiffness following a total shoulder arthroplasty performed by Dr Cori Rocha on 18. He reports a long history of progressively worsening shoulder pain and dysfunction. He presents to PT with decreased passive ROM, active ROM, functional reaching that limits his ability to get dressed, reach, and open a door. Pt will benefit from skilled PT to address above listed impairments and functional limitations to facilitate return to prior level of function. Due to surgical precautions and history of significantly restricted motion and function, patient will likely require extended care. PROBLEM LIST (Impacting functional limitations):  1. Decreased Strength  2. Decreased ADL/Functional Activities  3. Increased Pain  4. Decreased Flexibility/Joint Mobility INTERVENTIONS PLANNED:  1. Cold  2. Electrical Stimulation  3. Heat  4. Home Exercise Program (HEP)  5. Manual Therapy  6. Neuromuscular Re-education/Strengthening  7. Range of Motion (ROM)  8. Therapeutic Activites  9.  Therapeutic Exercise/Strengthening   TREATMENT PLAN:  Effective Dates: 2018 TO 2018 (90 days). Frequency/Duration: 2 times a week for 90 Days  GOALS: (Goals have been discussed and agreed upon with patient.)  Short-Term Functional Goals: Time Frame: 8/13/18  1. Pt will demonstrate passive ROM of flexion to 110 degrees, ER to 50 degrees, and IR to 60 degrees. Achieved 8/8/18   2. Pt will report no limitation in dressing (donning socks). Achieved 8/8/18  Discharge Goals: Time Frame: 9/30/18  1. Pt will demonstrate Apley's ER to back of head to allow independent grooming and dressing without modification. ONGOING, good progress   2. Pt will demonstrate Apley's IR to L5 to allow donning belt and pants without modification. ONGOING, some progress   3. Pt will demonstrate active flexion to 110 degrees to allow reaching overhead. ONGOING, some progress   4. Pt will demonstrate improvement in function with quickDASH to 14% or less disability. ONGOING  Rehabilitation Potential For Stated Goals: Good            The information in this section was collected on 7/2/18 (except where otherwise noted). HISTORY:   History of Present Injury/Illness (Reason for Referral):  7/2/18: Brendan Melendez reports a long history of left shoulder pain that progressively worsened and limited his UE use significantly over the last 2-3 years. He had a left TSA performed by Dr Estelita Warren on 6/13/18. He reports improvement in pain from pre-operative to post-operative and reports only discomfort at present. He reports limitation in all UE use due to weakness and post-operative restrictions. He reports difficulty dressing, reaching, opening a door, and lifting due to pain and shoulder precautions. Past Medical History/Comorbidities:   Mr. Nataliia Bravo  has a past medical history of GERD (gastroesophageal reflux disease); Hyperlipidemia (8/5/2016); Hypertension; Insomnia (10/30/2017); Obstructive sleep apnea (10/30/2017); Overweight (BMI 25.0-29.9) (10/30/2017); Primary osteoarthritis of left shoulder (7/14/2017); and Seizure ([facility] [address] (09/10/2017). Mr. Madan Uriostegui  has a past surgical history that includes hx hip replacement (Bilateral); hx hernia repair; and hx colonoscopy. Social History/Living Environment:     Pt lives in private home with his family. Prior Level of Function/Work/Activity:  Pt reports long, progressive history of shoulder dysfunction with inability to lift his arm for several years prior to surgery. Dominant Side:         RIGHT  Current Medications:       Current Outpatient Prescriptions:     oxyCODONE-acetaminophen (PERCOCET) 5-325 mg per tablet, Take 1 Tab by mouth every four (4) hours as needed. Max Daily Amount: 6 Tabs., Disp: 20 Tab, Rfl: 0    cpap machine kit, by Does Not Apply route., Disp: , Rfl:     pitavastatin calcium (LIVALO) 2 mg tablet, Take 1 Tab by mouth daily. , Disp: 90 Tab, Rfl: 3    ALPRAZolam (XANAX) 0.25 mg tablet, Take 1 Tab by mouth nightly as needed for Anxiety or Sleep. Max Daily Amount: 0.25 mg., Disp: 30 Tab, Rfl: 2    amLODIPine (NORVASC) 5 mg tablet, Take 1 Tab by mouth daily. , Disp: 90 Tab, Rfl: 3    esomeprazole (NEXIUM) 40 mg capsule, Take 1 Cap by mouth daily. , Disp: 90 Cap, Rfl: 3    ezetimibe (ZETIA) 10 mg tablet, Take 1 Tab by mouth every Monday, Wednesday, Friday., Disp: 90 Tab, Rfl: 3    losartan (COZAAR) 50 mg tablet, Take 1 Tab by mouth daily. , Disp: 90 Tab, Rfl: 3    ibuprofen (MOTRIN) 600 mg tablet, Take 1 Tab by mouth every eight (8) hours as needed for Pain. (Patient taking differently: Take 600 mg by mouth as needed for Pain.), Disp: 60 Tab, Rfl: 2    levETIRAcetam (KEPPRA) 500 mg tablet, One in AM and 2 at hs, Disp: 90 Tab, Rfl: 5    levETIRAcetam (KEPPRA) 500 mg tablet, Take 1 Tab by mouth two (2) times a day., Disp: 60 Tab, Rfl: 5    aspirin delayed-release 81 mg tablet, Take 81 mg by mouth daily. , Disp: , Rfl:    Date Last Reviewed:  8/10/2018   Number of Personal Factors/Comorbidities that affect the Plan of Care: 1-2: MODERATE COMPLEXITY   EXAMINATION: Observation/palpation/joint mobility: incision well healed. All active motion with compensatory scapular hiking. Range of Motion:  Passive (active)  Left* Right   Flexion  125 (84) 150 (150)    External Rotation  50 50   Internal Rotation  55 60      Special Tests: none performed     Strength:      Manual Muscle Test out of 5   Flexion Not assessed at initial evaluation/ surgical precautions    Abduction Not assessed at initial evaluation/ surgical precautions    External Rotation Not assessed at initial evaluation/ surgical precautions    Internal Rotaton Not assessed at initial evaluation/ surgical precautions      Functional Tests:    Apley's Internal Rotation: L: lateral buttock, R: T12  Apley's External Rotation: L: ear, R: T3       Body Structures Involved:  1. Bones  2. Joints  3. Muscles Body Functions Affected:  1. Sensory/Pain  2. Neuromusculoskeletal  3. Movement Related Activities and Participation Affected:  1. General Tasks and Demands  2. Mobility  3. Self Care  4. Domestic Life  5. Interpersonal Interactions and Relationships  6. Community, Social and [place]   Number of elements (examined above) that affect the Plan of Care: 4+: HIGH COMPLEXITY   CLINICAL PRESENTATION:   Presentation: Stable and uncomplicated: LOW COMPLEXITY   CLINICAL DECISION MAKING:   Outcome Measure: Tool Used: Disabilities of the Arm, Shoulder and Hand (DASH) Questionnaire - Quick Version  Score:  Initial: 32/55 (48% disability)   8/8/18: 18/55 (16% disability)    Interpretation of Score: The DASH is designed to measure the activities of daily living in person's with upper extremity dysfunction or pain. Each section is scored on a 1-5 scale, 5 representing the greatest disability. The scores of each section are added together for a total score of 55. Score 11 12-19 20-28 29-37 38-45 46-54 55   Modifier CH CI CJ CK CL CM CN     ?  Carrying, Moving, and Handling Objects:     - CURRENT STATUS: CI - 1%-19% impaired, limited or restricted    - GOAL STATUS: CI - 1%-19% impaired, limited or restricted    - D/C STATUS:  ---------------To be determined---------------      Medical Necessity:   · Patient is expected to demonstrate progress in strength and range of motion to increase independence with reaching and lifting. Reason for Services/Other Comments:  · Patient continues to require present interventions due to patient's inability to reach, dress, and lift. Use of outcome tool(s) and clinical judgement create a POC that gives a: Clear prediction of patient's progress: LOW COMPLEXITY            TREATMENT:   (In addition to Assessment/Re-Assessment sessions the following treatments were rendered)  Pre-treatment Symptoms/Complaints:  Pt reports that he is doing well today. Pain: Initial:   Pain Intensity 1: 0  Post Session:  0/10      Manual Therapy (    Soft Tissue Mobilization Duration  Duration: 25 Minutes): Manual techniques to facilitate improved motion and decreased pain. (Used abbreviations: MET - muscle energy technique; PNF - proprioceptive neuromuscular facilitation; NMR - neuromuscular re-education; a/p - anterior to posterior; p/a - posterior to anterior)   · Manual ROM: flexion, ER, and IR within surgical precautions  · STM to upper trap and levator scapula, subscapularis and latissimus dorsi     Therapeutic Exercise: (30 Minutes):  Exercises per grid below to improve mobility, strength and dynamic movement of shoulder - left to improve functional lifting, reaching and overhead activites. Required minimal visual and verbal cues to promote proper body mechanics. Progressed resistance, range, repetitions and complexity of movement as indicated.        8/10/2018   Activity/Exercise Parameters                 Patient education --   Supine punches  2 x 10  punch to endrange with 2 lb weight; sidelying punches    UE ranger  --   ER theraband  Orange cord: 3 x 10    IR theraband  Green cord: 2 x 10    Flexion AAROM  2 x 10 rolls on foam roller seated   Horizontal abduction  --   Seated flexion  From varying degrees of flexion on table and from full. MedBridge Portal  Treatment/Session Assessment:    · Response to Treatment:  Pt demonstrated improved control with sidelying punches and seated flexion today. · Compliance with Program/Exercises: compliant most of the time. · Recommendations/Intent for next treatment session: \"Next visit will focus on advancements to more challenging activities\".   Total Treatment Duration:  PT Patient Time In/Time Out  Time In: 1305  Time Out: 1400    Jose Miguel Chamberlain PT

## 2018-08-14 ENCOUNTER — HOSPITAL ENCOUNTER (OUTPATIENT)
Dept: PHYSICAL THERAPY | Age: 78
Discharge: HOME | End: 2018-08-14
Payer: MEDICARE

## 2018-08-14 PROCEDURE — 97140 MANUAL THERAPY 1/> REGIONS: CPT

## 2018-08-14 PROCEDURE — 97110 THERAPEUTIC EXERCISES: CPT

## 2018-08-14 NOTE — PROGRESS NOTES
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 5 = High Risk)  MD Orders: evaluate and treat  MEDICAL/REFERRING DIAGNOSIS:  Left shoulder pain [M25.512]   DATE OF ONSET: Date of surgery: 18  REFERRING PHYSICIAN: Aly Luis MD  RETURN PHYSICIAN APPOINTMENT: unknown      INITIAL ASSESSMENT:  Mr. Peggy Romero is a 70year old male with left shoulder pain and stiffness following a total shoulder arthroplasty performed by Dr Peg Jean on 18. He reports a long history of progressively worsening shoulder pain and dysfunction. He presents to PT with decreased passive ROM, active ROM, functional reaching that limits his ability to get dressed, reach, and open a door. Pt will benefit from skilled PT to address above listed impairments and functional limitations to facilitate return to prior level of function. Due to surgical precautions and history of significantly restricted motion and function, patient will likely require extended care. PROBLEM LIST (Impacting functional limitations):  1. Decreased Strength  2. Decreased ADL/Functional Activities  3. Increased Pain  4. Decreased Flexibility/Joint Mobility INTERVENTIONS PLANNED:  1. Cold  2. Electrical Stimulation  3. Heat  4. Home Exercise Program (HEP)  5. Manual Therapy  6. Neuromuscular Re-education/Strengthening  7. Range of Motion (ROM)  8. Therapeutic Activites  9.  Therapeutic Exercise/Strengthening   TREATMENT PLAN:  Effective Dates: 2018 TO 2018 (90 days). Frequency/Duration: 2 times a week for 90 Days  GOALS: (Goals have been discussed and agreed upon with patient.)  Short-Term Functional Goals: Time Frame: 8/13/18  1. Pt will demonstrate passive ROM of flexion to 110 degrees, ER to 50 degrees, and IR to 60 degrees. Achieved 8/8/18   2. Pt will report no limitation in dressing (donning socks). Achieved 8/8/18  Discharge Goals: Time Frame: 9/30/18  1. Pt will demonstrate Apley's ER to back of head to allow independent grooming and dressing without modification. ONGOING, good progress   2. Pt will demonstrate Apley's IR to L5 to allow donning belt and pants without modification. ONGOING, some progress   3. Pt will demonstrate active flexion to 110 degrees to allow reaching overhead. ONGOING, some progress   4. Pt will demonstrate improvement in function with quickDASH to 14% or less disability. ONGOING  Rehabilitation Potential For Stated Goals: Good            The information in this section was collected on 7/2/18 (except where otherwise noted). HISTORY:   History of Present Injury/Illness (Reason for Referral):  7/2/18: Ada reports a long history of left shoulder pain that progressively worsened and limited his UE use significantly over the last 2-3 years. He had a left TSA performed by Dr Sheryle England on 6/13/18. He reports improvement in pain from pre-operative to post-operative and reports only discomfort at present. He reports limitation in all UE use due to weakness and post-operative restrictions. He reports difficulty dressing, reaching, opening a door, and lifting due to pain and shoulder precautions. Past Medical History/Comorbidities:   Mr. Jose Alejandro Tapia  has a past medical history of GERD (gastroesophageal reflux disease); Hyperlipidemia (8/5/2016); Hypertension; Insomnia (10/30/2017); Obstructive sleep apnea (10/30/2017); Overweight (BMI 25.0-29.9) (10/30/2017); Primary osteoarthritis of left shoulder (7/14/2017); and Seizure ([facility] [address] (09/10/2017). Mr. Jaiden Borja  has a past surgical history that includes hx hip replacement (Bilateral); hx hernia repair; and hx colonoscopy. Social History/Living Environment:     Pt lives in private home with his family. Prior Level of Function/Work/Activity:  Pt reports long, progressive history of shoulder dysfunction with inability to lift his arm for several years prior to surgery. Dominant Side:         RIGHT  Current Medications:       Current Outpatient Prescriptions:     oxyCODONE-acetaminophen (PERCOCET) 5-325 mg per tablet, Take 1 Tab by mouth every four (4) hours as needed. Max Daily Amount: 6 Tabs., Disp: 20 Tab, Rfl: 0    cpap machine kit, by Does Not Apply route., Disp: , Rfl:     pitavastatin calcium (LIVALO) 2 mg tablet, Take 1 Tab by mouth daily. , Disp: 90 Tab, Rfl: 3    ALPRAZolam (XANAX) 0.25 mg tablet, Take 1 Tab by mouth nightly as needed for Anxiety or Sleep. Max Daily Amount: 0.25 mg., Disp: 30 Tab, Rfl: 2    amLODIPine (NORVASC) 5 mg tablet, Take 1 Tab by mouth daily. , Disp: 90 Tab, Rfl: 3    esomeprazole (NEXIUM) 40 mg capsule, Take 1 Cap by mouth daily. , Disp: 90 Cap, Rfl: 3    ezetimibe (ZETIA) 10 mg tablet, Take 1 Tab by mouth every Monday, Wednesday, Friday., Disp: 90 Tab, Rfl: 3    losartan (COZAAR) 50 mg tablet, Take 1 Tab by mouth daily. , Disp: 90 Tab, Rfl: 3    ibuprofen (MOTRIN) 600 mg tablet, Take 1 Tab by mouth every eight (8) hours as needed for Pain. (Patient taking differently: Take 600 mg by mouth as needed for Pain.), Disp: 60 Tab, Rfl: 2    levETIRAcetam (KEPPRA) 500 mg tablet, One in AM and 2 at hs, Disp: 90 Tab, Rfl: 5    levETIRAcetam (KEPPRA) 500 mg tablet, Take 1 Tab by mouth two (2) times a day., Disp: 60 Tab, Rfl: 5    aspirin delayed-release 81 mg tablet, Take 81 mg by mouth daily. , Disp: , Rfl:    Date Last Reviewed:  8/14/2018   Number of Personal Factors/Comorbidities that affect the Plan of Care: 1-2: MODERATE COMPLEXITY   EXAMINATION: Observation/palpation/joint mobility: incision well healed. All active motion with compensatory scapular hiking. Range of Motion:  Passive (active)  Left* Right   Flexion  125 (84) 150 (150)    External Rotation  50 50   Internal Rotation  55 60      Special Tests: none performed     Strength:      Manual Muscle Test out of 5   Flexion Not assessed at initial evaluation/ surgical precautions    Abduction Not assessed at initial evaluation/ surgical precautions    External Rotation Not assessed at initial evaluation/ surgical precautions    Internal Rotaton Not assessed at initial evaluation/ surgical precautions      Functional Tests:    Apley's Internal Rotation: L: lateral buttock, R: T12  Apley's External Rotation: L: ear, R: T3       Body Structures Involved:  1. Bones  2. Joints  3. Muscles Body Functions Affected:  1. Sensory/Pain  2. Neuromusculoskeletal  3. Movement Related Activities and Participation Affected:  1. General Tasks and Demands  2. Mobility  3. Self Care  4. Domestic Life  5. Interpersonal Interactions and Relationships  6. Community, Social and [place]   Number of elements (examined above) that affect the Plan of Care: 4+: HIGH COMPLEXITY   CLINICAL PRESENTATION:   Presentation: Stable and uncomplicated: LOW COMPLEXITY   CLINICAL DECISION MAKING:   Outcome Measure: Tool Used: Disabilities of the Arm, Shoulder and Hand (DASH) Questionnaire - Quick Version  Score:  Initial: 32/55 (48% disability)   8/8/18: 18/55 (16% disability)    Interpretation of Score: The DASH is designed to measure the activities of daily living in person's with upper extremity dysfunction or pain. Each section is scored on a 1-5 scale, 5 representing the greatest disability. The scores of each section are added together for a total score of 55. Score 11 12-19 20-28 29-37 38-45 46-54 55   Modifier CH CI CJ CK CL CM CN     ?  Carrying, Moving, and Handling Objects:     - CURRENT STATUS: CI - 1%-19% impaired, limited or restricted    - GOAL STATUS: CI - 1%-19% impaired, limited or restricted    - D/C STATUS:  ---------------To be determined---------------      Medical Necessity:   · Patient is expected to demonstrate progress in strength and range of motion to increase independence with reaching and lifting. Reason for Services/Other Comments:  · Patient continues to require present interventions due to patient's inability to reach, dress, and lift. Use of outcome tool(s) and clinical judgement create a POC that gives a: Clear prediction of patient's progress: LOW COMPLEXITY            TREATMENT:   (In addition to Assessment/Re-Assessment sessions the following treatments were rendered)  Pre-treatment Symptoms/Complaints:  Pt reports that he continues to feel improvements in his shoulder. Pain: Initial:   Pain Intensity 1: 0  Post Session:  0/10      Manual Therapy (    Soft Tissue Mobilization Duration  Duration: 30 Minutes): Manual techniques to facilitate improved motion and decreased pain. (Used abbreviations: MET - muscle energy technique; PNF - proprioceptive neuromuscular facilitation; NMR - neuromuscular re-education; a/p - anterior to posterior; p/a - posterior to anterior)   · Manual ROM: flexion, ER, and IR within surgical precautions  · STM to upper trap and levator scapula, subscapularis and latissimus dorsi     Therapeutic Exercise: (25 Minutes):  Exercises per grid below to improve mobility, strength and dynamic movement of shoulder - left to improve functional lifting, reaching and overhead activites. Required minimal visual and verbal cues to promote proper body mechanics. Progressed resistance, range, repetitions and complexity of movement as indicated.        8/14/2018   Activity/Exercise Parameters                 Patient education --   Supine punches  2 x 10  punch to endrange supine and table elevated    UE ranger  --   ER theraband  Bilateral yellow 3 x 10    IR theraband  --    Flexion AAROM  2 x 10 rolls on foam roller seated   Horizontal abduction  --   Seated flexion  Isometric against wall and with abduction into yellow band        MedBridge Portal  Treatment/Session Assessment:    · Response to Treatment:  Pt was able to achieve almost full active available shoulder elevation with use of yellow band to activate rotator cuff. · Compliance with Program/Exercises: compliant most of the time. · Recommendations/Intent for next treatment session: \"Next visit will focus on advancements to more challenging activities\".   Total Treatment Duration:  PT Patient Time In/Time Out  Time In: 1300  Time Out: [id number], PT

## 2018-08-16 ENCOUNTER — HOSPITAL ENCOUNTER (OUTPATIENT)
Dept: PHYSICAL THERAPY | Age: 78
Discharge: HOME | End: 2018-08-16
Payer: MEDICARE

## 2018-08-16 PROCEDURE — 97110 THERAPEUTIC EXERCISES: CPT

## 2018-08-16 PROCEDURE — 97140 MANUAL THERAPY 1/> REGIONS: CPT

## 2018-08-16 NOTE — PROGRESS NOTES
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 5 = High Risk)  MD Orders: evaluate and treat  MEDICAL/REFERRING DIAGNOSIS:  Left shoulder pain [M25.512]   DATE OF ONSET: Date of surgery: 18  REFERRING PHYSICIAN: J Carlos Abernathy MD  RETURN PHYSICIAN APPOINTMENT: unknown      INITIAL ASSESSMENT:  Mr. Hakan Peters is a 70year old male with left shoulder pain and stiffness following a total shoulder arthroplasty performed by Dr Asuncion Barrios on 18. He reports a long history of progressively worsening shoulder pain and dysfunction. He presents to PT with decreased passive ROM, active ROM, functional reaching that limits his ability to get dressed, reach, and open a door. Pt will benefit from skilled PT to address above listed impairments and functional limitations to facilitate return to prior level of function. Due to surgical precautions and history of significantly restricted motion and function, patient will likely require extended care. PROBLEM LIST (Impacting functional limitations):  1. Decreased Strength  2. Decreased ADL/Functional Activities  3. Increased Pain  4. Decreased Flexibility/Joint Mobility INTERVENTIONS PLANNED:  1. Cold  2. Electrical Stimulation  3. Heat  4. Home Exercise Program (HEP)  5. Manual Therapy  6. Neuromuscular Re-education/Strengthening  7. Range of Motion (ROM)  8. Therapeutic Activites  9.  Therapeutic Exercise/Strengthening   TREATMENT PLAN:  Effective Dates: 2018 TO 2018 (90 days). Frequency/Duration: 2 times a week for 90 Days  GOALS: (Goals have been discussed and agreed upon with patient.)  Short-Term Functional Goals: Time Frame: 8/13/18  1. Pt will demonstrate passive ROM of flexion to 110 degrees, ER to 50 degrees, and IR to 60 degrees. Achieved 8/8/18   2. Pt will report no limitation in dressing (donning socks). Achieved 8/8/18  Discharge Goals: Time Frame: 9/30/18  1. Pt will demonstrate Apley's ER to back of head to allow independent grooming and dressing without modification. ONGOING, good progress   2. Pt will demonstrate Apley's IR to L5 to allow donning belt and pants without modification. ONGOING, some progress   3. Pt will demonstrate active flexion to 110 degrees to allow reaching overhead. ONGOING, some progress   4. Pt will demonstrate improvement in function with quickDASH to 14% or less disability. ONGOING  Rehabilitation Potential For Stated Goals: Good            The information in this section was collected on 7/2/18 (except where otherwise noted). HISTORY:   History of Present Injury/Illness (Reason for Referral):  7/2/18: Luis Angel Maxwell reports a long history of left shoulder pain that progressively worsened and limited his UE use significantly over the last 2-3 years. He had a left TSA performed by Dr Romy Rodriguez on 6/13/18. He reports improvement in pain from pre-operative to post-operative and reports only discomfort at present. He reports limitation in all UE use due to weakness and post-operative restrictions. He reports difficulty dressing, reaching, opening a door, and lifting due to pain and shoulder precautions. Past Medical History/Comorbidities:   Mr. Rogerio Soto  has a past medical history of GERD (gastroesophageal reflux disease); Hyperlipidemia (8/5/2016); Hypertension; Insomnia (10/30/2017); Obstructive sleep apnea (10/30/2017); Overweight (BMI 25.0-29.9) (10/30/2017); Primary osteoarthritis of left shoulder (7/14/2017); and Seizure ([facility] [address] (09/10/2017). Mr. Wilman Dennis  has a past surgical history that includes hx hip replacement (Bilateral); hx hernia repair; and hx colonoscopy. Social History/Living Environment:     Pt lives in private home with his family. Prior Level of Function/Work/Activity:  Pt reports long, progressive history of shoulder dysfunction with inability to lift his arm for several years prior to surgery. Dominant Side:         RIGHT  Current Medications:       Current Outpatient Prescriptions:     oxyCODONE-acetaminophen (PERCOCET) 5-325 mg per tablet, Take 1 Tab by mouth every four (4) hours as needed. Max Daily Amount: 6 Tabs., Disp: 20 Tab, Rfl: 0    cpap machine kit, by Does Not Apply route., Disp: , Rfl:     pitavastatin calcium (LIVALO) 2 mg tablet, Take 1 Tab by mouth daily. , Disp: 90 Tab, Rfl: 3    ALPRAZolam (XANAX) 0.25 mg tablet, Take 1 Tab by mouth nightly as needed for Anxiety or Sleep. Max Daily Amount: 0.25 mg., Disp: 30 Tab, Rfl: 2    amLODIPine (NORVASC) 5 mg tablet, Take 1 Tab by mouth daily. , Disp: 90 Tab, Rfl: 3    esomeprazole (NEXIUM) 40 mg capsule, Take 1 Cap by mouth daily. , Disp: 90 Cap, Rfl: 3    ezetimibe (ZETIA) 10 mg tablet, Take 1 Tab by mouth every Monday, Wednesday, Friday., Disp: 90 Tab, Rfl: 3    losartan (COZAAR) 50 mg tablet, Take 1 Tab by mouth daily. , Disp: 90 Tab, Rfl: 3    ibuprofen (MOTRIN) 600 mg tablet, Take 1 Tab by mouth every eight (8) hours as needed for Pain. (Patient taking differently: Take 600 mg by mouth as needed for Pain.), Disp: 60 Tab, Rfl: 2    levETIRAcetam (KEPPRA) 500 mg tablet, One in AM and 2 at hs, Disp: 90 Tab, Rfl: 5    levETIRAcetam (KEPPRA) 500 mg tablet, Take 1 Tab by mouth two (2) times a day., Disp: 60 Tab, Rfl: 5    aspirin delayed-release 81 mg tablet, Take 81 mg by mouth daily. , Disp: , Rfl:    Date Last Reviewed:  8/16/2018   Number of Personal Factors/Comorbidities that affect the Plan of Care: 1-2: MODERATE COMPLEXITY   EXAMINATION: Observation/palpation/joint mobility: incision well healed. All active motion with compensatory scapular hiking. Range of Motion:  Passive (active)  Left* Right   Flexion  125 (84) 150 (150)    External Rotation  50 50   Internal Rotation  55 60      Special Tests: none performed     Strength:      Manual Muscle Test out of 5   Flexion Not assessed at initial evaluation/ surgical precautions    Abduction Not assessed at initial evaluation/ surgical precautions    External Rotation Not assessed at initial evaluation/ surgical precautions    Internal Rotaton Not assessed at initial evaluation/ surgical precautions      Functional Tests:    Apley's Internal Rotation: L: lateral buttock, R: T12  Apley's External Rotation: L: ear, R: T3       Body Structures Involved:  1. Bones  2. Joints  3. Muscles Body Functions Affected:  1. Sensory/Pain  2. Neuromusculoskeletal  3. Movement Related Activities and Participation Affected:  1. General Tasks and Demands  2. Mobility  3. Self Care  4. Domestic Life  5. Interpersonal Interactions and Relationships  6. Community, Social and [place]   Number of elements (examined above) that affect the Plan of Care: 4+: HIGH COMPLEXITY   CLINICAL PRESENTATION:   Presentation: Stable and uncomplicated: LOW COMPLEXITY   CLINICAL DECISION MAKING:   Outcome Measure: Tool Used: Disabilities of the Arm, Shoulder and Hand (DASH) Questionnaire - Quick Version  Score:  Initial: 32/55 (48% disability)   8/8/18: 18/55 (16% disability)    Interpretation of Score: The DASH is designed to measure the activities of daily living in person's with upper extremity dysfunction or pain. Each section is scored on a 1-5 scale, 5 representing the greatest disability. The scores of each section are added together for a total score of 55. Score 11 12-19 20-28 29-37 38-45 46-54 55   Modifier CH CI CJ CK CL CM CN     ?  Carrying, Moving, and Handling Objects:     - CURRENT STATUS: CI - 1%-19% impaired, limited or restricted    - GOAL STATUS: CI - 1%-19% impaired, limited or restricted    - D/C STATUS:  ---------------To be determined---------------      Medical Necessity:   · Patient is expected to demonstrate progress in strength and range of motion to increase independence with reaching and lifting. Reason for Services/Other Comments:  · Patient continues to require present interventions due to patient's inability to reach, dress, and lift. Use of outcome tool(s) and clinical judgement create a POC that gives a: Clear prediction of patient's progress: LOW COMPLEXITY            TREATMENT:   (In addition to Assessment/Re-Assessment sessions the following treatments were rendered)  Pre-treatment Symptoms/Complaints:  Pt reports that his shoulder feels a little stiff today. Pain: Initial:   Pain Intensity 1: 0  Post Session:  0/10      Manual Therapy (    Soft Tissue Mobilization Duration  Duration: 25 Minutes): Manual techniques to facilitate improved motion and decreased pain. (Used abbreviations: MET - muscle energy technique; PNF - proprioceptive neuromuscular facilitation; NMR - neuromuscular re-education; a/p - anterior to posterior; p/a - posterior to anterior)   · Manual ROM: flexion, ER, and IR within surgical precautions  · STM to upper trap and levator scapula, subscapularis and latissimus dorsi     Therapeutic Exercise: (30 Minutes):  Exercises per grid below to improve mobility, strength and dynamic movement of shoulder - left to improve functional lifting, reaching and overhead activites. Required minimal visual and verbal cues to promote proper body mechanics. Progressed resistance, range, repetitions and complexity of movement as indicated.        8/16/2018   Activity/Exercise Parameters                 Patient education --   UBE 2 mins each    Supine punches  10 punch to endrange, 2 x 10 with red band supine    UE ranger  Flexion, each diagonal 2 x 10    ER theraband --   IR theraband  --    Flexion AAROM  Ball rolls into flexion with force into abduction: 2 x 10    Horizontal abduction  --   Seated flexion  Isometric against wall and with abduction into yellow band        MedBridge Portal  Treatment/Session Assessment:    · Response to Treatment:  Pt continues to progress and reported improved stiffness following warm up and manual techniques. · Compliance with Program/Exercises: compliant most of the time. · Recommendations/Intent for next treatment session: \"Next visit will focus on advancements to more challenging activities\".   Total Treatment Duration:  PT Patient Time In/Time Out  Time In: 1255  Time Out: OZ [place]

## 2018-08-21 ENCOUNTER — HOSPITAL ENCOUNTER (OUTPATIENT)
Dept: PHYSICAL THERAPY | Age: 78
Discharge: HOME | End: 2018-08-21
Payer: MEDICARE

## 2018-08-21 PROCEDURE — 97110 THERAPEUTIC EXERCISES: CPT

## 2018-08-21 PROCEDURE — 97140 MANUAL THERAPY 1/> REGIONS: CPT

## 2018-08-21 NOTE — PROGRESS NOTES
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 5 = High Risk)  MD Orders: evaluate and treat  MEDICAL/REFERRING DIAGNOSIS:  Left shoulder pain [M25.512]   DATE OF ONSET: Date of surgery: 18  REFERRING PHYSICIAN: Nini Maldonado MD  RETURN PHYSICIAN APPOINTMENT: unknown      INITIAL ASSESSMENT:  Mr. Jennifer Sanchez is a 70year old male with left shoulder pain and stiffness following a total shoulder arthroplasty performed by Dr Marbella Marmolejo on 18. He reports a long history of progressively worsening shoulder pain and dysfunction. He presents to PT with decreased passive ROM, active ROM, functional reaching that limits his ability to get dressed, reach, and open a door. Pt will benefit from skilled PT to address above listed impairments and functional limitations to facilitate return to prior level of function. Due to surgical precautions and history of significantly restricted motion and function, patient will likely require extended care. PROBLEM LIST (Impacting functional limitations):  1. Decreased Strength  2. Decreased ADL/Functional Activities  3. Increased Pain  4. Decreased Flexibility/Joint Mobility INTERVENTIONS PLANNED:  1. Cold  2. Electrical Stimulation  3. Heat  4. Home Exercise Program (HEP)  5. Manual Therapy  6. Neuromuscular Re-education/Strengthening  7. Range of Motion (ROM)  8. Therapeutic Activites  9.  Therapeutic Exercise/Strengthening   TREATMENT PLAN:  Effective Dates: 2018 TO 2018 (90 days). Frequency/Duration: 2 times a week for 90 Days  GOALS: (Goals have been discussed and agreed upon with patient.)  Short-Term Functional Goals: Time Frame: 8/13/18  1. Pt will demonstrate passive ROM of flexion to 110 degrees, ER to 50 degrees, and IR to 60 degrees. Achieved 8/8/18   2. Pt will report no limitation in dressing (donning socks). Achieved 8/8/18  Discharge Goals: Time Frame: 9/30/18  1. Pt will demonstrate Apley's ER to back of head to allow independent grooming and dressing without modification. ONGOING, good progress   2. Pt will demonstrate Apley's IR to L5 to allow donning belt and pants without modification. ONGOING, some progress   3. Pt will demonstrate active flexion to 110 degrees to allow reaching overhead. ONGOING, some progress   4. Pt will demonstrate improvement in function with quickDASH to 14% or less disability. ONGOING  Rehabilitation Potential For Stated Goals: Good            The information in this section was collected on 7/2/18 (except where otherwise noted). HISTORY:   History of Present Injury/Illness (Reason for Referral):  7/2/18: Tessy Pham reports a long history of left shoulder pain that progressively worsened and limited his UE use significantly over the last 2-3 years. He had a left TSA performed by Dr Dory Levin on 6/13/18. He reports improvement in pain from pre-operative to post-operative and reports only discomfort at present. He reports limitation in all UE use due to weakness and post-operative restrictions. He reports difficulty dressing, reaching, opening a door, and lifting due to pain and shoulder precautions. Past Medical History/Comorbidities:   Mr. Wilman Dennis  has a past medical history of GERD (gastroesophageal reflux disease); Hyperlipidemia (8/5/2016); Hypertension; Insomnia (10/30/2017); Obstructive sleep apnea (10/30/2017); Overweight (BMI 25.0-29.9) (10/30/2017); Primary osteoarthritis of left shoulder (7/14/2017); and Seizure ([facility] [address] (09/10/2017). Mr. Shin Brothers  has a past surgical history that includes hx hip replacement (Bilateral); hx hernia repair; and hx colonoscopy. Social History/Living Environment:     Pt lives in private home with his family. Prior Level of Function/Work/Activity:  Pt reports long, progressive history of shoulder dysfunction with inability to lift his arm for several years prior to surgery. Dominant Side:         RIGHT  Current Medications:       Current Outpatient Prescriptions:     oxyCODONE-acetaminophen (PERCOCET) 5-325 mg per tablet, Take 1 Tab by mouth every four (4) hours as needed. Max Daily Amount: 6 Tabs., Disp: 20 Tab, Rfl: 0    cpap machine kit, by Does Not Apply route., Disp: , Rfl:     pitavastatin calcium (LIVALO) 2 mg tablet, Take 1 Tab by mouth daily. , Disp: 90 Tab, Rfl: 3    ALPRAZolam (XANAX) 0.25 mg tablet, Take 1 Tab by mouth nightly as needed for Anxiety or Sleep. Max Daily Amount: 0.25 mg., Disp: 30 Tab, Rfl: 2    amLODIPine (NORVASC) 5 mg tablet, Take 1 Tab by mouth daily. , Disp: 90 Tab, Rfl: 3    esomeprazole (NEXIUM) 40 mg capsule, Take 1 Cap by mouth daily. , Disp: 90 Cap, Rfl: 3    ezetimibe (ZETIA) 10 mg tablet, Take 1 Tab by mouth every Monday, Wednesday, Friday., Disp: 90 Tab, Rfl: 3    losartan (COZAAR) 50 mg tablet, Take 1 Tab by mouth daily. , Disp: 90 Tab, Rfl: 3    ibuprofen (MOTRIN) 600 mg tablet, Take 1 Tab by mouth every eight (8) hours as needed for Pain. (Patient taking differently: Take 600 mg by mouth as needed for Pain.), Disp: 60 Tab, Rfl: 2    levETIRAcetam (KEPPRA) 500 mg tablet, One in AM and 2 at hs, Disp: 90 Tab, Rfl: 5    levETIRAcetam (KEPPRA) 500 mg tablet, Take 1 Tab by mouth two (2) times a day., Disp: 60 Tab, Rfl: 5    aspirin delayed-release 81 mg tablet, Take 81 mg by mouth daily. , Disp: , Rfl:    Date Last Reviewed:  8/21/2018   Number of Personal Factors/Comorbidities that affect the Plan of Care: 1-2: MODERATE COMPLEXITY   EXAMINATION: Observation/palpation/joint mobility: incision well healed. All active motion with compensatory scapular hiking. Range of Motion:  Passive (active)  Left* Right   Flexion  125 (84) 150 (150)    External Rotation  50 50   Internal Rotation  55 60      Special Tests: none performed     Strength:      Manual Muscle Test out of 5   Flexion Not assessed at initial evaluation/ surgical precautions    Abduction Not assessed at initial evaluation/ surgical precautions    External Rotation Not assessed at initial evaluation/ surgical precautions    Internal Rotaton Not assessed at initial evaluation/ surgical precautions      Functional Tests:    Apley's Internal Rotation: L: lateral buttock, R: T12  Apley's External Rotation: L: ear, R: T3       Body Structures Involved:  1. Bones  2. Joints  3. Muscles Body Functions Affected:  1. Sensory/Pain  2. Neuromusculoskeletal  3. Movement Related Activities and Participation Affected:  1. General Tasks and Demands  2. Mobility  3. Self Care  4. Domestic Life  5. Interpersonal Interactions and Relationships  6. Community, Social and [place]   Number of elements (examined above) that affect the Plan of Care: 4+: HIGH COMPLEXITY   CLINICAL PRESENTATION:   Presentation: Stable and uncomplicated: LOW COMPLEXITY   CLINICAL DECISION MAKING:   Outcome Measure: Tool Used: Disabilities of the Arm, Shoulder and Hand (DASH) Questionnaire - Quick Version  Score:  Initial: 32/55 (48% disability)   8/8/18: 18/55 (16% disability)    Interpretation of Score: The DASH is designed to measure the activities of daily living in person's with upper extremity dysfunction or pain. Each section is scored on a 1-5 scale, 5 representing the greatest disability. The scores of each section are added together for a total score of 55. Score 11 12-19 20-28 29-37 38-45 46-54 55   Modifier CH CI CJ CK CL CM CN     ?  Carrying, Moving, and Handling Objects:     - CURRENT STATUS: CI - 1%-19% impaired, limited or restricted    - GOAL STATUS: CI - 1%-19% impaired, limited or restricted    - D/C STATUS:  ---------------To be determined---------------      Medical Necessity:   · Patient is expected to demonstrate progress in strength and range of motion to increase independence with reaching and lifting. Reason for Services/Other Comments:  · Patient continues to require present interventions due to patient's inability to reach, dress, and lift. Use of outcome tool(s) and clinical judgement create a POC that gives a: Clear prediction of patient's progress: LOW COMPLEXITY            TREATMENT:   (In addition to Assessment/Re-Assessment sessions the following treatments were rendered)  Pre-treatment Symptoms/Complaints:  Pt reports that he is doing well and was able to shampoo his hair with left hand today. Pain: Initial:   Pain Intensity 1: 0  Post Session:  0/10      Manual Therapy (    Soft Tissue Mobilization Duration  Duration: 25 Minutes): Manual techniques to facilitate improved motion and decreased pain. (Used abbreviations: MET - muscle energy technique; PNF - proprioceptive neuromuscular facilitation; NMR - neuromuscular re-education; a/p - anterior to posterior; p/a - posterior to anterior)   · Manual ROM: flexion, ER, and IR within surgical precautions  · STM to upper trap and levator scapula, subscapularis and latissimus dorsi     Therapeutic Exercise: (35 Minutes):  Exercises per grid below to improve mobility, strength and dynamic movement of shoulder - left to improve functional lifting, reaching and overhead activites. Required minimal visual and verbal cues to promote proper body mechanics. Progressed resistance, range, repetitions and complexity of movement as indicated.        8/21/2018   Activity/Exercise Parameters                 Patient education --   UBE 2 mins each    Supine punches  10 punch to endrange, 2 x 10 elevated table    UE ranger  --   ER theraband  -- IR theraband  --    Flexion AAROM  Ball rolls into flexion with force into abduction: 2 x 10 and ball rolls into flexion 2 x 5    Horizontal abduction  Elevated table: 2 x 10 red    Seated flexion  --   PNF  Total gym 5 lbs: 2 x 10 each diagonal pull downs        MedBridge Portal  Treatment/Session Assessment:    · Response to Treatment:  Pt demonstrates plateau in passive range, but good improvement in active range. · Compliance with Program/Exercises: compliant most of the time. · Recommendations/Intent for next treatment session: \"Next visit will focus on advancements to more challenging activities\".   Total Treatment Duration:  PT Patient Time In/Time Out  Time In: 1300  Time Out: 1400    Jet Tripathi, PT

## 2018-08-22 NOTE — PROGRESS NOTES
I am accessing Mr. Ryan Knott chart as a part of our department's internal chart auditing process. I certify that Mr. Deng Bentley is, or was, a patient in our department.   Thank you,  Colette Lucio, PT  8/22/2018

## 2018-08-23 ENCOUNTER — APPOINTMENT (OUTPATIENT)
Dept: PHYSICAL THERAPY | Age: 78
End: 2018-08-23
Payer: MEDICARE

## 2018-08-28 ENCOUNTER — HOSPITAL ENCOUNTER (OUTPATIENT)
Dept: PHYSICAL THERAPY | Age: 78
Discharge: HOME | End: 2018-08-28
Payer: MEDICARE

## 2018-08-28 PROCEDURE — 97140 MANUAL THERAPY 1/> REGIONS: CPT

## 2018-08-28 PROCEDURE — 97110 THERAPEUTIC EXERCISES: CPT

## 2018-08-28 NOTE — PROGRESS NOTES
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 5 = High Risk)  MD Orders: evaluate and treat  MEDICAL/REFERRING DIAGNOSIS:  Left shoulder pain [M25.512]   DATE OF ONSET: Date of surgery: 18  REFERRING PHYSICIAN: Cyndee Vora MD  RETURN PHYSICIAN APPOINTMENT: unknown      INITIAL ASSESSMENT:  Mr. Remy Sandoval is a 70year old male with left shoulder pain and stiffness following a total shoulder arthroplasty performed by Dr Franci White on 18. He reports a long history of progressively worsening shoulder pain and dysfunction. He presents to PT with decreased passive ROM, active ROM, functional reaching that limits his ability to get dressed, reach, and open a door. Pt will benefit from skilled PT to address above listed impairments and functional limitations to facilitate return to prior level of function. Due to surgical precautions and history of significantly restricted motion and function, patient will likely require extended care. PROBLEM LIST (Impacting functional limitations):  1. Decreased Strength  2. Decreased ADL/Functional Activities  3. Increased Pain  4. Decreased Flexibility/Joint Mobility INTERVENTIONS PLANNED:  1. Cold  2. Electrical Stimulation  3. Heat  4. Home Exercise Program (HEP)  5. Manual Therapy  6. Neuromuscular Re-education/Strengthening  7. Range of Motion (ROM)  8. Therapeutic Activites  9.  Therapeutic Exercise/Strengthening   TREATMENT PLAN:  Effective Dates: 2018 TO 2018 (90 days). Frequency/Duration: 2 times a week for 90 Days  GOALS: (Goals have been discussed and agreed upon with patient.)  Short-Term Functional Goals: Time Frame: 8/13/18  1. Pt will demonstrate passive ROM of flexion to 110 degrees, ER to 50 degrees, and IR to 60 degrees. Achieved 8/8/18   2. Pt will report no limitation in dressing (donning socks). Achieved 8/8/18  Discharge Goals: Time Frame: 9/30/18  1. Pt will demonstrate Apley's ER to back of head to allow independent grooming and dressing without modification. ONGOING, good progress   2. Pt will demonstrate Apley's IR to L5 to allow donning belt and pants without modification. ONGOING, some progress   3. Pt will demonstrate active flexion to 110 degrees to allow reaching overhead. ONGOING, some progress   4. Pt will demonstrate improvement in function with quickDASH to 14% or less disability. ONGOING  Rehabilitation Potential For Stated Goals: Good            The information in this section was collected on 7/2/18 (except where otherwise noted). HISTORY:   History of Present Injury/Illness (Reason for Referral):  7/2/18: Lorene Driver reports a long history of left shoulder pain that progressively worsened and limited his UE use significantly over the last 2-3 years. He had a left TSA performed by Dr Celestine Manzano on 6/13/18. He reports improvement in pain from pre-operative to post-operative and reports only discomfort at present. He reports limitation in all UE use due to weakness and post-operative restrictions. He reports difficulty dressing, reaching, opening a door, and lifting due to pain and shoulder precautions. Past Medical History/Comorbidities:   Mr. Claudia Pillai  has a past medical history of GERD (gastroesophageal reflux disease); Hyperlipidemia (8/5/2016); Hypertension; Insomnia (10/30/2017); Obstructive sleep apnea (10/30/2017); Overweight (BMI 25.0-29.9) (10/30/2017); Primary osteoarthritis of left shoulder (7/14/2017); and Seizure ([facility] [address] (09/10/2017). Mr. Peggy Romero  has a past surgical history that includes hx hip replacement (Bilateral); hx hernia repair; and hx colonoscopy. Social History/Living Environment:     Pt lives in private home with his family. Prior Level of Function/Work/Activity:  Pt reports long, progressive history of shoulder dysfunction with inability to lift his arm for several years prior to surgery. Dominant Side:         RIGHT  Current Medications:       Current Outpatient Prescriptions:     oxyCODONE-acetaminophen (PERCOCET) 5-325 mg per tablet, Take 1 Tab by mouth every four (4) hours as needed. Max Daily Amount: 6 Tabs., Disp: 20 Tab, Rfl: 0    cpap machine kit, by Does Not Apply route., Disp: , Rfl:     pitavastatin calcium (LIVALO) 2 mg tablet, Take 1 Tab by mouth daily. , Disp: 90 Tab, Rfl: 3    ALPRAZolam (XANAX) 0.25 mg tablet, Take 1 Tab by mouth nightly as needed for Anxiety or Sleep. Max Daily Amount: 0.25 mg., Disp: 30 Tab, Rfl: 2    amLODIPine (NORVASC) 5 mg tablet, Take 1 Tab by mouth daily. , Disp: 90 Tab, Rfl: 3    esomeprazole (NEXIUM) 40 mg capsule, Take 1 Cap by mouth daily. , Disp: 90 Cap, Rfl: 3    ezetimibe (ZETIA) 10 mg tablet, Take 1 Tab by mouth every Monday, Wednesday, Friday., Disp: 90 Tab, Rfl: 3    losartan (COZAAR) 50 mg tablet, Take 1 Tab by mouth daily. , Disp: 90 Tab, Rfl: 3    ibuprofen (MOTRIN) 600 mg tablet, Take 1 Tab by mouth every eight (8) hours as needed for Pain. (Patient taking differently: Take 600 mg by mouth as needed for Pain.), Disp: 60 Tab, Rfl: 2    levETIRAcetam (KEPPRA) 500 mg tablet, One in AM and 2 at hs, Disp: 90 Tab, Rfl: 5    levETIRAcetam (KEPPRA) 500 mg tablet, Take 1 Tab by mouth two (2) times a day., Disp: 60 Tab, Rfl: 5    aspirin delayed-release 81 mg tablet, Take 81 mg by mouth daily. , Disp: , Rfl:    Date Last Reviewed:  8/28/2018   Number of Personal Factors/Comorbidities that affect the Plan of Care: 1-2: MODERATE COMPLEXITY   EXAMINATION: Observation/palpation/joint mobility: incision well healed. All active motion with compensatory scapular hiking. Range of Motion:  Passive (active)  Left* Right   Flexion  130 (95) 150 (150)    External Rotation  50 50   Internal Rotation  55 60      Special Tests: none performed     Strength:      Manual Muscle Test out of 5   Flexion Not assessed at initial evaluation/ surgical precautions    Abduction Not assessed at initial evaluation/ surgical precautions    External Rotation Not assessed at initial evaluation/ surgical precautions    Internal Rotaton Not assessed at initial evaluation/ surgical precautions      Functional Tests:    Apley's Internal Rotation: L: L3, R: T12  Apley's External Rotation: L: posterior head--mild compensation at head , R: T3       Body Structures Involved:  1. Bones  2. Joints  3. Muscles Body Functions Affected:  1. Sensory/Pain  2. Neuromusculoskeletal  3. Movement Related Activities and Participation Affected:  1. General Tasks and Demands  2. Mobility  3. Self Care  4. Domestic Life  5. Interpersonal Interactions and Relationships  6. Community, Social and [place]   Number of elements (examined above) that affect the Plan of Care: 4+: HIGH COMPLEXITY   CLINICAL PRESENTATION:   Presentation: Stable and uncomplicated: LOW COMPLEXITY   CLINICAL DECISION MAKING:   Outcome Measure: Tool Used: Disabilities of the Arm, Shoulder and Hand (DASH) Questionnaire - Quick Version  Score:  Initial: 32/55 (48% disability)   8/8/18: 18/55 (16% disability)    Interpretation of Score: The DASH is designed to measure the activities of daily living in person's with upper extremity dysfunction or pain. Each section is scored on a 1-5 scale, 5 representing the greatest disability. The scores of each section are added together for a total score of 55. Score 11 12-19 20-28 29-37 38-45 46-54 55   Modifier CH CI CJ CK CL CM CN     ?  Carrying, Moving, and Handling Objects:     - CURRENT STATUS: CI - 1%-19% impaired, limited or restricted    - GOAL STATUS: CI - 1%-19% impaired, limited or restricted    - D/C STATUS:  ---------------To be determined---------------      Medical Necessity:   · Patient is expected to demonstrate progress in strength and range of motion to increase independence with reaching and lifting. Reason for Services/Other Comments:  · Patient continues to require present interventions due to patient's inability to reach, dress, and lift. Use of outcome tool(s) and clinical judgement create a POC that gives a: Clear prediction of patient's progress: LOW COMPLEXITY            TREATMENT:   (In addition to Assessment/Re-Assessment sessions the following treatments were rendered)  Pre-treatment Symptoms/Complaints:  Pt reports that he continues to see slow, steady gains in his shoulder. He reports that he is about 65% of function of contralateral shoulder. Pain: Initial:   Pain Intensity 1: 0  Post Session:  0/10      Manual Therapy (    Soft Tissue Mobilization Duration  Duration: 25 Minutes): Manual techniques to facilitate improved motion and decreased pain. (Used abbreviations: MET - muscle energy technique; PNF - proprioceptive neuromuscular facilitation; NMR - neuromuscular re-education; a/p - anterior to posterior; p/a - posterior to anterior)   · Manual ROM: flexion, ER, and IR within surgical precautions  · STM to upper trap and levator scapula, subscapularis and latissimus dorsi     Therapeutic Exercise: (35 Minutes):  Exercises per grid below to improve mobility, strength and dynamic movement of shoulder - left to improve functional lifting, reaching and overhead activites. Required minimal visual and verbal cues to promote proper body mechanics. Progressed resistance, range, repetitions and complexity of movement as indicated.        8/28/2018   Activity/Exercise Parameters                 Patient education --   UBE 2 mins each    Supine punches  10 punch to endrange, 2 x 10 elevated table, 10 each diagonal each supine and elevated table    UE ranger  --   ER theraband  Bilateral: 2 sets to fatigue    IR theraband  --    Flexion AAROM  ball rolls into flexion 2 x 10    Horizontal abduction  Elevated table: 2 x 10 red    Seated flexion  --   PNF  --       MedBridge Portal  Treatment/Session Assessment:    · Response to Treatment:  Pt continues to demonstrate steady gains in active motion, but requires manual stretching to maintain passive gains despite regular pulley use at home. · Compliance with Program/Exercises: compliant most of the time. · Recommendations/Intent for next treatment session: \"Next visit will focus on advancements to more challenging activities\".   Total Treatment Duration:  PT Patient Time In/Time Out  Time In: 1300  Time Out: 1400    Malgorzata Liang, PT

## 2018-08-30 ENCOUNTER — HOSPITAL ENCOUNTER (OUTPATIENT)
Dept: PHYSICAL THERAPY | Age: 78
Discharge: HOME | End: 2018-08-30
Payer: MEDICARE

## 2018-08-30 PROCEDURE — 97140 MANUAL THERAPY 1/> REGIONS: CPT

## 2018-08-30 PROCEDURE — 97110 THERAPEUTIC EXERCISES: CPT

## 2018-08-30 NOTE — PROGRESS NOTES
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 5 = High Risk)  MD Orders: evaluate and treat  MEDICAL/REFERRING DIAGNOSIS:  Left shoulder pain [M25.512]   DATE OF ONSET: Date of surgery: 18  REFERRING PHYSICIAN: Junior Rodriguez MD  RETURN PHYSICIAN APPOINTMENT: unknown      INITIAL ASSESSMENT:  Mr. Gage Islas is a 70year old male with left shoulder pain and stiffness following a total shoulder arthroplasty performed by Dr Theadore Harada on 18. He reports a long history of progressively worsening shoulder pain and dysfunction. He presents to PT with decreased passive ROM, active ROM, functional reaching that limits his ability to get dressed, reach, and open a door. Pt will benefit from skilled PT to address above listed impairments and functional limitations to facilitate return to prior level of function. Due to surgical precautions and history of significantly restricted motion and function, patient will likely require extended care. PROBLEM LIST (Impacting functional limitations):  1. Decreased Strength  2. Decreased ADL/Functional Activities  3. Increased Pain  4. Decreased Flexibility/Joint Mobility INTERVENTIONS PLANNED:  1. Cold  2. Electrical Stimulation  3. Heat  4. Home Exercise Program (HEP)  5. Manual Therapy  6. Neuromuscular Re-education/Strengthening  7. Range of Motion (ROM)  8. Therapeutic Activites  9.  Therapeutic Exercise/Strengthening   TREATMENT PLAN:  Effective Dates: 2018 TO 2018 (90 days). Frequency/Duration: 2 times a week for 90 Days  GOALS: (Goals have been discussed and agreed upon with patient.)  Short-Term Functional Goals: Time Frame: 8/13/18  1. Pt will demonstrate passive ROM of flexion to 110 degrees, ER to 50 degrees, and IR to 60 degrees. Achieved 8/8/18   2. Pt will report no limitation in dressing (donning socks). Achieved 8/8/18  Discharge Goals: Time Frame: 9/30/18  1. Pt will demonstrate Apley's ER to back of head to allow independent grooming and dressing without modification. ONGOING, good progress   2. Pt will demonstrate Apley's IR to L5 to allow donning belt and pants without modification. ONGOING, some progress   3. Pt will demonstrate active flexion to 110 degrees to allow reaching overhead. ONGOING, some progress   4. Pt will demonstrate improvement in function with quickDASH to 14% or less disability. ONGOING  Rehabilitation Potential For Stated Goals: Good            The information in this section was collected on 7/2/18 (except where otherwise noted). HISTORY:   History of Present Injury/Illness (Reason for Referral):  7/2/18: Darren Oreilly reports a long history of left shoulder pain that progressively worsened and limited his UE use significantly over the last 2-3 years. He had a left TSA performed by Dr Pankaj Mancera on 6/13/18. He reports improvement in pain from pre-operative to post-operative and reports only discomfort at present. He reports limitation in all UE use due to weakness and post-operative restrictions. He reports difficulty dressing, reaching, opening a door, and lifting due to pain and shoulder precautions. Past Medical History/Comorbidities:   Mr. Adams   has a past medical history of GERD (gastroesophageal reflux disease); Hyperlipidemia (8/5/2016); Hypertension; Insomnia (10/30/2017); Obstructive sleep apnea (10/30/2017); Overweight (BMI 25.0-29.9) (10/30/2017); Primary osteoarthritis of left shoulder (7/14/2017); and Seizure ([facility] [address] (09/10/2017). Mr. Valentín Santana  has a past surgical history that includes hx hip replacement (Bilateral); hx hernia repair; and hx colonoscopy. Social History/Living Environment:     Pt lives in private home with his family. Prior Level of Function/Work/Activity:  Pt reports long, progressive history of shoulder dysfunction with inability to lift his arm for several years prior to surgery. Dominant Side:         RIGHT  Current Medications:       Current Outpatient Prescriptions:     oxyCODONE-acetaminophen (PERCOCET) 5-325 mg per tablet, Take 1 Tab by mouth every four (4) hours as needed. Max Daily Amount: 6 Tabs., Disp: 20 Tab, Rfl: 0    cpap machine kit, by Does Not Apply route., Disp: , Rfl:     pitavastatin calcium (LIVALO) 2 mg tablet, Take 1 Tab by mouth daily. , Disp: 90 Tab, Rfl: 3    ALPRAZolam (XANAX) 0.25 mg tablet, Take 1 Tab by mouth nightly as needed for Anxiety or Sleep. Max Daily Amount: 0.25 mg., Disp: 30 Tab, Rfl: 2    amLODIPine (NORVASC) 5 mg tablet, Take 1 Tab by mouth daily. , Disp: 90 Tab, Rfl: 3    esomeprazole (NEXIUM) 40 mg capsule, Take 1 Cap by mouth daily. , Disp: 90 Cap, Rfl: 3    ezetimibe (ZETIA) 10 mg tablet, Take 1 Tab by mouth every Monday, Wednesday, Friday., Disp: 90 Tab, Rfl: 3    losartan (COZAAR) 50 mg tablet, Take 1 Tab by mouth daily. , Disp: 90 Tab, Rfl: 3    ibuprofen (MOTRIN) 600 mg tablet, Take 1 Tab by mouth every eight (8) hours as needed for Pain. (Patient taking differently: Take 600 mg by mouth as needed for Pain.), Disp: 60 Tab, Rfl: 2    levETIRAcetam (KEPPRA) 500 mg tablet, One in AM and 2 at hs, Disp: 90 Tab, Rfl: 5    levETIRAcetam (KEPPRA) 500 mg tablet, Take 1 Tab by mouth two (2) times a day., Disp: 60 Tab, Rfl: 5    aspirin delayed-release 81 mg tablet, Take 81 mg by mouth daily. , Disp: , Rfl:    Date Last Reviewed:  8/30/2018   Number of Personal Factors/Comorbidities that affect the Plan of Care: 1-2: MODERATE COMPLEXITY   EXAMINATION: Observation/palpation/joint mobility: incision well healed. All active motion with compensatory scapular hiking. Range of Motion:  Passive (active)  Left* Right   Flexion  130 (95) 150 (150)    External Rotation  50 50   Internal Rotation  55 60      Special Tests: none performed     Strength:      Manual Muscle Test out of 5   Flexion Not assessed at initial evaluation/ surgical precautions    Abduction Not assessed at initial evaluation/ surgical precautions    External Rotation Not assessed at initial evaluation/ surgical precautions    Internal Rotaton Not assessed at initial evaluation/ surgical precautions      Functional Tests:    Apley's Internal Rotation: L: L3, R: T12  Apley's External Rotation: L: posterior head--mild compensation at head , R: T3       Body Structures Involved:  1. Bones  2. Joints  3. Muscles Body Functions Affected:  1. Sensory/Pain  2. Neuromusculoskeletal  3. Movement Related Activities and Participation Affected:  1. General Tasks and Demands  2. Mobility  3. Self Care  4. Domestic Life  5. Interpersonal Interactions and Relationships  6. Community, Social and [place]   Number of elements (examined above) that affect the Plan of Care: 4+: HIGH COMPLEXITY   CLINICAL PRESENTATION:   Presentation: Stable and uncomplicated: LOW COMPLEXITY   CLINICAL DECISION MAKING:   Outcome Measure: Tool Used: Disabilities of the Arm, Shoulder and Hand (DASH) Questionnaire - Quick Version  Score:  Initial: 32/55 (48% disability)   8/8/18: 18/55 (16% disability)    Interpretation of Score: The DASH is designed to measure the activities of daily living in person's with upper extremity dysfunction or pain. Each section is scored on a 1-5 scale, 5 representing the greatest disability. The scores of each section are added together for a total score of 55. Score 11 12-19 20-28 29-37 38-45 46-54 55   Modifier CH CI CJ CK CL CM CN     ?  Carrying, Moving, and Handling Objects:     - CURRENT STATUS: CI - 1%-19% impaired, limited or restricted    - GOAL STATUS: CI - 1%-19% impaired, limited or restricted    - D/C STATUS:  ---------------To be determined---------------      Medical Necessity:   · Patient is expected to demonstrate progress in strength and range of motion to increase independence with reaching and lifting. Reason for Services/Other Comments:  · Patient continues to require present interventions due to patient's inability to reach, dress, and lift. Use of outcome tool(s) and clinical judgement create a POC that gives a: Clear prediction of patient's progress: LOW COMPLEXITY            TREATMENT:   (In addition to Assessment/Re-Assessment sessions the following treatments were rendered)  Pre-treatment Symptoms/Complaints:  Pt reports that his shoulder is doing well and that his doctor wants him to continue therapy for another month. Pain: Initial:   Pain Intensity 1: 0  Post Session:  0/10      Manual Therapy (    Soft Tissue Mobilization Duration  Duration: 20 Minutes): Manual techniques to facilitate improved motion and decreased pain. (Used abbreviations: MET - muscle energy technique; PNF - proprioceptive neuromuscular facilitation; NMR - neuromuscular re-education; a/p - anterior to posterior; p/a - posterior to anterior)   · Manual ROM: flexion, ER, and IR within surgical precautions  · STM to upper trap and levator scapula, subscapularis and latissimus dorsi     Therapeutic Exercise: (30 Minutes):  Exercises per grid below to improve mobility, strength and dynamic movement of shoulder - left to improve functional lifting, reaching and overhead activites. Required minimal visual and verbal cues to promote proper body mechanics. Progressed resistance, range, repetitions and complexity of movement as indicated.        8/30/2018   Activity/Exercise Parameters                 Patient education --   UBE 2 mins each    Supine punches  10 punch to endrange, resisted bilateral PNF: 2 x 10 each way with yellow band    UE ranger  2 x 10 flexion and abduction, 2 x 5 each diagonal    ER theraband  Orange cord: 3 x 10    IR theraband  Green cord: 3 x 10    Flexion AAROM  --   Horizontal abduction  Seated: 3 x 5    Seated flexion  --   PNF  --       MedBridge Portal  Treatment/Session Assessment:    · Response to Treatment:  Pt continues to demonstrate good improvement. · Compliance with Program/Exercises: compliant most of the time. · Recommendations/Intent for next treatment session: \"Next visit will focus on advancements to more challenging activities\".   Total Treatment Duration:  PT Patient Time In/Time Out  Time In: 1300  Time Out: [place]

## 2018-09-04 ENCOUNTER — APPOINTMENT (OUTPATIENT)
Dept: PHYSICAL THERAPY | Age: 78
End: 2018-09-04
Payer: MEDICARE

## 2018-09-07 ENCOUNTER — HOSPITAL ENCOUNTER (OUTPATIENT)
Dept: PHYSICAL THERAPY | Age: 78
Discharge: HOME | End: 2018-09-07
Payer: MEDICARE

## 2018-09-07 PROCEDURE — 97140 MANUAL THERAPY 1/> REGIONS: CPT

## 2018-09-07 PROCEDURE — 97110 THERAPEUTIC EXERCISES: CPT

## 2018-09-07 NOTE — PROGRESS NOTES
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 5 = High Risk)  MD Orders: evaluate and treat  MEDICAL/REFERRING DIAGNOSIS:  Left shoulder pain [M25.512]   DATE OF ONSET: Date of surgery: 18  REFERRING PHYSICIAN: J Carlos Abernathy MD  RETURN PHYSICIAN APPOINTMENT: unknown      INITIAL ASSESSMENT:  Mr. Hakan Peters is a 70year old male with left shoulder pain and stiffness following a total shoulder arthroplasty performed by Dr Asuncion Barrios on 18. He reports a long history of progressively worsening shoulder pain and dysfunction. He presents to PT with decreased passive ROM, active ROM, functional reaching that limits his ability to get dressed, reach, and open a door. Pt will benefit from skilled PT to address above listed impairments and functional limitations to facilitate return to prior level of function. Due to surgical precautions and history of significantly restricted motion and function, patient will likely require extended care. PROBLEM LIST (Impacting functional limitations):  1. Decreased Strength  2. Decreased ADL/Functional Activities  3. Increased Pain  4. Decreased Flexibility/Joint Mobility INTERVENTIONS PLANNED:  1. Cold  2. Electrical Stimulation  3. Heat  4. Home Exercise Program (HEP)  5. Manual Therapy  6. Neuromuscular Re-education/Strengthening  7. Range of Motion (ROM)  8. Therapeutic Activites  9.  Therapeutic Exercise/Strengthening   TREATMENT PLAN:  Effective Dates: 2018 TO 2018 (90 days). Frequency/Duration: 2 times a week for 90 Days  GOALS: (Goals have been discussed and agreed upon with patient.)  Short-Term Functional Goals: Time Frame: 8/13/18  1. Pt will demonstrate passive ROM of flexion to 110 degrees, ER to 50 degrees, and IR to 60 degrees. Achieved 8/8/18   2. Pt will report no limitation in dressing (donning socks). Achieved 8/8/18  Discharge Goals: Time Frame: 9/30/18  1. Pt will demonstrate Apley's ER to back of head to allow independent grooming and dressing without modification. ONGOING, good progress   2. Pt will demonstrate Apley's IR to L5 to allow donning belt and pants without modification. ONGOING, some progress   3. Pt will demonstrate active flexion to 110 degrees to allow reaching overhead. ONGOING, some progress   4. Pt will demonstrate improvement in function with quickDASH to 14% or less disability. ONGOING  Rehabilitation Potential For Stated Goals: Good            The information in this section was collected on 7/2/18 (except where otherwise noted). HISTORY:   History of Present Injury/Illness (Reason for Referral):  7/2/18: Rosey De La Fuente reports a long history of left shoulder pain that progressively worsened and limited his UE use significantly over the last 2-3 years. He had a left TSA performed by Dr Ame Turner on 6/13/18. He reports improvement in pain from pre-operative to post-operative and reports only discomfort at present. He reports limitation in all UE use due to weakness and post-operative restrictions. He reports difficulty dressing, reaching, opening a door, and lifting due to pain and shoulder precautions. Past Medical History/Comorbidities:   Mr. Man Barnhart  has a past medical history of GERD (gastroesophageal reflux disease); Hyperlipidemia (8/5/2016); Hypertension; Insomnia (10/30/2017); Obstructive sleep apnea (10/30/2017); Overweight (BMI 25.0-29.9) (10/30/2017); Primary osteoarthritis of left shoulder (7/14/2017); and Seizure ([facility] [address] (09/10/2017). Mr. Claudia Pillai  has a past surgical history that includes hx hip replacement (Bilateral); hx hernia repair; and hx colonoscopy. Social History/Living Environment:     Pt lives in private home with his family. Prior Level of Function/Work/Activity:  Pt reports long, progressive history of shoulder dysfunction with inability to lift his arm for several years prior to surgery. Dominant Side:         RIGHT  Current Medications:       Current Outpatient Prescriptions:     oxyCODONE-acetaminophen (PERCOCET) 5-325 mg per tablet, Take 1 Tab by mouth every four (4) hours as needed. Max Daily Amount: 6 Tabs., Disp: 20 Tab, Rfl: 0    cpap machine kit, by Does Not Apply route., Disp: , Rfl:     pitavastatin calcium (LIVALO) 2 mg tablet, Take 1 Tab by mouth daily. , Disp: 90 Tab, Rfl: 3    ALPRAZolam (XANAX) 0.25 mg tablet, Take 1 Tab by mouth nightly as needed for Anxiety or Sleep. Max Daily Amount: 0.25 mg., Disp: 30 Tab, Rfl: 2    amLODIPine (NORVASC) 5 mg tablet, Take 1 Tab by mouth daily. , Disp: 90 Tab, Rfl: 3    esomeprazole (NEXIUM) 40 mg capsule, Take 1 Cap by mouth daily. , Disp: 90 Cap, Rfl: 3    ezetimibe (ZETIA) 10 mg tablet, Take 1 Tab by mouth every Monday, Wednesday, Friday., Disp: 90 Tab, Rfl: 3    losartan (COZAAR) 50 mg tablet, Take 1 Tab by mouth daily. , Disp: 90 Tab, Rfl: 3    ibuprofen (MOTRIN) 600 mg tablet, Take 1 Tab by mouth every eight (8) hours as needed for Pain. (Patient taking differently: Take 600 mg by mouth as needed for Pain.), Disp: 60 Tab, Rfl: 2    levETIRAcetam (KEPPRA) 500 mg tablet, One in AM and 2 at hs, Disp: 90 Tab, Rfl: 5    levETIRAcetam (KEPPRA) 500 mg tablet, Take 1 Tab by mouth two (2) times a day., Disp: 60 Tab, Rfl: 5    aspirin delayed-release 81 mg tablet, Take 81 mg by mouth daily. , Disp: , Rfl:    Date Last Reviewed:  9/7/2018   Number of Personal Factors/Comorbidities that affect the Plan of Care: 1-2: MODERATE COMPLEXITY   EXAMINATION: Observation/palpation/joint mobility: incision well healed. All active motion with compensatory scapular hiking. Range of Motion:  Passive (active)  Left* Right   Flexion  130 (95) 150 (150)    External Rotation  50 50   Internal Rotation  55 60      Special Tests: none performed     Strength:      Manual Muscle Test out of 5   Flexion Not assessed at initial evaluation/ surgical precautions    Abduction Not assessed at initial evaluation/ surgical precautions    External Rotation Not assessed at initial evaluation/ surgical precautions    Internal Rotaton Not assessed at initial evaluation/ surgical precautions      Functional Tests:    Apley's Internal Rotation: L: L3, R: T12  Apley's External Rotation: L: posterior head--mild compensation at head , R: T3       Body Structures Involved:  1. Bones  2. Joints  3. Muscles Body Functions Affected:  1. Sensory/Pain  2. Neuromusculoskeletal  3. Movement Related Activities and Participation Affected:  1. General Tasks and Demands  2. Mobility  3. Self Care  4. Domestic Life  5. Interpersonal Interactions and Relationships  6. Community, Social and [place]   Number of elements (examined above) that affect the Plan of Care: 4+: HIGH COMPLEXITY   CLINICAL PRESENTATION:   Presentation: Stable and uncomplicated: LOW COMPLEXITY   CLINICAL DECISION MAKING:   Outcome Measure: Tool Used: Disabilities of the Arm, Shoulder and Hand (DASH) Questionnaire - Quick Version  Score:  Initial: 32/55 (48% disability)   8/8/18: 18/55 (16% disability)    Interpretation of Score: The DASH is designed to measure the activities of daily living in person's with upper extremity dysfunction or pain. Each section is scored on a 1-5 scale, 5 representing the greatest disability. The scores of each section are added together for a total score of 55. Score 11 12-19 20-28 29-37 38-45 46-54 55   Modifier CH CI CJ CK CL CM CN     ?  Carrying, Moving, and Handling Objects:     - CURRENT STATUS: CI - 1%-19% impaired, limited or restricted    - GOAL STATUS: CI - 1%-19% impaired, limited or restricted    - D/C STATUS:  ---------------To be determined---------------      Medical Necessity:   · Patient is expected to demonstrate progress in strength and range of motion to increase independence with reaching and lifting. Reason for Services/Other Comments:  · Patient continues to require present interventions due to patient's inability to reach, dress, and lift. Use of outcome tool(s) and clinical judgement create a POC that gives a: Clear prediction of patient's progress: LOW COMPLEXITY            TREATMENT:   (In addition to Assessment/Re-Assessment sessions the following treatments were rendered)  Pre-treatment Symptoms/Complaints:  Pt reports that he is doing well now, but that he had a few days with mild shoulder pain and that he thinks he strained a muscle. Pain: Initial:   Pain Intensity 1: 0  Post Session:  0/10      Manual Therapy (    Soft Tissue Mobilization Duration  Duration: 15 Minutes): Manual techniques to facilitate improved motion and decreased pain. (Used abbreviations: MET - muscle energy technique; PNF - proprioceptive neuromuscular facilitation; NMR - neuromuscular re-education; a/p - anterior to posterior; p/a - posterior to anterior)   · Manual ROM: flexion, ER, and IR within surgical precautions  · STM to upper trap and levator scapula, subscapularis and latissimus dorsi     Therapeutic Exercise: (40 Minutes):  Exercises per grid below to improve mobility, strength and dynamic movement of shoulder - left to improve functional lifting, reaching and overhead activites. Required minimal visual and verbal cues to promote proper body mechanics. Progressed resistance, range, repetitions and complexity of movement as indicated.        9/7/2018   Activity/Exercise Parameters                 Patient education --   UBE 2 mins each    Supine punches  10 punch to endrange, resisted 90 to endrange with red band 3 x 10     UE ranger  2 x 10 flexion and abduction, 2 x 5 each diagonal    ER theraband  Bilateral with red: 3 x 10, sidelying ER with 1 lb 2 x 10, sidelying horizontal abduction 2 x 10 no weight    IR theraband  --   Flexion AAROM  Ball on wall: 2 x 10    Horizontal abduction  --   Seated flexion  Abduction into red band: 2 x 10    PNF  --       [facility] Portal  Treatment/Session Assessment:    · Response to Treatment:  Pt continues to progress well. He will be seen 1 visit next week and then travel to [place] for 3 weeks and resume therapy when he returns. · Compliance with Program/Exercises: compliant most of the time. · Recommendations/Intent for next treatment session: \"Next visit will focus on advancements to more challenging activities\".   Total Treatment Duration:  PT Patient Time In/Time Out  Time In: 1525  Time Out: 3028 Ermias Carr, PT

## 2018-09-11 ENCOUNTER — HOSPITAL ENCOUNTER (OUTPATIENT)
Dept: PHYSICAL THERAPY | Age: 78
Discharge: HOME | End: 2018-09-11
Payer: MEDICARE

## 2018-09-11 PROCEDURE — 97140 MANUAL THERAPY 1/> REGIONS: CPT

## 2018-09-11 PROCEDURE — 97110 THERAPEUTIC EXERCISES: CPT

## 2018-09-11 NOTE — PROGRESS NOTES
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 5 = High Risk)  MD Orders: evaluate and treat  MEDICAL/REFERRING DIAGNOSIS:  Left shoulder pain [M25.512]   DATE OF ONSET: Date of surgery: 18  REFERRING PHYSICIAN: Dianna Forte MD  RETURN PHYSICIAN APPOINTMENT: unknown      INITIAL ASSESSMENT:  Mr. Tosha Newman is a 70year old male with left shoulder pain and stiffness following a total shoulder arthroplasty performed by Dr Samy Melgar on 18. He reports a long history of progressively worsening shoulder pain and dysfunction. He presents to PT with decreased passive ROM, active ROM, functional reaching that limits his ability to get dressed, reach, and open a door. Pt will benefit from skilled PT to address above listed impairments and functional limitations to facilitate return to prior level of function. Due to surgical precautions and history of significantly restricted motion and function, patient will likely require extended care. PROBLEM LIST (Impacting functional limitations):  1. Decreased Strength  2. Decreased ADL/Functional Activities  3. Increased Pain  4. Decreased Flexibility/Joint Mobility INTERVENTIONS PLANNED:  1. Cold  2. Electrical Stimulation  3. Heat  4. Home Exercise Program (HEP)  5. Manual Therapy  6. Neuromuscular Re-education/Strengthening  7. Range of Motion (ROM)  8. Therapeutic Activites  9.  Therapeutic Exercise/Strengthening   TREATMENT PLAN:  Effective Dates: 2018 TO 2018 (90 days). Frequency/Duration: 2 times a week for 90 Days  GOALS: (Goals have been discussed and agreed upon with patient.)  Short-Term Functional Goals: Time Frame: 8/13/18  1. Pt will demonstrate passive ROM of flexion to 110 degrees, ER to 50 degrees, and IR to 60 degrees. Achieved 8/8/18   2. Pt will report no limitation in dressing (donning socks). Achieved 8/8/18  Discharge Goals: Time Frame: 9/30/18  1. Pt will demonstrate Apley's ER to back of head to allow independent grooming and dressing without modification. ONGOING, good progress   2. Pt will demonstrate Apley's IR to L5 to allow donning belt and pants without modification. ONGOING, some progress   3. Pt will demonstrate active flexion to 110 degrees to allow reaching overhead. ONGOING, some progress   4. Pt will demonstrate improvement in function with quickDASH to 14% or less disability. ONGOING  Rehabilitation Potential For Stated Goals: Good            The information in this section was collected on 7/2/18 (except where otherwise noted). HISTORY:   History of Present Injury/Illness (Reason for Referral):  7/2/18: Reji Patel reports a long history of left shoulder pain that progressively worsened and limited his UE use significantly over the last 2-3 years. He had a left TSA performed by Dr Aby Garcia on 6/13/18. He reports improvement in pain from pre-operative to post-operative and reports only discomfort at present. He reports limitation in all UE use due to weakness and post-operative restrictions. He reports difficulty dressing, reaching, opening a door, and lifting due to pain and shoulder precautions. Past Medical History/Comorbidities:   Mr. Deric Stockton  has a past medical history of GERD (gastroesophageal reflux disease); Hyperlipidemia (8/5/2016); Hypertension; Insomnia (10/30/2017); Obstructive sleep apnea (10/30/2017); Overweight (BMI 25.0-29.9) (10/30/2017); Primary osteoarthritis of left shoulder (7/14/2017); and Seizure ([facility] [address] (09/10/2017). Mr. Claudia Pillai  has a past surgical history that includes hx hip replacement (Bilateral); hx hernia repair; and hx colonoscopy. Social History/Living Environment:     Pt lives in private home with his family. Prior Level of Function/Work/Activity:  Pt reports long, progressive history of shoulder dysfunction with inability to lift his arm for several years prior to surgery. Dominant Side:         RIGHT  Current Medications:       Current Outpatient Prescriptions:     ALPRAZolam (XANAX) 0.25 mg tablet, Take 1 Tab by mouth nightly as needed for Anxiety or Sleep. Max Daily Amount: 0.25 mg., Disp: 30 Tab, Rfl: 2    oxyCODONE-acetaminophen (PERCOCET) 5-325 mg per tablet, Take 1 Tab by mouth every four (4) hours as needed. Max Daily Amount: 6 Tabs., Disp: 20 Tab, Rfl: 0    cpap machine kit, by Does Not Apply route., Disp: , Rfl:     pitavastatin calcium (LIVALO) 2 mg tablet, Take 1 Tab by mouth daily. , Disp: 90 Tab, Rfl: 3    amLODIPine (NORVASC) 5 mg tablet, Take 1 Tab by mouth daily. , Disp: 90 Tab, Rfl: 3    esomeprazole (NEXIUM) 40 mg capsule, Take 1 Cap by mouth daily. , Disp: 90 Cap, Rfl: 3    ezetimibe (ZETIA) 10 mg tablet, Take 1 Tab by mouth every Monday, Wednesday, Friday., Disp: 90 Tab, Rfl: 3    losartan (COZAAR) 50 mg tablet, Take 1 Tab by mouth daily. , Disp: 90 Tab, Rfl: 3    ibuprofen (MOTRIN) 600 mg tablet, Take 1 Tab by mouth every eight (8) hours as needed for Pain. (Patient taking differently: Take 600 mg by mouth as needed for Pain.), Disp: 60 Tab, Rfl: 2    levETIRAcetam (KEPPRA) 500 mg tablet, One in AM and 2 at hs, Disp: 90 Tab, Rfl: 5    levETIRAcetam (KEPPRA) 500 mg tablet, Take 1 Tab by mouth two (2) times a day., Disp: 60 Tab, Rfl: 5    aspirin delayed-release 81 mg tablet, Take 81 mg by mouth daily. , Disp: , Rfl:    Date Last Reviewed:  9/11/2018   Number of Personal Factors/Comorbidities that affect the Plan of Care: 1-2: MODERATE COMPLEXITY   EXAMINATION: Observation/palpation/joint mobility: incision well healed. All active motion with compensatory scapular hiking. Range of Motion:  Passive (active)  Left* Right   Flexion  131 (95) 150 (150)    External Rotation  50 50   Internal Rotation  55 60      Special Tests: none performed     Strength:      Manual Muscle Test out of 5   Flexion Not assessed at initial evaluation/ surgical precautions    Abduction Not assessed at initial evaluation/ surgical precautions    External Rotation Not assessed at initial evaluation/ surgical precautions    Internal Rotaton Not assessed at initial evaluation/ surgical precautions      Functional Tests:    Apley's Internal Rotation: L: L3, R: T12  Apley's External Rotation: L: posterior head--mild compensation at head , R: T3       Body Structures Involved:  1. Bones  2. Joints  3. Muscles Body Functions Affected:  1. Sensory/Pain  2. Neuromusculoskeletal  3. Movement Related Activities and Participation Affected:  1. General Tasks and Demands  2. Mobility  3. Self Care  4. Domestic Life  5. Interpersonal Interactions and Relationships  6. Community, Social and [place]   Number of elements (examined above) that affect the Plan of Care: 4+: HIGH COMPLEXITY   CLINICAL PRESENTATION:   Presentation: Stable and uncomplicated: LOW COMPLEXITY   CLINICAL DECISION MAKING:   Outcome Measure: Tool Used: Disabilities of the Arm, Shoulder and Hand (DASH) Questionnaire - Quick Version  Score:  Initial: 32/55 (48% disability)   8/8/18: 18/55 (16% disability)    Interpretation of Score: The DASH is designed to measure the activities of daily living in person's with upper extremity dysfunction or pain. Each section is scored on a 1-5 scale, 5 representing the greatest disability. The scores of each section are added together for a total score of 55. Score 11 12-19 20-28 29-37 38-45 46-54 55   Modifier CH CI CJ CK CL CM CN     ?  Carrying, Moving, and Handling Objects:     - CURRENT STATUS: CI - 1%-19% impaired, limited or restricted    - GOAL STATUS: CI - 1%-19% impaired, limited or restricted    - D/C STATUS:  ---------------To be determined---------------      Medical Necessity:   · Patient is expected to demonstrate progress in strength and range of motion to increase independence with reaching and lifting. Reason for Services/Other Comments:  · Patient continues to require present interventions due to patient's inability to reach, dress, and lift. Use of outcome tool(s) and clinical judgement create a POC that gives a: Clear prediction of patient's progress: LOW COMPLEXITY            TREATMENT:   (In addition to Assessment/Re-Assessment sessions the following treatments were rendered)  Pre-treatment Symptoms/Complaints:  Pt reports that his shoulder feels great today. Pain: Initial:   Pain Intensity 1: 0  Post Session:  0/10      Manual Therapy (    Soft Tissue Mobilization Duration  Duration: 15 Minutes): Manual techniques to facilitate improved motion and decreased pain. (Used abbreviations: MET - muscle energy technique; PNF - proprioceptive neuromuscular facilitation; NMR - neuromuscular re-education; a/p - anterior to posterior; p/a - posterior to anterior)   · Manual ROM: flexion, ER, and IR within surgical precautions  · STM to upper trap and levator scapula, subscapularis and latissimus dorsi     Therapeutic Exercise: (40 Minutes):  Exercises per grid below to improve mobility, strength and dynamic movement of shoulder - left to improve functional lifting, reaching and overhead activites. Required minimal visual and verbal cues to promote proper body mechanics. Progressed resistance, range, repetitions and complexity of movement as indicated.        9/11/2018   Activity/Exercise Parameters                 Patient education --   UBE 2 mins each    Supine punches  10 punch to endrange supine and 20 elevated table    UE ranger  --    ER theraband Bilateral with red: 3 x 10   IR theraband  --   Flexion AAROM  Ball on wall: 2 x 10, foam roll on table with endrange stretch    Horizontal abduction  --   Seated flexion  Abduction into red band: 2 x 10    PNF  2 x 10 each supine and elevated table, ball taps to wall backwards 2 x 5 each       [facility] Portal  Treatment/Session Assessment:    · Response to Treatment:  Reviewed HEP to perform while traveling. Plan to resume therapy in 3 weeks when he returns from [place]. · Compliance with Program/Exercises: compliant most of the time. · Recommendations/Intent for next treatment session: \"Next visit will focus on advancements to more challenging activities\".   Total Treatment Duration:  PT Patient Time In/Time Out  Time In: 1400  Time Out: 1455    Georgiana Stratton PT

## 2018-09-13 ENCOUNTER — APPOINTMENT (OUTPATIENT)
Dept: PHYSICAL THERAPY | Age: 78
End: 2018-09-13
Payer: MEDICARE

## 2018-09-18 ENCOUNTER — APPOINTMENT (OUTPATIENT)
Dept: PHYSICAL THERAPY | Age: 78
End: 2018-09-18
Payer: MEDICARE

## 2018-09-20 ENCOUNTER — APPOINTMENT (OUTPATIENT)
Dept: PHYSICAL THERAPY | Age: 78
End: 2018-09-20
Payer: MEDICARE

## 2018-09-25 ENCOUNTER — APPOINTMENT (OUTPATIENT)
Dept: PHYSICAL THERAPY | Age: 78
End: 2018-09-25
Payer: MEDICARE

## 2018-09-27 ENCOUNTER — APPOINTMENT (OUTPATIENT)
Dept: PHYSICAL THERAPY | Age: 78
End: 2018-09-27
Payer: MEDICARE

## 2018-10-05 ENCOUNTER — HOSPITAL ENCOUNTER (OUTPATIENT)
Dept: ULTRASOUND IMAGING | Age: 78
Discharge: HOME | End: 2018-10-05
Attending: INTERNAL MEDICINE
Payer: MEDICARE

## 2018-10-05 DIAGNOSIS — R60.9 EDEMA, UNSPECIFIED TYPE: ICD-10-CM

## 2018-10-05 PROCEDURE — 93970 EXTREMITY STUDY: CPT

## 2018-10-09 ENCOUNTER — HOSPITAL ENCOUNTER (OUTPATIENT)
Dept: PHYSICAL THERAPY | Age: 78
Discharge: HOME | End: 2018-10-09
Payer: MEDICARE

## 2018-10-09 PROCEDURE — G8984 CARRY CURRENT STATUS: HCPCS

## 2018-10-09 PROCEDURE — 97140 MANUAL THERAPY 1/> REGIONS: CPT

## 2018-10-09 PROCEDURE — 97164 PT RE-EVAL EST PLAN CARE: CPT

## 2018-10-09 PROCEDURE — 97110 THERAPEUTIC EXERCISES: CPT

## 2018-10-09 PROCEDURE — G8985 CARRY GOAL STATUS: HCPCS

## 2018-10-09 NOTE — THERAPY RECERTIFICATION
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 5 = High Risk)  MD Orders: evaluate and treat  MEDICAL/REFERRING DIAGNOSIS:  Left shoulder pain [M25.512]   DATE OF ONSET: Date of surgery: 18  REFERRING PHYSICIAN: Elsa Luke MD  RETURN PHYSICIAN APPOINTMENT: unknown      INITIAL ASSESSMENT:  Mr. Petra Galarza is a 70year old male with left shoulder pain and stiffness following a total shoulder arthroplasty performed by Dr Carmen Miller on 18. He reports a long history of progressively worsening shoulder pain and dysfunction. He presents to PT with decreased passive ROM, active ROM, functional reaching that limits his ability to get dressed, reach, and open a door. Pt will benefit from skilled PT to address above listed impairments and functional limitations to facilitate return to prior level of function. Due to surgical precautions and history of significantly restricted motion and function, patient will likely require extended care. 10/9/18: Mr Petra Galarza returns to therapy after a 3 week break due to [place] travel. He has demonstrated excellent gains since initial evaluation, but reports a mild injury/pain since returning from his trip that he attributes to a strain from lifting luggage. This pain is partially responsible for increased quickDASH score. He has maintained passive ROM and improved in active ROM while away from therapy.   Plan to continue therapy for 4 weeks to continue to facilitate full AROM and strength to improve function. PROBLEM LIST (Impacting functional limitations):  1. Decreased Strength  2. Decreased ADL/Functional Activities  3. Increased Pain  4. Decreased Flexibility/Joint Mobility INTERVENTIONS PLANNED:  1. Cold  2. Electrical Stimulation  3. Heat  4. Home Exercise Program (HEP)  5. Manual Therapy  6. Neuromuscular Re-education/Strengthening  7. Range of Motion (ROM)  8. Therapeutic Activites  9. Therapeutic Exercise/Strengthening   TREATMENT PLAN:  Effective Dates: 10/9/2018 TO 11/6/2018. Frequency/Duration: 2 times a week for 4 weeks. GOALS: (Goals have been discussed and agreed upon with patient.)  Short-Term Functional Goals: Time Frame: 8/13/18  1. Pt will demonstrate passive ROM of flexion to 110 degrees, ER to 50 degrees, and IR to 60 degrees. Achieved 8/8/18   2. Pt will report no limitation in dressing (donning socks). Achieved 8/8/18  Discharge Goals: Time Frame: 11/6/18  1. Pt will demonstrate Apley's ER to back of head to allow independent grooming and dressing without modification. Achieved 10/9/18   2. Pt will demonstrate Apley's IR to L5 to allow donning belt and pants without modification. ONGOING, some progress   3. Pt will demonstrate active flexion to 110 degrees to allow reaching overhead. ONGOING, some progress   4. Pt will demonstrate improvement in function with quickDASH to 14% or less disability. ONGOING  Rehabilitation Potential For Stated Goals: Good            The information in this section was collected on 7/2/18 (except where otherwise noted). HISTORY:   History of Present Injury/Illness (Reason for Referral):  7/2/18: Sam Gibbons reports a long history of left shoulder pain that progressively worsened and limited his UE use significantly over the last 2-3 years. He had a left TSA performed by Dr Dottie Sanchez on 6/13/18. He reports improvement in pain from pre-operative to post-operative and reports only discomfort at present.   He reports limitation in all UE use due to weakness and post-operative restrictions. He reports difficulty dressing, reaching, opening a door, and lifting due to pain and shoulder precautions. Past Medical History/Comorbidities:   Mr. 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 Mr. Lesly Najera  has a past surgical history that includes hx hip replacement (Bilateral); hx hernia repair; and hx colonoscopy. Social History/Living Environment:     Pt lives in private home with his family. Prior Level of Function/Work/Activity:  Pt reports long, progressive history of shoulder dysfunction with inability to lift his arm for several years prior to surgery. Dominant Side:         RIGHT  Current Medications:       Current Outpatient Prescriptions:     ALPRAZolam (XANAX) 0.25 mg tablet, Take 1 Tab by mouth nightly as needed for Anxiety or Sleep. Max Daily Amount: 0.25 mg., Disp: 30 Tab, Rfl: 2    oxyCODONE-acetaminophen (PERCOCET) 5-325 mg per tablet, Take 1 Tab by mouth every four (4) hours as needed. Max Daily Amount: 6 Tabs., Disp: 20 Tab, Rfl: 0    cpap machine kit, by Does Not Apply route., Disp: , Rfl:     pitavastatin calcium (LIVALO) 2 mg tablet, Take 1 Tab by mouth daily. , Disp: 90 Tab, Rfl: 3    amLODIPine (NORVASC) 5 mg tablet, Take 1 Tab by mouth daily. , Disp: 90 Tab, Rfl: 3    esomeprazole (NEXIUM) 40 mg capsule, Take 1 Cap by mouth daily. , Disp: 90 Cap, Rfl: 3    ezetimibe (ZETIA) 10 mg tablet, Take 1 Tab by mouth every Monday, Wednesday, Friday., Disp: 90 Tab, Rfl: 3    losartan (COZAAR) 50 mg tablet, Take 1 Tab by mouth daily. , Disp: 90 Tab, Rfl: 3    ibuprofen (MOTRIN) 600 mg tablet, Take 1 Tab by mouth every eight (8) hours as needed for Pain. (Patient taking differently: Take 600 mg by mouth as needed for Pain.), Disp: 60 Tab, Rfl: 2    levETIRAcetam (KEPPRA) 500 mg tablet, One in AM and 2 at hs, Disp: 90 Tab, Rfl: 5    levETIRAcetam (KEPPRA) 500 mg tablet, Take 1 Tab by mouth two (2) times a day., Disp: 60 Tab, Rfl: 5    aspirin delayed-release 81 mg tablet, Take 81 mg by mouth daily. , Disp: , Rfl:    Date Last Reviewed:  10/9/2018   Number of Personal Factors/Comorbidities that affect the Plan of Care: 1-2: MODERATE COMPLEXITY   EXAMINATION:   Observation/palpation/joint mobility: incision well healed. All active motion with compensatory scapular hiking. Range of Motion:  Passive (active)  Left* Right   Flexion  131 (100) 150 (150)    External Rotation  50 50   Internal Rotation  55 60      Special Tests: none performed     Strength:      Manual Muscle Test out of 5   Flexion Not assessed at initial evaluation/ surgical precautions    Abduction Not assessed at initial evaluation/ surgical precautions    External Rotation Not assessed at initial evaluation/ surgical precautions    Internal Rotaton Not assessed at initial evaluation/ surgical precautions      Functional Tests:    Apley's Internal Rotation: L: L3, R: T12  Apley's External Rotation: L: posterior head--mild compensation at head , R: T3       Body Structures Involved:  1. Bones  2. Joints  3. Muscles Body Functions Affected:  1. Sensory/Pain  2. Neuromusculoskeletal  3. Movement Related Activities and Participation Affected:  1. General Tasks and Demands  2. Mobility  3. Self Care  4. Domestic Life  5. Interpersonal Interactions and Relationships  6. Community, Social and [place]   Number of elements (examined above) that affect the Plan of Care: 4+: HIGH COMPLEXITY   CLINICAL PRESENTATION:   Presentation: Stable and uncomplicated: LOW COMPLEXITY   CLINICAL DECISION MAKING:   Outcome Measure:    Tool Used: Disabilities of the Arm, Shoulder and Hand (DASH) Questionnaire - Quick Version  Score:  Initial: 32/55 (48% disability)   8/8/18: 18/55 (16% disability)  10/9/18: 24/55 (30% disability)    Interpretation of Score: The DASH is designed to measure the activities of daily living in person's with upper extremity dysfunction or pain. Each section is scored on a 1-5 scale, 5 representing the greatest disability. The scores of each section are added together for a total score of 55. Score 11 12-19 20-28 29-37 38-45 46-54 55   Modifier CH CI CJ CK CL CM CN     ? Carrying, Moving, and Handling Objects:     - CURRENT STATUS: CJ - 20%-39% impaired, limited or restricted    - GOAL STATUS: CI - 1%-19% impaired, limited or restricted    - D/C STATUS:  ---------------To be determined---------------      Medical Necessity:   · Patient is expected to demonstrate progress in strength and range of motion to increase independence with reaching and lifting. Reason for Services/Other Comments:  · Patient continues to require present interventions due to patient's inability to reach, dress, and lift. Use of outcome tool(s) and clinical judgement create a POC that gives a: Clear prediction of patient's progress: LOW COMPLEXITY            TREATMENT:   (In addition to Assessment/Re-Assessment sessions the following treatments were rendered)  Pre-treatment Symptoms/Complaints:  Pt reports that he has had some shoulder pain with reaching across his body since his trip and that he feels like he may have tweaked his shoulder. Pain: Initial:   Pain Intensity 1: 0  Post Session:  0/10      Manual Therapy (    Soft Tissue Mobilization Duration  Duration: 15 Minutes): Manual techniques to facilitate improved motion and decreased pain.  (Used abbreviations: MET - muscle energy technique; PNF - proprioceptive neuromuscular facilitation; NMR - neuromuscular re-education; a/p - anterior to posterior; p/a - posterior to anterior)   · Manual ROM: flexion, ER, and IR within surgical precautions  · STM to upper trap and levator scapula, subscapularis and latissimus dorsi     Therapeutic Exercise: (30 Minutes):  Exercises per grid below to improve mobility, strength and dynamic movement of shoulder - left to improve functional lifting, reaching and overhead activites. Required minimal visual and verbal cues to promote proper body mechanics. Progressed resistance, range, repetitions and complexity of movement as indicated. 10/9/2018   Activity/Exercise Parameters                 Patient education --   UBE 2 mins each    Supine punches  10 punch to endrange supine and 20 elevated table    UE ranger  Flexion: 30 reps    ER theraband  Bilateral with red: 3 x 10   IR theraband  --   Flexion AAROM   foam roll on table with endrange stretch    Horizontal abduction  --   Seated flexion  Abduction into red band: 2 x 10    PNF  2 x 10 each supine and elevated table       MedBridge Portal  Treatment/Session Assessment:    · Response to Treatment:  Pt demonstrates steady progress and will continue to benefit from therapy to facilitate improved active motion and functional reaching. · Compliance with Program/Exercises: compliant most of the time. · Recommendations/Intent for next treatment session: \"Next visit will focus on advancements to more challenging activities\".   Total Treatment Duration:  PT Patient Time In/Time Out  Time In: 1300  Time Out: 1400    Sindhu Sibley PT

## 2018-10-11 ENCOUNTER — APPOINTMENT (OUTPATIENT)
Dept: PHYSICAL THERAPY | Age: 78
End: 2018-10-11
Payer: MEDICARE

## 2018-10-16 ENCOUNTER — APPOINTMENT (OUTPATIENT)
Dept: PHYSICAL THERAPY | Age: 78
End: 2018-10-16
Payer: MEDICARE

## 2018-10-23 ENCOUNTER — HOSPITAL ENCOUNTER (OUTPATIENT)
Dept: PHYSICAL THERAPY | Age: 78
Discharge: HOME | End: 2018-10-23
Payer: MEDICARE

## 2018-10-23 PROCEDURE — 97110 THERAPEUTIC EXERCISES: CPT

## 2018-10-23 PROCEDURE — 97140 MANUAL THERAPY 1/> REGIONS: CPT

## 2018-10-23 NOTE — PROGRESS NOTES
Rogerio Peabody  : 1940  [place]  Secondary: [address] at 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 5 = High Risk)  MD Orders: evaluate and treat  MEDICAL/REFERRING DIAGNOSIS:  Left shoulder pain [M25.512]   DATE OF ONSET: Date of surgery: 18  REFERRING PHYSICIAN: Anayeli Smith MD  RETURN PHYSICIAN APPOINTMENT: unknown      INITIAL ASSESSMENT:  Mr. Kaushik Cleaning is a 70year old male with left shoulder pain and stiffness following a total shoulder arthroplasty performed by Dr Dale aKy on 18. He reports a long history of progressively worsening shoulder pain and dysfunction. He presents to PT with decreased passive ROM, active ROM, functional reaching that limits his ability to get dressed, reach, and open a door. Pt will benefit from skilled PT to address above listed impairments and functional limitations to facilitate return to prior level of function. Due to surgical precautions and history of significantly restricted motion and function, patient will likely require extended care. 10/9/18: Mr Kaushik Cleaning returns to therapy after a 3 week break due to overseas travel. He has demonstrated excellent gains since initial evaluation, but reports a mild injury/pain since returning from his trip that he attributes to a strain from lifting luggage. This pain is partially responsible for increased quickDASH score. He has maintained passive ROM and improved in active ROM while away from therapy.   Plan to continue therapy for 4 weeks to continue to facilitate full AROM and strength to improve function. PROBLEM LIST (Impacting functional limitations):  1. Decreased Strength  2. Decreased ADL/Functional Activities  3. Increased Pain  4. Decreased Flexibility/Joint Mobility INTERVENTIONS PLANNED:  1. Cold  2. Electrical Stimulation  3. Heat  4. Home Exercise Program (HEP)  5. Manual Therapy  6. Neuromuscular Re-education/Strengthening  7. Range of Motion (ROM)  8. Therapeutic Activites  9. Therapeutic Exercise/Strengthening   TREATMENT PLAN:  Effective Dates: 10/9/2018 TO 11/6/2018. Frequency/Duration: 2 times a week for 4 weeks. GOALS: (Goals have been discussed and agreed upon with patient.)  Short-Term Functional Goals: Time Frame: 8/13/18  1. Pt will demonstrate passive ROM of flexion to 110 degrees, ER to 50 degrees, and IR to 60 degrees. Achieved 8/8/18   2. Pt will report no limitation in dressing (donning socks). Achieved 8/8/18  Discharge Goals: Time Frame: 11/6/18  1. Pt will demonstrate Apley's ER to back of head to allow independent grooming and dressing without modification. Achieved 10/9/18   2. Pt will demonstrate Apley's IR to L5 to allow donning belt and pants without modification. ONGOING, some progress   3. Pt will demonstrate active flexion to 110 degrees to allow reaching overhead. ONGOING, some progress   4. Pt will demonstrate improvement in function with quickDASH to 14% or less disability. ONGOING  Rehabilitation Potential For Stated Goals: Good            The information in this section was collected on 7/2/18 (except where otherwise noted). HISTORY:   History of Present Injury/Illness (Reason for Referral):  7/2/18: Gabrielle Lemons reports a long history of left shoulder pain that progressively worsened and limited his UE use significantly over the last 2-3 years. He had a left TSA performed by Dr Keerthi Sesay on 6/13/18. He reports improvement in pain from pre-operative to post-operative and reports only discomfort at present.   He reports limitation in all UE use due to weakness and post-operative restrictions. He reports difficulty dressing, reaching, opening a door, and lifting due to pain and shoulder precautions. Past Medical History/Comorbidities:   Mr. Lesly Najera  has a past medical history of GERD (gastroesophageal reflux disease), Hyperlipidemia, Hypertension, Insomnia, Obstructive sleep apnea, Overweight (BMI 25.0-29.9), Primary osteoarthritis of left shoulder, and Seizure ([address] Mr. Lesly Najera  has a past surgical history that includes hx hip replacement (Bilateral); hx hernia repair; hx colonoscopy; LEFT SHOULDER ARTHROPLASTY/TOTAL (Left, 6/13/2018); and KYPHOPLASTY T11  ROOM 707 (N/A, 9/12/2017). Social History/Living Environment:     Pt lives in private home with his family. Prior Level of Function/Work/Activity:  Pt reports long, progressive history of shoulder dysfunction with inability to lift his arm for several years prior to surgery. Dominant Side:         RIGHT  Current Medications:       Current Outpatient Medications:     ALPRAZolam (XANAX) 0.25 mg tablet, Take 1 Tab by mouth nightly as needed for Anxiety or Sleep. Max Daily Amount: 0.25 mg., Disp: 30 Tab, Rfl: 2    oxyCODONE-acetaminophen (PERCOCET) 5-325 mg per tablet, Take 1 Tab by mouth every four (4) hours as needed. Max Daily Amount: 6 Tabs., Disp: 20 Tab, Rfl: 0    cpap machine kit, by Does Not Apply route., Disp: , Rfl:     pitavastatin calcium (LIVALO) 2 mg tablet, Take 1 Tab by mouth daily. , Disp: 90 Tab, Rfl: 3    amLODIPine (NORVASC) 5 mg tablet, Take 1 Tab by mouth daily. , Disp: 90 Tab, Rfl: 3    esomeprazole (NEXIUM) 40 mg capsule, Take 1 Cap by mouth daily. , Disp: 90 Cap, Rfl: 3    ezetimibe (ZETIA) 10 mg tablet, Take 1 Tab by mouth every Monday, Wednesday, Friday., Disp: 90 Tab, Rfl: 3    losartan (COZAAR) 50 mg tablet, Take 1 Tab by mouth daily. , Disp: 90 Tab, Rfl: 3    ibuprofen (MOTRIN) 600 mg tablet, Take 1 Tab by mouth every eight (8) hours as needed for Pain. (Patient taking differently: Take 600 mg by mouth as needed for Pain.), Disp: 60 Tab, Rfl: 2    levETIRAcetam (KEPPRA) 500 mg tablet, One in AM and 2 at hs, Disp: 90 Tab, Rfl: 5    levETIRAcetam (KEPPRA) 500 mg tablet, Take 1 Tab by mouth two (2) times a day., Disp: 60 Tab, Rfl: 5    aspirin delayed-release 81 mg tablet, Take 81 mg by mouth daily. , Disp: , Rfl:    Date Last Reviewed:  10/23/2018   Number of Personal Factors/Comorbidities that affect the Plan of Care: 1-2: MODERATE COMPLEXITY   EXAMINATION:   Observation/palpation/joint mobility: incision well healed. All active motion with compensatory scapular hiking. Range of Motion:  Passive (active)  Left* Right   Flexion  131 (100) 150 (150)    External Rotation  50 50   Internal Rotation  55 60      Special Tests: none performed     Strength:      Manual Muscle Test out of 5   Flexion Not assessed at initial evaluation/ surgical precautions    Abduction Not assessed at initial evaluation/ surgical precautions    External Rotation Not assessed at initial evaluation/ surgical precautions    Internal Rotaton Not assessed at initial evaluation/ surgical precautions      Functional Tests:    Apley's Internal Rotation: L: L3, R: T12  Apley's External Rotation: L: posterior head--mild compensation at head , R: T3       Body Structures Involved:  1. Bones  2. Joints  3. Muscles Body Functions Affected:  1. Sensory/Pain  2. Neuromusculoskeletal  3. Movement Related Activities and Participation Affected:  1. General Tasks and Demands  2. Mobility  3. Self Care  4. Domestic Life  5. Interpersonal Interactions and Relationships  6. Community, Social and [place]   Number of elements (examined above) that affect the Plan of Care: 4+: HIGH COMPLEXITY   CLINICAL PRESENTATION:   Presentation: Stable and uncomplicated: LOW COMPLEXITY   CLINICAL DECISION MAKING:   Outcome Measure:    Tool Used: Disabilities of the Arm, Shoulder and Hand (DASH) Questionnaire - Quick Version  Score:  Initial: 32/55 (48% disability)   8/8/18: 18/55 (16% disability)  10/9/18: 24/55 (30% disability)    Interpretation of Score: The DASH is designed to measure the activities of daily living in person's with upper extremity dysfunction or pain. Each section is scored on a 1-5 scale, 5 representing the greatest disability. The scores of each section are added together for a total score of 55. Score 11 12-19 20-28 29-37 38-45 46-54 55   Modifier CH CI CJ CK CL CM CN     ? Carrying, Moving, and Handling Objects:     - CURRENT STATUS: CJ - 20%-39% impaired, limited or restricted    - GOAL STATUS: CI - 1%-19% impaired, limited or restricted    - D/C STATUS:  ---------------To be determined---------------      Medical Necessity:   · Patient is expected to demonstrate progress in strength and range of motion to increase independence with reaching and lifting. Reason for Services/Other Comments:  · Patient continues to require present interventions due to patient's inability to reach, dress, and lift. Use of outcome tool(s) and clinical judgement create a POC that gives a: Clear prediction of patient's progress: LOW COMPLEXITY            TREATMENT:   (In addition to Assessment/Re-Assessment sessions the following treatments were rendered)  Pre-treatment Symptoms/Complaints:  Pt reports that his shoulder was a little tender after returning home from [facility] but that has resolved. He reports that he does well reaching up but that he can tell that it is not the same as the other side. Pain: Initial:   Pain Intensity 1: 0  Post Session:  0/10      Manual Therapy (    Soft Tissue Mobilization Duration  Duration: 15 Minutes): Manual techniques to facilitate improved motion and decreased pain.  (Used abbreviations: MET - muscle energy technique; PNF - proprioceptive neuromuscular facilitation; NMR - neuromuscular re-education; a/p - anterior to posterior; p/a - posterior to anterior) · Manual ROM: flexion, ER, and IR   · STM to upper trap and levator scapula, subscapularis and latissimus dorsi     Therapeutic Exercise: (40 Minutes):  Exercises per grid below to improve mobility, strength and dynamic movement of shoulder - left to improve functional lifting, reaching and overhead activites. Required minimal visual and verbal cues to promote proper body mechanics. Progressed resistance, range, repetitions and complexity of movement as indicated. 10/23/2018   Activity/Exercise Parameters                 Patient education --   UBE 2 mins each    Supine punches  10 punch to endrange supine and 20 elevated table    UE ranger  --   ER theraband  --   IR theraband  --   Flexion AAROM  Unilateral ball on wall with endrange stretch: 20 reps, supine with cane and trunk rotation to right    Horizontal abduction  --   Seated flexion  --   PNF  2 x 10 each supine and elevated table   Ball taps to wall  2 x 10 flexion, 5 each side over each shoulder        MedBridge Portal  Treatment/Session Assessment:    · Response to Treatment:  Pt with no loss of motion with break in therapy due to family emergency. He tolerated reinitiation of strengthening today without problem. · Compliance with Program/Exercises: compliant most of the time. · Recommendations/Intent for next treatment session: \"Next visit will focus on advancements to more challenging activities\".   Total Treatment Duration:  PT Patient Time In/Time Out  Time In: 1300  Time Out: [id number] , PT

## 2018-10-25 ENCOUNTER — HOSPITAL ENCOUNTER (OUTPATIENT)
Dept: PHYSICAL THERAPY | Age: 78
Discharge: HOME | End: 2018-10-25
Payer: MEDICARE

## 2018-10-25 PROCEDURE — 97110 THERAPEUTIC EXERCISES: CPT

## 2018-10-25 PROCEDURE — 97140 MANUAL THERAPY 1/> REGIONS: CPT

## 2018-10-25 NOTE — PROGRESS NOTES
Dorman Dakins  : 1940  [place]  Secondary: [address] at [address]  [phone]   [phone]       OUTPATIENT PHYSICAL THERAPY:Daily Note 10/25/2018   ICD-10: Treatment Diagnosis: pain in joint, shoulder, left (M25.512); stiffness in joint, not elsewhere classified, shoulder, left (M25.612); primary OA of shoulder, left (M19.012)  Precautions/Allergies:   Lynder Meckel known allergies]   Fall Risk Score: 1 (? 5 = High Risk)  MD Orders: evaluate and treat  MEDICAL/REFERRING DIAGNOSIS:  Left shoulder pain [M25.512]   DATE OF ONSET: Date of surgery: 18  REFERRING PHYSICIAN: María Elena Cole MD  RETURN PHYSICIAN APPOINTMENT: unknown      INITIAL ASSESSMENT:  Mr. Krystal Moody is a 70year old male with left shoulder pain and stiffness following a total shoulder arthroplasty performed by Dr Uzma Lala on 18. He reports a long history of progressively worsening shoulder pain and dysfunction. He presents to PT with decreased passive ROM, active ROM, functional reaching that limits his ability to get dressed, reach, and open a door. Pt will benefit from skilled PT to address above listed impairments and functional limitations to facilitate return to prior level of function. Due to surgical precautions and history of significantly restricted motion and function, patient will likely require extended care. 10/9/18: Mr Krystal Moody returns to therapy after a 3 week break due to [place] travel. He has demonstrated excellent gains since initial evaluation, but reports a mild injury/pain since returning from his trip that he attributes to a strain from lifting luggage. This pain is partially responsible for increased quickDASH score. He has maintained passive ROM and improved in active ROM while away from therapy.   Plan to continue therapy for 4 weeks to continue to facilitate full AROM and strength to improve function. PROBLEM LIST (Impacting functional limitations):  1. Decreased Strength  2. Decreased ADL/Functional Activities  3. Increased Pain  4. Decreased Flexibility/Joint Mobility INTERVENTIONS PLANNED:  1. Cold  2. Electrical Stimulation  3. Heat  4. Home Exercise Program (HEP)  5. Manual Therapy  6. Neuromuscular Re-education/Strengthening  7. Range of Motion (ROM)  8. Therapeutic Activites  9. Therapeutic Exercise/Strengthening   TREATMENT PLAN:  Effective Dates: 10/9/2018 TO 11/6/2018. Frequency/Duration: 2 times a week for 4 weeks. GOALS: (Goals have been discussed and agreed upon with patient.)  Short-Term Functional Goals: Time Frame: 8/13/18  1. Pt will demonstrate passive ROM of flexion to 110 degrees, ER to 50 degrees, and IR to 60 degrees. Achieved 8/8/18   2. Pt will report no limitation in dressing (donning socks). Achieved 8/8/18  Discharge Goals: Time Frame: 11/6/18  1. Pt will demonstrate Apley's ER to back of head to allow independent grooming and dressing without modification. Achieved 10/9/18   2. Pt will demonstrate Apley's IR to L5 to allow donning belt and pants without modification. ONGOING, some progress   3. Pt will demonstrate active flexion to 110 degrees to allow reaching overhead. ONGOING, some progress   4. Pt will demonstrate improvement in function with quickDASH to 14% or less disability. ONGOING  Rehabilitation Potential For Stated Goals: Good            The information in this section was collected on 7/2/18 (except where otherwise noted). HISTORY:   History of Present Injury/Illness (Reason for Referral):  7/2/18: Rebecca Sumner reports a long history of left shoulder pain that progressively worsened and limited his UE use significantly over the last 2-3 years. He had a left TSA performed by Dr Darrell Powers on 6/13/18. He reports improvement in pain from pre-operative to post-operative and reports only discomfort at present.   He reports limitation in all UE use due to weakness and post-operative restrictions. He reports difficulty dressing, reaching, opening a door, and lifting due to pain and shoulder precautions. Past Medical History/Comorbidities:   Mr. Racheal Lewis  has a past medical history of GERD (gastroesophageal reflux disease), Hyperlipidemia, Hypertension, Insomnia, Obstructive sleep apnea, Overweight (BMI 25.0-29.9), Primary osteoarthritis of left shoulder, and Seizure ([address] Mr. Racheal Lewis  has a past surgical history that includes hx hip replacement (Bilateral); hx hernia repair; hx colonoscopy; LEFT SHOULDER ARTHROPLASTY/TOTAL (Left, 6/13/2018); and KYPHOPLASTY T11  ROOM 707 (N/A, 9/12/2017). Social History/Living Environment:     Pt lives in private home with his family. Prior Level of Function/Work/Activity:  Pt reports long, progressive history of shoulder dysfunction with inability to lift his arm for several years prior to surgery. Dominant Side:         RIGHT  Current Medications:       Current Outpatient Medications:     ALPRAZolam (XANAX) 0.25 mg tablet, Take 1 Tab by mouth nightly as needed for Anxiety or Sleep. Max Daily Amount: 0.25 mg., Disp: 30 Tab, Rfl: 2    oxyCODONE-acetaminophen (PERCOCET) 5-325 mg per tablet, Take 1 Tab by mouth every four (4) hours as needed. Max Daily Amount: 6 Tabs., Disp: 20 Tab, Rfl: 0    cpap machine kit, by Does Not Apply route., Disp: , Rfl:     pitavastatin calcium (LIVALO) 2 mg tablet, Take 1 Tab by mouth daily. , Disp: 90 Tab, Rfl: 3    amLODIPine (NORVASC) 5 mg tablet, Take 1 Tab by mouth daily. , Disp: 90 Tab, Rfl: 3    esomeprazole (NEXIUM) 40 mg capsule, Take 1 Cap by mouth daily. , Disp: 90 Cap, Rfl: 3    ezetimibe (ZETIA) 10 mg tablet, Take 1 Tab by mouth every Monday, Wednesday, Friday., Disp: 90 Tab, Rfl: 3    losartan (COZAAR) 50 mg tablet, Take 1 Tab by mouth daily. , Disp: 90 Tab, Rfl: 3    ibuprofen (MOTRIN) 600 mg tablet, Take 1 Tab by mouth every eight (8) hours as needed for Pain. (Patient taking differently: Take 600 mg by mouth as needed for Pain.), Disp: 60 Tab, Rfl: 2    levETIRAcetam (KEPPRA) 500 mg tablet, One in AM and 2 at hs, Disp: 90 Tab, Rfl: 5    levETIRAcetam (KEPPRA) 500 mg tablet, Take 1 Tab by mouth two (2) times a day., Disp: 60 Tab, Rfl: 5    aspirin delayed-release 81 mg tablet, Take 81 mg by mouth daily. , Disp: , Rfl:    Date Last Reviewed:  10/25/2018   Number of Personal Factors/Comorbidities that affect the Plan of Care: 1-2: MODERATE COMPLEXITY   EXAMINATION:   Observation/palpation/joint mobility: incision well healed. All active motion with compensatory scapular hiking. Range of Motion:  Passive (active)  Left* Right   Flexion  131 (100) 150 (150)    External Rotation  50 50   Internal Rotation  55 60      Special Tests: none performed     Strength:      Manual Muscle Test out of 5   Flexion Not assessed at initial evaluation/ surgical precautions    Abduction Not assessed at initial evaluation/ surgical precautions    External Rotation Not assessed at initial evaluation/ surgical precautions    Internal Rotaton Not assessed at initial evaluation/ surgical precautions      Functional Tests:    Apley's Internal Rotation: L: L3, R: T12  Apley's External Rotation: L: posterior head--mild compensation at head , R: T3       Body Structures Involved:  1. Bones  2. Joints  3. Muscles Body Functions Affected:  1. Sensory/Pain  2. Neuromusculoskeletal  3. Movement Related Activities and Participation Affected:  1. General Tasks and Demands  2. Mobility  3. Self Care  4. Domestic Life  5. Interpersonal Interactions and Relationships  6. Community, Social and [place]   Number of elements (examined above) that affect the Plan of Care: 4+: HIGH COMPLEXITY   CLINICAL PRESENTATION:   Presentation: Stable and uncomplicated: LOW COMPLEXITY   CLINICAL DECISION MAKING:   Outcome Measure:    Tool Used: Disabilities of the Arm, Shoulder and Hand (DASH) Questionnaire - Quick Version  Score:  Initial: 32/55 (48% disability)   8/8/18: 18/55 (16% disability)  10/9/18: 24/55 (30% disability)    Interpretation of Score: The DASH is designed to measure the activities of daily living in person's with upper extremity dysfunction or pain. Each section is scored on a 1-5 scale, 5 representing the greatest disability. The scores of each section are added together for a total score of 55. Score 11 12-19 20-28 29-37 38-45 46-54 55   Modifier CH CI CJ CK CL CM CN     ? Carrying, Moving, and Handling Objects:     - CURRENT STATUS: CJ - 20%-39% impaired, limited or restricted    - GOAL STATUS: CI - 1%-19% impaired, limited or restricted    - D/C STATUS:  ---------------To be determined---------------      Medical Necessity:   · Patient is expected to demonstrate progress in strength and range of motion to increase independence with reaching and lifting. Reason for Services/Other Comments:  · Patient continues to require present interventions due to patient's inability to reach, dress, and lift. Use of outcome tool(s) and clinical judgement create a POC that gives a: Clear prediction of patient's progress: LOW COMPLEXITY            TREATMENT:   (In addition to Assessment/Re-Assessment sessions the following treatments were rendered)  Pre-treatment Symptoms/Complaints:  Pt reports that he is still doing well, but has to leave therapy early today. Pain: Initial:   Pain Intensity 1: 0  Post Session:  0/10      Manual Therapy (    Soft Tissue Mobilization Duration  Duration: 10 Minutes): Manual techniques to facilitate improved motion and decreased pain.  (Used abbreviations: MET - muscle energy technique; PNF - proprioceptive neuromuscular facilitation; NMR - neuromuscular re-education; a/p - anterior to posterior; p/a - posterior to anterior)   · Manual ROM: flexion, ER, and IR   · STM to upper trap and levator scapula, subscapularis and latissimus dorsi     Therapeutic Exercise: (30 Minutes):  Exercises per grid below to improve mobility, strength and dynamic movement of shoulder - left to improve functional lifting, reaching and overhead activites. Required minimal visual and verbal cues to promote proper body mechanics. Progressed resistance, range, repetitions and complexity of movement as indicated. 10/25/2018   Activity/Exercise Parameters                 Patient education --   UBE 2 mins each 2.0 resistance    Supine punches  10 punch to endrange supine and 20 elevated table    UE ranger  --   ER theraband  2 x 10 bilateral supine red band    IR theraband  --   Flexion AAROM  Foam roll on table: 30 reps    Horizontal abduction  2 x 10 supine with red band, each diagonal 2 x 10 supine with red band    Seated flexion  --   PNF  --   Ball taps to wall  --       [employer] Portal  Treatment/Session Assessment:    · Response to Treatment:  Pt continues to progress well. · Compliance with Program/Exercises: compliant most of the time. · Recommendations/Intent for next treatment session: \"Next visit will focus on advancements to more challenging activities\".   Total Treatment Duration:  PT Patient Time In/Time Out  Time In: 1300  Time Out: Nicol PT

## 2018-10-30 ENCOUNTER — HOSPITAL ENCOUNTER (OUTPATIENT)
Dept: PHYSICAL THERAPY | Age: 78
Discharge: HOME | End: 2018-10-30
Payer: MEDICARE

## 2018-10-30 PROCEDURE — 97140 MANUAL THERAPY 1/> REGIONS: CPT

## 2018-10-30 PROCEDURE — 97110 THERAPEUTIC EXERCISES: CPT

## 2018-10-30 NOTE — PROGRESS NOTES
Shasha Cunningham  : 1940  [place]  Secondary: [address] at [facility] [address] 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 5 = High Risk)  MD Orders: evaluate and treat  MEDICAL/REFERRING DIAGNOSIS:  Left shoulder pain [M25.512]   DATE OF ONSET: Date of surgery: 18  REFERRING PHYSICIAN: Jose Huffman MD  RETURN PHYSICIAN APPOINTMENT: unknown      INITIAL ASSESSMENT:  Mr. Claudell Mercury is a 70year old male with left shoulder pain and stiffness following a total shoulder arthroplasty performed by Dr Krish Lamb on 18. He reports a long history of progressively worsening shoulder pain and dysfunction. He presents to PT with decreased passive ROM, active ROM, functional reaching that limits his ability to get dressed, reach, and open a door. Pt will benefit from skilled PT to address above listed impairments and functional limitations to facilitate return to prior level of function. Due to surgical precautions and history of significantly restricted motion and function, patient will likely require extended care. 10/9/18: Mr Claudell Mercury returns to therapy after a 3 week break due to overseas travel. He has demonstrated excellent gains since initial evaluation, but reports a mild injury/pain since returning from his trip that he attributes to a strain from lifting luggage. This pain is partially responsible for increased quickDASH score. He has maintained passive ROM and improved in active ROM while away from therapy.   Plan to continue therapy for 4 weeks to continue to facilitate full AROM and strength to improve function. PROBLEM LIST (Impacting functional limitations):  1. Decreased Strength  2. Decreased ADL/Functional Activities  3. Increased Pain  4. Decreased Flexibility/Joint Mobility INTERVENTIONS PLANNED:  1. Cold  2. Electrical Stimulation  3. Heat  4. Home Exercise Program (HEP)  5. Manual Therapy  6. Neuromuscular Re-education/Strengthening  7. Range of Motion (ROM)  8. Therapeutic Activites  9. Therapeutic Exercise/Strengthening   TREATMENT PLAN:  Effective Dates: 10/9/2018 TO 11/6/2018. Frequency/Duration: 2 times a week for 4 weeks. GOALS: (Goals have been discussed and agreed upon with patient.)  Short-Term Functional Goals: Time Frame: 8/13/18  1. Pt will demonstrate passive ROM of flexion to 110 degrees, ER to 50 degrees, and IR to 60 degrees. Achieved 8/8/18   2. Pt will report no limitation in dressing (donning socks). Achieved 8/8/18  Discharge Goals: Time Frame: 11/6/18  1. Pt will demonstrate Apley's ER to back of head to allow independent grooming and dressing without modification. Achieved 10/9/18   2. Pt will demonstrate Apley's IR to L5 to allow donning belt and pants without modification. ONGOING, some progress   3. Pt will demonstrate active flexion to 110 degrees to allow reaching overhead. ONGOING, some progress   4. Pt will demonstrate improvement in function with quickDASH to 14% or less disability. ONGOING  Rehabilitation Potential For Stated Goals: Good            The information in this section was collected on 7/2/18 (except where otherwise noted). HISTORY:   History of Present Injury/Illness (Reason for Referral):  7/2/18: Marcel Woods reports a long history of left shoulder pain that progressively worsened and limited his UE use significantly over the last 2-3 years. He had a left TSA performed by Dr Rigo Lawton on 6/13/18. He reports improvement in pain from pre-operative to post-operative and reports only discomfort at present.   He reports limitation in all UE use due to weakness and post-operative restrictions. He reports difficulty dressing, reaching, opening a door, and lifting due to pain and shoulder precautions. Past Medical History/Comorbidities:   Mr. Claudeen Siren  has a past medical history of GERD (gastroesophageal reflux disease), Hyperlipidemia, Hypertension, Insomnia, Obstructive sleep apnea, Overweight (BMI 25.0-29.9), Primary osteoarthritis of left shoulder, and Seizure ([address] Mr. Claudeen Siren  has a past surgical history that includes hx hip replacement (Bilateral); hx hernia repair; hx colonoscopy; LEFT SHOULDER ARTHROPLASTY/TOTAL (Left, 6/13/2018); and KYPHOPLASTY T11  ROOM 707 (N/A, 9/12/2017). Social History/Living Environment:     Pt lives in private home with his family. Prior Level of Function/Work/Activity:  Pt reports long, progressive history of shoulder dysfunction with inability to lift his arm for several years prior to surgery. Dominant Side:         RIGHT  Current Medications:       Current Outpatient Medications:     ALPRAZolam (XANAX) 0.25 mg tablet, Take 1 Tab by mouth nightly as needed for Anxiety or Sleep. Max Daily Amount: 0.25 mg., Disp: 30 Tab, Rfl: 2    oxyCODONE-acetaminophen (PERCOCET) 5-325 mg per tablet, Take 1 Tab by mouth every four (4) hours as needed. Max Daily Amount: 6 Tabs., Disp: 20 Tab, Rfl: 0    cpap machine kit, by Does Not Apply route., Disp: , Rfl:     pitavastatin calcium (LIVALO) 2 mg tablet, Take 1 Tab by mouth daily. , Disp: 90 Tab, Rfl: 3    amLODIPine (NORVASC) 5 mg tablet, Take 1 Tab by mouth daily. , Disp: 90 Tab, Rfl: 3    esomeprazole (NEXIUM) 40 mg capsule, Take 1 Cap by mouth daily. , Disp: 90 Cap, Rfl: 3    ezetimibe (ZETIA) 10 mg tablet, Take 1 Tab by mouth every Monday, Wednesday, Friday., Disp: 90 Tab, Rfl: 3    losartan (COZAAR) 50 mg tablet, Take 1 Tab by mouth daily. , Disp: 90 Tab, Rfl: 3    ibuprofen (MOTRIN) 600 mg tablet, Take 1 Tab by mouth every eight (8) hours as needed for Pain. (Patient taking differently: Take 600 mg by mouth as needed for Pain.), Disp: 60 Tab, Rfl: 2    levETIRAcetam (KEPPRA) 500 mg tablet, One in AM and 2 at hs, Disp: 90 Tab, Rfl: 5    levETIRAcetam (KEPPRA) 500 mg tablet, Take 1 Tab by mouth two (2) times a day., Disp: 60 Tab, Rfl: 5    aspirin delayed-release 81 mg tablet, Take 81 mg by mouth daily. , Disp: , Rfl:    Date Last Reviewed:  10/30/2018   Number of Personal Factors/Comorbidities that affect the Plan of Care: 1-2: MODERATE COMPLEXITY   EXAMINATION:   Observation/palpation/joint mobility: incision well healed. All active motion with compensatory scapular hiking. Range of Motion:  Passive (active)  Left* Right   Flexion  131 (100) 150 (150)    External Rotation  50 50   Internal Rotation  55 60      Special Tests: none performed     Strength:      Manual Muscle Test out of 5   Flexion Not assessed at initial evaluation/ surgical precautions    Abduction Not assessed at initial evaluation/ surgical precautions    External Rotation Not assessed at initial evaluation/ surgical precautions    Internal Rotaton Not assessed at initial evaluation/ surgical precautions      Functional Tests:    Apley's Internal Rotation: L: L3, R: T12  Apley's External Rotation: L: posterior head--mild compensation at head , R: T3       Body Structures Involved:  1. Bones  2. Joints  3. Muscles Body Functions Affected:  1. Sensory/Pain  2. Neuromusculoskeletal  3. Movement Related Activities and Participation Affected:  1. General Tasks and Demands  2. Mobility  3. Self Care  4. Domestic Life  5. Interpersonal Interactions and Relationships  6. Community, Social and [place]   Number of elements (examined above) that affect the Plan of Care: 4+: HIGH COMPLEXITY   CLINICAL PRESENTATION:   Presentation: Stable and uncomplicated: LOW COMPLEXITY   CLINICAL DECISION MAKING:   Outcome Measure:    Tool Used: Disabilities of the Arm, Shoulder and Hand (DASH) Questionnaire - Quick Version  Score:  Initial: 32/55 (48% disability)   8/8/18: 18/55 (16% disability)  10/9/18: 24/55 (30% disability)    Interpretation of Score: The DASH is designed to measure the activities of daily living in person's with upper extremity dysfunction or pain. Each section is scored on a 1-5 scale, 5 representing the greatest disability. The scores of each section are added together for a total score of 55. Score 11 12-19 20-28 29-37 38-45 46-54 55   Modifier CH CI CJ CK CL CM CN     ? Carrying, Moving, and Handling Objects:     - CURRENT STATUS: CJ - 20%-39% impaired, limited or restricted    - GOAL STATUS: CI - 1%-19% impaired, limited or restricted    - D/C STATUS:  ---------------To be determined---------------      Medical Necessity:   · Patient is expected to demonstrate progress in strength and range of motion to increase independence with reaching and lifting. Reason for Services/Other Comments:  · Patient continues to require present interventions due to patient's inability to reach, dress, and lift. Use of outcome tool(s) and clinical judgement create a POC that gives a: Clear prediction of patient's progress: LOW COMPLEXITY            TREATMENT:   (In addition to Assessment/Re-Assessment sessions the following treatments were rendered)  Pre-treatment Symptoms/Complaints:  Pt reports that he is feeling         Pain: Initial:   Pain Intensity 1: 0  Post Session:  0/10      Manual Therapy (    Soft Tissue Mobilization Duration  Duration: 10 Minutes): Manual techniques to facilitate improved motion and decreased pain.  (Used abbreviations: MET - muscle energy technique; PNF - proprioceptive neuromuscular facilitation; NMR - neuromuscular re-education; a/p - anterior to posterior; p/a - posterior to anterior)   · Manual ROM: flexion, ER, and IR   · STM to upper trap and levator scapula, subscapularis and latissimus dorsi     Therapeutic Exercise: (45 Minutes):  Exercises per grid below to improve mobility, strength and dynamic movement of shoulder - left to improve functional lifting, reaching and overhead activites. Required minimal visual and verbal cues to promote proper body mechanics. Progressed resistance, range, repetitions and complexity of movement as indicated. 10/30/2018   Activity/Exercise Parameters                 Patient education --   UBE 2 mins each 2.0 resistance    Supine punches  10 punch to endrange supine and 20 elevated table    UE ranger  --   ER theraband  --   IR theraband  --   Flexion AAROM  Flexion and abduction ball rolls 30 each    Horizontal abduction  2 x 10 supine with red band, each diagonal 2 x 10 supine with red band    Seated flexion  3 x 5 bilateral    PNF  --   Ball taps to wall  --   Sidelying progression ER 3 x 10, abduction 3 x 10, flexion 3 x 10        [facility] Portal  Treatment/Session Assessment:    · Response to Treatment:  Pt demonstrates good progress. Improved tolerance with ER sidelying. · Compliance with Program/Exercises: compliant most of the time. · Recommendations/Intent for next treatment session: \"Next visit will focus on advancements to more challenging activities\".   Total Treatment Duration:  PT Patient Time In/Time Out  Time In: 1300  Time Out: [address], PT

## 2018-11-01 ENCOUNTER — HOSPITAL ENCOUNTER (OUTPATIENT)
Dept: PHYSICAL THERAPY | Age: 78
Discharge: HOME | End: 2018-11-01
Payer: MEDICARE

## 2018-11-01 PROCEDURE — 97140 MANUAL THERAPY 1/> REGIONS: CPT

## 2018-11-01 PROCEDURE — 97110 THERAPEUTIC EXERCISES: CPT

## 2018-11-01 NOTE — PROGRESS NOTES
Rand Vieyra  : 1940  [place]  Secondary: [address] at [facility]  [address], [address]  [phone]   [phone]       OUTPATIENT PHYSICAL THERAPY:Daily Note 2018   ICD-10: Treatment Diagnosis: pain in joint, shoulder, left (M25.512); stiffness in joint, not elsewhere classified, shoulder, left (M25.612); primary OA of shoulder, left (M19.012)  Precautions/Allergies:   Ellouise Pat known allergies]   Fall Risk Score: 1 (? 5 = High Risk)  MD Orders: evaluate and treat  MEDICAL/REFERRING DIAGNOSIS:  Left shoulder pain [M25.512]   DATE OF ONSET: Date of surgery: 18  REFERRING PHYSICIAN: Gosia Fried MD  RETURN PHYSICIAN APPOINTMENT: unknown      INITIAL ASSESSMENT:  Mr. Zakiya Smart is a 70year old male with left shoulder pain and stiffness following a total shoulder arthroplasty performed by Dr Tamia Vieyra on 18. He reports a long history of progressively worsening shoulder pain and dysfunction. He presents to PT with decreased passive ROM, active ROM, functional reaching that limits his ability to get dressed, reach, and open a door. Pt will benefit from skilled PT to address above listed impairments and functional limitations to facilitate return to prior level of function. Due to surgical precautions and history of significantly restricted motion and function, patient will likely require extended care. 10/9/18: Mr Zakiya Smart returns to therapy after a 3 week break due to [place] travel. He has demonstrated excellent gains since initial evaluation, but reports a mild injury/pain since returning from his trip that he attributes to a strain from lifting luggage. This pain is partially responsible for increased quickDASH score. He has maintained passive ROM and improved in active ROM while away from therapy.   Plan to continue therapy for 4 weeks to continue to facilitate full AROM and strength to improve function. PROBLEM LIST (Impacting functional limitations):  1. Decreased Strength  2. Decreased ADL/Functional Activities  3. Increased Pain  4. Decreased Flexibility/Joint Mobility INTERVENTIONS PLANNED:  1. Cold  2. Electrical Stimulation  3. Heat  4. Home Exercise Program (HEP)  5. Manual Therapy  6. Neuromuscular Re-education/Strengthening  7. Range of Motion (ROM)  8. Therapeutic Activites  9. Therapeutic Exercise/Strengthening   TREATMENT PLAN:  Effective Dates: 10/9/2018 TO 11/6/2018. Frequency/Duration: 2 times a week for 4 weeks. GOALS: (Goals have been discussed and agreed upon with patient.)  Short-Term Functional Goals: Time Frame: 8/13/18  1. Pt will demonstrate passive ROM of flexion to 110 degrees, ER to 50 degrees, and IR to 60 degrees. Achieved 8/8/18   2. Pt will report no limitation in dressing (donning socks). Achieved 8/8/18  Discharge Goals: Time Frame: 11/6/18  1. Pt will demonstrate Apley's ER to back of head to allow independent grooming and dressing without modification. Achieved 10/9/18   2. Pt will demonstrate Apley's IR to L5 to allow donning belt and pants without modification. ONGOING, some progress   3. Pt will demonstrate active flexion to 110 degrees to allow reaching overhead. ONGOING, some progress   4. Pt will demonstrate improvement in function with quickDASH to 14% or less disability. ONGOING  Rehabilitation Potential For Stated Goals: Good            The information in this section was collected on 7/2/18 (except where otherwise noted). HISTORY:   History of Present Injury/Illness (Reason for Referral):  7/2/18: Kemi Jackson reports a long history of left shoulder pain that progressively worsened and limited his UE use significantly over the last 2-3 years. He had a left TSA performed by Dr Cabrera Barreto on 6/13/18. He reports improvement in pain from pre-operative to post-operative and reports only discomfort at present.   He reports limitation in all UE use due to weakness and post-operative restrictions. He reports difficulty dressing, reaching, opening a door, and lifting due to pain and shoulder precautions. Past Medical History/Comorbidities:   Mr. Jus Heredia  has a past medical history of GERD (gastroesophageal reflux disease), Hyperlipidemia, Hypertension, Insomnia, Obstructive sleep apnea, Overweight (BMI 25.0-29.9), Primary osteoarthritis of left shoulder, and Seizure ([address] Mr. Jus Heredia  has a past surgical history that includes hx hip replacement (Bilateral); hx hernia repair; hx colonoscopy; LEFT SHOULDER ARTHROPLASTY/TOTAL (Left, 6/13/2018); and KYPHOPLASTY T11  ROOM 707 (N/A, 9/12/2017). Social History/Living Environment:     Pt lives in private home with his family. Prior Level of Function/Work/Activity:  Pt reports long, progressive history of shoulder dysfunction with inability to lift his arm for several years prior to surgery. Dominant Side:         RIGHT  Current Medications:       Current Outpatient Medications:     ALPRAZolam (XANAX) 0.25 mg tablet, Take 1 Tab by mouth nightly as needed for Anxiety or Sleep. Max Daily Amount: 0.25 mg., Disp: 30 Tab, Rfl: 2    oxyCODONE-acetaminophen (PERCOCET) 5-325 mg per tablet, Take 1 Tab by mouth every four (4) hours as needed. Max Daily Amount: 6 Tabs., Disp: 20 Tab, Rfl: 0    cpap machine kit, by Does Not Apply route., Disp: , Rfl:     pitavastatin calcium (LIVALO) 2 mg tablet, Take 1 Tab by mouth daily. , Disp: 90 Tab, Rfl: 3    amLODIPine (NORVASC) 5 mg tablet, Take 1 Tab by mouth daily. , Disp: 90 Tab, Rfl: 3    esomeprazole (NEXIUM) 40 mg capsule, Take 1 Cap by mouth daily. , Disp: 90 Cap, Rfl: 3    ezetimibe (ZETIA) 10 mg tablet, Take 1 Tab by mouth every Monday, Wednesday, Friday., Disp: 90 Tab, Rfl: 3    losartan (COZAAR) 50 mg tablet, Take 1 Tab by mouth daily. , Disp: 90 Tab, Rfl: 3    ibuprofen (MOTRIN) 600 mg tablet, Take 1 Tab by mouth every eight (8) hours as needed for Pain. (Patient taking differently: Take 600 mg by mouth as needed for Pain.), Disp: 60 Tab, Rfl: 2    levETIRAcetam (KEPPRA) 500 mg tablet, One in AM and 2 at hs, Disp: 90 Tab, Rfl: 5    levETIRAcetam (KEPPRA) 500 mg tablet, Take 1 Tab by mouth two (2) times a day., Disp: 60 Tab, Rfl: 5    aspirin delayed-release 81 mg tablet, Take 81 mg by mouth daily. , Disp: , Rfl:    Date Last Reviewed:  11/1/2018   Number of Personal Factors/Comorbidities that affect the Plan of Care: 1-2: MODERATE COMPLEXITY   EXAMINATION:   Observation/palpation/joint mobility: incision well healed. All active motion with compensatory scapular hiking. Range of Motion:  Passive (active)  Left* Right   Flexion  131 (100) 150 (150)    External Rotation  50 50   Internal Rotation  55 60      Special Tests: none performed     Strength:      Manual Muscle Test out of 5   Flexion Not assessed at initial evaluation/ surgical precautions    Abduction Not assessed at initial evaluation/ surgical precautions    External Rotation Not assessed at initial evaluation/ surgical precautions    Internal Rotaton Not assessed at initial evaluation/ surgical precautions      Functional Tests:    Apley's Internal Rotation: L: L3, R: T12  Apley's External Rotation: L: posterior head--mild compensation at head , R: T3       Body Structures Involved:  1. Bones  2. Joints  3. Muscles Body Functions Affected:  1. Sensory/Pain  2. Neuromusculoskeletal  3. Movement Related Activities and Participation Affected:  1. General Tasks and Demands  2. Mobility  3. Self Care  4. Domestic Life  5. Interpersonal Interactions and Relationships  6. Community, Social and [place]   Number of elements (examined above) that affect the Plan of Care: 4+: HIGH COMPLEXITY   CLINICAL PRESENTATION:   Presentation: Stable and uncomplicated: LOW COMPLEXITY   CLINICAL DECISION MAKING:   Outcome Measure:    Tool Used: Disabilities of the Arm, Shoulder and Hand (DASH) Questionnaire - Quick Version  Score:  Initial: 32/55 (48% disability)   8/8/18: 18/55 (16% disability)  10/9/18: 24/55 (30% disability)    Interpretation of Score: The DASH is designed to measure the activities of daily living in person's with upper extremity dysfunction or pain. Each section is scored on a 1-5 scale, 5 representing the greatest disability. The scores of each section are added together for a total score of 55. Score 11 12-19 20-28 29-37 38-45 46-54 55   Modifier CH CI CJ CK CL CM CN     ? Carrying, Moving, and Handling Objects:     - CURRENT STATUS: CJ - 20%-39% impaired, limited or restricted    - GOAL STATUS: CI - 1%-19% impaired, limited or restricted    - D/C STATUS:  ---------------To be determined---------------      Medical Necessity:   · Patient is expected to demonstrate progress in strength and range of motion to increase independence with reaching and lifting. Reason for Services/Other Comments:  · Patient continues to require present interventions due to patient's inability to reach, dress, and lift. Use of outcome tool(s) and clinical judgement create a POC that gives a: Clear prediction of patient's progress: LOW COMPLEXITY            TREATMENT:   (In addition to Assessment/Re-Assessment sessions the following treatments were rendered)  Pre-treatment Symptoms/Complaints:  Pt reports that he is feeling good today. He reports that he wants to leave a little early due to MD appointment across [facility]. Pain: Initial:   Pain Intensity 1: 0  Post Session:  0/10      Manual Therapy (    Soft Tissue Mobilization Duration  Duration: 10 Minutes): Manual techniques to facilitate improved motion and decreased pain.  (Used abbreviations: MET - muscle energy technique; PNF - proprioceptive neuromuscular facilitation; NMR - neuromuscular re-education; a/p - anterior to posterior; p/a - posterior to anterior)   · Manual ROM: flexion, ER, and IR   · STM to upper trap and levator scapula, subscapularis and latissimus dorsi     Therapeutic Exercise: (35 Minutes):  Exercises per grid below to improve mobility, strength and dynamic movement of shoulder - left to improve functional lifting, reaching and overhead activites. Required minimal visual and verbal cues to promote proper body mechanics. Progressed resistance, range, repetitions and complexity of movement as indicated. 11/1/2018   Activity/Exercise Parameters                 Patient education --   UBE 2 mins each 2.7 resistance    Supine punches  10 punch to endrange supine     UE ranger  Flexion, each diagonal, abduction: 20 each    ER theraband  --   IR theraband  --   Flexion AAROM  --   Horizontal abduction  --   Seated flexion  3 x 5 bilateral    PNF  Elevated table: AAROM each diagonal 2 x 10 each    Ball taps to wall  --   Sidelying progression --       [employer] Portal  Treatment/Session Assessment:    · Response to Treatment:  Pt with steady gains noted. Biggest limitations are ROM loss between visits and abduction AROM (to enter code to enter his neighborhood). · Compliance with Program/Exercises: compliant most of the time. · Recommendations/Intent for next treatment session: \"Next visit will focus on advancements to more challenging activities\".   Total Treatment Duration:  PT Patient Time In/Time Out  Time In: 1300  Time Out: 1345    Daphine Heading, PT

## 2018-11-06 ENCOUNTER — APPOINTMENT (OUTPATIENT)
Dept: PHYSICAL THERAPY | Age: 78
End: 2018-11-06
Payer: MEDICARE

## 2018-12-28 NOTE — PROGRESS NOTES
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 5 = High Risk)  MD Orders: evaluate and treat  MEDICAL/REFERRING DIAGNOSIS:  Left shoulder pain [M25.512]   DATE OF ONSET: Date of surgery: 18  REFERRING PHYSICIAN: Mirta De León MD  RETURN PHYSICIAN APPOINTMENT: unknown      INITIAL ASSESSMENT:  Mr. Marcelo Marques is a 70year old male with left shoulder pain and stiffness following a total shoulder arthroplasty performed by Dr Virginia Kern on 18. He reports a long history of progressively worsening shoulder pain and dysfunction. He presents to PT with decreased passive ROM, active ROM, functional reaching that limits his ability to get dressed, reach, and open a door. Pt will benefit from skilled PT to address above listed impairments and functional limitations to facilitate return to prior level of function. Due to surgical precautions and history of significantly restricted motion and function, patient will likely require extended care. 10/9/18: Mr Marcelo Marques returns to therapy after a 3 week break due to overseas travel. He has demonstrated excellent gains since initial evaluation, but reports a mild injury/pain since returning from his trip that he attributes to a strain from lifting luggage. This pain is partially responsible for increased quickDASH score. He has maintained passive ROM and improved in active ROM while away from therapy.   Plan to continue therapy for 4 weeks to continue to facilitate full AROM and strength to improve function. 12/28/18: Pt attended 23 therapy visits. He demonstrated very significant gains in ROM, strength, function and satisfaction with his surgery in that time. He achieved several therapy goals and had demonstrated progress towards other goals when last assessed, but did not attend his final therapy visit to reassess for discharge. As a result, he is discharged without achieving these therapy goals. PROBLEM LIST (Impacting functional limitations):  1. Decreased Strength  2. Decreased ADL/Functional Activities  3. Increased Pain  4. Decreased Flexibility/Joint Mobility INTERVENTIONS PLANNED:  1. Cold  2. Electrical Stimulation  3. Heat  4. Home Exercise Program (HEP)  5. Manual Therapy  6. Neuromuscular Re-education/Strengthening  7. Range of Motion (ROM)  8. Therapeutic Activites  9. Therapeutic Exercise/Strengthening   TREATMENT PLAN:  Effective Dates: 10/9/2018 TO 11/6/2018. Frequency/Duration: 2 times a week for 4 weeks. GOALS: (Goals have been discussed and agreed upon with patient.)  Short-Term Functional Goals: Time Frame: 8/13/18  1. Pt will demonstrate passive ROM of flexion to 110 degrees, ER to 50 degrees, and IR to 60 degrees. Achieved 8/8/18   2. Pt will report no limitation in dressing (donning socks). Achieved 8/8/18  Discharge Goals: Time Frame: 11/6/18  1. Pt will demonstrate Apley's ER to back of head to allow independent grooming and dressing without modification. Achieved 10/9/18   2. Pt will demonstrate Apley's IR to L5 to allow donning belt and pants without modification. Did not achieve   3. Pt will demonstrate active flexion to 110 degrees to allow reaching overhead. Did not achieve   4. Pt will demonstrate improvement in function with quickDASH to 14% or less disability. Did not achieve   Rehabilitation Potential For Stated Goals: Good            The information in this section was collected on 7/2/18 (except where otherwise noted).   HISTORY:   History of Present Injury/Illness (Reason for Referral):  7/2/18: Chan Arboleda reports a long history of left shoulder pain that progressively worsened and limited his UE use significantly over the last 2-3 years. He had a left TSA performed by Dr Marley Alvarez on 6/13/18. He reports improvement in pain from pre-operative to post-operative and reports only discomfort at present. He reports limitation in all UE use due to weakness and post-operative restrictions. He reports difficulty dressing, reaching, opening a door, and lifting due to pain and shoulder precautions. Past Medical History/Comorbidities:   Mr. Robe Manuel  has a past medical history of GERD (gastroesophageal reflux disease), Hyperlipidemia, Hypertension, Insomnia, Obstructive sleep apnea, Overweight (BMI 25.0-29.9), Primary osteoarthritis of left shoulder, and Seizure ([facility] [address] Mr. Robe Manuel  has a past surgical history that includes hx hip replacement (Bilateral); hx hernia repair; hx colonoscopy; LEFT SHOULDER ARTHROPLASTY/TOTAL (Left, 6/13/2018); and KYPHOPLASTY T11  ROOM 707 (N/A, 9/12/2017). Social History/Living Environment:     Pt lives in private home with his family. Prior Level of Function/Work/Activity:  Pt reports long, progressive history of shoulder dysfunction with inability to lift his arm for several years prior to surgery. Dominant Side:         RIGHT  Current Medications:       Current Outpatient Medications:     pitavastatin calcium (LIVALO) 2 mg tablet, Take 1 Tab by mouth daily. , Disp: 90 Tab, Rfl: 3    levETIRAcetam (KEPPRA) 500 mg tablet, Take 1 Tab by mouth two (2) times a day., Disp: 60 Tab, Rfl: 11    ALPRAZolam (XANAX) 0.25 mg tablet, Take 1 Tab by mouth nightly as needed for Anxiety or Sleep. Max Daily Amount: 0.25 mg., Disp: 30 Tab, Rfl: 2    oxyCODONE-acetaminophen (PERCOCET) 5-325 mg per tablet, Take 1 Tab by mouth every four (4) hours as needed.  Max Daily Amount: 6 Tabs., Disp: 20 Tab, Rfl: 0    cpap machine kit, by Does Not Apply route., Disp: , Rfl:     amLODIPine (NORVASC) 5 mg tablet, Take 1 Tab by mouth daily. , Disp: 90 Tab, Rfl: 3    esomeprazole (NEXIUM) 40 mg capsule, Take 1 Cap by mouth daily. , Disp: 90 Cap, Rfl: 3    ezetimibe (ZETIA) 10 mg tablet, Take 1 Tab by mouth every Monday, Wednesday, Friday., Disp: 90 Tab, Rfl: 3    losartan (COZAAR) 50 mg tablet, Take 1 Tab by mouth daily. , Disp: 90 Tab, Rfl: 3    ibuprofen (MOTRIN) 600 mg tablet, Take 1 Tab by mouth every eight (8) hours as needed for Pain. (Patient taking differently: Take 600 mg by mouth as needed for Pain.), Disp: 60 Tab, Rfl: 2    levETIRAcetam (KEPPRA) 500 mg tablet, One in AM and 2 at hs, Disp: 90 Tab, Rfl: 5    aspirin delayed-release 81 mg tablet, Take 81 mg by mouth daily. , Disp: , Rfl:    Date Last Reviewed:  12/28/2018   Number of Personal Factors/Comorbidities that affect the Plan of Care: 1-2: MODERATE COMPLEXITY   EXAMINATION:   Observation/palpation/joint mobility: incision well healed. All active motion with compensatory scapular hiking. Range of Motion:  Passive (active)  Left* Right   Flexion  131 (100) 150 (150)    External Rotation  50 50   Internal Rotation  55 60      Special Tests: none performed     Strength:      Manual Muscle Test out of 5   Flexion Not assessed at initial evaluation/ surgical precautions    Abduction Not assessed at initial evaluation/ surgical precautions    External Rotation Not assessed at initial evaluation/ surgical precautions    Internal Rotaton Not assessed at initial evaluation/ surgical precautions      Functional Tests:    Apley's Internal Rotation: L: L3, R: T12  Apley's External Rotation: L: posterior head--mild compensation at head , R: T3       Body Structures Involved:  1. Bones  2. Joints  3. Muscles Body Functions Affected:  1. Sensory/Pain  2. Neuromusculoskeletal  3. Movement Related Activities and Participation Affected:  1. General Tasks and Demands  2. Mobility  3. Self Care  4.  Domestic Life  5. Interpersonal Interactions and Relationships  6. Community, Social and [place]   Number of elements (examined above) that affect the Plan of Care: 4+: HIGH COMPLEXITY   CLINICAL PRESENTATION:   Presentation: Stable and uncomplicated: LOW COMPLEXITY   CLINICAL DECISION MAKING:   Outcome Measure: Tool Used: Disabilities of the Arm, Shoulder and Hand (DASH) Questionnaire - Quick Version  Score:  Initial: 32/55 (48% disability)   8/8/18: 18/55 (16% disability)  10/9/18: 24/55 (30% disability)    Interpretation of Score: The DASH is designed to measure the activities of daily living in person's with upper extremity dysfunction or pain. Each section is scored on a 1-5 scale, 5 representing the greatest disability. The scores of each section are added together for a total score of 55. Score 11 12-19 20-28 29-37 38-45 46-54 55   Modifier CH CI CJ CK CL CM CN     ? Carrying, Moving, and Handling Objects:     - CURRENT STATUS: CJ - 20%-39% impaired, limited or restricted    - GOAL STATUS: CI - 1%-19% impaired, limited or restricted    - D/C STATUS:  ---------------To be determined---------------      Medical Necessity:   · Patient is expected to demonstrate progress in strength and range of motion to increase independence with reaching and lifting. Reason for Services/Other Comments:  · Patient continues to require present interventions due to patient's inability to reach, dress, and lift.    Use of outcome tool(s) and clinical judgement create a POC that gives a: Clear prediction of patient's progress: LOW COMPLEXITY                 Sindhu Sibley, PT

## 2019-02-01 PROBLEM — M80.00XA AGE-RELATED OSTEOPOROSIS WITH CURRENT PATHOLOGICAL FRACTURE: Status: RESOLVED | Noted: 2017-09-21 | Resolved: 2019-02-01

## 2019-02-01 PROBLEM — M19.012 PRIMARY OSTEOARTHRITIS OF LEFT SHOULDER: Status: RESOLVED | Noted: 2017-07-14 | Resolved: 2019-02-01

## 2019-02-01 PROBLEM — E66.9 OBESITY (BMI 30-39.9): Status: RESOLVED | Noted: 2018-02-21 | Resolved: 2019-02-01

## 2019-02-01 PROBLEM — E66.3 OVERWEIGHT (BMI 25.0-29.9): Status: RESOLVED | Noted: 2017-10-30 | Resolved: 2019-02-01

## 2019-02-01 PROBLEM — R56.9 NEW ONSET SEIZURE (HCC): Status: RESOLVED | Noted: 2017-09-11 | Resolved: 2019-02-01

## 2020-06-05 ENCOUNTER — HOSPITAL ENCOUNTER (OUTPATIENT)
Dept: MRI IMAGING | Age: 80
Discharge: HOME | End: 2020-06-05
Attending: UROLOGY
Payer: MEDICARE

## 2020-06-05 DIAGNOSIS — R97.20 RISING PSA LEVEL: ICD-10-CM

## 2020-06-05 PROCEDURE — 72197 MRI PELVIS W/O & W/DYE: CPT

## 2020-06-05 PROCEDURE — 74011250636 HC RX REV CODE- 250/636: Performed by: UROLOGY

## 2020-06-05 PROCEDURE — A9575 INJ GADOTERATE MEGLUMI 0.1ML: HCPCS | Performed by: UROLOGY

## 2020-06-05 RX ORDER — SODIUM CHLORIDE 0.9 % (FLUSH) 0.9 %
10 SYRINGE (ML) INJECTION
Status: COMPLETED | OUTPATIENT
Start: 2020-06-05 | End: 2020-06-05

## 2020-06-05 RX ORDER — GADOTERATE MEGLUMINE 376.9 MG/ML
20 INJECTION INTRAVENOUS
Status: COMPLETED | OUTPATIENT
Start: 2020-06-05 | End: 2020-06-05

## 2020-06-05 RX ADMIN — Medication 10 ML: at 09:16

## 2020-06-05 RX ADMIN — GADOTERATE MEGLUMINE 20 ML: 376.9 INJECTION INTRAVENOUS at 09:16

## 2021-04-14 PROBLEM — R06.02 SHORTNESS OF BREATH: Status: ACTIVE | Noted: 2021-04-14

## 2021-04-20 PROBLEM — I25.10 CORONARY ARTERY DISEASE INVOLVING NATIVE CORONARY ARTERY OF NATIVE HEART WITHOUT ANGINA PECTORIS: Status: ACTIVE | Noted: 2021-04-20

## 2021-05-28 NOTE — PERIOP NOTES
Family called and updated on the progress of surgery Please print out ostomy belt order and fax to [facility]. Thanks.

## 2021-06-23 ENCOUNTER — HOSPITAL ENCOUNTER (OUTPATIENT)
Dept: CT IMAGING | Age: 81
Discharge: HOME | End: 2021-06-23
Attending: NURSE PRACTITIONER
Payer: MEDICARE

## 2021-06-23 DIAGNOSIS — R31.0 GROSS HEMATURIA: ICD-10-CM

## 2021-06-23 LAB — CREAT BLD-MCNC: 1.03 MG/DL (ref 0.8–1.5)

## 2021-06-23 PROCEDURE — 82565 ASSAY OF CREATININE: CPT

## 2021-06-23 PROCEDURE — 74178 CT ABD&PLV WO CNTR FLWD CNTR: CPT

## 2021-06-23 PROCEDURE — 74011000636 HC RX REV CODE- 636: Performed by: NURSE PRACTITIONER

## 2021-06-23 PROCEDURE — 74011000258 HC RX REV CODE- 258: Performed by: NURSE PRACTITIONER

## 2021-06-23 RX ORDER — SODIUM CHLORIDE 0.9 % (FLUSH) 0.9 %
10 SYRINGE (ML) INJECTION
Status: COMPLETED | OUTPATIENT
Start: 2021-06-23 | End: 2021-06-23

## 2021-06-23 RX ADMIN — SODIUM CHLORIDE 100 ML: 900 INJECTION, SOLUTION INTRAVENOUS at 14:33

## 2021-06-23 RX ADMIN — IOPAMIDOL 100 ML: 755 INJECTION, SOLUTION INTRAVENOUS at 14:32

## 2021-06-23 RX ADMIN — Medication 10 ML: at 14:33

## 2021-07-21 PROBLEM — Z11.59 NEED FOR HEPATITIS C SCREENING TEST: Status: ACTIVE | Noted: 2021-07-21

## 2022-03-18 PROBLEM — I25.10 CORONARY ARTERY DISEASE INVOLVING NATIVE CORONARY ARTERY OF NATIVE HEART WITHOUT ANGINA PECTORIS: Status: ACTIVE | Noted: 2021-04-20

## 2022-03-18 PROBLEM — Z11.59 NEED FOR HEPATITIS C SCREENING TEST: Status: ACTIVE | Noted: 2021-07-21

## 2022-03-18 PROBLEM — G47.33 OBSTRUCTIVE SLEEP APNEA: Status: ACTIVE | Noted: 2017-10-30

## 2022-03-19 PROBLEM — M75.50 CHRONIC SHOULDER BURSITIS: Status: ACTIVE | Noted: 2017-01-09

## 2022-03-19 PROBLEM — Z87.19 HISTORY OF GASTROESOPHAGEAL REFLUX (GERD): Status: ACTIVE | Noted: 2017-09-21

## 2022-03-19 PROBLEM — N40.0 BENIGN PROSTATIC HYPERPLASIA WITHOUT LOWER URINARY TRACT SYMPTOMS: Status: ACTIVE | Noted: 2017-01-09

## 2022-03-19 PROBLEM — M85.9 DISORDER OF BONE DENSITY AND STRUCTURE, UNSPECIFIED: Status: ACTIVE | Noted: 2017-09-21

## 2022-03-19 PROBLEM — R06.02 SHORTNESS OF BREATH: Status: ACTIVE | Noted: 2021-04-14

## 2022-03-19 PROBLEM — R97.20 ELEVATED PSA: Status: ACTIVE | Noted: 2017-07-14

## 2022-03-19 PROBLEM — R56.9 SEIZURE (HCC): Status: ACTIVE | Noted: 2017-09-10

## 2022-03-19 PROBLEM — M19.012 ARTHRITIS OF SHOULDER REGION, LEFT, DEGENERATIVE: Status: ACTIVE | Noted: 2018-06-13

## 2022-03-20 PROBLEM — G47.00 INSOMNIA: Status: ACTIVE | Noted: 2017-10-30

## 2022-07-15 ENCOUNTER — HOSPITAL ENCOUNTER (OUTPATIENT)
Age: 82
Setting detail: OBSERVATION
Discharge: HOME | End: 2022-07-17
Attending: EMERGENCY MEDICINE
Payer: MEDICARE

## 2022-07-15 ENCOUNTER — APPOINTMENT (OUTPATIENT)
Dept: CT IMAGING | Age: 82
End: 2022-07-15
Payer: MEDICARE

## 2022-07-15 DIAGNOSIS — G45.9 TIA (TRANSIENT ISCHEMIC ATTACK): ICD-10-CM

## 2022-07-15 DIAGNOSIS — R41.3 AMNESIA: Primary | ICD-10-CM

## 2022-07-15 PROBLEM — R06.02 SHORTNESS OF BREATH: Status: RESOLVED | Noted: 2021-04-14 | Resolved: 2022-07-15

## 2022-07-15 PROBLEM — R56.9 SEIZURE (HCC): Status: ACTIVE | Noted: 2017-09-10

## 2022-07-15 PROBLEM — R29.90 STROKE-LIKE SYMPTOMS: Status: ACTIVE | Noted: 2022-07-15

## 2022-07-15 PROBLEM — N40.0 BENIGN PROSTATIC HYPERPLASIA WITHOUT LOWER URINARY TRACT SYMPTOMS: Status: ACTIVE | Noted: 2017-01-09

## 2022-07-15 LAB
ANION GAP SERPL CALC-SCNC: 4 MMOL/L (ref 7–16)
BASOPHILS # BLD: 0.1 K/UL (ref 0–0.2)
BASOPHILS NFR BLD: 1 % (ref 0–2)
BUN SERPL-MCNC: 21 MG/DL (ref 8–23)
CALCIUM SERPL-MCNC: 9.2 MG/DL (ref 8.3–10.4)
CHLORIDE SERPL-SCNC: 108 MMOL/L (ref 98–107)
CO2 SERPL-SCNC: 28 MMOL/L (ref 21–32)
CREAT SERPL-MCNC: 1.1 MG/DL (ref 0.8–1.5)
DIFFERENTIAL METHOD BLD: NORMAL
EOSINOPHIL # BLD: 0.4 K/UL (ref 0–0.8)
EOSINOPHIL NFR BLD: 6 % (ref 0.5–7.8)
ERYTHROCYTE [DISTWIDTH] IN BLOOD BY AUTOMATED COUNT: 12.4 % (ref 11.9–14.6)
GLUCOSE BLD STRIP.AUTO-MCNC: 103 MG/DL (ref 65–100)
GLUCOSE SERPL-MCNC: 109 MG/DL (ref 65–100)
HCT VFR BLD AUTO: 48.2 % (ref 41.1–50.3)
HGB BLD-MCNC: 16.3 G/DL (ref 13.6–17.2)
IMM GRANULOCYTES # BLD AUTO: 0 K/UL (ref 0–0.5)
IMM GRANULOCYTES NFR BLD AUTO: 0 % (ref 0–5)
INR PPP: 0.9
LYMPHOCYTES # BLD: 1.8 K/UL (ref 0.5–4.6)
LYMPHOCYTES NFR BLD: 24 % (ref 13–44)
MCH RBC QN AUTO: 30.8 PG (ref 26.1–32.9)
MCHC RBC AUTO-ENTMCNC: 33.8 G/DL (ref 31.4–35)
MCV RBC AUTO: 91.1 FL (ref 79.6–97.8)
MONOCYTES # BLD: 0.7 K/UL (ref 0.1–1.3)
MONOCYTES NFR BLD: 9 % (ref 4–12)
NEUTS SEG # BLD: 4.5 K/UL (ref 1.7–8.2)
NEUTS SEG NFR BLD: 60 % (ref 43–78)
NRBC # BLD: 0 K/UL (ref 0–0.2)
PLATELET # BLD AUTO: 198 K/UL (ref 150–450)
PMV BLD AUTO: 10.6 FL (ref 9.4–12.3)
POTASSIUM SERPL-SCNC: 3.9 MMOL/L (ref 3.5–5.1)
PROTHROMBIN TIME: 12.9 SEC (ref 12.6–14.5)
RBC # BLD AUTO: 5.29 M/UL (ref 4.23–5.6)
SERVICE CMNT-IMP: ABNORMAL
SODIUM SERPL-SCNC: 140 MMOL/L (ref 138–145)
TROPONIN I SERPL HS-MCNC: 5 PG/ML (ref 0–14)
WBC # BLD AUTO: 7.5 K/UL (ref 4.3–11.1)

## 2022-07-15 PROCEDURE — 6360000004 HC RX CONTRAST MEDICATION: Performed by: EMERGENCY MEDICINE

## 2022-07-15 PROCEDURE — 96372 THER/PROPH/DIAG INJ SC/IM: CPT

## 2022-07-15 PROCEDURE — 85610 PROTHROMBIN TIME: CPT

## 2022-07-15 PROCEDURE — 99220 PR INITIAL OBSERVATION CARE/DAY 70 MINUTES: CPT | Performed by: PSYCHIATRY & NEUROLOGY

## 2022-07-15 PROCEDURE — 6370000000 HC RX 637 (ALT 250 FOR IP): Performed by: FAMILY MEDICINE

## 2022-07-15 PROCEDURE — 0042T CT BRAIN PERFUSION: CPT

## 2022-07-15 PROCEDURE — 84484 ASSAY OF TROPONIN QUANT: CPT

## 2022-07-15 PROCEDURE — 99285 EMERGENCY DEPT VISIT HI MDM: CPT

## 2022-07-15 PROCEDURE — 70496 CT ANGIOGRAPHY HEAD: CPT

## 2022-07-15 PROCEDURE — 94761 N-INVAS EAR/PLS OXIMETRY MLT: CPT

## 2022-07-15 PROCEDURE — 82962 GLUCOSE BLOOD TEST: CPT

## 2022-07-15 PROCEDURE — G0378 HOSPITAL OBSERVATION PER HR: HCPCS

## 2022-07-15 PROCEDURE — 96361 HYDRATE IV INFUSION ADD-ON: CPT

## 2022-07-15 PROCEDURE — 2580000003 HC RX 258: Performed by: EMERGENCY MEDICINE

## 2022-07-15 PROCEDURE — 70450 CT HEAD/BRAIN W/O DYE: CPT

## 2022-07-15 PROCEDURE — 2580000003 HC RX 258: Performed by: FAMILY MEDICINE

## 2022-07-15 PROCEDURE — 80048 BASIC METABOLIC PNL TOTAL CA: CPT

## 2022-07-15 PROCEDURE — 6360000002 HC RX W HCPCS: Performed by: EMERGENCY MEDICINE

## 2022-07-15 PROCEDURE — 96365 THER/PROPH/DIAG IV INF INIT: CPT

## 2022-07-15 PROCEDURE — 6360000002 HC RX W HCPCS: Performed by: FAMILY MEDICINE

## 2022-07-15 PROCEDURE — 85025 COMPLETE CBC W/AUTO DIFF WBC: CPT

## 2022-07-15 RX ORDER — ONDANSETRON 2 MG/ML
4 INJECTION INTRAMUSCULAR; INTRAVENOUS EVERY 6 HOURS PRN
Status: DISCONTINUED | OUTPATIENT
Start: 2022-07-15 | End: 2022-07-17 | Stop reason: HOSPADM

## 2022-07-15 RX ORDER — LEVETIRACETAM 500 MG/1
500 TABLET ORAL 2 TIMES DAILY
Status: DISCONTINUED | OUTPATIENT
Start: 2022-07-15 | End: 2022-07-17 | Stop reason: HOSPADM

## 2022-07-15 RX ORDER — ENOXAPARIN SODIUM 100 MG/ML
40 INJECTION SUBCUTANEOUS DAILY
Status: DISCONTINUED | OUTPATIENT
Start: 2022-07-15 | End: 2022-07-17 | Stop reason: HOSPADM

## 2022-07-15 RX ORDER — ONDANSETRON 4 MG/1
4 TABLET, ORALLY DISINTEGRATING ORAL EVERY 8 HOURS PRN
Status: DISCONTINUED | OUTPATIENT
Start: 2022-07-15 | End: 2022-07-17 | Stop reason: HOSPADM

## 2022-07-15 RX ORDER — ATORVASTATIN CALCIUM 40 MG/1
80 TABLET, FILM COATED ORAL NIGHTLY
Status: DISCONTINUED | OUTPATIENT
Start: 2022-07-15 | End: 2022-07-17 | Stop reason: HOSPADM

## 2022-07-15 RX ORDER — LABETALOL HYDROCHLORIDE 5 MG/ML
10 INJECTION, SOLUTION INTRAVENOUS EVERY 10 MIN PRN
Status: DISCONTINUED | OUTPATIENT
Start: 2022-07-15 | End: 2022-07-17 | Stop reason: HOSPADM

## 2022-07-15 RX ORDER — FINASTERIDE 5 MG/1
5 TABLET, FILM COATED ORAL DAILY
Status: DISCONTINUED | OUTPATIENT
Start: 2022-07-16 | End: 2022-07-17 | Stop reason: HOSPADM

## 2022-07-15 RX ORDER — CLOPIDOGREL BISULFATE 75 MG/1
300 TABLET ORAL ONCE
Status: COMPLETED | OUTPATIENT
Start: 2022-07-15 | End: 2022-07-15

## 2022-07-15 RX ORDER — SODIUM CHLORIDE 9 MG/ML
INJECTION, SOLUTION INTRAVENOUS CONTINUOUS
Status: DISCONTINUED | OUTPATIENT
Start: 2022-07-15 | End: 2022-07-16

## 2022-07-15 RX ORDER — CLOPIDOGREL BISULFATE 75 MG/1
75 TABLET ORAL DAILY
Status: DISCONTINUED | OUTPATIENT
Start: 2022-07-16 | End: 2022-07-17 | Stop reason: HOSPADM

## 2022-07-15 RX ORDER — ASPIRIN 81 MG/1
324 TABLET, CHEWABLE ORAL ONCE
Status: COMPLETED | OUTPATIENT
Start: 2022-07-15 | End: 2022-07-15

## 2022-07-15 RX ORDER — POLYETHYLENE GLYCOL 3350 17 G/17G
17 POWDER, FOR SOLUTION ORAL DAILY PRN
Status: DISCONTINUED | OUTPATIENT
Start: 2022-07-15 | End: 2022-07-17 | Stop reason: HOSPADM

## 2022-07-15 RX ORDER — ASPIRIN 81 MG/1
81 TABLET ORAL DAILY
Status: DISCONTINUED | OUTPATIENT
Start: 2022-07-16 | End: 2022-07-17 | Stop reason: HOSPADM

## 2022-07-15 RX ADMIN — LEVETIRACETAM 500 MG: 500 TABLET, FILM COATED ORAL at 21:18

## 2022-07-15 RX ADMIN — ATORVASTATIN CALCIUM 80 MG: 40 TABLET, FILM COATED ORAL at 21:18

## 2022-07-15 RX ADMIN — ASPIRIN 324 MG: 81 TABLET, CHEWABLE ORAL at 21:16

## 2022-07-15 RX ADMIN — ENOXAPARIN SODIUM 40 MG: 40 INJECTION SUBCUTANEOUS at 21:18

## 2022-07-15 RX ADMIN — LEVETIRACETAM 1500 MG: 100 INJECTION, SOLUTION INTRAVENOUS at 17:34

## 2022-07-15 RX ADMIN — SODIUM CHLORIDE: 9 INJECTION, SOLUTION INTRAVENOUS at 21:14

## 2022-07-15 RX ADMIN — CLOPIDOGREL BISULFATE 300 MG: 75 TABLET ORAL at 21:17

## 2022-07-15 RX ADMIN — IOPAMIDOL 100 ML: 755 INJECTION, SOLUTION INTRAVENOUS at 17:20

## 2022-07-15 ASSESSMENT — ENCOUNTER SYMPTOMS
BACK PAIN: 0
DIARRHEA: 0
COUGH: 0
ABDOMINAL PAIN: 0
EYE DISCHARGE: 0
SHORTNESS OF BREATH: 0
VOMITING: 0
CHEST TIGHTNESS: 0
NAUSEA: 0

## 2022-07-15 ASSESSMENT — PAIN SCALES - GENERAL
PAINLEVEL_OUTOF10: 0
PAINLEVEL_OUTOF10: 0

## 2022-07-15 ASSESSMENT — PAIN - FUNCTIONAL ASSESSMENT: PAIN_FUNCTIONAL_ASSESSMENT: 0-10

## 2022-07-15 NOTE — ED TRIAGE NOTES
Patient arrives to ED via EMS from home. Patient is experiencing a new onset of memory loss. Per family it started x 30 min ago. Patient has history of hypertension and seizures. Patient is on [address] for seizures.  RACE 0.     BP-160/80

## 2022-07-15 NOTE — CONSULTS
[facility] Neurology [address]  [address], [address] [facility]            Erasto Flynn is a [de-identified] y.o. male who presents on referral from the emergency room. 82-year-old gentleman with a prior history of a seizure disorder which precipitated a compression fracture of his spine back in 2017 had the acute onset this afternoon of confusion which the patient recognized he was asking questions over again and was disoriented in terms of place and where he had been. No dianna features of aphasia and word salad no neologisms. The patient is bilingual his first language being [place]      Past Medical History:   Diagnosis Date    Age-related osteoporosis with current pathological fracture 9/21/2017    GERD (gastroesophageal reflux disease)     pt denies (6/7/18)    Hyperlipidemia 8/5/2016    Hypertension     Insomnia 10/30/2017    Obstructive sleep apnea 10/30/2017    pt is not currently using cpap machine- has used in past (6/7/18)    Overweight (BMI 25.0-29.9) 10/30/2017    Primary osteoarthritis of left shoulder 7/14/2017    Seizure ([address] 09/10/2017    x2 (9/2017 and 10/2017)- on keppra BID- had consult with Dr Kasi Granados (neuro)- pt reports triggered by insomnia       Past Surgical History:   Procedure Laterality Date    COLONOSCOPY      HERNIA REPAIR      TOTAL HIP ARTHROPLASTY Bilateral     TOTAL SHOULDER ARTHROPLASTY Left         Family History   Problem Relation Age of Onset    No Known Problems Mother     No Known Problems Father         Social History     Socioeconomic History    Marital status:    Tobacco Use    Smoking status: Never    Smokeless tobacco: Never   Substance and Sexual Activity    Alcohol use: No    Drug use: No         No current facility-administered medications for this encounter.      Current Outpatient Medications   Medication Sig Dispense Refill    amLODIPine-benazepril (LOTREL) 5-20 MG per capsule TAKE ONE CAPSULE BY MOUTH ONE TIME DAILY      aspirin 81 MG EC tablet Take 81 mg by mouth daily      atorvastatin (LIPITOR) 40 MG tablet TAKE ONE TABLET BY MOUTH ONE TIME DAILY      finasteride (PROSCAR) 5 MG tablet Take 5 mg by mouth daily      levETIRAcetam (KEPPRA) 500 MG tablet Take 500 mg by mouth 2 times daily      levoFLOXacin (LEVAQUIN) 500 MG tablet Take 500 mg by mouth daily          No Known Allergies    Review of Systems    BP (!) 174/76   Pulse 64   Resp 16   Ht 5' 9\" (1.753 m)   Wt 204 lb (92.5 kg)   SpO2 95%   BMI 30.13 kg/m²     Neurologic Exam    The patient is alert cooperative and oriented. No evident skin lesions no evidence of excessive bruising no trauma  Patient looks well-hydrated. Does not appear chronically ill. Cranial nerve examination normal visual fields. Normal random eye movements. No ptosis. No lid lag  Face moves strongly symmetrically and equally  Speech is normal.  He had some minor word finding difficulties in English but was able to name objects in [place] and his son-in-law who is a physician did not believe that his performance was abnormal in terms of baseline  He interpreted the stroke cards and read the sentences readily  Motor  Upper extremities  Normal bulk strength tone and symmetric arm swing  Lower extremities  Normal bulk strength tone  Deep tendon reflexes 1+ and symmetric at biceps brachioradialis and triceps  Casual gait is normal with no evidence of ataxia  Fine motor coordination is normal in the hands bilaterally    Peripheral sensation light touch normal  There are no carotid bruits  Vital signs are reviewed  NIH score 0  Most recent MRI   Results for orders placed in visit on 10/18/17    MRI BRAIN W WO CONTRAST    Narrative  MRI BRAIN WITHOUT AND WITH CONTRAST 10/18/2017    HISTORY: New onset of seizures for one week ago.     TECHNIQUE: Sagittal and axial T1-weighted, axial T2-weighted, axial FLAIR, axial  T2-weighted gradient-echo, axial diffusion weighted images with ADC maps and  postcontrast axial and coronal T1-weighted images of the brain. 20cc of  MultiHance gadolinium contrast was administered intravenously without adverse  event to evaluate for pathological leptomeningeal or parenchymal enhancement. COMPARISON: 9/10/2017    FINDINGS: On the T2-weighted and FLAIR sequences, there are stable scattered  punctate hyperintense foci throughout the supratentorial white matter. This is  not an uncommon finding which may be present with asymptomatic patients, with  migraine headaches or with mild chronic small vessel ischemic disease. There is no acute infarction, acute intracranial hemorrhage, hydrocephalus,  intra-axial mass, or abnormal extra-axial fluid collection. Mild cerebral volume  loss is present without disproportionate hippocampal atrophy. There is no  abnormal parenchymal or leptomeningeal enhancement. Minimal mucosal thickening  is present in the frontal and ethmoid air cells. There is an old lacunar infarct  or dilated perivascular space in the left coronal radiata. Impression  IMPRESSION: Stable nonspecific punctate T2/FLAIR hyperintense white matter  lesions present as described. This pattern may be present with migraine  headaches or mild chronic small vessel ischemic disease. No new abnormalities  identified that would account for the patient's seizures. Most recent MRA   No results found for this or any previous visit. Most recent CTA  Results for orders placed during the hospital encounter of 07/15/22    CT HEAD WO CONTRAST    Narrative  HEAD CT WITHOUT CONTRAST  7/15/2022    HISTORY:   AMS  ; intermittent confusion    TECHNIQUE: Noncontrast axial images were obtained through the brain. All CT  scans at this facility used dose modulation, interactive reconstruction and/or  weight based dosing when appropriate to reduce radiation dose to as low as  reasonably achievable.     COMPARISON: None    FINDINGS: There is no acute intracranial hemorrhage, significant mass effect or  CT evidence of acute large artery territorial infarction. Please note that a  hyperacute infarct or small vessel infarct may not be apparent on initial CT  imaging. Mild cerebral volume loss is present without disproportionate hippocampal  atrophy. There is no hydrocephalus , intra-axial mass or abnormal extra-axial fluid  collection. There are no displaced skull fractures. The mastoid air cells and  paranasal sinuses are clear where imaged. Impression  No acute findings       Most recent Echo  No results found for this or any previous visit.          Most recent lipid panels  Lab Results   Component Value Date/Time    CHOL 138 01/25/2022 11:00 AM    HDL 50 01/25/2022 11:00 AM    VLDL 21 01/25/2022 11:00 AM       Most recent Hgb A1C  No results found for: HBA1C, [id number]      Assessment/Plan:  Differential diagnosis here is between TIA and transient global amnesia I believe it less likely that this reflected a seizure  Recommendations  As he does have intracranial vascular disease cover with dual antiplatelet therapy  Continue high-dose statin  Permissive hypertension-as TGA is a diagnostic possibility I would treat systolic greater than 920 rather than the usual 220  Monitor echocardiogram and bubble study  IV dosage of Keppra 1 g and continue oral Keppra the patient has been weaning down his dosage of Keppra in the absence of medical supervision this is discouraged  Should be evaluated and treated for potential obstructive sleep apnea            Bette Kennedy MD

## 2022-07-15 NOTE — ED PROVIDER NOTES
[facility] Emergency Department Provider Note                   PCP:                Nav Foster MD               Age: [de-identified] y.o. Sex: male       ICD-10-CM    1. Amnesia  R41.3       2. TIA (transient ischemic attack)  G45.9           DISPOSITION Decision To Admit 07/15/2022 06:12:21 PM       MDM  Number of Diagnoses or Management Options  Amnesia  TIA (transient ischemic attack)  Diagnosis management comments: 62-year-old male presents via EMS for concern for strokelike symptoms. Vital signs here are reviewed. Was initiated. Patient had an episode 30 minutes prior to arrival where he was having repeated episodes of repeated speech. Patient had no recollection of this time. The neurologist had evaluated the patient at bedside. Initial CT shows no new intracranial findings. The neurologist saw some vascular disease and recommended antiepileptic medication as well as Plavix and aspirin. CBC here is fairly unremarkable, CMP here also fairly unremarkable. At this time I do think the patient should be admitted for further evaluation and work-up and likely including an MRI. I have spoken with the on-call hospitalist.  Admitted in stable condition. Orders Placed This Encounter   Procedures    CT HEAD WO CONTRAST    CT BRAIN PERFUSION    CTA HEAD NECK W CONTRAST    Basic Metabolic Panel    CBC with Auto Differential    Troponin    Protime-INR    Diet NPO    Activate Code Stroke    Cardiac Monitor - ED Only    Continuous Pulse Oximetry    Neuro checks    NIH STROKE SCALE ASSESSMENT    Weigh patient    SLP--DYSPHAGIA SCREENING    POCT Glucose    POCT INR    POCT Glucose    EKG 12 Lead    Saline lock IV        Alethea Correia is a [de-identified] y.o. male who presents to the Emergency Department with chief complaint of    Chief Complaint   Patient presents with    Altered Mental Status      [de-identified]year-old male arrives via EMS with a chief complaint of intermittent confusion.   Family is at bedside who states that symptoms started approximately 30 minutes prior to arrival.  Patient was apparently on the phone with a friend when he could not recall the prior conversation he had had a couple minutes before. Currently the patient was repeating himself. He was speaking in [place] at times which was his initial first language. Had a prior seizure from a prior back accident and is unclear if he is still on any antiepileptic medication. He has had no reported falls or trauma he is not currently on any blood thinners. His wife had given him 2 baby aspirin prior to arrival.        All other systems reviewed and are negative. Review of Systems   Constitutional:  Negative for chills, fatigue and fever. HENT:  Negative for congestion, dental problem and drooling. Eyes:  Negative for discharge. Respiratory:  Negative for cough, chest tightness and shortness of breath. Cardiovascular:  Negative for chest pain and palpitations. Gastrointestinal:  Negative for abdominal pain, diarrhea, nausea and vomiting. Endocrine: Negative for polydipsia. Genitourinary:  Negative for difficulty urinating, dysuria and hematuria. Musculoskeletal:  Negative for back pain. Skin:  Negative for rash and wound. Neurological:  Positive for speech difficulty. Negative for dizziness, seizures, light-headedness and headaches. Psychiatric/Behavioral:  Negative for agitation.       Past Medical History:   Diagnosis Date    Age-related osteoporosis with current pathological fracture 9/21/2017    GERD (gastroesophageal reflux disease)     pt denies (6/7/18)    Hyperlipidemia 8/5/2016    Hypertension     Insomnia 10/30/2017    Obstructive sleep apnea 10/30/2017    pt is not currently using cpap machine- has used in past (6/7/18)    Overweight (BMI 25.0-29.9) 10/30/2017    Primary osteoarthritis of left shoulder 7/14/2017    Seizure ([address] 09/10/2017    x2 (9/2017 and 10/2017)- on keppra BID- had consult with Dr Ash Carbajal (neuro)- pt reports triggered by insomnia        Past Surgical History:   Procedure Laterality Date    COLONOSCOPY      HERNIA REPAIR      TOTAL HIP ARTHROPLASTY Bilateral     TOTAL SHOULDER ARTHROPLASTY Left         Family History   Problem Relation Age of Onset    No Known Problems Mother     No Known Problems Father         Social Connections: Not on file        No Known Allergies     Vitals signs and nursing note reviewed. Patient Vitals for the past 4 hrs:   Pulse Resp BP SpO2   07/15/22 1835 69 13 (!) 147/95 --   07/15/22 1815 66 16 (!) 143/68 93 %   07/15/22 1800 61 17 135/80 93 %   07/15/22 1745 63 14 (!) 143/121 95 %   07/15/22 1733 62 16 -- 97 %   07/15/22 1732 -- -- (!) 160/79 --   07/15/22 1649 64 16 (!) 174/76 95 %          Physical Exam  Vitals and nursing note reviewed. Constitutional:       Appearance: Normal appearance. HENT:      Head: Normocephalic and atraumatic. Right Ear: Tympanic membrane normal.      Nose: Nose normal.      Mouth/Throat:      Mouth: Mucous membranes are moist.   Eyes:      Extraocular Movements: Extraocular movements intact. Pupils: Pupils are equal, round, and reactive to light. Cardiovascular:      Rate and Rhythm: Normal rate and regular rhythm. Heart sounds: No murmur heard. Pulmonary:      Effort: Pulmonary effort is normal. No respiratory distress. Breath sounds: Normal breath sounds. Abdominal:      General: There is no distension. Palpations: Abdomen is soft. Tenderness: There is no abdominal tenderness. There is no guarding. Musculoskeletal:         General: No swelling or tenderness. Normal range of motion. Cervical back: Normal range of motion and neck supple. No rigidity or tenderness. Skin:     General: Skin is warm and dry. Capillary Refill: Capillary refill takes less than 2 seconds. Neurological:      General: No focal deficit present. Mental Status: He is alert and oriented to person, place, and time.  Mental status is at baseline. GCS: GCS eye subscore is 4. GCS verbal subscore is 5. GCS motor subscore is 6. Psychiatric:         Behavior: Behavior normal.        Procedures    ED EKG Interpretation  EKG was interpreted in the absence of a cardiologist.    Normal sinus rhythm with a ventricular rate of 65 bpm, MN interval 263, QRS 94, QTc 391, no ST segment elevation or depression noted no signs of acute ischemia. 1st degree av block    Labs Reviewed   BASIC METABOLIC PANEL - Abnormal; Notable for the following components:       Result Value    Chloride 108 (*)     Anion Gap 4 (*)     Glucose 109 (*)     All other components within normal limits   POCT GLUCOSE - Abnormal; Notable for the following components:    POC Glucose 103 (*)     All other components within normal limits   CBC WITH AUTO DIFFERENTIAL   TROPONIN   PROTIME-INR   POCT GLUCOSE   POCT PT/INR        CT BRAIN PERFUSION   Final Result      No estimated core infarct. Please note that the determination of patient treatment is not based solely upon   imaging factors or calculation values. Management of ischemia is at the   discretion of the primary physician and is based upon a combination of clinical   and imaging data, along other factors. CTA HEAD NECK W CONTRAST         CT HEAD WO CONTRAST   Final Result   No acute findings              NIH Stroke Scale  Interval: Baseline  Level of Consciousness (1a): Alert  LOC Questions (1b): Answers both correctly  LOC Commands (1c): Performs both tasks correctly  Best Gaze (2): Normal  Visual (3): No visual loss  Facial Palsy (4): Normal symmetrical movement  Motor Arm, Left (5a): No drift  Motor Arm, Right (5b): No drift  Motor Leg, Left (6a): No drift  Motor Leg, Right (6b):  No drift  Limb Ataxia (7): Absent  Sensory (8): Normal  Best Language (9): No aphasia  Dysarthria (10): Normal  Extinction and Inattention (11): No abnormality  Total: 0                   Voice dictation software was used during the making of this note. This software is not perfect and grammatical and other typographical errors may be present. This note has not been completely proofread for errors.      Ulysses Cairo, DO  07/15/22 1846

## 2022-07-15 NOTE — H&P
Hospitalist History and Physical   Admit Date:  7/15/2022  4:44 PM   Name:  Raul Gottron   Age:  [de-identified] y.o. Sex:  male  :  1940   MRN:  [id number]   Room:  [facility]/    Presenting Complaint: Altered Mental Status     Reason(s) for Admission: Stroke-like symptoms [R29.90]     History of Present Illness:   Raul Gottron is a [de-identified] y.o. male with medical history of Obesity, HTN, HLD, Seizure d/o who presented from home via ems with sudden onset memory loss that started 30 minutes PTA. Seen by neurologist Jhon Choi, suspected TGA vs TIA, less likely seizures. Given IV keppra 1.5 load just in case and recommended admission with permissive HTN to  vs typical 220. CTH showed no acute findings. He tells me he is 98% better. Doesn't recall what happened. No family at the bedside. Says he has been reducing his keppra dose on his own for awhile and was doing well until now. He says he was told he had sleep apnea in the past but the CPAP didn't do anything for him. Denies vision changes, speech changes, weakness, or sensory changes. Review of Systems:  10 systems reviewed and negative except as noted in HPI. Assessment & Plan:     STROKE-LIKE SYMPTOMS - TIA vs TGA   Home antiplatelets/anticoagulation: ASA  CTA not recommended by neurology   Remote tele  -  mg plus Plavix 300 mg x1 followed by dual antiplatelet therapy with ASA 81 mg daily +plavix 75 mg daily for 21 days f/b ASA monotherapy unless neurology recommends otherwise. - High intensity statin   - Fasting hgbA1C, Lipid panel  - MRI brain  contrast ; TTE with bubble  - Permissive hypertension to 180/110 mm Hg instead of usual parameters   - NS 75 cc/hr x 24 hours   -  Needs outpatient sleep study  - Consider Long term rhythm monitor at DC to eval for paroxysmal afib/flutter  - Outpatient referral to Neurology for followup    Seizure disorder - s/p keppra load.  Resume PO keppra 500 mg BID per neurology     HTN - hold home PO for now  HLD - increase atorvastatin   BPH - resume finesteride     Obesity adds to complexity         Dispo/Discharge Planning:     Admit to remote tele    Diet: Diet NPO  VTE ppx: lovenox   Code status: Full Code    Hospital Problems:  Principal Problem:    Stroke-like symptoms  Active Problems:    Amnesia    TIA (transient ischemic attack)    Hyperlipidemia    Benign prostatic hyperplasia without lower urinary tract symptoms    Seizure ([address]    Hypertension  Resolved Problems:    * No resolved hospital problems. *       Past History:     Past Medical History:   Diagnosis Date    Age-related osteoporosis with current pathological fracture 9/21/2017    GERD (gastroesophageal reflux disease)     pt denies (6/7/18)    Hyperlipidemia 8/5/2016    Hypertension     Insomnia 10/30/2017    Obstructive sleep apnea 10/30/2017    pt is not currently using cpap machine- has used in past (6/7/18)    Overweight (BMI 25.0-29.9) 10/30/2017    Primary osteoarthritis of left shoulder 7/14/2017    Seizure ([address] 09/10/2017    x2 (9/2017 and 10/2017)- on keppra BID- had consult with Dr Latrice Juárez (neuro)- pt reports triggered by insomnia     Past Surgical History:   Procedure Laterality Date    COLONOSCOPY      HERNIA REPAIR      TOTAL HIP ARTHROPLASTY Bilateral     TOTAL SHOULDER ARTHROPLASTY Left       Social History     Tobacco Use    Smoking status: Never    Smokeless tobacco: Never   Substance Use Topics    Alcohol use: No      Social History     Substance and Sexual Activity   Drug Use No     Family History   Problem Relation Age of Onset    No Known Problems Mother     No Known Problems Father       Family history reviewed and negative except as noted above.       Immunization History   Administered Date(s) Administered    COVID-19, PFIZER PURPLE top, DILUTE for use, (age 15 y+), 30mcg/0.3mL 02/15/2021, 03/09/2021, 09/29/2021    Influenza, High Dose (Fluzone 65 yrs and older) 10/05/2015, 08/24/2016, 09/06/2018, 08/26/2020, 10/01/2021 Influenza, Quadv, adjuvanted, 65 yrs +, IM, PF (Fluad) 08/14/2020    Influenza, Triv, inactivated, subunit, adjuvanted, IM (Fluad 65 yrs and older) 10/24/2019    Pneumococcal Conjugate 13-valent ([id number]) 06/23/2015    Pneumococcal Polysaccharide ([id number]) 02/01/2019     No Known Allergies  Prior to Admit Medications:  Current Outpatient Medications   Medication Instructions    amLODIPine-benazepril (LOTREL) 5-20 MG per capsule TAKE ONE CAPSULE BY MOUTH ONE TIME DAILY    aspirin 81 mg, Oral, DAILY    atorvastatin (LIPITOR) 40 MG tablet TAKE ONE TABLET BY MOUTH ONE TIME DAILY    finasteride (PROSCAR) 5 mg, Oral, DAILY    levETIRAcetam (KEPPRA) 500 mg, Oral, 2 TIMES DAILY    levoFLOXacin (LEVAQUIN) 500 mg, Oral, DAILY         Objective:   Patient Vitals for the past 24 hrs:   Pulse Resp BP SpO2   07/15/22 1835 69 13 (!) 147/95 --   07/15/22 1815 66 16 (!) 143/68 93 %   07/15/22 1800 61 17 135/80 93 %   07/15/22 1745 63 14 (!) 143/121 95 %   07/15/22 1733 62 16 -- 97 %   07/15/22 1732 -- -- (!) 160/79 --   07/15/22 1649 64 16 (!) 174/76 95 %       Oxygen Therapy  SpO2: 93 %  O2 Device: None (Room air)    Estimated body mass index is 30.13 kg/m² as calculated from the following:    Height as of this encounter: 5' 9\" (1.753 m). Weight as of this encounter: 204 lb (92.5 kg). No intake or output data in the 24 hours ending 07/15/22 1919      Physical Exam:  Blood pressure (!) 147/95, pulse 69, resp. rate 13, height 5' 9\" (1.753 m), weight 204 lb (92.5 kg), SpO2 93 %. General:    Well nourished. Obese   Head:  Normocephalic, atraumatic  Eyes:  Sclerae appear normal.  Pupils equally round. ENT:  Nares appear normal, no drainage. Moist oral mucosa  Neck:  No restricted ROM. Trachea midline   CV:   RRR. No m/r/g. No jugular venous distension. Lungs:   CTAB. No wheezing, rhonchi, or rales. Symmetric expansion. Abdomen: Bowel sounds present. Soft, nontender, nondistended.   Extremities: No cyanosis or clubbing. No edema  Skin:     No rashes and normal coloration. Warm and dry. Neuro:  CN II-XII grossly intact. Sensation intact. MS strenght grossly 5/5. A&Ox3  Psych:  Normal mood and affect.       I have reviewed ordered lab tests and independently visualized imaging below:    Last 24hr Labs:  Recent Results (from the past 24 hour(s))   POCT Glucose    Collection Time: 07/15/22  4:54 PM   Result Value Ref Range    POC Glucose 103 (H) 65 - 100 mg/dL    Performed by: Shannan Shah    Basic Metabolic Panel    Collection Time: 07/15/22  4:59 PM   Result Value Ref Range    Sodium 140 138 - 145 mmol/L    Potassium 3.9 3.5 - 5.1 mmol/L    Chloride 108 (H) 98 - 107 mmol/L    CO2 28 21 - 32 mmol/L    Anion Gap 4 (L) 7 - 16 mmol/L    Glucose 109 (H) 65 - 100 mg/dL    BUN 21 8 - 23 MG/DL    CREATININE 1.10 0.8 - 1.5 MG/DL    GFR African American >60 >60 ml/min/1.73m2    GFR Non- >60 >60 ml/min/1.73m2    Calcium 9.2 8.3 - 10.4 MG/DL   CBC with Auto Differential    Collection Time: 07/15/22  4:59 PM   Result Value Ref Range    WBC 7.5 4.3 - 11.1 K/uL    RBC 5.29 4.23 - 5.6 M/uL    Hemoglobin 16.3 13.6 - 17.2 g/dL    Hematocrit 48.2 41.1 - 50.3 %    MCV 91.1 79.6 - 97.8 FL    MCH 30.8 26.1 - 32.9 PG    MCHC 33.8 31.4 - 35.0 g/dL    RDW 12.4 11.9 - 14.6 %    Platelets 471 687 - 140 K/uL    MPV 10.6 9.4 - 12.3 FL    nRBC 0.00 0.0 - 0.2 K/uL    Differential Type AUTOMATED      Seg Neutrophils 60 43 - 78 %    Lymphocytes 24 13 - 44 %    Monocytes 9 4.0 - 12.0 %    Eosinophils % 6 0.5 - 7.8 %    Basophils 1 0.0 - 2.0 %    Immature Granulocytes 0 0.0 - 5.0 %    Segs Absolute 4.5 1.7 - 8.2 K/UL    Absolute Lymph # 1.8 0.5 - 4.6 K/UL    Absolute Mono # 0.7 0.1 - 1.3 K/UL    Absolute Eos # 0.4 0.0 - 0.8 K/UL    Basophils Absolute 0.1 0.0 - 0.2 K/UL    Absolute Immature Granulocyte 0.0 0.0 - 0.5 K/UL   Troponin    Collection Time: 07/15/22  4:59 PM   Result Value Ref Range    Troponin, High Sensitivity 5.0 0 - 14 pg/mL       Other Studies:  CT HEAD WO CONTRAST    Result Date: 7/15/2022  HEAD CT WITHOUT CONTRAST  7/15/2022 HISTORY:   AMS  ; intermittent confusion TECHNIQUE: Noncontrast axial images were obtained through the brain. All CT scans at this facility used dose modulation, interactive reconstruction and/or weight based dosing when appropriate to reduce radiation dose to as low as reasonably achievable. COMPARISON: None FINDINGS: There is no acute intracranial hemorrhage, significant mass effect or CT evidence of acute large artery territorial infarction. Please note that a hyperacute infarct or small vessel infarct may not be apparent on initial CT imaging. Mild cerebral volume loss is present without disproportionate hippocampal atrophy. There is no hydrocephalus , intra-axial mass or abnormal extra-axial fluid collection. There are no displaced skull fractures. The mastoid air cells and paranasal sinuses are clear where imaged. No acute findings     CT BRAIN PERFUSION    Result Date: 7/15/2022  EXAMINATION: CT PERFUSION DATE: 7/15/2022 5:20 PM INDICATION: : AMS COMPARISON: Head CT and CT angiogram from the same day TECHNIQUE: CT perfusion of the brain was obtained after the administration of intravenous contrast. Perfusion maps and perfusion analysis output were generated using the [employer] perfusion processing software algorithm. Radiation dose reduction techniques were used for this study: All CT scans performed at this facility use one or all of the following: Automated exposure control, adjustment of the mA and/or kVp according to patient's size, iterative reconstruction. FINDINGS:    There is no estimated core infarct. There are nonspecific areas of mildly diminished cerebral blood flow in the posterior left cerebral hemisphere.  Perfusion Output Values: CBF < 30% volume (best correlation with core infarct volume without overcalls): 0 ml (core infarction volume greater than 50 cc associated with poor outcomes) Tmax > 6 seconds: 0 ml Tmax/CBF Mismatch Volume: 0 ml Tmax/CBF Mismatch Ratio: N/A Hypoperfusion Intensity Ratio (Tmax > 10 seconds / Tmax > 6 seconds): 0 (values greater than 0.5 associated with poor outcome) Tmax > 10 seconds Volume: 0 ml (volume greater than 100 mL is associated with poor outcome)     No estimated core infarct. Please note that the determination of patient treatment is not based solely upon imaging factors or calculation values. Management of ischemia is at the discretion of the primary physician and is based upon a combination of clinical and imaging data, along other factors. CTA HEAD NECK W CONTRAST    Result Date: 7/15/2022  Preliminary report: NECK: No hemodynamically significant stenosis. Head: No LVO full report to follow. Echocardiogram:  No results found for this or any previous visit. Meds previously ordered:  Orders Placed This Encounter   Medications    levETIRAcetam (KEPPRA) 1,500 mg in sodium chloride 0.9 % 100 mL IVPB    iopamidol (ISOVUE-370) 76 % injection 100 mL    OR Linked Order Group     ondansetron (ZOFRAN-ODT) disintegrating tablet 4 mg     ondansetron (ZOFRAN) injection 4 mg    polyethylene glycol (GLYCOLAX) packet 17 g    enoxaparin (LOVENOX) injection 40 mg     Order Specific Question:   Indication of Use     Answer:   Prophylaxis-DVT/PE    0.9 % sodium chloride infusion    atorvastatin (LIPITOR) tablet 80 mg    clopidogrel (PLAVIX) tablet 75 mg    aspirin EC tablet 81 mg    AND Linked Order Group     aspirin chewable tablet 324 mg     clopidogrel (PLAVIX) tablet 300 mg    labetalol (NORMODYNE;TRANDATE) injection 10 mg    levETIRAcetam (KEPPRA) tablet 500 mg    finasteride (PROSCAR) tablet 5 mg     Signed:  Gustavo Gregg DO, DO    Part of this note may have been written by using a voice dictation software. The note has been proof read but may still contain some grammatical/other typographical errors.

## 2022-07-16 ENCOUNTER — APPOINTMENT (OUTPATIENT)
Dept: MRI IMAGING | Age: 82
End: 2022-07-16
Payer: MEDICARE

## 2022-07-16 ENCOUNTER — APPOINTMENT (OUTPATIENT)
Dept: NON INVASIVE DIAGNOSTICS | Age: 82
End: 2022-07-16
Payer: MEDICARE

## 2022-07-16 LAB
ALBUMIN SERPL-MCNC: 3.2 G/DL (ref 3.2–4.6)
ALBUMIN/GLOB SERPL: 1.1 {RATIO} (ref 1.2–3.5)
ALP SERPL-CCNC: 72 U/L (ref 50–136)
ALT SERPL-CCNC: 26 U/L (ref 12–65)
ANION GAP SERPL CALC-SCNC: 4 MMOL/L (ref 7–16)
AST SERPL-CCNC: 13 U/L (ref 15–37)
BILIRUB SERPL-MCNC: 0.6 MG/DL (ref 0.2–1.1)
BUN SERPL-MCNC: 19 MG/DL (ref 8–23)
CALCIUM SERPL-MCNC: 8.9 MG/DL (ref 8.3–10.4)
CHLORIDE SERPL-SCNC: 110 MMOL/L (ref 98–107)
CHOLEST SERPL-MCNC: 132 MG/DL
CO2 SERPL-SCNC: 27 MMOL/L (ref 21–32)
CREAT SERPL-MCNC: 1 MG/DL (ref 0.8–1.5)
ECHO AO ASC DIAM: 3.8 CM
ECHO AO ASCENDING AORTA INDEX: 1.83 CM/M2
ECHO AO ROOT DIAM: 3.2 CM
ECHO AO ROOT INDEX: 1.54 CM/M2
ECHO AV AREA PEAK VELOCITY: 2.8 CM2
ECHO AV AREA VTI: 2.7 CM2
ECHO AV AREA/BSA PEAK VELOCITY: 1.3 CM2/M2
ECHO AV AREA/BSA VTI: 1.3 CM2/M2
ECHO AV MEAN GRADIENT: 3 MMHG
ECHO AV MEAN VELOCITY: 0.8 M/S
ECHO AV PEAK GRADIENT: 5 MMHG
ECHO AV PEAK VELOCITY: 1.2 M/S
ECHO AV VELOCITY RATIO: 0.83
ECHO AV VTI: 29.8 CM
ECHO BSA: 2.12 M2
ECHO LA AREA 2C: 16.2 CM2
ECHO LA AREA 4C: 15.7 CM2
ECHO LA DIAMETER INDEX: 1.59 CM/M2
ECHO LA DIAMETER: 3.3 CM
ECHO LA MAJOR AXIS: 6.1 CM
ECHO LA MINOR AXIS: 5.3 CM
ECHO LA TO AORTIC ROOT RATIO: 1.03
ECHO LA VOL BP: 39 ML (ref 18–58)
ECHO LA VOL/BSA BIPLANE: 19 ML/M2 (ref 16–34)
ECHO LV E' LATERAL VELOCITY: 8 CM/S
ECHO LV E' SEPTAL VELOCITY: 7 CM/S
ECHO LV EDV A2C: 95 ML
ECHO LV EDV A4C: 103 ML
ECHO LV EDV INDEX A4C: 50 ML/M2
ECHO LV EDV NDEX A2C: 46 ML/M2
ECHO LV EJECTION FRACTION A2C: 53 %
ECHO LV EJECTION FRACTION A4C: 54 %
ECHO LV EJECTION FRACTION BIPLANE: 53 % (ref 55–100)
ECHO LV ESV A2C: 45 ML
ECHO LV ESV A4C: 47 ML
ECHO LV ESV INDEX A2C: 22 ML/M2
ECHO LV ESV INDEX A4C: 23 ML/M2
ECHO LV FRACTIONAL SHORTENING: 35 % (ref 28–44)
ECHO LV INTERNAL DIMENSION DIASTOLE INDEX: 1.78 CM/M2
ECHO LV INTERNAL DIMENSION DIASTOLIC: 3.7 CM (ref 4.2–5.9)
ECHO LV INTERNAL DIMENSION SYSTOLIC INDEX: 1.15 CM/M2
ECHO LV INTERNAL DIMENSION SYSTOLIC: 2.4 CM
ECHO LV IVSD: 1.2 CM (ref 0.6–1)
ECHO LV MASS 2D: 120.8 G (ref 88–224)
ECHO LV MASS INDEX 2D: 58.1 G/M2 (ref 49–115)
ECHO LV POSTERIOR WALL DIASTOLIC: 0.9 CM (ref 0.6–1)
ECHO LV RELATIVE WALL THICKNESS RATIO: 0.49
ECHO LVOT AREA: 3.1 CM2
ECHO LVOT AV VTI INDEX: 0.85
ECHO LVOT DIAM: 2 CM
ECHO LVOT MEAN GRADIENT: 2 MMHG
ECHO LVOT PEAK GRADIENT: 4 MMHG
ECHO LVOT PEAK VELOCITY: 1 M/S
ECHO LVOT STROKE VOLUME INDEX: 38.2 ML/M2
ECHO LVOT SV: 79.4 ML
ECHO LVOT VTI: 25.3 CM
ECHO MV A VELOCITY: 1.13 M/S
ECHO MV E DECELERATION TIME (DT): 261 MS
ECHO MV E VELOCITY: 0.91 M/S
ECHO MV E/A RATIO: 0.81
ECHO MV E/E' LATERAL: 11.38
ECHO MV E/E' RATIO (AVERAGED): 12.19
ECHO MV E/E' SEPTAL: 13
ECHO PV ACCELERATION TIME (AT): 129 MS
ECHO PV MAX VELOCITY: 1.1 M/S
ECHO PV PEAK GRADIENT: 5 MMHG
ECHO RV BASAL DIMENSION: 3.1 CM
ECHO RV INTERNAL DIMENSION: 4.1 CM
ECHO RV TAPSE: 1.9 CM (ref 1.7–?)
ECHO TV REGURGITANT MAX VELOCITY: 1.99 M/S
ECHO TV REGURGITANT PEAK GRADIENT: 16 MMHG
ERYTHROCYTE [DISTWIDTH] IN BLOOD BY AUTOMATED COUNT: 12.5 % (ref 11.9–14.6)
EST. AVERAGE GLUCOSE BLD GHB EST-MCNC: 108 MG/DL
GLOBULIN SER CALC-MCNC: 3 G/DL (ref 2.3–3.5)
GLUCOSE SERPL-MCNC: 86 MG/DL (ref 65–100)
HBA1C MFR BLD: 5.4 % (ref 4.8–5.6)
HCT VFR BLD AUTO: 42.4 % (ref 41.1–50.3)
HDLC SERPL-MCNC: 51 MG/DL (ref 40–60)
HDLC SERPL: 2.6 {RATIO}
HGB BLD-MCNC: 14.1 G/DL (ref 13.6–17.2)
LDLC SERPL CALC-MCNC: 59.6 MG/DL
MCH RBC QN AUTO: 30.1 PG (ref 26.1–32.9)
MCHC RBC AUTO-ENTMCNC: 33.3 G/DL (ref 31.4–35)
MCV RBC AUTO: 90.4 FL (ref 79.6–97.8)
NRBC # BLD: 0 K/UL (ref 0–0.2)
PLATELET # BLD AUTO: 180 K/UL (ref 150–450)
PMV BLD AUTO: 10 FL (ref 9.4–12.3)
POTASSIUM SERPL-SCNC: 3.6 MMOL/L (ref 3.5–5.1)
PROT SERPL-MCNC: 6.2 G/DL (ref 6.3–8.2)
RBC # BLD AUTO: 4.69 M/UL (ref 4.23–5.6)
SODIUM SERPL-SCNC: 141 MMOL/L (ref 138–145)
TRIGL SERPL-MCNC: 107 MG/DL (ref 35–150)
VLDLC SERPL CALC-MCNC: 21.4 MG/DL (ref 6–23)
WBC # BLD AUTO: 6.9 K/UL (ref 4.3–11.1)

## 2022-07-16 PROCEDURE — 6370000000 HC RX 637 (ALT 250 FOR IP): Performed by: FAMILY MEDICINE

## 2022-07-16 PROCEDURE — 83036 HEMOGLOBIN GLYCOSYLATED A1C: CPT

## 2022-07-16 PROCEDURE — G0378 HOSPITAL OBSERVATION PER HR: HCPCS

## 2022-07-16 PROCEDURE — 96361 HYDRATE IV INFUSION ADD-ON: CPT

## 2022-07-16 PROCEDURE — 93306 TTE W/DOPPLER COMPLETE: CPT | Performed by: INTERNAL MEDICINE

## 2022-07-16 PROCEDURE — 96372 THER/PROPH/DIAG INJ SC/IM: CPT

## 2022-07-16 PROCEDURE — 6370000000 HC RX 637 (ALT 250 FOR IP): Performed by: PHYSICIAN ASSISTANT

## 2022-07-16 PROCEDURE — 6360000002 HC RX W HCPCS: Performed by: FAMILY MEDICINE

## 2022-07-16 PROCEDURE — 97535 SELF CARE MNGMENT TRAINING: CPT

## 2022-07-16 PROCEDURE — 97165 OT EVAL LOW COMPLEX 30 MIN: CPT

## 2022-07-16 PROCEDURE — 93306 TTE W/DOPPLER COMPLETE: CPT

## 2022-07-16 PROCEDURE — 70551 MRI BRAIN STEM W/O DYE: CPT

## 2022-07-16 PROCEDURE — 97161 PT EVAL LOW COMPLEX 20 MIN: CPT

## 2022-07-16 PROCEDURE — 80053 COMPREHEN METABOLIC PANEL: CPT

## 2022-07-16 PROCEDURE — 36415 COLL VENOUS BLD VENIPUNCTURE: CPT

## 2022-07-16 PROCEDURE — 80061 LIPID PANEL: CPT

## 2022-07-16 PROCEDURE — 85027 COMPLETE CBC AUTOMATED: CPT

## 2022-07-16 RX ORDER — LISINOPRIL 20 MG/1
20 TABLET ORAL DAILY
Status: DISCONTINUED | OUTPATIENT
Start: 2022-07-16 | End: 2022-07-17 | Stop reason: HOSPADM

## 2022-07-16 RX ORDER — AMLODIPINE BESYLATE AND BENAZEPRIL HYDROCHLORIDE 5; 20 MG/1; MG/1
1 CAPSULE ORAL DAILY
Status: DISCONTINUED | OUTPATIENT
Start: 2022-07-16 | End: 2022-07-16

## 2022-07-16 RX ORDER — AMLODIPINE BESYLATE 5 MG/1
5 TABLET ORAL DAILY
Status: DISCONTINUED | OUTPATIENT
Start: 2022-07-16 | End: 2022-07-17 | Stop reason: HOSPADM

## 2022-07-16 RX ADMIN — AMLODIPINE BESYLATE 5 MG: 5 TABLET ORAL at 15:05

## 2022-07-16 RX ADMIN — LEVETIRACETAM 500 MG: 500 TABLET, FILM COATED ORAL at 20:18

## 2022-07-16 RX ADMIN — LEVETIRACETAM 500 MG: 500 TABLET, FILM COATED ORAL at 08:26

## 2022-07-16 RX ADMIN — CLOPIDOGREL BISULFATE 75 MG: 75 TABLET ORAL at 08:26

## 2022-07-16 RX ADMIN — ASPIRIN 81 MG: 81 TABLET ORAL at 08:26

## 2022-07-16 RX ADMIN — FINASTERIDE 5 MG: 5 TABLET, FILM COATED ORAL at 08:26

## 2022-07-16 RX ADMIN — ENOXAPARIN SODIUM 40 MG: 40 INJECTION SUBCUTANEOUS at 08:25

## 2022-07-16 RX ADMIN — LISINOPRIL 20 MG: 20 TABLET ORAL at 15:05

## 2022-07-16 RX ADMIN — ATORVASTATIN CALCIUM 80 MG: 40 TABLET, FILM COATED ORAL at 20:18

## 2022-07-16 ASSESSMENT — PAIN SCALES - GENERAL: PAINLEVEL_OUTOF10: 0

## 2022-07-16 NOTE — DISCHARGE INSTRUCTIONS
Stroke: After Your Visit    Your Care Instructions    You have had a stroke. Risk factors for stroke include being overweight, smoking, and sedentary lifestyle. This means that the blood flow to a part of your brain was blocked for some time, which damages the nerve cells in that part of the brain. The part of your body controlled by that part of your brain may not function properly now. The brain is an amazing organ that can heal itself to some degree. The stroke you had damaged part of your brain, but other parts of your brain may take over in some way for the damaged areas. You have already started this process. Going home may be hard for you and your family. The more you can try to do for yourself, the better. Remember to take each day one at a time. Follow-up care is a key part of your treatment and safety. Be sure to make and go to all appointments, and call your doctor if you are having problems. It's also a good idea to know your test results and keep a list of the medicines you take. How can you care for yourself at home? Enter a stroke rehabilitation (rehab) program, if your doctor recommends it. Physical, speech, and occupational therapies can help you manage bathing, dressing, eating, and other basics of daily living. Eat a heart-healthy diet that is low in cholesterol, saturated fat, and salt. Eat lots of fresh fruits and vegetables and foods high in fiber. Increase your activities slowly. Take short rest breaks when you get tired. Gradually increase the amount you walk. Start out by walking a little more than you did the day before. Do not drive until your doctor says it is okay. It is normal to feel sad or depressed after a stroke. If the blues last, talk to your doctor. If you are having problems with urine leakage, go to the bathroom at regular times, including when you first wake up and at bedtime. Also, limit fluids after dinner.   If you are constipated, drink plenty of that could cause falls, and make sure that the lighting is good. Put grab bars and seats in tubs and showers. Find out what your loved one can do and what he or she needs help with. Try not to do things for your loved one that your loved one can do on his or her own. Help him or her learn and practice new skills. Visit and talk with your loved one often. Try doing activities together that you both enjoy, such as playing cards or board games. Keep in touch with your loved one's friends as much as you can, and encourage them to visit. Take care of yourself. Do not try to do everything yourself. Ask other family members to help. Eat well, get enough rest, and take time to do things that you enjoy. Keep up with your own doctor visits, and make sure to take your medicines regularly. Get out of the house as much as you can. Join a local support group. Find out if you qualify for home health care visits to help with rehab or for adult day care. When should you call for help? Call 911 anytime you think you may need emergency care. For example, call if:  You have signs of another stroke. These may include:  Sudden numbness, paralysis, or weakness in your face, arm, or leg, especially on only one side of your body. New problems with walking or balance. Sudden vision changes. Drooling or slurred speech. New problems speaking or understanding simple statements, or you feel confused. A sudden, severe headache that is different from past headaches. Call 911 even if these symptoms go away in a few minutes. You cough up blood. You vomit blood or what looks like coffee grounds. You pass maroon or very bloody stools. Call your doctor now or seek immediate medical care if:  You have new bruises or blood spots under your skin. You have a nosebleed. Your gums bleed when you brush your teeth. You have blood in your urine. Your stools are black and tarlike or have streaks of blood.   You have vaginal bleeding when you are not having your period, or heavy period bleeding. You have new symptoms that may be related to your stroke, such as falls or trouble swallowing. Watch closely for changes in your health, and be sure to contact your doctor if you have any problems. Where can you learn more? Go to [employer]    Enter C294  in the search box to learn more about \"Stroke: After Your Visit\". © 2660-5262 [employer]. Care instructions adapted under license by [facility] [email or web address] [facility] (which disclaims liability or warranty for this information). This care instruction is for use with your licensed healthcare professional. If you have questions about a medical condition or this instruction, always ask your healthcare professional. [employer] any warranty or liability for your use of this information.

## 2022-07-16 NOTE — PROGRESS NOTES
Called floor and requested RN completion of MRI screening in [facility] so that ordered study can be completed as soon as possible.

## 2022-07-16 NOTE — ED NOTES
TRANSFER - OUT REPORT:    Verbal report given to Clovis Farr on [facility]  being transferred to [address] [place] [address] for routine progression of patient care       Report consisted of patient's Situation, Background, Assessment and   Recommendations(SBAR). Information from the following report(s) Nurse Handoff Report was reviewed with the receiving nurse. Lines:   Peripheral IV 07/15/22 Distal;Left; Anterior Cephalic (Active)        Opportunity for questions and clarification was provided.       Patient transported with:  Registered Nurse      Juliana Mc RN  07/15/22 2010

## 2022-07-16 NOTE — PROGRESS NOTES
Pt presented from home via EMS with sudden onset memory loss. Has a PMHx of obesity, HTN, HLD, and seizure d/o. Pleasant pt lives with his wife in a two-story townhouse; lives on the main level. PTA, indep with his ADLs. Abl to drive himself. Has the following DMEs: cane, RW and SS. On RA. Denies h/o HH and STR. PT/OT consulted and recommend no further skilled therapy was needed. PCP confirmed. [place]. Able to afford meds. No discharge needs identified but will remain available.

## 2022-07-16 NOTE — PLAN OF CARE
Problem: Discharge Planning  Goal: Discharge to home or other facility with appropriate resources  7/16/2022 1028 by Geoffrey uHynh RN  Outcome: Progressing  Flowsheets (Taken 7/16/2022 0800)  Discharge to home or other facility with appropriate resources: Identify barriers to discharge with patient and caregiver  7/16/2022 0124 by Gabriel Matos RN  Outcome: Progressing     Problem: Pain  Goal: Verbalizes/displays adequate comfort level or baseline comfort level  7/16/2022 1028 by Geoffrey Huynh RN  Outcome: Progressing  7/16/2022 0124 by Gabriel Matos RN  Outcome: Progressing     Problem: Safety - Adult  Goal: Free from fall injury  7/16/2022 1028 by Geoffrey Huynh RN  Outcome: Progressing  7/16/2022 0124 by Gabriel Matos RN  Outcome: Progressing     Problem: ABCDS Injury Assessment  Goal: Absence of physical injury  Outcome: Progressing

## 2022-07-16 NOTE — PROGRESS NOTES
Doing well this a.m.   No difficulties with speech  Unfortunately MRI is down until 2 PM  He is getting his echocardiogram initiated currently  If MRI negative no objections to early discharge and follow-up with Khalida Ortiz

## 2022-07-16 NOTE — PROGRESS NOTES
OCCUPATIONAL THERAPY Initial Assessment, Daily Note, and AM       OT Visit Days: 1  Acknowledge Orders  Time  OT Charge Capture  Rehab Caseload Tracker      Fadia Craig is a [de-identified] y.o. male   PRIMARY DIAGNOSIS: Stroke-like symptoms  TIA (transient ischemic attack) [G45.9]  Amnesia [R41.3]  Stroke-like symptoms [R29.90]       Reason for Referral: Generalized Muscle Weakness (M62.81)  Other lack of cordination (R27.8)  Difficulty in walking, Not elsewhere classified (R26.2)  Other abnormalities of gait and mobility (R26.89)  Observation: Payor: MEDICARE / Plan: MEDICARE PART A AND B / Product Type: *No Product type* /     ASSESSMENT:     REHAB RECOMMENDATIONS:   Recommendation to date pending progress:  Setting:  No further skilled therapy after discharge from hospital    Equipment:    None  Has SPC, RW, shower chair ,  nozzle     ASSESSMENT:  Mr. Manasa Dudley is an [de-identified] YO R dominant [place] and [place] speaking WM admitted from home with sudden memory loss and seizure. Normally independent with all ADLs, helps wife with IADLs, retired, driving, loves to travel home to [facility], ambulating with no DME (has RW and SPC). Lives with wife in 2 level townhouse with no steps at entrance, bed and bath on main level, has walk in shower with shower chair, [facility] nozzle. Admits to no falls. Today, agreeable to therapy evaluation. SPV with bed mobility, able to tammy socks sitting on EOB with crossed leg method. Sit to stand with CGA and ambulation in room with CGA to SBA and therapy managing IV pole. Stood at sink for grooming tasks with SBA. B UEs are WFLs for basic self care tasks. Up to recliner with all needs mete and in reach. RN approved taking pt off continuous O2 monitoring since pt is on room air. Pt is functioning below his baseline and could benefit from skilled OT to address his deficits and maximize his independence, safety and activity tolerance for ADLs and functional mobility.  Will follow for 1 to 2 more sessions to assess safety in full ADL with mobility. Most likely once medically stable, no further OT needs. [address] [facility] [address] [id number] AM-PAC 6 Clicks Daily Activity Inpatient Short Form:    AM-PAC Daily Activity Inpatient   How much help for putting on and taking off regular lower body clothing?: A Little  How much help for Bathing?: A Little  How much help for Toileting?: A Little  How much help for putting on and taking off regular upper body clothing?: A Little  How much help for taking care of personal grooming?: None  How much help for eating meals?: None  AM-PAC Inpatient Daily Activity Raw Score: 20  AM-PAC Inpatient ADL T-Scale Score : 42.03  ADL Inpatient CMS 0-100% Score: 38.32  ADL Inpatient CMS G-Code Modifier : CJ         SUBJECTIVE:     Mr. Jacob Cobos states, \"I am so thankful for [employer] and the excellent care they give. \"     Social/Functional Lives With: Spouse  Type of Home: House (2 level townhouse)  Home Layout: Two level, Performs ADL's on one level, Able to Live on Main level with bedroom/bathroom  Home Access: Level entry  Bathroom Shower/Tub: Walk-in shower, Shower chair with back  Bathroom Toilet: Standard  Bathroom Equipment: Hand-held shower, Shower chair  Bathroom Accessibility: Accessible  Home Equipment: Marcia Becerra, kymberly  Has the patient had two or more falls in the past year or any fall with injury in the past year?: No  Receives Help From: Family  ADL Assistance: Independent  Homemaking Assistance: Independent  Ambulation Assistance: Independent  Transfer Assistance: Independent  Active : Yes  Mode of Transportation: Car  Occupation: Retired    OBJECTIVE:     [facility] / Lavina Raw / [employer] O[employer]: IV    RESTRICTIONS/PRECAUTIONS:  Restrictions/Precautions: Seizure, Fall Risk    PAIN: VITALS / O2:   Pre Treatment:   Pain Assessment: None - Denies Pain      Post Treatment: no complaint of pain       Vitals          Oxygen on room air            GROSS EVALUATION: INTACT IMPAIRED   (See Comments)   UE AROM [x] []Generally decreased   UE PROM [] []   Strength [x]  Generally decreased     Posture / Balance []  Good sitting, fair + ambulating with no DME, rounded shoulders, forward head   Sensation [x]     Coordination [x]       Tone []       Edema []    Activity Tolerance [] Patient Tolerated treatment well     Hand Dominance R [x] L []      COGNITION/  PERCEPTION: INTACT IMPAIRED   (See Comments)   Orientation [x]     Vision [] Wears reading glasses   Hearing [x]     Cognition  [x]     Perception [x]       MOBILITY: I Mod I S SBA CGA Min Mod Max Total  NT x2 Comments:   Bed Mobility    Rolling [] [] [x] [] [] [] [] [] [] [] []    Supine to Sit [] [] [x] [] [] [] [] [] [] [] []    Scooting [] [] [x] [] [] [] [] [] [] [] []    Sit to Supine [] [] [] [] [] [] [] [] [] [x] [] Up to recliner   Transfers    Sit to Stand [] [] [] [] [x] [] [] [] [] [] []    Bed to Chair [] [] [] [] [x] [] [] [] [] [] []    Stand to Sit [] [] [] [] [x] [] [] [] [] [] []    Tub/Shower [] [] [] [] [] [] [] [] [] [x] []     Toilet [] [] [] [] [] [] [] [] [] [x] []      [] [] [] [] [] [] [] [] [] [] []    I=Independent, Mod I=Modified Independent, S=Supervision/Setup, SBA=Standby Assistance, CGA=Contact Guard Assistance, Min=Minimal Assistance, Mod=Moderate Assistance, Max=Maximal Assistance, Total=Total Assistance, NT=Not Tested    ACTIVITIES OF DAILY LIVING: I Mod I S SBA CGA Min Mod Max Total NT Comments   BASIC ADLs:              Upper Body Bathing  [] [] [] [] [] [] [] [] [] [x]    Lower Body Bathing [] [] [] [] [] [] [] [] [] [x]    Toileting [] [] [] [] [] [] [] [] [] [x]    Upper Body Dressing [] [] [] [x] [] [] [] [] [] []    Lower Body Dressing [] [] [] [x] [] [] [] [] [] []    Feeding [x] [] [] [] [] [] [] [] [] []    Grooming [] [] [] [] [x] [] [] [] [] [] In standing at sink   Personal [facility] [] [] [] [] [] [] [] [] [] []    Functional Mobility [] [] [] [] [x] [] [] [] [] []    I=Independent, Mod I=Modified Independent, S=Supervision/Setup, SBA=Standby Assistance, CGA=Contact Guard Assistance, Min=Minimal Assistance, Mod=Moderate Assistance, Max=Maximal Assistance, Total=Total Assistance, NT=Not Tested    PLAN:     [facility]: 3 times/week for duration of hospital stay or until stated goals are met, whichever comes first.    ACUTE OCCUPATIONAL THERAPY GOALS:   (Developed with and agreed upon by patient and/or caregiver.)  1) Patient will complete full body bathing and dressing with independently and adaptive equipment as needed. 2) Patient will complete toileting with independently. 3) Patient will complete functional transfers with independently and adaptive equipment as needed. 4) Patient will tolerate at least 23 minutes of OT activity with as needed rest breaks while maintaining O2 sats >90%. 5) Patient will verbalize at least 3 energy conservation technique to utilize during ADL/IADL. 6) Patient will complete grooming tasks in standing at sink level including gathering supplies.   Timeframe: 7 visits        PROBLEM LIST:   (Skilled intervention is medically necessary to address:)  Decreased ADL/Functional Activities  Decreased Activity Tolerance  Decreased AROM/PROM  Decreased Balance  Decreased Cognition  Decreased Coordination  Decreased Gait Ability  Decreased Safety Awareness  Decreased Strength  Decreased Transfer Abilities  Increased Pain   INTERVENTIONS PLANNED:  (Benefits and precautions of occupational therapy have been discussed with the patient.)  Self Care Training  Therapeutic Activity  Therapeutic Exercise/HEP  Neuromuscular Re-education  Manual Therapy  Education         TREATMENT:     EVALUATION: LOW COMPLEXITY: (Untimed Charge)    TREATMENT:   Co-Treatment PT/OT necessary due to patient's decreased overall endurance/tolerance levels, as well as need for high level skilled assistance to complete functional transfers/mobility and functional tasks  Self Care (11 minutes): Patient participated in upper body dressing, lower body dressing, self feeding, and grooming ADLs in unsupported sitting and standing with minimal verbal cueing to increase independence, increase activity tolerance, and increase safety awareness. Patient also participated in functional mobility, functional transfer, and energy conservation training to increase independence, decrease assistance required, increase activity tolerance, and increase safety awareness. TREATMENT GRID:  N/A    AFTER TREATMENT PRECAUTIONS: Bed/Chair Locked, Call light within reach, Chair, Needs within reach, and RN notified    INTERDISCIPLINARY COLLABORATION:  RN/ PCT, PT/ PTA, and OT/ GARCIA    EDUCATION:  Education Given To: Patient  Education Provided: Role of Therapy;Plan of Care;Home Exercise Program;Precautions; ADL Adaptive Strategies;Transfer Training;Energy Conservation;IADL Safety;Equipment; Fall Prevention Strategies  Education Method: Demonstration;Verbal  Barriers to Learning: None  Education Outcome: Verbalized understanding;Continued education needed    TOTAL TREATMENT DURATION AND TIME:  Time In: 0827  Time Out: 1899  Minutes: 11    GEORGE TILLMAN, OT   George Tillman, MS, OTR/L

## 2022-07-16 NOTE — PROGRESS NOTES
PHYSICAL THERAPY Initial Assessment  (Link to Caseload Tracking:    Acknowledge Orders  Time In/Out  PT Charge Capture  Rehab Caseload Tracker    Fabián Davis is a [de-identified] y.o. male   PRIMARY DIAGNOSIS: Stroke-like symptoms  TIA (transient ischemic attack) [G45.9]  Amnesia [R41.3]  Stroke-like symptoms [R29.90]       Reason for Referral: Generalized Muscle Weakness (M62.81)  Other lack of cordination (R27.8)  Difficulty in walking, Not elsewhere classified (R26.2)  Other abnormalities of gait and mobility (R26.89)  Observation: Payor: MEDICARE / Plan: MEDICARE PART A AND B / Product Type: *No Product type* /     ASSESSMENT:     REHAB RECOMMENDATIONS:   Recommendation to date pending progress:  Setting:  No further skilled therapy after discharge from hospital    Equipment:    None     ASSESSMENT:  Mr. Jayy Spicer presents in in supine. He moves w/ supervision, including bed mobility and 2 x 20' of gait. Gait is noted be slowed but functional. At end of session, he is up in chair with all needs within reach, RN notified.      [address] [facility] [address] [id number] AM-PAC 6 Clicks Basic Mobility Inpatient Short Form  AM-PAC Mobility Inpatient   How much difficulty turning over in bed?: None  How much difficulty sitting down on / standing up from a chair with arms?: None  How much difficulty moving from lying on back to sitting on side of bed?: None  How much help from another person moving to and from a bed to a chair?: None  How much help from another person needed to walk in hospital room?: None  How much help from another person for climbing 3-5 steps with a railing?: A Little  -[facility] Inpatient Mobility Raw Score : 23  AM-PAC Inpatient T-Scale Score : 56.93  Mobility Inpatient CMS 0-100% Score: 11.2  Mobility Inpatient CMS G-Code Modifier : CI    SUBJECTIVE:   Mr. Jayy Spicer states, \" I finally went to visit home in [facility] and I got covid there\"     Social/Functional Lives With: Spouse  Type of Home: House (2 level townhouse)  Home Layout: Two level, Performs ADL's on one level, Able to Live on Main level with bedroom/bathroom  Home Access: Level entry  Bathroom Shower/Tub: Walk-in shower, Shower chair with back  Bathroom Toilet: Standard  Bathroom Equipment: Hand-held shower, Shower chair  Bathroom Accessibility: Accessible  Home Equipment: Zurrba Genre, rolling  Has the patient had two or more falls in the past year or any fall with injury in the past year?: No  Receives Help From: Family  ADL Assistance: Independent  Homemaking Assistance: Independent  Ambulation Assistance: Independent  Transfer Assistance: Independent  Active : Yes  Mode of Transportation: Car  Occupation: Retired    OBJECTIVE:     PAIN: VITALS / O2: PRECAUTION / Molinda Cooks / Perla Pall:   Pre Treatment:    0      Post Treatment: 0 Vitals        Oxygen      IV    RESTRICTIONS/PRECAUTIONS:  Restrictions/Precautions: Seizure, Fall Risk                 GROSS EVALUATION: Intact Impaired (Comments):   AROM [x]     PROM [x]    Strength [x]     Balance []  Mild sway   Posture [] Forward Head   Sensation [x]     Coordination [x]      Tone [x]     Edema [x]    Activity Tolerance [x]      []      COGNITION/  PERCEPTION: Intact Impaired (Comments):   Orientation [x]     Vision [x]     Hearing [x]     Cognition  [x]       MOBILITY: I Mod I S SBA CGA Min Mod Max Total  NT x2 Comments:   Bed Mobility    Rolling [] [] [x] [] [] [] [] [] [] [] []    Supine to Sit [] [] [x] [] [] [] [] [] [] [] []    Scooting [] [] [x] [] [] [] [] [] [] [] []    Sit to Supine [] [] [] [] [] [] [] [] [] [x] []    Transfers    Sit to Stand [] [] [x] [] [] [] [] [] [] [] []    Bed to Chair [] [] [x] [] [] [] [] [] [] [] []    Stand to Sit [] [] [x] [] [] [] [] [] [] [] []     [] [] [] [] [] [] [] [] [] [] []    I=Independent, Mod I=Modified Independent, S=Supervision, SBA=Standby Assistance, CGA=Contact Guard Assistance,   Min=Minimal Assistance, Mod=Moderate Assistance, Max=Maximal Assistance, Total=Total Assistance, NT=Not Tested    GAIT: I Mod I S SBA CGA Min Mod Max Total  NT x2 Comments:   Level of Assistance [] [] [x] [] [] [] [] [] [] [] []    Distance 2 x 30 feet    DME None    Gait Quality N/A    Weightbearing Status      Stairs      I=Independent, Mod I=Modified Independent, S=Supervision, SBA=Standby Assistance, CGA=Contact Guard Assistance,   Min=Minimal Assistance, Mod=Moderate Assistance, Max=Maximal Assistance, Total=Total Assistance, NT=Not Tested    PLAN:   ACUTE PHYSICAL THERAPY GOALS:   (Developed with and agreed upon by patient and/or caregiver. )    LTG:  (1.)Mr. Gemma Mallory will move from supine to sit and sit to supine , scoot up and down, and roll side to side in bed with INDEPENDENT within 7 treatment day(s). (2.)Mr. Gemma Mallory will transfer from bed to chair and chair to bed with INDEPENDENT using the least restrictive device within 7 treatment day(s). (3.)Mr. Gemma Mallory will ambulate with INDEPENDENT for 300 feet with the least restrictive device within 7 treatment day(s). (4.)Mr. Gemma Mallory will tolerate at least 23 min of dynamic standing activity to assist standing ADLs with the least restrictive device within 7 treatment days.       ________________________________________________________________________________________________      FREQUENCY AND DURATION: 3 times/week for duration of hospital stay or until stated goals are met, whichever comes first.    THERAPY PROGNOSIS: Excellent    PROBLEM LIST:   (Skilled intervention is medically necessary to address:)  Decreased ADL/Functional Activities  Decreased Activity Tolerance  Decreased Cognition  Decreased Coordination  Decreased Gait Ability  Decreased Safety Awareness  Decreased Transfer Abilities INTERVENTIONS PLANNED:   (Benefits and precautions of physical therapy have been discussed with the patient.)  Self Care Training  Therapeutic Activity  Therapeutic Exercise/HEP  Neuromuscular Re-education  Gait Training  Manual Therapy  Modalities  Education       TREATMENT:   EVALUATION: LOW COMPLEXITY: (Untimed Charge)    TREATMENT:   Eval only    TREATMENT GRID:  N/A    AFTER TREATMENT PRECAUTIONS: Chair    INTERDISCIPLINARY COLLABORATION:  RN/ PCT, PT/ PTA, and OT/ GARCIA    EDUCATION: Education Given To: Patient  Education Provided: Role of Therapy    TIME IN/OUT:  Time In: 0827  Time Out: Elizabeth Grossman  Minutes: AMARILYS Deleon

## 2022-07-16 NOTE — PROGRESS NOTES
no acute abnl found    Diet:  ADULT DIET; Regular; 4 carb choices (60 gm/meal); Low Fat/Low Chol/High Fiber/MARY  DVT PPx: lovenox  Code status: Full Code    Hospital Problems:  Principal Problem:    Stroke-like symptoms  Active Problems:    Amnesia    TIA (transient ischemic attack)    Hyperlipidemia    Benign prostatic hyperplasia without lower urinary tract symptoms    Seizure ([address]    Hypertension  Resolved Problems:    * No resolved hospital problems. *      Objective:   Patient Vitals for the past 24 hrs:   Temp Pulse Resp BP SpO2   07/16/22 1200 97.5 °F (36.4 °C) 55 17 137/80 95 %   07/16/22 0800 97.5 °F (36.4 °C) 55 18 (!) 141/75 92 %   07/16/22 0400 97.5 °F (36.4 °C) 57 17 (!) 146/84 93 %   07/16/22 0000 97.3 °F (36.3 °C) 56 17 (!) 145/85 95 %   07/15/22 2000 98.1 °F (36.7 °C) 56 17 (!) 180/87 95 %   07/15/22 1901 -- -- -- -- 95 %   07/15/22 1900 98.2 °F (36.8 °C) 68 16 (!) 148/86 --   07/15/22 1835 -- 69 13 (!) 147/95 --   07/15/22 1815 -- 66 16 (!) 143/68 93 %   07/15/22 1800 -- 61 17 135/80 93 %   07/15/22 1745 -- 63 14 (!) 143/121 95 %   07/15/22 1733 -- 62 16 -- 97 %   07/15/22 1732 -- -- -- (!) 160/79 --   07/15/22 1725 -- 70 18 -- 96 %   07/15/22 1721 -- 70 18 (!) 154/120 96 %   07/15/22 1649 -- 64 16 (!) 174/76 95 %       Oxygen Therapy  SpO2: 95 %  O2 Device: None (Room air)    Estimated body mass index is 30.13 kg/m² as calculated from the following:    Height as of this encounter: 5' 9\" (1.753 m). Weight as of this encounter: 204 lb (92.5 kg). No intake or output data in the 24 hours ending 07/16/22 1324      Physical Exam:     Blood pressure 137/80, pulse 55, temperature 97.5 °F (36.4 °C), temperature source Oral, resp. rate 17, height 5' 9\" (1.753 m), weight 204 lb (92.5 kg), SpO2 95 %. General:    Well nourished. Head:  Normocephalic, atraumatic  Eyes:  Sclerae appear normal.  Pupils equally round. No lidlag / no nystagmus  ENT:  Nares appear normal, no drainage.   Moist oral mucosa  Neck:  No restricted ROM. Trachea midline   CV:   RRR. No m/r/g. No jugular venous distension. Lungs:   CTAB. No wheezing, rhonchi, or rales. Symmetric expansion. Abdomen: Bowel sounds present. Soft, nontender, nondistended. Extremities: No cyanosis or clubbing. No edema  Skin:     No rashes and normal coloration. Warm and dry. Neuro:  CN II-XII grossly intact. Sensation intact. A&Ox3; no focal deficits, no pronator drift  Psych:  Normal mood and affect.       I have reviewed ordered lab tests and independently visualized imaging below:    Recent Labs:  Recent Results (from the past 48 hour(s))   POCT Glucose    Collection Time: 07/15/22  4:54 PM   Result Value Ref Range    POC Glucose 103 (H) 65 - 100 mg/dL    Performed by: Alexander Lefort    Basic Metabolic Panel    Collection Time: 07/15/22  4:59 PM   Result Value Ref Range    Sodium 140 138 - 145 mmol/L    Potassium 3.9 3.5 - 5.1 mmol/L    Chloride 108 (H) 98 - 107 mmol/L    CO2 28 21 - 32 mmol/L    Anion Gap 4 (L) 7 - 16 mmol/L    Glucose 109 (H) 65 - 100 mg/dL    BUN 21 8 - 23 MG/DL    CREATININE 1.10 0.8 - 1.5 MG/DL    GFR African American >60 >60 ml/min/1.73m2    GFR Non- >60 >60 ml/min/1.73m2    Calcium 9.2 8.3 - 10.4 MG/DL   CBC with Auto Differential    Collection Time: 07/15/22  4:59 PM   Result Value Ref Range    WBC 7.5 4.3 - 11.1 K/uL    RBC 5.29 4.23 - 5.6 M/uL    Hemoglobin 16.3 13.6 - 17.2 g/dL    Hematocrit 48.2 41.1 - 50.3 %    MCV 91.1 79.6 - 97.8 FL    MCH 30.8 26.1 - 32.9 PG    MCHC 33.8 31.4 - 35.0 g/dL    RDW 12.4 11.9 - 14.6 %    Platelets 537 533 - 098 K/uL    MPV 10.6 9.4 - 12.3 FL    nRBC 0.00 0.0 - 0.2 K/uL    Differential Type AUTOMATED      Seg Neutrophils 60 43 - 78 %    Lymphocytes 24 13 - 44 %    Monocytes 9 4.0 - 12.0 %    Eosinophils % 6 0.5 - 7.8 %    Basophils 1 0.0 - 2.0 %    Immature Granulocytes 0 0.0 - 5.0 %    Segs Absolute 4.5 1.7 - 8.2 K/UL    Absolute Lymph # 1.8 0.5 - 4.6 K/UL Absolute Mono # 0.7 0.1 - 1.3 K/UL    Absolute Eos # 0.4 0.0 - 0.8 K/UL    Basophils Absolute 0.1 0.0 - 0.2 K/UL    Absolute Immature Granulocyte 0.0 0.0 - 0.5 K/UL   Troponin    Collection Time: 07/15/22  4:59 PM   Result Value Ref Range    Troponin, High Sensitivity 5.0 0 - 14 pg/mL   Protime-INR    Collection Time: 07/15/22  4:59 PM   Result Value Ref Range    Protime 12.9 12.6 - 14.5 sec    INR 0.9     CBC    Collection Time: 07/16/22  4:52 AM   Result Value Ref Range    WBC 6.9 4.3 - 11.1 K/uL    RBC 4.69 4.23 - 5.6 M/uL    Hemoglobin 14.1 13.6 - 17.2 g/dL    Hematocrit 42.4 41.1 - 50.3 %    MCV 90.4 79.6 - 97.8 FL    MCH 30.1 26.1 - 32.9 PG    MCHC 33.3 31.4 - 35.0 g/dL    RDW 12.5 11.9 - 14.6 %    Platelets 477 466 - 874 K/uL    MPV 10.0 9.4 - 12.3 FL    nRBC 0.00 0.0 - 0.2 K/uL   Hemoglobin A1c    Collection Time: 07/16/22  4:52 AM   Result Value Ref Range    Hemoglobin A1C 5.4 4.8 - 5.6 %    eAG 108 mg/dL   Lipid panel - fasting    Collection Time: 07/16/22  4:52 AM   Result Value Ref Range    Cholesterol, Total 132 <200 MG/DL    Triglycerides 107 35 - 150 MG/DL    HDL 51 40 - 60 MG/DL    LDL Calculated 59.6 <100 MG/DL    VLDL Cholesterol Calculated 21.4 6.0 - 23.0 MG/DL    Chol/HDL Ratio 2.6     Comprehensive Metabolic Panel    Collection Time: 07/16/22  4:52 AM   Result Value Ref Range    Sodium 141 138 - 145 mmol/L    Potassium 3.6 3.5 - 5.1 mmol/L    Chloride 110 (H) 98 - 107 mmol/L    CO2 27 21 - 32 mmol/L    Anion Gap 4 (L) 7 - 16 mmol/L    Glucose 86 65 - 100 mg/dL    BUN 19 8 - 23 MG/DL    CREATININE 1.00 0.8 - 1.5 MG/DL    GFR African American >60 >60 ml/min/1.73m2    GFR Non- >60 >60 ml/min/1.73m2    Calcium 8.9 8.3 - 10.4 MG/DL    Total Bilirubin 0.6 0.2 - 1.1 MG/DL    ALT 26 12 - 65 U/L    AST 13 (L) 15 - 37 U/L    Alk Phosphatase 72 50 - 136 U/L    Total Protein 6.2 (L) 6.3 - 8.2 g/dL    Albumin 3.2 3.2 - 4.6 g/dL    Globulin 3.0 2.3 - 3.5 g/dL    Albumin/Globulin Ratio 1.1 (L) 1.2 - 3.5     Transthoracic echocardiogram (TTE) complete with contrast, bubble, strain, and 3D PRN    Collection Time: 07/16/22 11:29 AM   Result Value Ref Range    LV EDV A2C 95 mL    LV EDV A4C 103 mL    LV ESV A2C 45 mL    LV ESV A4C 47 mL    IVSd 1.2 (A) 0.6 - 1.0 cm    LVIDd 3.7 (A) 4.2 - 5.9 cm    LVIDs 2.4 cm    LVOT Diameter 2.0 cm    LVOT Mean Gradient 2 mmHg    LVOT VTI 25.3 cm    LVOT Peak Velocity 1.0 m/s    LVOT Peak Gradient 4 mmHg    LVPWd 0.9 0.6 - 1.0 cm    LV E' Lateral Velocity 8 cm/s    LV E' Septal Velocity 7 cm/s    LV Ejection Fraction A2C 53 %    LV Ejection Fraction A4C 54 %    EF BP 53 (A) 55 - 100 %    LVOT Area 3.1 cm2    LVOT SV 79.4 ml    LA Minor Axis 5.3 cm    LA Major [place] 6.1 cm    LA Area 2C 16.2 cm2    LA Area 4C 15.7 cm2    LA Volume BP 39 18 - 58 mL    LA Diameter 3.3 cm    AV Mean Velocity 0.8 m/s    AV Mean Gradient 3 mmHg    AV VTI 29.8 cm    AV Peak Velocity 1.2 m/s    AV Peak Gradient 5 mmHg    AV Area by VTI 2.7 cm2    AV Area by Peak Velocity 2.8 cm2    Aortic Root 3.2 cm    Ascending Aorta 3.8 cm    MV E Wave Deceleration Time 261.0 ms    MV A Velocity 1.13 m/s    MV E Velocity 0.91 m/s    PV .0 ms    PV Max Velocity 1.1 m/s    PV Peak Gradient 5 mmHg    RVIDd 4.1 cm    RV Basal Dimension 3.1 cm    TAPSE 1.9 1.7 cm    TR Max Velocity 1.99 m/s    TR Peak Gradient 16 mmHg    Body Surface Area 2.12 m2    Fractional Shortening 2D 35 28 - 44 %    LV ESV Index A4C 23 mL/m2    LV EDV Index A4C 50 mL/m2    LV ESV Index A2C 22 mL/m2    LV EDV Index A2C 46 mL/m2    LVIDd Index 1.78 cm/m2    LVIDs Index 1.15 cm/m2    LV RWT Ratio 0.49     LV Mass 2D 120.8 88 - 224 g    LV Mass 2D Index 58.1 49 - 115 g/m2    MV E/A 0.81     E/E' Ratio (Averaged) 12.19     E/E' Lateral 11.38     E/E' Septal 13.00     LA Volume Index BP 19 16 - 34 ml/m2    LVOT Stroke Volume Index 38.2 mL/m2    LA Size Index 1.59 cm/m2    LA/AO Root Ratio 1.03     Ao Root Index 1.54 cm/m2    Ascending Aorta Index 1.83 cm/m2    AV Velocity Ratio 0.83     LVOT:AV VTI Index 0.85     ALEJANDRA/BSA VTI 1.3 cm2/m2    ALEJANDRA/BSA Peak Velocity 1.3 cm2/m2       Other Studies:  CT BRAIN PERFUSION   Final Result      No estimated core infarct. Please note that the determination of patient treatment is not based solely upon   imaging factors or calculation values. Management of ischemia is at the   discretion of the primary physician and is based upon a combination of clinical   and imaging data, along other factors. CTA HEAD NECK W CONTRAST   Final Result      No evidence of acute arterial occlusive disease. No hemodynamically significant   carotid artery stenosis. CT HEAD WO CONTRAST   Final Result   No acute findings         MRI brain without contrast    (Results Pending)       Current Meds:  Current Facility-Administered Medications   Medication Dose Route Frequency    ondansetron (ZOFRAN-ODT) disintegrating tablet 4 mg  4 mg Oral Q8H PRN    Or    ondansetron (ZOFRAN) injection 4 mg  4 mg IntraVENous Q6H PRN    polyethylene glycol (GLYCOLAX) packet 17 g  17 g Oral Daily PRN    enoxaparin (LOVENOX) injection 40 mg  40 mg SubCUTAneous Daily    0.9 % sodium chloride infusion   IntraVENous Continuous    atorvastatin (LIPITOR) tablet 80 mg  80 mg Oral Nightly    clopidogrel (PLAVIX) tablet 75 mg  75 mg Oral Daily    aspirin EC tablet 81 mg  81 mg Oral Daily    labetalol (NORMODYNE;TRANDATE) injection 10 mg  10 mg IntraVENous Q10 Min PRN    levETIRAcetam (KEPPRA) tablet 500 mg  500 mg Oral BID    finasteride (PROSCAR) tablet 5 mg  5 mg Oral Daily       Signed:  Re Trevino    Part of this note may have been written by using a voice dictation software. The note has been proof read but may still contain some grammatical/other typographical errors.

## 2022-07-17 VITALS
BODY MASS INDEX: 30.21 KG/M2 | WEIGHT: 204 LBS | TEMPERATURE: 97.7 F | HEART RATE: 56 BPM | DIASTOLIC BLOOD PRESSURE: 73 MMHG | HEIGHT: 69 IN | SYSTOLIC BLOOD PRESSURE: 148 MMHG | RESPIRATION RATE: 18 BRPM | OXYGEN SATURATION: 91 %

## 2022-07-17 PROCEDURE — 6370000000 HC RX 637 (ALT 250 FOR IP): Performed by: PHYSICIAN ASSISTANT

## 2022-07-17 PROCEDURE — 96361 HYDRATE IV INFUSION ADD-ON: CPT

## 2022-07-17 PROCEDURE — 6370000000 HC RX 637 (ALT 250 FOR IP): Performed by: FAMILY MEDICINE

## 2022-07-17 PROCEDURE — G0378 HOSPITAL OBSERVATION PER HR: HCPCS

## 2022-07-17 PROCEDURE — 99225 PR SBSQ OBSERVATION CARE/DAY 25 MINUTES: CPT | Performed by: PSYCHIATRY & NEUROLOGY

## 2022-07-17 RX ORDER — ATORVASTATIN CALCIUM 80 MG/1
80 TABLET, FILM COATED ORAL NIGHTLY
Qty: 30 TABLET | Refills: 0 | Status: SHIPPED | OUTPATIENT
Start: 2022-07-17 | End: 2022-08-01 | Stop reason: SDUPTHER

## 2022-07-17 RX ORDER — CLOPIDOGREL BISULFATE 75 MG/1
75 TABLET ORAL DAILY
Qty: 30 TABLET | Refills: 0 | Status: SHIPPED | OUTPATIENT
Start: 2022-07-17 | End: 2022-07-17 | Stop reason: SDUPTHER

## 2022-07-17 RX ORDER — CLOPIDOGREL BISULFATE 75 MG/1
75 TABLET ORAL DAILY
Qty: 30 TABLET | Refills: 0 | Status: SHIPPED | OUTPATIENT
Start: 2022-07-17 | End: 2022-07-22 | Stop reason: SINTOL

## 2022-07-17 RX ORDER — ATORVASTATIN CALCIUM 80 MG/1
80 TABLET, FILM COATED ORAL NIGHTLY
Qty: 30 TABLET | Refills: 0 | Status: SHIPPED | OUTPATIENT
Start: 2022-07-17 | End: 2022-07-17 | Stop reason: SDUPTHER

## 2022-07-17 RX ADMIN — ASPIRIN 81 MG: 81 TABLET ORAL at 08:42

## 2022-07-17 RX ADMIN — LISINOPRIL 20 MG: 20 TABLET ORAL at 08:42

## 2022-07-17 RX ADMIN — FINASTERIDE 5 MG: 5 TABLET, FILM COATED ORAL at 08:42

## 2022-07-17 RX ADMIN — CLOPIDOGREL BISULFATE 75 MG: 75 TABLET ORAL at 08:41

## 2022-07-17 RX ADMIN — AMLODIPINE BESYLATE 5 MG: 5 TABLET ORAL at 08:42

## 2022-07-17 RX ADMIN — LEVETIRACETAM 500 MG: 500 TABLET, FILM COATED ORAL at 08:42

## 2022-07-17 ASSESSMENT — PAIN SCALES - GENERAL: PAINLEVEL_OUTOF10: 0

## 2022-07-17 NOTE — CARE COORDINATION
Participation: Discharge Planning   The Plan for Transition of Care is related to the following treatment goals: Return home and back to baseline functioning

## 2022-07-17 NOTE — DISCHARGE SUMMARY
Hospitalist Discharge Summary   Admit Date:  7/15/2022  4:44 PM   DC Note date: 2022  Name:  Fadia Craig   Age:  [de-identified] y.o. Sex:  male  :  1940   MRN:  [id number]   Room:  [facility]  PCP:  Alon Mccray MD    Presenting Complaint: Altered Mental Status     Initial Admission Diagnosis: TIA (transient ischemic attack) [G45.9]  Amnesia [R41.3]  Stroke-like symptoms [R29.90]     Problem List for this Hospitalization (present on admission):    Principal Problem:    Stroke-like symptoms  Active Problems:    Amnesia    TIA (transient ischemic attack)    Hyperlipidemia    Benign prostatic hyperplasia without lower urinary tract symptoms    Seizure ([facility] [address]    Hypertension  Resolved Problems:    * No resolved hospital problems. *    Did Patient have Sepsis (YES OR NO): no    Hospital Course:  Fadia Craig is a [de-identified] y.o. male with medical history of Obesity, HTN, HLD, Seizure d/o who presented from home via ems with sudden onset memory loss that started 30 minutes PTA. Seen by neurologist Marcelo Morataya, suspected TGA vs TIA, less likely seizures. Given IV keppra 1.5 load just in case and recommended admission with permissive HTN to  vs typical 220. CTH showed no acute findings. He tells me he is 98% better. Doesn't recall what happened. No family at the bedside. Says he has been reducing his keppra dose on his own for awhile and was doing well until now. He says he was told he had sleep apnea in the past but the CPAP didn't do anything for him. Denies vision changes, speech changes, weakness, or sensory changes. Pt was admitted to the hospital for further evaluation / work-up. He was seen in consultation by Dr. 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 MRI brain 2022 was negative for acute event and echocardiogram with preserved EF with a negative bubble study. From neurology standpoint pt was stable for discharge home with recommendations to f/u with Dr. Almas Wilson (neuro) and continue keppra for seizure d/o. Mr. Sally Asutin is medically stable for discharge home today. Disposition: Home  Diet: ADULT DIET; Regular; 4 carb choices (60 gm/meal); Low Fat/Low Chol/High Fiber/MARY  Code Status: Full Code    Follow Ups:   Follow-up Information     [facility] Follow up. Specialty: Neurology  Why: Call in the morning to schedule for follow up in 1 week  Contact information:  [address]  [phone]                       Time spent in patient discharge and coordination 35 minutes. Plan was discussed with patient, RN, case mgmt. All questions answered. Patient was stable at time of discharge. Instructions given to call a physician or return if any concerns. Discharge Medication List as of 7/17/2022 10:21 AM        CONTINUE these medications which have CHANGED    Details   atorvastatin (LIPITOR) 80 MG tablet Take 1 tablet by mouth nightly, Disp-30 tablet, R-0Print      clopidogrel (PLAVIX) 75 MG tablet Take 1 tablet by mouth in the morning for 30 doses. , Disp-30 tablet, R-0Print           CONTINUE these medications which have NOT CHANGED    Details   amLODIPine-benazepril (LOTREL) 5-20 MG per capsule TAKE ONE CAPSULE BY MOUTH ONE TIME DAILYHistorical Med      aspirin 81 MG EC tablet Take 81 mg by mouth dailyHistorical Med      finasteride (PROSCAR) 5 MG tablet Take 5 mg by mouth dailyHistorical Med      levETIRAcetam (KEPPRA) 500 MG tablet Take 500 mg by mouth in the morning and 500 mg before bedtime. Pt has been taking a half dose, once a day. Historical Med           STOP taking these medications       levoFLOXacin (LEVAQUIN) 500 MG tablet Comments:   Reason for Stopping:               Procedures done this admission:  * No surgery found *    Consults this admission:  IP CONSULT TO STROKE COORDINATOR  IP CONSULT TO CASE MANAGEMENT  FOLLOW UP VISIT    Echocardiogram results:  07/15/22    TRANSTHORACIC ECHOCARDIOGRAM (TTE) COMPLETE (CONTRAST/BUBBLE/3D PRN) 07/16/2022 12:34 PM (Final)    Interpretation Summary  Formatting of this result is different from the original.      Left Ventricle: Low normal left ventricular systolic function with a visually estimated EF of 50 - 55%. Left ventricle size is normal. Mildly increased wall thickness. Normal wall motion. Normal diastolic function. Aorta: Mildly dilated ascending aorta. Signed by: Mikayla Cochran MD on 7/16/2022 12:34 PM      Diagnostic Imaging/Tests:   CT HEAD WO CONTRAST    Result Date: 7/15/2022  No acute findings     CT BRAIN PERFUSION    Result Date: 7/15/2022  No estimated core infarct. Please note that the determination of patient treatment is not based solely upon imaging factors or calculation values. Management of ischemia is at the discretion of the primary physician and is based upon a combination of clinical and imaging data, along other factors. CTA HEAD NECK W CONTRAST    Result Date: 7/16/2022  No evidence of acute arterial occlusive disease. No hemodynamically significant carotid artery stenosis. MRI brain without contrast    Result Date: 7/16/2022  1. No acute infarction. 2. Scattered punctate white matter hyperintensities compatible with migraine headaches or mild chronic small vessel ischemic disease.          Labs: Results:       BMP, Mg, Phos Recent Labs     07/15/22  1659 07/16/22  0452    141   K 3.9 3.6   * 110*   CO2 28 27   ANIONGAP 4* 4*   BUN 21 19   CREATININE 1.10 1.00   LABGLOM >60 >60   GFRAA >60 >60   CALCIUM 9.2 8.9   GLUCOSE 109* 86      CBC Recent Labs     07/15/22  1659 07/16/22  0452   WBC 7.5 6.9   RBC 5.29 4.69   HGB 16.3 14.1   HCT 48.2 42.4   MCV 91.1 90.4   MCH 30.8 30.1   MCHC 33.8 33.3   RDW 12.4 12.5    180   MPV 10.6 10.0   NRBC 0.00 0.00   SEGS 60  --    LYMPHOPCT 24  -- EOSRELPCT 6  --    MONOPCT 9  --    BASOPCT 1  --    IMMGRAN 0  --    SEGSABS 4.5  --    LYMPHSABS 1.8  --    EOSABS 0.4  --    MONOSABS 0.7  --    BASOSABS 0.1  --    ABSIMMGRAN 0.0  --       LFT Recent Labs     07/16/22  0452   BILITOT 0.6   ALKPHOS 72   AST 13*   ALT 26   PROT 6.2*   LABALBU 3.2   GLOB 3.0      Cardiac  Lab Results   Component Value Date/Time    TROPHS 5.0 07/15/2022 04:59 PM      Coags Lab Results   Component Value Date/Time    PROTIME 12.9 07/15/2022 04:59 PM    INR 0.9 07/15/2022 04:59 PM      A1c Lab Results   Component Value Date/Time    LABA1C 5.4 07/16/2022 04:52 AM     07/16/2022 04:52 AM      Lipids Lab Results   Component Value Date/Time    CHOL 132 07/16/2022 04:52 AM    LDLCALC 59.6 07/16/2022 04:52 AM    LABVLDL 21.4 07/16/2022 04:52 AM    HDL 51 07/16/2022 04:52 AM    CHOLHDLRATIO 2.6 07/16/2022 04:52 AM    TRIG 107 07/16/2022 04:52 AM      Thyroid  No results found for: TSHELE, [id number]     Most Recent UA Lab Results   Component Value Date/Time    COLORU Yellow 01/25/2022 11:00 AM    SPECGRAV 1.023 01/25/2022 11:00 AM    PROTEINU 1+ 01/25/2022 11:00 AM    GLUCOSEU Negative 01/25/2022 11:00 AM    KETUA Negative 01/25/2022 11:00 AM    BILIRUBINUR Negative 01/25/2022 11:00 AM    BLOODU Negative 01/25/2022 11:00 AM    UROBILINOGEN 0.2 01/25/2022 11:00 AM    NITRU Negative 01/25/2022 11:00 AM    LEUKOCYTESUR Negative 01/25/2022 11:00 AM    WBCUA 11-30 01/25/2022 11:00 AM    RBCUA 3-10 01/25/2022 11:00 AM    BACTERIA None seen 01/25/2022 11:00 AM    MUCUS Present 07/06/2020 10:24 AM          All Labs from Last 24 Hrs:  No results found for this or any previous visit (from the past 24 hour(s)).     No Known Allergies  Immunization History   Administered Date(s) Administered    COVID-19, PFIZER PURPLE top, DILUTE for use, (age 15 y+), 30mcg/0.3mL 02/15/2021, 03/09/2021, 09/29/2021    Influenza, High Dose (Fluzone 65 yrs and older) 10/05/2015, 08/24/2016, 09/06/2018, 08/26/2020, 10/01/2021    Influenza, Quadv, adjuvanted, 65 yrs +, IM, PF (Fluad) 08/14/2020    Influenza, Triv, inactivated, subunit, adjuvanted, IM (Fluad 65 yrs and older) 10/24/2019    Pneumococcal Conjugate 13-valent ([id number]) 06/23/2015    Pneumococcal Polysaccharide ([id number]) 02/01/2019       Recent Vital Data:  Patient Vitals for the past 24 hrs:   Temp Pulse Resp BP SpO2   07/17/22 0749 97.7 °F (36.5 °C) 56 18 (!) 148/73 91 %   07/17/22 0400 97.5 °F (36.4 °C) 55 17 (!) 151/81 95 %   07/17/22 0000 97.3 °F (36.3 °C) 52 17 (!) 146/77 93 %   07/16/22 2000 97.5 °F (36.4 °C) 58 17 (!) 155/77 95 %   07/16/22 1600 97.5 °F (36.4 °C) 55 17 139/75 96 %       Oxygen Therapy  SpO2: 91 %  O2 Device: None (Room air)    Estimated body mass index is 30.13 kg/m² as calculated from the following:    Height as of this encounter: 5' 9\" (1.753 m). Weight as of this encounter: 204 lb (92.5 kg). No intake or output data in the 24 hours ending 07/17/22 1211      Physical Exam:  General:          Well nourished. Head:               Normocephalic, atraumatic  Eyes:               Sclerae appear normal.  Pupils equally round. No lidlag / no nystagmus  ENT:                Nares appear normal, no drainage. Moist oral mucosa  Neck:               No restricted ROM. Trachea midline   CV:                  RRR. No m/r/g. No jugular venous distension. Lungs:             CTAB. No wheezing, rhonchi, or rales. Symmetric expansion. Abdomen: Bowel sounds present. Soft, nontender, nondistended. Extremities:     No cyanosis or clubbing. No edema  Skin:                No rashes and normal coloration. Warm and dry. Neuro:             CN II-XII grossly intact. Sensation intact. A&Ox3; no focal deficits, no pronator drift  Psych:             Normal mood and affect. Signed:  Re Trevino    Part of this note may have been written by using a voice dictation software.   The note has been proof read but may still contain some grammatical/other typographical errors.

## 2022-07-17 NOTE — PROGRESS NOTES
Discharge instructions explained to the patient. Peripheral IV removed. All questions answered. No other complaints at this time. Instructed to call once ready to leave.

## 2022-07-17 NOTE — PLAN OF CARE
Problem: Discharge Planning  Goal: Discharge to home or other facility with appropriate resources  7/16/2022 2030 by Miguel Martinez RN  Outcome: Progressing  7/16/2022 1028 by Ronaldo Bertrand RN  Outcome: Progressing  Flowsheets (Taken 7/16/2022 0800)  Discharge to home or other facility with appropriate resources: Identify barriers to discharge with patient and caregiver     Problem: Pain  Goal: Verbalizes/displays adequate comfort level or baseline comfort level  7/16/2022 2030 by Miguel Martinez RN  Outcome: Progressing  7/16/2022 1028 by Ronaldo Bertrand RN  Outcome: Progressing     Problem: Safety - Adult  Goal: Free from fall injury  7/16/2022 2030 by Miguel Martinez RN  Outcome: Progressing  7/16/2022 1028 by Ronaldo Bertrand RN  Outcome: Progressing     Problem: ABCDS Injury Assessment  Goal: Absence of physical injury  7/16/2022 2030 by Miguel Martinez RN  Outcome: Progressing  7/16/2022 1028 by Ronaldo Bertrand RN  Outcome: Progressing

## 2022-07-17 NOTE — PROGRESS NOTES
[facility]  [address]  [facility], [address] [facility]            Fadia Craig is a [de-identified] y.o. male who presents on referral from the inpatient service for transient global amnesia he is back to normal at this point  MRI was negative.       Past Medical History:   Diagnosis Date    Age-related osteoporosis with current pathological fracture 9/21/2017    GERD (gastroesophageal reflux disease)     pt denies (6/7/18)    Hyperlipidemia 8/5/2016    Hypertension     Insomnia 10/30/2017    Obstructive sleep apnea 10/30/2017    pt is not currently using cpap machine- has used in past (6/7/18)    Overweight (BMI 25.0-29.9) 10/30/2017    Primary osteoarthritis of left shoulder 7/14/2017    Seizure ([address] 09/10/2017    x2 (9/2017 and 10/2017)- on keppra BID- had consult with Dr Radha Lynch (neuro)- pt reports triggered by insomnia       Past Surgical History:   Procedure Laterality Date    COLONOSCOPY      HERNIA REPAIR      TOTAL HIP ARTHROPLASTY Bilateral     TOTAL SHOULDER ARTHROPLASTY Left         Family History   Problem Relation Age of Onset    No Known Problems Mother     No Known Problems Father         Social History     Socioeconomic History    Marital status:    Tobacco Use    Smoking status: Never    Smokeless tobacco: Never   Substance and Sexual Activity    Alcohol use: No    Drug use: No         Current Facility-Administered Medications   Medication Dose Route Frequency Provider Last Rate Last Admin    amLODIPine (NORVASC) tablet 5 mg  5 mg Oral Daily May Y MeshaVivianbama   5 mg at 07/17/22 4852    And    lisinopril (PRINIVIL;ZESTRIL) tablet 20 mg  20 mg Oral Daily May Y Mesha [place]   20 mg at 07/17/22 0842    ondansetron (ZOFRAN-ODT) disintegrating tablet 4 mg  4 mg Oral Q8H PRN Stacey Factor, DO        Or    ondansetron (ZOFRAN) injection 4 mg  4 mg IntraVENous Q6H PRN Stacey Factor, DO        polyethylene glycol (GLYCOLAX) packet 17 g  17 g Oral Daily PRN Stacey Factor, DO enoxaparin (LOVENOX) injection 40 mg  40 mg SubCUTAneous Daily [place] Lingo, DO   40 mg at 07/16/22 0825    atorvastatin (LIPITOR) tablet 80 mg  80 mg Oral Nightly [place] Lingo, DO   80 mg at 07/16/22 2018    clopidogrel (PLAVIX) tablet 75 mg  75 mg Oral Daily [place] Lingo, DO   75 mg at 07/17/22 0841    aspirin EC tablet 81 mg  81 mg Oral Daily [place] Lingo, DO   81 mg at 07/17/22 0842    labetalol (NORMODYNE;TRANDATE) injection 10 mg  10 mg IntraVENous Q10 Min PRN [place] Lingo, DO        levETIRAcetam (KEPPRA) tablet 500 mg  500 mg Oral BID [place] Lingo, DO   500 mg at 07/17/22 1540    finasteride (PROSCAR) tablet 5 mg  5 mg Oral Daily [place] Lingo, DO   5 mg at 07/17/22 4442     Current Outpatient Medications   Medication Sig Dispense Refill    atorvastatin (LIPITOR) 80 MG tablet Take 1 tablet by mouth nightly 30 tablet 0    clopidogrel (PLAVIX) 75 MG tablet Take 1 tablet by mouth in the morning for 30 doses. 30 tablet 0    amLODIPine-benazepril (LOTREL) 5-20 MG per capsule TAKE ONE CAPSULE BY MOUTH ONE TIME DAILY      aspirin 81 MG EC tablet Take 81 mg by mouth daily      finasteride (PROSCAR) 5 MG tablet Take 5 mg by mouth daily      levETIRAcetam (KEPPRA) 500 MG tablet Take 500 mg by mouth in the morning and 500 mg before bedtime. Pt has been taking a half dose, once a day. No Known Allergies        BP (!) 148/73   Pulse 56   Temp 97.7 °F (36.5 °C) (Oral)   Resp 18   Ht 5' 9\" (1.753 m)   Wt 204 lb (92.5 kg)   SpO2 91%   BMI 30.13 kg/m²     Neurologic Exam  The patient is alert cooperative and oriented. No evident skin lesions no evidence of excessive bruising no trauma  Patient looks well-hydrated. Does not appear chronically ill. Cranial nerve examination normal visual fields. Normal random eye movements. No ptosis.   No lid lag  Face moves strongly symmetrically and equally  Speech is normal  Motor  Upper extremities  Normal bulk strength tone and symmetric arm swing  Lower extremities  Normal bulk strength tone  Deep tendon reflexes 1+ and symmetric at biceps brachioradialis and triceps  Casual gait is normal with no evidence of ataxia  Fine motor coordination is normal in the hands bilaterally    Peripheral sensation light touch normal  There are no carotid bruits  Vital signs are reviewed      Most recent MRI   Results for orders placed during the hospital encounter of 07/15/22    MRI brain without contrast    Narrative  MRI BRAIN WITHOUT CONTRAST 7/16/2022    HISTORY: Strokelike symptoms. Sudden onset of memory loss. TECHNIQUE: Sagittal and axial T1-weighted, axial T2-weighted, axial and coronal  FLAIR, axial T2-weighted gradient-echo, axial diffusion weighted images with ADC  maps of the brain. COMPARISON: CT head and CT perfusion from the previous day    FINDINGS: There is no acute infarction, acute intracranial hemorrhage,  hydrocephalus, intra-axial mass or extra-axial hematoma. On the T2-weighted and FLAIR sequences, there are scattered punctate white  matter hyperintensities within the supratentorial brain. This is not an uncommon  finding which may be present with asymptomatic patients, with migraine headaches  or with mild chronic small vessel ischemic disease. There is no advanced cerebral volume loss or disproportionate hippocampal  atrophy. Impression  1. No acute infarction. 2. Scattered punctate white matter hyperintensities compatible with migraine  headaches or mild chronic small vessel ischemic disease. Most recent MRA   No results found for this or any previous visit. Most recent CTA  Results for orders placed during the hospital encounter of 07/15/22    CTA HEAD NECK W CONTRAST    Narrative  Preliminary report by Dr. Nan Barbosa:    NECK: No hemodynamically significant stenosis. Head: No LVO    full report to follow. History: Code stroke.     FINDINGS:    CT angiography was performed of the neck and head with contrast and  three-dimensional CT angiography reconstruction and reformat was performed. NASCET criteria as needed. CT dose reduction was achieved through use of a  standardized protocol tailored for this examination and automatic exposure  control for dose modulation. Study analyzed by the ipnexus software package per the hospital protocol if  presented as such by the facility technologists in the pacs system. However,  the results of the analysis are neither reviewed nor provided in this exam  interpretation and report. This statement is provided at the request of the  hospital for hospital billing purposes only. Small vascular calcification in the left upper lobe lung. Atherosclerosis of the  aortic arch. The left subclavian artery is patent. The left vertebral artery is  patent. The innominate artery is patent. The right subclavian artery has a mild  stenosis in the proximal segment. The right vertebral artery is patent. The left common carotid artery is patent. The left internal carotid artery is  patent. The right common and internal carotid arteries are patent. The anterior  cerebral arteries are patent. The left middle cerebral artery is patent. The right middle cerebral artery is  patent. The basilar artery is patent. Right posterior communicating artery is  patent. Left posterior cerebral artery is patent. Dural venous sinuses are  patent. Impression  No evidence of acute arterial occlusive disease. No hemodynamically significant  carotid artery stenosis. Most recent Echo  Results for orders placed during the hospital encounter of 07/15/22    Transthoracic echocardiogram (TTE) complete with contrast, bubble, strain, and 3D PRN    Interpretation Summary  Formatting of this result is different from the original.      Left Ventricle: Low normal left ventricular systolic function with a visually estimated EF of 50 - 55%. Left ventricle size is normal. Mildly increased wall thickness.  Normal wall motion. Normal diastolic function. Aorta: Mildly dilated ascending aorta. Most recent lipid panels  Lab Results   Component Value Date/Time    CHOL 132 07/16/2022 04:52 AM    HDL 51 07/16/2022 04:52 AM    VLDL 21 01/25/2022 11:00 AM       Most recent Hgb A1C  No results found for: HBA1C, [id number]      Assessment/Plan:  I am very comfortable with the diagnosis here of transient global amnesia. The patient's friend whom he telephoned 7 times during the episode reports that he did indeed keep asking the same question over and over again and this is characteristic.   I am not concerned with reference to the episode being a TIA nor by her concerned with reference to it being a focal seizure  He should continue with Keppra and I note he has seen Dr. Daphne Whitehead in the past and should return to Dr. Martinez Salas care and be seen at least periodically  He clearly should a day or 2 maximum guidelines for vascular disease prophylaxis, and in addition I would strongly urge that he return to using his CPAP but requires no other investigation or treatment            Maday Nichols MD

## 2022-07-22 ENCOUNTER — OFFICE VISIT (OUTPATIENT)
Dept: NEUROLOGY | Age: 82
End: 2022-07-22
Payer: MEDICARE

## 2022-07-22 VITALS
HEIGHT: 69 IN | HEART RATE: 67 BPM | SYSTOLIC BLOOD PRESSURE: 142 MMHG | DIASTOLIC BLOOD PRESSURE: 85 MMHG | BODY MASS INDEX: 30.96 KG/M2 | WEIGHT: 209 LBS

## 2022-07-22 DIAGNOSIS — R56.9 SEIZURE (HCC): ICD-10-CM

## 2022-07-22 DIAGNOSIS — G44.219 EPISODIC TENSION-TYPE HEADACHE, NOT INTRACTABLE: ICD-10-CM

## 2022-07-22 DIAGNOSIS — E78.5 HYPERLIPIDEMIA, UNSPECIFIED HYPERLIPIDEMIA TYPE: ICD-10-CM

## 2022-07-22 DIAGNOSIS — Z09 HOSPITAL DISCHARGE FOLLOW-UP: Primary | ICD-10-CM

## 2022-07-22 DIAGNOSIS — G45.4 TRANSIENT GLOBAL AMNESIA: ICD-10-CM

## 2022-07-22 DIAGNOSIS — I10 PRIMARY HYPERTENSION: ICD-10-CM

## 2022-07-22 DIAGNOSIS — R97.20 ELEVATED PSA: Primary | ICD-10-CM

## 2022-07-22 DIAGNOSIS — G47.33 OBSTRUCTIVE SLEEP APNEA: ICD-10-CM

## 2022-07-22 DIAGNOSIS — R31.9 HEMATURIA, UNSPECIFIED TYPE: ICD-10-CM

## 2022-07-22 PROCEDURE — 99215 OFFICE O/P EST HI 40 MIN: CPT | Performed by: NURSE PRACTITIONER

## 2022-07-22 PROCEDURE — 1123F ACP DISCUSS/DSCN MKR DOCD: CPT | Performed by: NURSE PRACTITIONER

## 2022-07-22 RX ORDER — LEVETIRACETAM 500 MG/1
1000 TABLET, EXTENDED RELEASE ORAL EVERY EVENING
Qty: 60 TABLET | Refills: 5 | Status: SHIPPED | OUTPATIENT
Start: 2022-07-22

## 2022-07-22 ASSESSMENT — PATIENT HEALTH QUESTIONNAIRE - PHQ9
1. LITTLE INTEREST OR PLEASURE IN DOING THINGS: 0
2. FEELING DOWN, DEPRESSED OR HOPELESS: 0
SUM OF ALL RESPONSES TO PHQ QUESTIONS 1-9: 0
SUM OF ALL RESPONSES TO PHQ9 QUESTIONS 1 & 2: 0

## 2022-07-22 NOTE — PROGRESS NOTES
[address] [facility]  [address] [facility]  [address] [place] [address], [address] [facility]      Chief Complaint   Patient presents with    Neurologic Problem     Hospital follow up for TIA       Maite Chapin is a [de-identified] y.o. male who presents for hospital follow up for transient global amnesia. PMH significant for seizure, PIERRE, HTN, HLD who presented to ED with sudden onset of confusion and disorientation. Patient had an episode 30 minutes prior to arrival where he was having repeated episodes of repeated speech. Patient had no recollection of this time. Code S initiated on arrival. NIH 0. CT of head negative for acute intracranial abnormality. CTA of head/neck negative for LVO or high grade stenosis. CT perfusion negative for perfusion abnormalities. Given IV keppra 1500 mg in ED. It was noted in his medical record, the patient had been reducing his keppra dose on his own for awhile. MRI of brain negative for acute infarct, hemorrhage or lesion; chronic small vessel disease, no advanced cerebral volume loss or disproportionate hippocampal atrophy. TTE EF 50-55% negative for PFO or LAD. SR on telemetry. He was discharged home on Keppra 500 mg twice daily, ASA 81 mg daily, Plavix 75 mg daily for 21 days and atorvastatin 80 mg daily for stroke prevention. Interval history:     He is here today with his spouse. Denies seizure, focal deficits. He is hard of hearing. He is independent of his ADLS. He is currently taking ASA 81 mg daily, Plavix 75 mg daily and atrovastatin 80 mg daily for stroke prevention and Keppra 500 mg twice daily for seizures. Denies myalgias, GI distress or changes in moods/behaviors. Endorse hematuria since starting Plavix. He brought urine sample to office, no evidence of clotting. Hx of sleep apnea- he is non-compliant with CPAP, stated he did not feel the CPAP worked for him. Followed by Pulmonary, however has not been seen in a few years. Endorses headache this morning.  Located in frontal region. Describes as dull/throbbing. Severity 9/10. Denies worsening with positional changes, thunderclap, photophobia, phonophobia, or recent illness. Unable to identify triggers. He has not taken any medication to help alleviate his headache. Past Medical History:   Diagnosis Date    Age-related osteoporosis with current pathological fracture 9/21/2017    GERD (gastroesophageal reflux disease)     pt denies (6/7/18)    Hyperlipidemia 8/5/2016    Hypertension     Insomnia 10/30/2017    Obstructive sleep apnea 10/30/2017    pt is not currently using cpap machine- has used in past (6/7/18)    Overweight (BMI 25.0-29.9) 10/30/2017    Primary osteoarthritis of left shoulder 7/14/2017    Seizure ([address] 09/10/2017    x2 (9/2017 and 10/2017)- on keppra BID- had consult with Dr Maik Bowden (neuro)- pt reports triggered by insomnia       Past Surgical History:   Procedure Laterality Date    COLONOSCOPY      HERNIA REPAIR      TOTAL HIP ARTHROPLASTY Bilateral     TOTAL SHOULDER ARTHROPLASTY Left        Family History   Problem Relation Age of Onset    No Known Problems Mother     No Known Problems Father        Social History     Socioeconomic History    Marital status:    Tobacco Use    Smoking status: Never    Smokeless tobacco: Never   Substance and Sexual Activity    Alcohol use: No    Drug use: No         Current Outpatient Medications:     atorvastatin (LIPITOR) 80 MG tablet, Take 1 tablet by mouth nightly, Disp: 30 tablet, Rfl: 0    clopidogrel (PLAVIX) 75 MG tablet, Take 1 tablet by mouth in the morning for 30 doses. , Disp: 30 tablet, Rfl: 0    amLODIPine-benazepril (LOTREL) 5-20 MG per capsule, TAKE ONE CAPSULE BY MOUTH ONE TIME DAILY, Disp: , Rfl:     aspirin 81 MG EC tablet, Take 81 mg by mouth daily, Disp: , Rfl:     finasteride (PROSCAR) 5 MG tablet, Take 5 mg by mouth daily, Disp: , Rfl:     levETIRAcetam (KEPPRA) 500 MG tablet, Take 500 mg by mouth in the morning and 500 mg before bedtime.  Pt has been taking a half dose, once a day., Disp: , Rfl:     No Known Allergies    REVIEW OF SYSTEMS:  CONSTITUTIONAL: No weight loss, fever, chills, weakness or fatigue. HEENT: Eyes: No visual loss, blurred vision, double vision or yellow sclerae. Ears, Nose, Throat:  hearing loss, Denies sneezing, congestion, runny nose or sore throat. SKIN: No rash or itching. CARDIOVASCULAR: No chest pain, chest pressure or chest discomfort. No palpitations or edema. RESPIRATORY: No shortness of breath, cough or sputum. GASTROINTESTINAL: No anorexia, nausea, vomiting or diarrhea. No abdominal pain or blood. GENITOURINARY: hematuria no burning with urination. NEUROLOGICAL:  headache, Denies dizziness, syncope, paralysis, ataxia, numbness or tingling in the extremities. No change in bowel or bladder control. MUSCULOSKELETAL: No muscle, back pain, joint pain or stiffness. HEMATOLOGIC: No anemia, bleeding or bruising. LYMPHATICS: No enlarged nodes. No history of splenectomy. PSYCHIATRIC: No history of depression or anxiety. ENDOCRINOLOGIC: No reports of sweating, cold or heat intolerance. No polyuria or polydipsia. ALLERGIES: No history of asthma, hives, eczema or rhinitis. Physical Examination  BP (!) 142/85   Pulse 67   Ht 5' 9\" (1.753 m)   Wt 209 lb (94.8 kg)   BMI 30.86 kg/m²     General - Well developed, obese, in no apparent distress. Pleasant and conversent. HEENT - Normocephalic, atraumatic. [place]. Conjunctiva, tympanic membranes, and oropharynx are clear. Neck - Supple without masses, no bruits   Cardiovascular - Regular rate and rhythm. Lungs - Clear to auscultation. Abdomen - Soft, nontender with normal bowel sounds. Extremities - Peripheral pulses intact. No edema and no rashes. Neurological examination - Comprehension, attention , memory and reasoning are intact. Language and speech are normal. On cranial nerve examination pupils are equal round and reactive to light.  Fundoscopic examination is normal. Visual acuity is adequate. Visual fields are full to finger confrontation. Extraocular motility is normal. Face is symmetric and sensation is intact to light touch. Hearing is intact to finger rustle bilaterally. Motor examination - There is normal muscle tone and bulk. Power is full throughout. Muscle stretch reflexes are 1+ throughout. Sensation is intact to light touch, pinprick, vibration and proprioception in all extremities. Cerebellar examination is normal. Gait and stance are normal.     CTA Result (most recent):  CTA HEAD NECK W CONTRAST 07/15/2022    Narrative    History: Code stroke. FINDINGS:    CT angiography was performed of the neck and head with contrast and  three-dimensional CT angiography reconstruction and reformat was performed. NASCET criteria as needed. CT dose reduction was achieved through use of a  standardized protocol tailored for this examination and automatic exposure  control for dose modulation. Study analyzed by the [employer] software package per the hospital protocol if  presented as such by the facility technologists in the pacs system. However,  the results of the analysis are neither reviewed nor provided in this exam  interpretation and report. This statement is provided at the request of the  hospital for hospital billing purposes only. Small vascular calcification in the left upper lobe lung. Atherosclerosis of the  aortic arch. The left subclavian artery is patent. The left vertebral artery is  patent. The innominate artery is patent. The right subclavian artery has a mild  stenosis in the proximal segment. The right vertebral artery is patent. The left common carotid artery is patent. The left internal carotid artery is  patent. The right common and internal carotid arteries are patent. The anterior  cerebral arteries are patent. The left middle cerebral artery is patent. The right middle cerebral artery is  patent.  The basilar artery is patent. Right posterior communicating artery is  patent. Left posterior cerebral artery is patent. Dural venous sinuses are  patent. Impression  No evidence of acute arterial occlusive disease. No hemodynamically significant  carotid artery stenosis. MRI Result (most recent):  MRI BRAIN WO CONTRAST 07/16/2022    Narrative  MRI BRAIN WITHOUT CONTRAST 7/16/2022    HISTORY: Strokelike symptoms. Sudden onset of memory loss. TECHNIQUE: Sagittal and axial T1-weighted, axial T2-weighted, axial and coronal  FLAIR, axial T2-weighted gradient-echo, axial diffusion weighted images with ADC  maps of the brain. COMPARISON: CT head and CT perfusion from the previous day    FINDINGS: There is no acute infarction, acute intracranial hemorrhage,  hydrocephalus, intra-axial mass or extra-axial hematoma. On the T2-weighted and FLAIR sequences, there are scattered punctate white  matter hyperintensities within the supratentorial brain. This is not an uncommon  finding which may be present with asymptomatic patients, with migraine headaches  or with mild chronic small vessel ischemic disease. There is no advanced cerebral volume loss or disproportionate hippocampal  atrophy. Impression  1. No acute infarction. 2. Scattered punctate white matter hyperintensities compatible with migraine  headaches or mild chronic small vessel ischemic disease.       Lab Results   Component Value Date    CHOL 132 07/16/2022    CHOL 138 01/25/2022    CHOL 135 07/14/2021     Lab Results   Component Value Date    TRIG 107 07/16/2022    TRIG 116 01/25/2022    TRIG 138 07/14/2021     Lab Results   Component Value Date    HDL 51 07/16/2022    HDL 50 01/25/2022    HDL 48 07/14/2021     Lab Results   Component Value Date    LDLCALC 59.6 07/16/2022    LDLCALC 67 01/25/2022    LDLCALC 63 07/14/2021     Lab Results   Component Value Date    LABVLDL 21.4 07/16/2022    LABVLDL 18 07/06/2020    VLDL 21 01/25/2022    VLDL 24 07/14/2021    VLDL 16 01/11/2021     Lab Results   Component Value Date    CHOLHDLRATIO 2.6 07/16/2022     Lab Results   Component Value Date    LABA1C 5.4 07/16/2022     Lab Results   Component Value Date     07/16/2022      Tone was seen today for neurologic problem. Diagnoses and all orders for this visit:    Hospital discharge follow-up    Transient global amnesia  Resolved. Seizure ([address]  Stable. He has difficulty remembering to take second dose. Will transition to Keppra ER 1000 mg once daily to help with medication compliance. -     levETIRAcetam (KEPPRA XR) 500 MG TB24 extended release tablet; Take 2 tablets by mouth every evening    Obstructive sleep apnea  Non compliant with CPAP. Recommend follow up with Pulmonary as this is likely contributing to his headaches and memory difficulties. Hematuria, unspecified type  New since starting plavix with no evidence of clotting. Discontinue Plavix. Episodic tension-type headache, not intractable  Given hematuria, avoid NSAIDs. Headache Education:   Instructed the patient on over-the-counter headache management medications including: CoQ10, magnesium oxide, and butterbur. To avoid a pain medication overuse headache trying not to take pain medicines more than 3 doses a week. To help relieve headache symptoms without taking pain medicine lie down under darkroom and drink glass of water. Consider lifestyle modification including good sleep hygiene, routine medial schedules, regular exercise and managing triggers. Keep a headache diary  to reveal triggers and possible patterns. Triggers may be: Food, stress, perfumes, alcohol, or even chocolate. Drink plenty of water and try to get 8 hours of sleep each night to reduce risk factors that may cause headaches. Follow up in 6 months or sooner if needed.      I spent greater than 50% of the 60 total minutes of today's visit in coordination of care and patient/family education and counseling regarding the above patient concerns, reviewing the patient's medical record, my assessment and recommendations.           Lulú Reynolds, [address] [facility] [address] [facility] [address], [address]:[phone]

## 2022-07-25 ENCOUNTER — NURSE ONLY (OUTPATIENT)
Dept: INTERNAL MEDICINE CLINIC | Facility: CLINIC | Age: 82
End: 2022-07-25

## 2022-07-25 DIAGNOSIS — E78.5 HYPERLIPIDEMIA, UNSPECIFIED HYPERLIPIDEMIA TYPE: ICD-10-CM

## 2022-07-25 DIAGNOSIS — R97.20 ELEVATED PSA: ICD-10-CM

## 2022-07-25 DIAGNOSIS — I10 PRIMARY HYPERTENSION: ICD-10-CM

## 2022-07-25 LAB
BASOPHILS # BLD: 0.1 K/UL (ref 0–0.2)
BASOPHILS NFR BLD: 1 % (ref 0–2)
DIFFERENTIAL METHOD BLD: NORMAL
EOSINOPHIL # BLD: 0.4 K/UL (ref 0–0.8)
EOSINOPHIL NFR BLD: 5 % (ref 0.5–7.8)
ERYTHROCYTE [DISTWIDTH] IN BLOOD BY AUTOMATED COUNT: 12.6 % (ref 11.9–14.6)
HCT VFR BLD AUTO: 48.2 % (ref 41.1–50.3)
HGB BLD-MCNC: 15.9 G/DL (ref 13.6–17.2)
IMM GRANULOCYTES # BLD AUTO: 0 K/UL (ref 0–0.5)
IMM GRANULOCYTES NFR BLD AUTO: 0 % (ref 0–5)
LYMPHOCYTES # BLD: 2.1 K/UL (ref 0.5–4.6)
LYMPHOCYTES NFR BLD: 25 % (ref 13–44)
MCH RBC QN AUTO: 30.9 PG (ref 26.1–32.9)
MCHC RBC AUTO-ENTMCNC: 33 G/DL (ref 31.4–35)
MCV RBC AUTO: 93.8 FL (ref 79.6–97.8)
MONOCYTES # BLD: 0.8 K/UL (ref 0.1–1.3)
MONOCYTES NFR BLD: 10 % (ref 4–12)
NEUTS SEG # BLD: 5 K/UL (ref 1.7–8.2)
NEUTS SEG NFR BLD: 59 % (ref 43–78)
NRBC # BLD: 0 K/UL (ref 0–0.2)
PLATELET # BLD AUTO: 202 K/UL (ref 150–450)
PMV BLD AUTO: 10.7 FL (ref 9.4–12.3)
PSA SERPL-MCNC: 10 NG/ML
RBC # BLD AUTO: 5.14 M/UL (ref 4.23–5.6)
WBC # BLD AUTO: 8.5 K/UL (ref 4.3–11.1)

## 2022-07-26 LAB
ALBUMIN SERPL-MCNC: 3.7 G/DL (ref 3.2–4.6)
ALBUMIN/GLOB SERPL: 1.3 {RATIO} (ref 1.2–3.5)
ALP SERPL-CCNC: 88 U/L (ref 50–136)
ALT SERPL-CCNC: 36 U/L (ref 12–65)
ANION GAP SERPL CALC-SCNC: 10 MMOL/L (ref 7–16)
APPEARANCE UR: CLEAR
AST SERPL-CCNC: 21 U/L (ref 15–37)
BACTERIA URNS QL MICRO: NEGATIVE /HPF
BILIRUB SERPL-MCNC: 0.6 MG/DL (ref 0.2–1.1)
BILIRUB UR QL: NEGATIVE
BUN SERPL-MCNC: 26 MG/DL (ref 8–23)
CALCIUM SERPL-MCNC: 9.3 MG/DL (ref 8.3–10.4)
CASTS URNS QL MICRO: ABNORMAL /LPF
CHLORIDE SERPL-SCNC: 110 MMOL/L (ref 98–107)
CHOLEST SERPL-MCNC: 136 MG/DL
CO2 SERPL-SCNC: 23 MMOL/L (ref 21–32)
COLOR UR: YELLOW
CREAT SERPL-MCNC: 1.1 MG/DL (ref 0.8–1.5)
EPI CELLS #/AREA URNS HPF: ABNORMAL /HPF
GLOBULIN SER CALC-MCNC: 2.9 G/DL (ref 2.3–3.5)
GLUCOSE SERPL-MCNC: 84 MG/DL (ref 65–100)
GLUCOSE UR STRIP.AUTO-MCNC: NEGATIVE MG/DL
HDLC SERPL-MCNC: 53 MG/DL (ref 40–60)
HDLC SERPL: 2.6 {RATIO}
HGB UR QL STRIP: NEGATIVE
KETONES UR QL STRIP.AUTO: NEGATIVE MG/DL
LDLC SERPL CALC-MCNC: 65.6 MG/DL
LEUKOCYTE ESTERASE UR QL STRIP.AUTO: ABNORMAL
MUCOUS THREADS URNS QL MICRO: 0 /LPF
NITRITE UR QL STRIP.AUTO: NEGATIVE
PH UR STRIP: 6.5 [PH] (ref 5–9)
POTASSIUM SERPL-SCNC: 4.2 MMOL/L (ref 3.5–5.1)
PROT SERPL-MCNC: 6.6 G/DL (ref 6.3–8.2)
PROT UR STRIP-MCNC: NEGATIVE MG/DL
RBC #/AREA URNS HPF: ABNORMAL /HPF
SODIUM SERPL-SCNC: 143 MMOL/L (ref 136–145)
SP GR UR REFRACTOMETRY: 1.01 (ref 1–1.02)
TRIGL SERPL-MCNC: 87 MG/DL (ref 35–150)
URINE CULTURE IF INDICATED: ABNORMAL
UROBILINOGEN UR QL STRIP.AUTO: 0.2 EU/DL (ref 0.2–1)
VLDLC SERPL CALC-MCNC: 17.4 MG/DL (ref 6–23)
WBC URNS QL MICRO: ABNORMAL /HPF

## 2022-08-01 ENCOUNTER — OFFICE VISIT (OUTPATIENT)
Dept: INTERNAL MEDICINE CLINIC | Facility: CLINIC | Age: 82
End: 2022-08-01
Payer: MEDICARE

## 2022-08-01 VITALS
TEMPERATURE: 97.9 F | WEIGHT: 209 LBS | DIASTOLIC BLOOD PRESSURE: 78 MMHG | OXYGEN SATURATION: 96 % | HEIGHT: 69 IN | SYSTOLIC BLOOD PRESSURE: 126 MMHG | BODY MASS INDEX: 30.96 KG/M2 | HEART RATE: 62 BPM

## 2022-08-01 DIAGNOSIS — R97.20 ELEVATED PSA: ICD-10-CM

## 2022-08-01 DIAGNOSIS — G45.9 TIA (TRANSIENT ISCHEMIC ATTACK): ICD-10-CM

## 2022-08-01 DIAGNOSIS — G47.33 OBSTRUCTIVE SLEEP APNEA: ICD-10-CM

## 2022-08-01 DIAGNOSIS — I10 PRIMARY HYPERTENSION: ICD-10-CM

## 2022-08-01 DIAGNOSIS — E78.2 MIXED HYPERLIPIDEMIA: ICD-10-CM

## 2022-08-01 DIAGNOSIS — G45.4 TRANSIENT GLOBAL AMNESIA: ICD-10-CM

## 2022-08-01 DIAGNOSIS — Z00.00 MEDICARE ANNUAL WELLNESS VISIT, SUBSEQUENT: Primary | ICD-10-CM

## 2022-08-01 PROCEDURE — 1123F ACP DISCUSS/DSCN MKR DOCD: CPT | Performed by: INTERNAL MEDICINE

## 2022-08-01 PROCEDURE — G8427 DOCREV CUR MEDS BY ELIG CLIN: HCPCS | Performed by: INTERNAL MEDICINE

## 2022-08-01 PROCEDURE — G8417 CALC BMI ABV UP PARAM F/U: HCPCS | Performed by: INTERNAL MEDICINE

## 2022-08-01 PROCEDURE — G0439 PPPS, SUBSEQ VISIT: HCPCS | Performed by: INTERNAL MEDICINE

## 2022-08-01 PROCEDURE — 99214 OFFICE O/P EST MOD 30 MIN: CPT | Performed by: INTERNAL MEDICINE

## 2022-08-01 PROCEDURE — 1036F TOBACCO NON-USER: CPT | Performed by: INTERNAL MEDICINE

## 2022-08-01 RX ORDER — ATORVASTATIN CALCIUM 80 MG/1
80 TABLET, FILM COATED ORAL DAILY
Qty: 30 TABLET | Refills: 5 | Status: SHIPPED | OUTPATIENT
Start: 2022-08-01 | End: 2023-01-28

## 2022-08-01 ASSESSMENT — ENCOUNTER SYMPTOMS
SHORTNESS OF BREATH: 0
CHEST TIGHTNESS: 0

## 2022-08-01 ASSESSMENT — LIFESTYLE VARIABLES
HOW OFTEN DO YOU HAVE A DRINK CONTAINING ALCOHOL: 2-4 TIMES A MONTH
HOW MANY STANDARD DRINKS CONTAINING ALCOHOL DO YOU HAVE ON A TYPICAL DAY: 1 OR 2

## 2022-08-01 NOTE — PATIENT INSTRUCTIONS
Personalized Preventive Plan for Lino Amaya - 8/1/2022  Medicare offers a range of preventive health benefits. Some of the tests and screenings are paid in full while other may be subject to a deductible, co-insurance, and/or copay. Some of these benefits include a comprehensive review of your medical history including lifestyle, illnesses that may run in your family, and various assessments and screenings as appropriate. After reviewing your medical record and screening and assessments performed today your provider may have ordered immunizations, labs, imaging, and/or referrals for you. A list of these orders (if applicable) as well as your Preventive Care list are included within your After Visit Summary for your review. Other Preventive Recommendations:    A preventive eye exam performed by an eye specialist is recommended every 1-2 years to screen for glaucoma; cataracts, macular degeneration, and other eye disorders. A preventive dental visit is recommended every 6 months. Try to get at least 150 minutes of exercise per week or 10,000 steps per day on a pedometer . Order or download the FREE \"Exercise & Physical Activity: Your Everyday Guide\" from [employer]. Call 8-651.777.1662 or search [employer] online. You need 5287-0184 mg of calcium and 4713-6139 IU of vitamin D per day. It is possible to meet your calcium requirement with diet alone, but a vitamin D supplement is usually necessary to meet this goal.  When exposed to the sun, use a sunscreen that protects against both UVA and UVB radiation with an SPF of 30 or greater. Reapply every 2 to 3 hours or after sweating, drying off with a towel, or swimming. Always wear a seat belt when traveling in a car. Always wear a helmet when riding a bicycle or motorcycle.

## 2022-08-01 NOTE — PROGRESS NOTES
Medicare Annual Wellness Visit    uHmaira Hare is here for Medicare AWV and [address]   Medicare annual wellness visit, subsequent  TIA (transient ischemic attack)  -     ALT; Future  -     AST; Future  Primary hypertension  Obstructive sleep apnea  Mixed hyperlipidemia  -     atorvastatin (LIPITOR) 80 MG tablet; Take 1 tablet by mouth in the morning., Disp-30 tablet, R-5Normal  -     Lipid Panel; Future  -     ALT; Future  -     AST; Future  Elevated PSA  Transient global amnesia    Recommendations for Preventive Services Due: see orders and patient instructions/AVS.  Recommended screening schedule for the next 5-10 years is provided to the patient in written form: see Patient Instructions/AVS.     Return in 3 months (on 11/1/2022) for Medicare Annual Wellness Visit in 1 year. Subjective       Patient's complete Health Risk Assessment and screening values have been reviewed and are found in Flowsheets. The following problems were reviewed today and where indicated follow up appointments were made and/or referrals ordered.     Positive Risk Factor Screenings with Interventions:             General Health and ACP:  General  In general, how would you say your health is?: Fair  In the past 7 days, have you experienced any of the following: New or Increased Pain, New or Increased Fatigue, Loneliness, Social Isolation, Stress or Anger?: (!) Yes  Select all that apply: (!) New or Increased Fatigue  Do you get the social and emotional support that you need?: Yes  Do you have a Living Will?: Yes    Advance Directives       Power of  Living Will ACP-Advance Directive ACP-Power of     Not on File Not on File Filed Not on File          General Health Risk Interventions:      Health Habits/Nutrition:  Physical Activity: Inactive    Days of Exercise per Week: 0 days    Minutes of Exercise per Session: 0 min     Have you lost any weight without trying in the past 3 months?: No  Body mass index: (!) 30.86  Have you seen the dentist within the past year?: Yes  Health Habits/Nutrition Interventions:  Inadequate physical activity:  daily exercise / walking/ encouraged. Medication compliance with Keppra encouraged. Safety:  Do you have working smoke detectors?: Yes  Do you have any tripping hazards - loose or unsecured carpets or rugs?: No  Do you have any tripping hazards - clutter in doorways, halls, or stairs?: No  Do you have either shower bars, grab bars, non-slip mats or non-slip surfaces in your shower or bathtub?: (!) No  Do all of your stairways have a railing or banister?: Yes  Do you always fasten your seatbelt when you are in a car?: Yes  Safety Interventions:  Home safety tips provided           Objective   Vitals:    08/01/22 0946   BP: 126/78   Site: Left Upper Arm   Position: Sitting   Cuff Size: Small Adult   Pulse: 62   Temp: 97.9 °F (36.6 °C)   TempSrc: Temporal   SpO2: 96%   Weight: 209 lb (94.8 kg)   Height: 5' 9\" (1.753 m)      Body mass index is 30.86 kg/m². No Known Allergies  Prior to Visit Medications    Medication Sig Taking? Authorizing Provider   atorvastatin (LIPITOR) 80 MG tablet Take 1 tablet by mouth in the morning.  Yes Ragini Heath MD   levETIRAcetam (KEPPRA XR) 500 MG TB24 extended release tablet Take 2 tablets by mouth every evening Yes FLORENTIN Driscoll   amLODIPine-benazepril (LOTREL) 5-20 MG per capsule TAKE ONE CAPSULE BY MOUTH ONE TIME DAILY Yes Ar Automatic Reconciliation   aspirin 81 MG EC tablet Take 81 mg by mouth daily Yes Ar Automatic Reconciliation   finasteride (PROSCAR) 5 MG tablet Take 5 mg by mouth daily Yes Ar Automatic Reconciliation       CareTeam (Including outside providers/suppliers regularly involved in providing care):   Patient Care Team:  Ragini Heath MD as PCP - Frida Colin MD as PCP - [facility] Empaneled Provider     Reviewed and updated this visit:  Tobacco  Allergies Meds  Problems  Med Hx  Surg Hx  Soc Hx  Fam Hx                 ASSESSMENT/PLAN:    1. Medicare annual wellness visit, subsequent       2. TIA (transient ischemic attack)/transient global amnesia  Continue Keppra 500 mg BID. If easier, take both tablets of Keppra in the morning. MEDICATION COMPLIANCE WITH KEPPRA EMPHASIZED. CTA neg for vascular occlusion or cva. Sleep apnea and medication compliance with dosing probably related to TIA/TGA. Sx resolved. Neuro exam normal on exam today. Neuro notes reviewed. Personally reviewed hospital notes and imaging and labs and discussed. - ALT; Future  - AST; Future    3. Primary hypertension  Take Amlodipine/Benazepril daily in morning. 4. Obstructive sleep apnea  CPAP not tolerated. Declines    5. Mixed hyperlipidemia  Continue Atorvastatin 80 mg daily. Re check in 3 months. - atorvastatin (LIPITOR) 80 MG tablet; Take 1 tablet by mouth in the morning. Dispense: 30 tablet; Refill: 5  - Lipid Panel; Future  - ALT; Future  - AST; Future    6. Elevated PSA  PSA fluctuates. BPH. Will forward PSA to urology. Continue Finasteride daily at night. Evaluation and management of the chronic condition(s) delineated. No negative side effects reported. I have reviewed all the lab results. There are some abnormalities that are either expected or not critical to the patient's health, and are discussed in the office today and are addressed. Please refer to the above assessement and plan narrative and orders and follow up plan. Medication discussed and refilled as needed. Physical exam findings are stable unless otherwise indicated and this is addressed. The most recent lab work and imaging and consultant/urgent care visits and imaging are reviewed and discussed and considered during this visit encounter. 1. Medicare annual wellness visit, subsequent  2. TIA (transient ischemic attack)  -     ALT; Future  -     AST; Future  3.  Primary hypertension  4. Obstructive sleep apnea  5. Mixed hyperlipidemia  -     atorvastatin (LIPITOR) 80 MG tablet; Take 1 tablet by mouth in the morning., Disp-30 tablet, R-5Normal  -     Lipid Panel; Future  -     ALT; Future  -     AST; Future  6. Elevated PSA  7. Transient global amnesia         On this date, 22, I have spent 30 minutes reviewing previous notes, test results and face to face with the patient discussing the diagnosis and importance of compliance with the treatment plan as well as documenting on the day of the visit. An electronic signature was used to authenticate this note. -- Ovi Ackerman MD     Return in 3 months (on 2022) for Medicare Annual Wellness Visit in 1 year. SUBJECTIVE/OBJECTIVE:  Chief Complaint   Patient presents with    Medicare AWV    Discuss Labs       HPI:   Milka Ramírez (: 1940 is a [de-identified] y.o. male, here for evaluation of the following chief complaint(s):   Chief Complaint   Patient presents with    Medicare [address]       Patient is here for follow-up and management of chronic medical conditions, review of recent labs, review of any imaging completed since our last office visit and discuss any consultants opinions or management changes. 53-year-old male with history of sleep apnea hypertension and history of seizure. Patient had been not taking his Keppra on a regular basis. He had an episode of transient global amnesia that resolved quickly. He was admitted for evaluation. No evidence of stroke. His atorvastatin was increased to 80 mg. He is currently on aspirin 81 mg daily. No focal weakness or acute neurologic deficits reported today. No difficulty swallowing or focal weakness. Alert and oriented on exam today. He has sleep apnea but does not use CPAP. Not tolerated. He has chronic hearing loss. He will be having an appointment today to have hearing aids.     Labs reviewed and discussed and medication refilled as needed for chronic medications during ov or adjusted based on lab results and/or our discussion as appropriate. See discussion. The patient's available records and electronic chart records are reviewed. The PMH, PSH, medications, allergies, medications, FH, health maintenance and vaccination status are all reviewed and updated as appropriate. Records from outside providers have been reviewed, summarized, and considered as noted in the history of present illness, past medical history, and objective data of this note and encounter. Health Maintenance   Topic Date Due    DTaP/Tdap/Td vaccine (1 - Tdap) Never done    Shingles vaccine (1 of 2) Never done    COVID-19 Vaccine (4 - Booster for Pfizer series) 01/29/2022    Flu vaccine (1) 09/01/2022    Depression Screen  07/22/2023    Lipids  07/25/2023    Prostate Specific Antigen (PSA) Screening or Monitoring  07/25/2023    Annual Wellness Visit (AWV)  08/02/2023    Pneumococcal 65+ years Vaccine  Completed    Hepatitis A vaccine  Aged Out    Hepatitis B vaccine  Aged Out    Hib vaccine  Aged Out    Meningococcal (ACWY) vaccine  Aged Out     Patient Active Problem List   Diagnosis    Coronary artery disease involving native coronary artery of native heart without angina pectoris    Obstructive sleep apnea    Need for hepatitis C screening test    Chronic shoulder bursitis    History of gastroesophageal reflux (GERD)    Disorder of bone density and structure, unspecified    Elevated PSA    Hyperlipidemia    Benign prostatic hyperplasia without lower urinary tract symptoms    Seizure (HCC)    Arthritis of shoulder region, left, degenerative    Insomnia    Hypertension    Amnesia    Transient global amnesia    Stroke-like symptoms       Review of Systems   Constitutional:  Negative for activity change and fatigue. HENT:  Positive for hearing loss. Eyes:  Negative for visual disturbance. Respiratory:  Negative for chest tightness and shortness of breath. Cardiovascular:  Negative for chest pain, palpitations and leg swelling. Endocrine: Negative for polydipsia and polyuria. Genitourinary:  Negative for difficulty urinating, dysuria and hematuria. Musculoskeletal:  Negative for myalgias. Skin:  Negative for wound. Neurological:  Positive for seizures and light-headedness. Negative for facial asymmetry, speech difficulty, weakness and headaches. Psychiatric/Behavioral:  Negative for confusion, decreased concentration and dysphoric mood.       Lab Results   Component Value Date/Time    WBC 8.5 07/25/2022 12:21 PM    HGB 15.9 07/25/2022 12:21 PM    HCT 48.2 07/25/2022 12:21 PM    MCV 93.8 07/25/2022 12:21 PM    RDW 12.6 07/25/2022 12:21 PM     07/25/2022 12:21 PM    NEUTOPHILPCT 57 01/25/2022 11:00 AM    LYMPHOPCT 25 07/25/2022 12:21 PM    LYMPHOPCT 27 01/25/2022 11:00 AM    MONOPCT 10 07/25/2022 12:21 PM    MONOPCT 10 01/25/2022 11:00 AM    EOSRELPCT 5 07/25/2022 12:21 PM    BASOPCT 1 07/25/2022 12:21 PM    BASOPCT 1 01/25/2022 11:00 AM    LYMPHSABS 2.1 07/25/2022 12:21 PM    LYMPHSABS 2.3 01/25/2022 11:00 AM    MONOSABS 0.8 07/25/2022 12:21 PM    MONOSABS 0.9 01/25/2022 11:00 AM    EOSABS 0.4 07/25/2022 12:21 PM    EOSABS 0.4 01/25/2022 11:00 AM    BASOSABS 0.1 07/25/2022 12:21 PM    IMMGRAN 0 07/25/2022 12:21 PM    GRANULOCYTEABSOLUTECOUNT 0.0 01/25/2022 11:00 AM       Lab Results   Component Value Date/Time     07/25/2022 12:21 PM    K 4.2 07/25/2022 12:21 PM     07/25/2022 12:21 PM    CO2 23 07/25/2022 12:21 PM    ANIONGAP 10 07/25/2022 12:21 PM    GLUCOSE 84 07/25/2022 12:21 PM    BUN 26 07/25/2022 12:21 PM    CREATININE 1.10 07/25/2022 12:21 PM    GFRAA >60 07/25/2022 12:21 PM    LABGLOM >60 07/25/2022 12:21 PM    CALCIUM 9.3 07/25/2022 12:21 PM    BILITOT 0.6 07/25/2022 12:21 PM    ALT 36 07/25/2022 12:21 PM    AST 21 07/25/2022 12:21 PM    ALKPHOS 88 07/25/2022 12:21 PM    ALKPHOS 92 01/25/2022 11:00 AM    PROT 6.6 07/25/2022 12:21 PM    LABALBU 3.7 07/25/2022 12:21 PM    GLOB 2.9 07/25/2022 12:21 PM    ALBUMIN 1.3 07/25/2022 12:21 PM       Lab Results   Component Value Date/Time    CHOL 136 07/25/2022 12:21 PM    HDL 53 07/25/2022 12:21 PM    TRIG 87 07/25/2022 12:21 PM    LDLCALC 65.6 07/25/2022 12:21 PM    VLDL 21 01/25/2022 11:00 AM       Lab Results   Component Value Date/Time    LABA1C 5.4 07/16/2022 04:52 AM       Lab Results   Component Value Date/Time    TSH 4.290 07/14/2021 08:48 AM       Lab Results   Component Value Date/Time    PSA 10.0 07/25/2022 12:21 PM    PSA 4.4 01/25/2022 11:00 AM    PSA 9.1 07/14/2021 08:48 AM    PSA 8.4 01/11/2021 09:04 AM    PSA 8.6 12/28/2020 02:47 PM       Lab Results   Component Value Date/Time    SPECGRAV 1.013 07/25/2022 12:21 PM    PHUR 5.5 01/25/2022 11:00 AM    COLORU YELLOW 07/25/2022 12:21 PM    CLARITYU Clear 01/25/2022 11:00 AM    LEUKOCYTESUR TRACE 07/25/2022 12:21 PM    PROTEINU Negative 07/25/2022 12:21 PM    GLUCOSEU Negative 07/25/2022 12:21 PM    KETUA Negative 07/25/2022 12:21 PM    BLOODU Negative 07/25/2022 12:21 PM    BILIRUBINUR Negative 07/25/2022 12:21 PM    BILIRUBINUR Negative 01/25/2022 11:00 AM    UROBILINOGEN 0.2 07/25/2022 12:21 PM    NITRU Negative 07/25/2022 12:21 PM    LABMICR See additional order 01/25/2022 11:00 AM       No evidence of acute arterial occlusive disease. No hemodynamically significant   carotid artery stenosis. Vitals:    08/01/22 0946   BP: 126/78   Site: Left Upper Arm   Position: Sitting   Cuff Size: Small Adult   Pulse: 62   Temp: 97.9 °F (36.6 °C)   TempSrc: Temporal   SpO2: 96%   Weight: 209 lb (94.8 kg)   Height: 5' 9\" (1.753 m)     Wt Readings from Last 3 Encounters:   08/01/22 209 lb (94.8 kg)   07/22/22 209 lb (94.8 kg)   07/15/22 204 lb (92.5 kg)     BP Readings from Last 3 Encounters:   08/01/22 126/78   07/22/22 (!) 142/85   07/17/22 (!) 148/73     Physical Exam  Vitals and nursing note reviewed.    Constitutional:       Appearance: Normal appearance. He is not ill-appearing. HENT:      Head: Normocephalic and atraumatic. Eyes:      Extraocular Movements: Extraocular movements intact. Conjunctiva/sclera: Conjunctivae normal.   Cardiovascular:      Rate and Rhythm: Normal rate and regular rhythm. Heart sounds: No murmur heard. No friction rub. No gallop. Pulmonary:      Effort: Pulmonary effort is normal.      Breath sounds: Normal breath sounds. Neurological:      General: No focal deficit present. Mental Status: He is alert and oriented to person, place, and time. Mental status is at baseline. Cranial Nerves: No cranial nerve deficit. Motor: No weakness. Gait: Gait normal.   Psychiatric:         Mood and Affect: Mood normal.         Behavior: Behavior normal.         Thought Content:  Thought content normal.         Judgment: Judgment normal.

## 2022-10-26 ENCOUNTER — OFFICE VISIT (OUTPATIENT)
Dept: CARDIOLOGY CLINIC | Age: 82
End: 2022-10-26
Payer: MEDICARE

## 2022-10-26 VITALS
HEIGHT: 69 IN | SYSTOLIC BLOOD PRESSURE: 128 MMHG | DIASTOLIC BLOOD PRESSURE: 70 MMHG | HEART RATE: 64 BPM | WEIGHT: 211 LBS | BODY MASS INDEX: 31.25 KG/M2

## 2022-10-26 DIAGNOSIS — I10 PRIMARY HYPERTENSION: ICD-10-CM

## 2022-10-26 DIAGNOSIS — I25.10 CORONARY ARTERY DISEASE INVOLVING NATIVE CORONARY ARTERY OF NATIVE HEART WITHOUT ANGINA PECTORIS: Primary | ICD-10-CM

## 2022-10-26 PROCEDURE — 3078F DIAST BP <80 MM HG: CPT | Performed by: INTERNAL MEDICINE

## 2022-10-26 PROCEDURE — G8428 CUR MEDS NOT DOCUMENT: HCPCS | Performed by: INTERNAL MEDICINE

## 2022-10-26 PROCEDURE — G8417 CALC BMI ABV UP PARAM F/U: HCPCS | Performed by: INTERNAL MEDICINE

## 2022-10-26 PROCEDURE — 3074F SYST BP LT 130 MM HG: CPT | Performed by: INTERNAL MEDICINE

## 2022-10-26 PROCEDURE — G8484 FLU IMMUNIZE NO ADMIN: HCPCS | Performed by: INTERNAL MEDICINE

## 2022-10-26 PROCEDURE — 1036F TOBACCO NON-USER: CPT | Performed by: INTERNAL MEDICINE

## 2022-10-26 PROCEDURE — 99214 OFFICE O/P EST MOD 30 MIN: CPT | Performed by: INTERNAL MEDICINE

## 2022-10-26 PROCEDURE — 1123F ACP DISCUSS/DSCN MKR DOCD: CPT | Performed by: INTERNAL MEDICINE

## 2022-10-26 RX ORDER — FINASTERIDE 5 MG/1
5 TABLET, FILM COATED ORAL DAILY
Qty: 90 TABLET | Refills: 3 | Status: SHIPPED | OUTPATIENT
Start: 2022-10-26 | End: 2022-11-07 | Stop reason: SDUPTHER

## 2022-10-26 NOTE — PROGRESS NOTES
[address], [address] [facility], [address] [place] [address]  PHONE: [phone]     10/26/22    NAME:  Scott Cole  : 1940  MRN: [id number]       SUBJECTIVE:   Scott Cole is a [de-identified] y.o. male seen for a follow up visit regarding the following:     Chief Complaint   Patient presents with    Coronary Artery Disease    Hyperlipidemia    Hypertension       HPI: Here for CAD. LHC yrs ago in [facility] with Dr. Marsha Bhatt with 30% LM   Echo 2021:  Normal EF. Mild MR, AO 3.8, no PHTN   NST Today 2021:  Normal perfusion, normal EF. Lost 15 pds, diet better. Diet great, Medit Diet.  walking some now. No CP, pressure, CURRIE. No CP, pressure. No LE edema, palp. No new edema. Patient denies recent history of orthopnea, PND, excessive dizziness and/or syncope. Past Medical History, Past Surgical History, Family history, Social History, and Medications were all reviewed with the patient today and updated as necessary. Current Outpatient Medications   Medication Sig Dispense Refill    finasteride (PROSCAR) 5 MG tablet Take 1 tablet by mouth daily 90 tablet 3    atorvastatin (LIPITOR) 80 MG tablet Take 1 tablet by mouth in the morning. 30 tablet 5    levETIRAcetam (KEPPRA XR) 500 MG TB24 extended release tablet Take 2 tablets by mouth every evening 60 tablet 5    amLODIPine-benazepril (LOTREL) 5-20 MG per capsule TAKE ONE CAPSULE BY MOUTH ONE TIME DAILY      aspirin 81 MG EC tablet Take 81 mg by mouth daily       No current facility-administered medications for this visit.         No Known Allergies  Patient Active Problem List    Diagnosis Date Noted    Amnesia 07/15/2022     Priority: Medium    Transient global amnesia 07/15/2022     Priority: Medium    Stroke-like symptoms 07/15/2022     Priority: Medium    Need for hepatitis C screening test 2021    Coronary artery disease involving native coronary artery of native heart without angina pectoris 2021    Arthritis of shoulder region, left, degenerative 06/13/2018    Obstructive sleep apnea 10/30/2017    Insomnia 10/30/2017    History of gastroesophageal reflux (GERD) 09/21/2017    Disorder of bone density and structure, unspecified 09/21/2017    Seizure ([address] 09/10/2017    Elevated PSA 07/14/2017    Chronic shoulder bursitis 01/09/2017    Benign prostatic hyperplasia without lower urinary tract symptoms 01/09/2017    Hyperlipidemia 08/05/2016    Hypertension 08/05/2016      Past Surgical History:   Procedure Laterality Date    COLONOSCOPY      HERNIA REPAIR      SHOULDER ARTHROPLASTY Left     TOTAL HIP ARTHROPLASTY Bilateral      Family History   Problem Relation Age of Onset    No Known Problems Mother     No Known Problems Father      Social History     Tobacco Use    Smoking status: Never    Smokeless tobacco: Never   Substance Use Topics    Alcohol use: No         ROS:    No obvious pertinent positives on review of systems except for what was outlined in the HPI today.       PHYSICAL EXAM:     /70   Pulse 64   Ht 5' 9\" (1.753 m)   Wt 211 lb (95.7 kg)   BMI 31.16 kg/m²    General/Constitutional:   Alert and oriented x 3, no acute distress  HEENT:   normocephalic, atraumatic, moist mucous membranes  Neck:   No JVD or carotid bruits bilaterally  Cardiovascular:   regular rate and rhythm, no murmur/rub/gallop appreciated  Pulmonary:   clear to auscultation bilaterally, no respiratory distress  Abdomen:   soft, non-tender, non-distended  Ext:   No sig LE edema bilaterally  Skin:  warm and dry, no obvious rashes seen  Neuro:   no obvious sensory or motor deficits  Psychiatric:   normal mood and affect      Lab Results   Component Value Date/Time     07/25/2022 12:21 PM    K 4.2 07/25/2022 12:21 PM     07/25/2022 12:21 PM    CO2 23 07/25/2022 12:21 PM    BUN 26 07/25/2022 12:21 PM    CREATININE 1.10 07/25/2022 12:21 PM    GLUCOSE 84 07/25/2022 12:21 PM    CALCIUM 9.3 07/25/2022 12:21 PM        Lab Results Component Value Date    WBC 8.5 07/25/2022    HGB 15.9 07/25/2022    HCT 48.2 07/25/2022    MCV 93.8 07/25/2022     07/25/2022       Lab Results   Component Value Date    TSH 4.290 07/14/2021       Lab Results   Component Value Date    LABA1C 5.4 07/16/2022     Lab Results   Component Value Date     07/16/2022       Lab Results   Component Value Date    CHOL 136 07/25/2022    CHOL 132 07/16/2022    CHOL 138 01/25/2022     Lab Results   Component Value Date    TRIG 87 07/25/2022    TRIG 107 07/16/2022    TRIG 116 01/25/2022     Lab Results   Component Value Date    HDL 53 07/25/2022    HDL 51 07/16/2022    HDL 50 01/25/2022     Lab Results   Component Value Date    LDLCALC 65.6 07/25/2022    LDLCALC 59.6 07/16/2022    LDLCALC 67 01/25/2022     Lab Results   Component Value Date    LABVLDL 17.4 07/25/2022    LABVLDL 21.4 07/16/2022    LABVLDL 18 07/06/2020    VLDL 21 01/25/2022    VLDL 24 07/14/2021    VLDL 16 01/11/2021     Lab Results   Component Value Date    CHOLHDLRATIO 2.6 07/25/2022    CHOLHDLRATIO 2.6 07/16/2022           I have Independently reviewed prior care notes, any ER records available, cardiac testing, labs and results with the patient and before seeing the patient today. Also independently reviewed outside records when available. ASSESSMENT:    Francia Sahu was seen today for coronary artery disease, hyperlipidemia and hypertension. Diagnoses and all orders for this visit:    Coronary artery disease involving native coronary artery of native heart without angina pectoris  -     Vascular duplex carotid bilateral; Future    Primary hypertension    Other orders  -     finasteride (PROSCAR) 5 MG tablet; Take 1 tablet by mouth daily        PLAN:         1. CAD:  Echo ok. EF normal.  LM dz in the past.   Remain on ASA. Work on walking and exercise, this is key. He has my cell, call me as needed. Check carotid US. 2. HPL: remain on lipitor, lipids great. Follow.    LDL 65.    More labs in 2 weeks. 3. HTN: some white coat, need to follow, better now. EF normal.     Remain on lotrel, follow. 4. PIERRE:  Could not tolerate mask. Follow AO in the future. Patient has been instructed and agrees to call our office with any issues or other concerns related to their cardiac condition(s) and/or complaint(s). Return in about 6 months (around 4/26/2023).        APOORVA MERINO, DO  10/26/2022

## 2022-10-31 DIAGNOSIS — E78.2 MIXED HYPERLIPIDEMIA: ICD-10-CM

## 2022-10-31 LAB
CHOLEST SERPL-MCNC: 132 MG/DL
HDLC SERPL-MCNC: 55 MG/DL (ref 40–60)
HDLC SERPL: 2.4 {RATIO}
LDLC SERPL CALC-MCNC: 61.8 MG/DL
TRIGL SERPL-MCNC: 76 MG/DL (ref 35–150)
VLDLC SERPL CALC-MCNC: 15.2 MG/DL (ref 6–23)

## 2022-11-07 ENCOUNTER — OFFICE VISIT (OUTPATIENT)
Dept: INTERNAL MEDICINE CLINIC | Facility: CLINIC | Age: 82
End: 2022-11-07
Payer: MEDICARE

## 2022-11-07 VITALS
TEMPERATURE: 97 F | OXYGEN SATURATION: 99 % | RESPIRATION RATE: 16 BRPM | HEIGHT: 69 IN | SYSTOLIC BLOOD PRESSURE: 115 MMHG | BODY MASS INDEX: 30.07 KG/M2 | WEIGHT: 203 LBS | DIASTOLIC BLOOD PRESSURE: 68 MMHG | HEART RATE: 68 BPM

## 2022-11-07 DIAGNOSIS — N40.0 BENIGN PROSTATIC HYPERPLASIA WITHOUT LOWER URINARY TRACT SYMPTOMS: ICD-10-CM

## 2022-11-07 DIAGNOSIS — E78.2 MIXED HYPERLIPIDEMIA: ICD-10-CM

## 2022-11-07 DIAGNOSIS — R56.9 SEIZURE (HCC): ICD-10-CM

## 2022-11-07 DIAGNOSIS — I10 PRIMARY HYPERTENSION: Primary | ICD-10-CM

## 2022-11-07 PROCEDURE — 3074F SYST BP LT 130 MM HG: CPT | Performed by: INTERNAL MEDICINE

## 2022-11-07 PROCEDURE — G8427 DOCREV CUR MEDS BY ELIG CLIN: HCPCS | Performed by: INTERNAL MEDICINE

## 2022-11-07 PROCEDURE — 1123F ACP DISCUSS/DSCN MKR DOCD: CPT | Performed by: INTERNAL MEDICINE

## 2022-11-07 PROCEDURE — G8484 FLU IMMUNIZE NO ADMIN: HCPCS | Performed by: INTERNAL MEDICINE

## 2022-11-07 PROCEDURE — 3078F DIAST BP <80 MM HG: CPT | Performed by: INTERNAL MEDICINE

## 2022-11-07 PROCEDURE — 99214 OFFICE O/P EST MOD 30 MIN: CPT | Performed by: INTERNAL MEDICINE

## 2022-11-07 PROCEDURE — G8417 CALC BMI ABV UP PARAM F/U: HCPCS | Performed by: INTERNAL MEDICINE

## 2022-11-07 PROCEDURE — 1036F TOBACCO NON-USER: CPT | Performed by: INTERNAL MEDICINE

## 2022-11-07 RX ORDER — LEVETIRACETAM 500 MG/1
1000 TABLET, EXTENDED RELEASE ORAL DAILY
Qty: 180 TABLET | Refills: 3 | Status: SHIPPED | OUTPATIENT
Start: 2022-11-07

## 2022-11-07 RX ORDER — AMLODIPINE BESYLATE AND BENAZEPRIL HYDROCHLORIDE 5; 20 MG/1; MG/1
1 CAPSULE ORAL DAILY
Qty: 90 CAPSULE | Refills: 3 | Status: SHIPPED | OUTPATIENT
Start: 2022-11-07

## 2022-11-07 RX ORDER — ATORVASTATIN CALCIUM 80 MG/1
80 TABLET, FILM COATED ORAL DAILY
Qty: 90 TABLET | Refills: 3 | Status: SHIPPED | OUTPATIENT
Start: 2022-11-07 | End: 2023-11-02

## 2022-11-07 RX ORDER — FINASTERIDE 5 MG/1
5 TABLET, FILM COATED ORAL DAILY
Qty: 90 TABLET | Refills: 3 | Status: SHIPPED | OUTPATIENT
Start: 2022-11-07

## 2022-11-07 ASSESSMENT — ENCOUNTER SYMPTOMS
SHORTNESS OF BREATH: 0
CHEST TIGHTNESS: 0

## 2022-11-07 ASSESSMENT — PATIENT HEALTH QUESTIONNAIRE - PHQ9
SUM OF ALL RESPONSES TO PHQ QUESTIONS 1-9: 0
2. FEELING DOWN, DEPRESSED OR HOPELESS: 0
SUM OF ALL RESPONSES TO PHQ QUESTIONS 1-9: 0
SUM OF ALL RESPONSES TO PHQ9 QUESTIONS 1 & 2: 0
SUM OF ALL RESPONSES TO PHQ QUESTIONS 1-9: 0
1. LITTLE INTEREST OR PLEASURE IN DOING THINGS: 0
SUM OF ALL RESPONSES TO PHQ QUESTIONS 1-9: 0

## 2022-11-07 NOTE — PROGRESS NOTES
ASSESSMENT/PLAN:    Treatment regimen is effective. Medication refilled  Carotid US ok  Keppra dosing as prescribed encouraged. Rx printed for medications. Follow up in six months. Evaluation and management of the chronic condition(s) delineated. No negative side effects reported. I have reviewed all the lab results. There are some abnormalities that are either expected or not critical to the patient's health, and are discussed in the office today and are addressed. Please refer to the above assessement and plan narrative and orders and follow up plan. Medication discussed and refilled as needed. Physical exam findings are stable unless otherwise indicated and this is addressed. The most recent lab work and imaging and consultant/urgent care visits and imaging are reviewed and discussed and considered during this visit encounter. 1. Primary hypertension  -     amLODIPine-benazepril (LOTREL) 5-20 MG per capsule; Take 1 capsule by mouth daily, Disp-90 capsule, R-3Print  -     Comprehensive Metabolic Panel; Future  -     Lipid Panel; Future  -     CBC; Future  2. Mixed hyperlipidemia  -     atorvastatin (LIPITOR) 80 MG tablet; Take 1 tablet by mouth daily, Disp-90 tablet, R-3Print  -     Comprehensive Metabolic Panel; Future  -     Lipid Panel; Future  3. Seizure ([address]  -     levETIRAcetam (KEPPRA XR) 500 MG TB24 extended release tablet; Take 2 tablets by mouth daily, Disp-180 tablet, R-3Print  -     Comprehensive Metabolic Panel; Future  4. Benign prostatic hyperplasia without lower urinary tract symptoms  -     finasteride (PROSCAR) 5 MG tablet; Take 1 tablet by mouth daily, Disp-90 tablet, R-3Print         On this date, 11/7/22, I have spent 30 minutes reviewing previous notes, test results and face to face with the patient discussing the diagnosis and importance of compliance with the treatment plan as well as documenting on the day of the visit.      An electronic signature was used to authenticate this note. -- Umair Hansen MD     Return in about 6 months (around 2023). SUBJECTIVE/OBJECTIVE:  Chief Complaint   Patient presents with    Follow-up     3 month       HPI:   Jenna Gonzalez (: 1940 is a [de-identified] y.o. male, here for evaluation of the following chief complaint(s):   Chief Complaint   Patient presents with    Follow-up     3 month       Patient is here for follow-up and management of chronic medical conditions, review of recent labs, review of any imaging completed since our last office visit and discuss any consultants opinions or management changes. [place] [facility] [place]. His brother just recently passed away. His brother was 80. He is not sure of the details as to exactly why he passed away. Age-related? Not sure? No seizures. His Keppra as prescribed is 2 tablets daily. Encouraged to take 2 tablets daily. He admits he generally takes 1 tablet daily. He had a carotid ultrasound. Preliminary results reviewed. No severe stenosis obvious on preliminary results. Blood pressure medication and treatment stable. Chronic hearing loss. Recently saw ENT. He has hearing aids now. He does not like to wear them. BPH issues. Stable on finasteride. Elevated PSA fluctuations. Followed by Dr. Mendes. Medications refilled. Printed. Labs reviewed and discussed and medication refilled as needed for chronic medications during ov or adjusted based on lab results and/or our discussion as appropriate. See discussion. The patient's available records and electronic chart records are reviewed. The PMH, PSH, medications, allergies, medications, FH, health maintenance and vaccination status are all reviewed and updated as appropriate. Records from outside providers have been reviewed, summarized, and considered as noted in the history of present illness, past medical history, and objective data of this note and encounter.           Health Maintenance   Topic Date Due    DTaP/Tdap/Td vaccine (1 - Tdap) Never done    Shingles vaccine (1 of 2) Never done    COVID-19 Vaccine (5 - Booster for Pfizer series) 11/15/2022    Depression Screen  07/22/2023    Prostate Specific Antigen (PSA) Screening or Monitoring  07/25/2023    Annual Wellness Visit (AWV)  08/02/2023    Lipids  10/31/2023    Flu vaccine  Completed    Pneumococcal 65+ years Vaccine  Completed    Hepatitis A vaccine  Aged Out    Hib vaccine  Aged Out    Meningococcal (ACWY) vaccine  Aged Out     Patient Active Problem List   Diagnosis    Coronary artery disease involving native coronary artery of native heart without angina pectoris    Obstructive sleep apnea    Need for hepatitis C screening test    Chronic shoulder bursitis    History of gastroesophageal reflux (GERD)    Disorder of bone density and structure, unspecified    Elevated PSA    Hyperlipidemia    Benign prostatic hyperplasia without lower urinary tract symptoms    Seizure (HCC)    Arthritis of shoulder region, left, degenerative    Insomnia    Hypertension    Amnesia    Transient global amnesia    Stroke-like symptoms       Review of Systems   Constitutional:  Negative for activity change and fatigue. HENT:  Positive for hearing loss. Eyes:  Negative for visual disturbance. Respiratory:  Negative for chest tightness and shortness of breath. Cardiovascular:  Negative for chest pain, palpitations and leg swelling. Endocrine: Negative for polydipsia and polyuria. Genitourinary:  Negative for dysuria. Musculoskeletal:  Negative for myalgias. Skin:  Negative for wound. Neurological:  Negative for dizziness, seizures, speech difficulty and headaches. Psychiatric/Behavioral:  Negative for dysphoric mood.       Lab Results   Component Value Date/Time    WBC 8.5 07/25/2022 12:21 PM    HGB 15.9 07/25/2022 12:21 PM    HCT 48.2 07/25/2022 12:21 PM    MCV 93.8 07/25/2022 12:21 PM    RDW 12.6 07/25/2022 12:21 PM     07/25/2022 12:21 PM    NEUTOPHILPCT 57 01/25/2022 11:00 AM    LYMPHOPCT 25 07/25/2022 12:21 PM    LYMPHOPCT 27 01/25/2022 11:00 AM    MONOPCT 10 07/25/2022 12:21 PM    MONOPCT 10 01/25/2022 11:00 AM    EOSRELPCT 5 07/25/2022 12:21 PM    BASOPCT 1 07/25/2022 12:21 PM    BASOPCT 1 01/25/2022 11:00 AM    LYMPHSABS 2.1 07/25/2022 12:21 PM    LYMPHSABS 2.3 01/25/2022 11:00 AM    MONOSABS 0.8 07/25/2022 12:21 PM    MONOSABS 0.9 01/25/2022 11:00 AM    EOSABS 0.4 07/25/2022 12:21 PM    EOSABS 0.4 01/25/2022 11:00 AM    BASOSABS 0.1 07/25/2022 12:21 PM    IMMGRAN 0 07/25/2022 12:21 PM    GRANULOCYTEABSOLUTECOUNT 0.0 01/25/2022 11:00 AM       Lab Results   Component Value Date/Time     07/25/2022 12:21 PM    K 4.2 07/25/2022 12:21 PM     07/25/2022 12:21 PM    CO2 23 07/25/2022 12:21 PM    ANIONGAP 10 07/25/2022 12:21 PM    GLUCOSE 84 07/25/2022 12:21 PM    BUN 26 07/25/2022 12:21 PM    CREATININE 1.10 07/25/2022 12:21 PM    GFRAA >60 07/25/2022 12:21 PM    LABGLOM >60 07/25/2022 12:21 PM    CALCIUM 9.3 07/25/2022 12:21 PM    BILITOT 0.6 07/25/2022 12:21 PM    ALT 36 07/25/2022 12:21 PM    AST 21 07/25/2022 12:21 PM    ALKPHOS 88 07/25/2022 12:21 PM    ALKPHOS 92 01/25/2022 11:00 AM    PROT 6.6 07/25/2022 12:21 PM    LABALBU 3.7 07/25/2022 12:21 PM    GLOB 2.9 07/25/2022 12:21 PM    ALBUMIN 1.3 07/25/2022 12:21 PM       Lab Results   Component Value Date/Time    CHOL 132 10/31/2022 09:19 AM    HDL 55 10/31/2022 09:19 AM    TRIG 76 10/31/2022 09:19 AM    LDLCALC 61.8 10/31/2022 09:19 AM    VLDL 21 01/25/2022 11:00 AM       Lab Results   Component Value Date/Time    LABA1C 5.4 07/16/2022 04:52 AM       Lab Results   Component Value Date/Time    TSH 4.290 07/14/2021 08:48 AM       Lab Results   Component Value Date/Time    PSA 10.0 07/25/2022 12:21 PM    PSA 4.4 01/25/2022 11:00 AM    PSA 9.1 07/14/2021 08:48 AM    PSA 8.4 01/11/2021 09:04 AM    PSA 8.6 12/28/2020 02:47 PM       Lab Results   Component Value Date/Time    SPECGRAV 1.013 07/25/2022 12:21 PM    PHUR 5.5 01/25/2022 11:00 AM    COLORU YELLOW 07/25/2022 12:21 PM    CLARITYU Clear 01/25/2022 11:00 AM    LEUKOCYTESUR TRACE 07/25/2022 12:21 PM    PROTEINU Negative 07/25/2022 12:21 PM    GLUCOSEU Negative 07/25/2022 12:21 PM    KETUA Negative 07/25/2022 12:21 PM    BLOODU Negative 07/25/2022 12:21 PM    BILIRUBINUR Negative 07/25/2022 12:21 PM    BILIRUBINUR Negative 01/25/2022 11:00 AM    UROBILINOGEN 0.2 07/25/2022 12:21 PM    NITRU Negative 07/25/2022 12:21 PM    LABMICR See additional order 01/25/2022 11:00 AM           Vitals:    11/07/22 1336   BP: 115/68   Site: Left Upper Arm   Position: Sitting   Cuff Size: Large Adult   Pulse: 68   Resp: 16   Temp: 97 °F (36.1 °C)   TempSrc: Temporal   SpO2: 99%   Weight: 203 lb (92.1 kg)   Height: 5' 9\" (1.753 m)     Wt Readings from Last 3 Encounters:   11/07/22 203 lb (92.1 kg)   11/01/22 211 lb (95.7 kg)   10/26/22 211 lb (95.7 kg)     BP Readings from Last 3 Encounters:   11/07/22 115/68   10/26/22 128/70   08/01/22 126/78     Physical Exam  Vitals and nursing note reviewed. Constitutional:       Appearance: Normal appearance. He is not ill-appearing. HENT:      Head: Normocephalic and atraumatic. Right Ear: There is no impacted cerumen. Left Ear: There is no impacted cerumen. Eyes:      Extraocular Movements: Extraocular movements intact. Conjunctiva/sclera: Conjunctivae normal.   Cardiovascular:      Rate and Rhythm: Normal rate and regular rhythm. Heart sounds: Normal heart sounds. No murmur heard. No friction rub. No gallop. Pulmonary:      Effort: Pulmonary effort is normal.      Breath sounds: Normal breath sounds. Musculoskeletal:      Right lower leg: No edema. Left lower leg: No edema. Neurological:      General: No focal deficit present. Mental Status: He is alert and oriented to person, place, and time. Mental status is at baseline.    Psychiatric:         Mood and Affect: Mood normal.         Behavior: Behavior normal.

## 2022-11-09 ENCOUNTER — TELEPHONE (OUTPATIENT)
Dept: CARDIOLOGY CLINIC | Age: 82
End: 2022-11-09

## 2022-11-09 NOTE — TELEPHONE ENCOUNTER
----- Message from Louise Duke DO sent at 11/7/2022  4:33 PM EST -----  Please call the patient. CAROTID US was ok, nothing major or concerning, NO BAD blockages SEEN on the study. Keep regular appointment time, no med changes. Will review more at follow up and get plan for the future.     Thanks,   Hiral Munson

## 2022-11-09 NOTE — PROGRESS NOTES
Patient notified and he verbalized understanding
Please call patient, no change on MRI
hand grasp, leg strength strong and equal bilaterally

## 2023-01-27 DIAGNOSIS — I10 PRIMARY HYPERTENSION: ICD-10-CM

## 2023-01-27 DIAGNOSIS — E78.2 MIXED HYPERLIPIDEMIA: ICD-10-CM

## 2023-01-27 RX ORDER — AMLODIPINE BESYLATE AND BENAZEPRIL HYDROCHLORIDE 5; 20 MG/1; MG/1
CAPSULE ORAL
Qty: 90 CAPSULE | Refills: 3 | OUTPATIENT
Start: 2023-01-27

## 2023-01-27 RX ORDER — ATORVASTATIN CALCIUM 80 MG/1
TABLET, FILM COATED ORAL
Qty: 30 TABLET | Refills: 5 | OUTPATIENT
Start: 2023-01-27

## 2023-05-01 ENCOUNTER — OFFICE VISIT (OUTPATIENT)
Dept: CARDIOLOGY CLINIC | Age: 83
End: 2023-05-01
Payer: MEDICARE

## 2023-05-01 VITALS
WEIGHT: 215 LBS | BODY MASS INDEX: 31.84 KG/M2 | DIASTOLIC BLOOD PRESSURE: 80 MMHG | HEART RATE: 66 BPM | HEIGHT: 69 IN | SYSTOLIC BLOOD PRESSURE: 134 MMHG

## 2023-05-01 DIAGNOSIS — E78.00 PURE HYPERCHOLESTEROLEMIA: ICD-10-CM

## 2023-05-01 DIAGNOSIS — G47.33 OBSTRUCTIVE SLEEP APNEA: ICD-10-CM

## 2023-05-01 DIAGNOSIS — I10 PRIMARY HYPERTENSION: ICD-10-CM

## 2023-05-01 DIAGNOSIS — I25.10 CORONARY ARTERY DISEASE INVOLVING NATIVE CORONARY ARTERY OF NATIVE HEART WITHOUT ANGINA PECTORIS: Primary | ICD-10-CM

## 2023-05-01 PROCEDURE — 1036F TOBACCO NON-USER: CPT | Performed by: INTERNAL MEDICINE

## 2023-05-01 PROCEDURE — G8428 CUR MEDS NOT DOCUMENT: HCPCS | Performed by: INTERNAL MEDICINE

## 2023-05-01 PROCEDURE — 99214 OFFICE O/P EST MOD 30 MIN: CPT | Performed by: INTERNAL MEDICINE

## 2023-05-01 PROCEDURE — G8417 CALC BMI ABV UP PARAM F/U: HCPCS | Performed by: INTERNAL MEDICINE

## 2023-05-01 PROCEDURE — 1123F ACP DISCUSS/DSCN MKR DOCD: CPT | Performed by: INTERNAL MEDICINE

## 2023-05-01 PROCEDURE — 3075F SYST BP GE 130 - 139MM HG: CPT | Performed by: INTERNAL MEDICINE

## 2023-05-01 PROCEDURE — 3079F DIAST BP 80-89 MM HG: CPT | Performed by: INTERNAL MEDICINE

## 2023-05-01 NOTE — PROGRESS NOTES
[address], [address] [facility], [address] [place] [address]  PHONE: [phone]     23    NAME:  Radha Gunter  : 1940  MRN: [id number]       SUBJECTIVE:   Radha Gunter is a 82 y.o. male seen for a follow up visit regarding the following:     Chief Complaint   Patient presents with    Coronary Artery Disease       HPI: Here for CAD. LHC yrs ago in [facility] with Dr. Alton Cooper with 30% LM   Echo 2021:  Normal EF. Mild MR, AO 3.8, no PHTN   NST Today 2021:  Normal perfusion, normal EF. Carotid Dz 2022: mild     No new CURRIE< SOB. No CP, pressure. No LE edema, palp. No new edema. Patient denies recent history of orthopnea, PND, excessive dizziness and/or syncope. Past Medical History, Past Surgical History, Family history, Social History, and Medications were all reviewed with the patient today and updated as necessary. Current Outpatient Medications   Medication Sig Dispense Refill    finasteride (PROSCAR) 5 MG tablet Take 1 tablet by mouth daily 90 tablet 3    atorvastatin (LIPITOR) 80 MG tablet Take 1 tablet by mouth daily 90 tablet 3    levETIRAcetam (KEPPRA XR) 500 MG TB24 extended release tablet Take 2 tablets by mouth daily 180 tablet 3    amLODIPine-benazepril (LOTREL) 5-20 MG per capsule Take 1 capsule by mouth daily 90 capsule 3    aspirin 81 MG EC tablet Take 1 tablet by mouth daily       No current facility-administered medications for this visit.         No Known Allergies  Patient Active Problem List    Diagnosis Date Noted    Amnesia 07/15/2022     Priority: Medium    Transient global amnesia 07/15/2022     Priority: Medium    Stroke-like symptoms 07/15/2022     Priority: Medium    Need for hepatitis C screening test 2021    Coronary artery disease involving native coronary artery of native heart without angina pectoris 2021    Arthritis of shoulder region, left, degenerative 2018    Obstructive sleep apnea 10/30/2017    Insomnia

## 2023-05-31 ENCOUNTER — TELEPHONE (OUTPATIENT)
Dept: INTERNAL MEDICINE CLINIC | Facility: CLINIC | Age: 83
End: 2023-05-31

## 2023-06-01 ENCOUNTER — TELEPHONE (OUTPATIENT)
Dept: INTERNAL MEDICINE CLINIC | Facility: CLINIC | Age: 83
End: 2023-06-01

## 2023-06-01 NOTE — TELEPHONE ENCOUNTER
----- Message from Rip Pay sent at 6/1/2023  9:46 AM EDT -----  Subject: Message to Provider    QUESTIONS  Information for Provider? pt needs to r/s lab appt from 6/5 to 6/6 please   call pt to r/s   ---------------------------------------------------------------------------  --------------  [address]  [phone]; Do not leave any message, patient will call back for answer  ---------------------------------------------------------------------------  --------------  SCRIPT ANSWERS  Relationship to Patient? Spouse/Partner  Representative Name? Bianca Fitzpatrick  Is the representative on the Communication Release of Information (CARLITOS)   form in Epic?  Yes

## 2023-06-06 ENCOUNTER — HOSPITAL ENCOUNTER (OUTPATIENT)
Dept: LAB | Age: 83
Discharge: HOME | End: 2023-06-09

## 2023-06-06 DIAGNOSIS — R56.9 SEIZURE (HCC): ICD-10-CM

## 2023-06-06 DIAGNOSIS — G45.9 TIA (TRANSIENT ISCHEMIC ATTACK): ICD-10-CM

## 2023-06-06 DIAGNOSIS — E78.2 MIXED HYPERLIPIDEMIA: ICD-10-CM

## 2023-06-06 DIAGNOSIS — I10 PRIMARY HYPERTENSION: ICD-10-CM

## 2023-06-06 LAB
ALBUMIN SERPL-MCNC: 3.6 G/DL (ref 3.2–4.6)
ALBUMIN/GLOB SERPL: 1.3 (ref 0.4–1.6)
ALP SERPL-CCNC: 85 U/L (ref 50–136)
ALT SERPL-CCNC: 35 U/L (ref 12–65)
ANION GAP SERPL CALC-SCNC: 5 MMOL/L (ref 2–11)
AST SERPL-CCNC: 23 U/L (ref 15–37)
BILIRUB SERPL-MCNC: 1 MG/DL (ref 0.2–1.1)
BUN SERPL-MCNC: 27 MG/DL (ref 8–23)
CALCIUM SERPL-MCNC: 9.2 MG/DL (ref 8.3–10.4)
CHLORIDE SERPL-SCNC: 112 MMOL/L (ref 101–110)
CHOLEST SERPL-MCNC: 123 MG/DL
CO2 SERPL-SCNC: 28 MMOL/L (ref 21–32)
CREAT SERPL-MCNC: 1.1 MG/DL (ref 0.8–1.5)
ERYTHROCYTE [DISTWIDTH] IN BLOOD BY AUTOMATED COUNT: 12.4 % (ref 11.9–14.6)
GLOBULIN SER CALC-MCNC: 2.7 G/DL (ref 2.8–4.5)
GLUCOSE SERPL-MCNC: 108 MG/DL (ref 65–100)
HCT VFR BLD AUTO: 44.5 % (ref 41.1–50.3)
HDLC SERPL-MCNC: 53 MG/DL (ref 40–60)
HDLC SERPL: 2.3
HGB BLD-MCNC: 15 G/DL (ref 13.6–17.2)
LDLC SERPL CALC-MCNC: 48 MG/DL
MCH RBC QN AUTO: 31 PG (ref 26.1–32.9)
MCHC RBC AUTO-ENTMCNC: 33.7 G/DL (ref 31.4–35)
MCV RBC AUTO: 91.9 FL (ref 82–102)
NRBC # BLD: 0 K/UL (ref 0–0.2)
PLATELET # BLD AUTO: 210 K/UL (ref 150–450)
PMV BLD AUTO: 10.6 FL (ref 9.4–12.3)
POTASSIUM SERPL-SCNC: 3.9 MMOL/L (ref 3.5–5.1)
PROT SERPL-MCNC: 6.3 G/DL (ref 6.3–8.2)
RBC # BLD AUTO: 4.84 M/UL (ref 4.23–5.6)
SODIUM SERPL-SCNC: 145 MMOL/L (ref 133–143)
TRIGL SERPL-MCNC: 110 MG/DL (ref 35–150)
VLDLC SERPL CALC-MCNC: 22 MG/DL (ref 6–23)
WBC # BLD AUTO: 7.1 K/UL (ref 4.3–11.1)

## 2023-06-07 LAB
ALT SERPL-CCNC: 34 U/L (ref 12–65)
AST SERPL-CCNC: 18 U/L (ref 15–37)

## 2023-10-04 ENCOUNTER — TELEMEDICINE (OUTPATIENT)
Dept: INTERNAL MEDICINE CLINIC | Facility: CLINIC | Age: 83
End: 2023-10-04
Payer: MEDICARE

## 2023-10-04 DIAGNOSIS — F51.01 PRIMARY INSOMNIA: Primary | ICD-10-CM

## 2023-10-04 PROCEDURE — 1036F TOBACCO NON-USER: CPT | Performed by: INTERNAL MEDICINE

## 2023-10-04 PROCEDURE — 1123F ACP DISCUSS/DSCN MKR DOCD: CPT | Performed by: INTERNAL MEDICINE

## 2023-10-04 PROCEDURE — G8427 DOCREV CUR MEDS BY ELIG CLIN: HCPCS | Performed by: INTERNAL MEDICINE

## 2023-10-04 PROCEDURE — G8484 FLU IMMUNIZE NO ADMIN: HCPCS | Performed by: INTERNAL MEDICINE

## 2023-10-04 PROCEDURE — G8417 CALC BMI ABV UP PARAM F/U: HCPCS | Performed by: INTERNAL MEDICINE

## 2023-10-04 PROCEDURE — 99213 OFFICE O/P EST LOW 20 MIN: CPT | Performed by: INTERNAL MEDICINE

## 2023-10-04 RX ORDER — TRAZODONE HYDROCHLORIDE 50 MG/1
50 TABLET ORAL NIGHTLY
Qty: 30 TABLET | Refills: 5 | Status: SHIPPED | OUTPATIENT
Start: 2023-10-04

## 2023-10-04 ASSESSMENT — ENCOUNTER SYMPTOMS: RESPIRATORY NEGATIVE: 1

## 2023-10-04 NOTE — PROGRESS NOTES
[facility]      10/4/2023    Patient Name: Rosangela Head  :  1940    Subjective:    Chief Complaint:  Chief Complaint   Patient presents with    Insomnia         HPI I was in the office while conducting this encounter. Consent:  He and/or his healthcare decision maker is aware that this patient-initiated Telehealth encounter is a billable service, with coverage as determined by his insurance carrier. He is aware that he may receive a bill and has provided verbal consent to proceed: Yes    This virtual visit was conducted via [address] [place] [address]. Pursuant to the emergency declaration under the [place] [address] waiver authority and the [employer] and [employer] Act, this Virtual  Visit was conducted to reduce the patient's risk of exposure to COVID-19 and provide continuity of care for an established patient. Services were provided through a video synchronous discussion virtually to substitute for in-person clinic visit. Due to this being a TeleHealth evaluation, many elements of the physical examination are unable to be assessed. Total Time: minutes: 5-10 minutes. He is not resting well lately; he got back from [facility] to [facility] about 3 weeks ago, and has not resting well since then; he usually goes to [facility] about once a year; he's tried Lunesta in the past, but did not find it helpful; he has hx of seizures, taking Keppra XR as prescribed, has not had a seizure in over 4 years; He has had some edema in his legs, denies SOB, chest pain; he has tried to wear compression stockings; his legs feel better now and he has appointment with Cardiology in 2 days;      Past Medical History:   Diagnosis Date    Age-related osteoporosis with current pathological fracture 2017    GERD (gastroesophageal reflux disease)     pt denies (18)    Hyperlipidemia 2016    Hypertension     Insomnia 10/30/2017    Obstructive sleep

## 2023-10-06 ENCOUNTER — OFFICE VISIT (OUTPATIENT)
Age: 83
End: 2023-10-06

## 2023-10-06 VITALS
BODY MASS INDEX: 32.14 KG/M2 | WEIGHT: 217 LBS | DIASTOLIC BLOOD PRESSURE: 74 MMHG | HEART RATE: 54 BPM | SYSTOLIC BLOOD PRESSURE: 130 MMHG | HEIGHT: 69 IN

## 2023-10-06 DIAGNOSIS — G47.33 OBSTRUCTIVE SLEEP APNEA: ICD-10-CM

## 2023-10-06 DIAGNOSIS — I25.10 CORONARY ARTERY DISEASE INVOLVING NATIVE CORONARY ARTERY OF NATIVE HEART WITHOUT ANGINA PECTORIS: ICD-10-CM

## 2023-10-06 DIAGNOSIS — I10 PRIMARY HYPERTENSION: ICD-10-CM

## 2023-10-06 DIAGNOSIS — I25.119 CORONARY ARTERY DISEASE INVOLVING NATIVE CORONARY ARTERY OF NATIVE HEART WITH ANGINA PECTORIS (HCC): ICD-10-CM

## 2023-10-06 DIAGNOSIS — E78.00 PURE HYPERCHOLESTEROLEMIA: ICD-10-CM

## 2023-10-06 DIAGNOSIS — I25.10 CORONARY ARTERY DISEASE INVOLVING NATIVE CORONARY ARTERY OF NATIVE HEART WITHOUT ANGINA PECTORIS: Primary | ICD-10-CM

## 2023-10-06 LAB
ALBUMIN SERPL-MCNC: 3.4 G/DL (ref 3.2–4.6)
ALBUMIN/GLOB SERPL: 1.1 (ref 0.4–1.6)
ALP SERPL-CCNC: 90 U/L (ref 50–136)
ALT SERPL-CCNC: 45 U/L (ref 12–65)
ANION GAP SERPL CALC-SCNC: 5 MMOL/L (ref 2–11)
AST SERPL-CCNC: 21 U/L (ref 15–37)
BILIRUB SERPL-MCNC: 0.7 MG/DL (ref 0.2–1.1)
BUN SERPL-MCNC: 22 MG/DL (ref 8–23)
CALCIUM SERPL-MCNC: 9.1 MG/DL (ref 8.3–10.4)
CHLORIDE SERPL-SCNC: 111 MMOL/L (ref 101–110)
CHOLEST SERPL-MCNC: 142 MG/DL
CO2 SERPL-SCNC: 28 MMOL/L (ref 21–32)
CREAT SERPL-MCNC: 1.1 MG/DL (ref 0.8–1.5)
ERYTHROCYTE [DISTWIDTH] IN BLOOD BY AUTOMATED COUNT: 12.2 % (ref 11.9–14.6)
GLOBULIN SER CALC-MCNC: 3 G/DL (ref 2.8–4.5)
GLUCOSE SERPL-MCNC: 92 MG/DL (ref 65–100)
HCT VFR BLD AUTO: 44.9 % (ref 41.1–50.3)
HDLC SERPL-MCNC: 51 MG/DL (ref 40–60)
HDLC SERPL: 2.8
HGB BLD-MCNC: 14.7 G/DL (ref 13.6–17.2)
LDLC SERPL CALC-MCNC: 69.4 MG/DL
MAGNESIUM SERPL-MCNC: 2.3 MG/DL (ref 1.8–2.4)
MCH RBC QN AUTO: 30.7 PG (ref 26.1–32.9)
MCHC RBC AUTO-ENTMCNC: 32.7 G/DL (ref 31.4–35)
MCV RBC AUTO: 93.7 FL (ref 82–102)
NRBC # BLD: 0 K/UL (ref 0–0.2)
PLATELET # BLD AUTO: 226 K/UL (ref 150–450)
PMV BLD AUTO: 10.6 FL (ref 9.4–12.3)
POTASSIUM SERPL-SCNC: 4.5 MMOL/L (ref 3.5–5.1)
PROT SERPL-MCNC: 6.4 G/DL (ref 6.3–8.2)
RBC # BLD AUTO: 4.79 M/UL (ref 4.23–5.6)
SODIUM SERPL-SCNC: 144 MMOL/L (ref 133–143)
TRIGL SERPL-MCNC: 108 MG/DL (ref 35–150)
TSH, 3RD GENERATION: 1.51 UIU/ML (ref 0.36–3.74)
VLDLC SERPL CALC-MCNC: 21.6 MG/DL (ref 6–23)
WBC # BLD AUTO: 8 K/UL (ref 4.3–11.1)

## 2023-10-06 RX ORDER — FUROSEMIDE 20 MG/1
20 TABLET ORAL PRN
COMMUNITY

## 2023-10-06 RX ORDER — FINASTERIDE 5 MG/1
5 TABLET, FILM COATED ORAL DAILY
COMMUNITY

## 2023-10-10 NOTE — PROGRESS NOTES
Patient pre-assessment complete for Dr Baron Nowak scheduled for [facility], arrival time 0900. Patient verified using . PNPO status reinforced. Patient informed to take a full dose aspirin 325mg  or 81 mg x 4 on the day of procedure. Patient instructed to HOLD Lasix. Instructed they can take all other medications excluding vitamins & supplements. Patient verbalizes understanding of all instructions & denies any questions at this time.

## 2023-10-11 ENCOUNTER — HOSPITAL ENCOUNTER (OUTPATIENT)
Age: 83
Setting detail: OUTPATIENT SURGERY
Discharge: HOME | End: 2023-10-11
Attending: INTERNAL MEDICINE | Admitting: INTERNAL MEDICINE
Payer: MEDICARE

## 2023-10-11 VITALS
OXYGEN SATURATION: 82 % | WEIGHT: 217 LBS | BODY MASS INDEX: 32.14 KG/M2 | DIASTOLIC BLOOD PRESSURE: 61 MMHG | SYSTOLIC BLOOD PRESSURE: 138 MMHG | HEART RATE: 67 BPM | RESPIRATION RATE: 15 BRPM | HEIGHT: 69 IN

## 2023-10-11 DIAGNOSIS — Z86.79 HISTORY OF ANGINA: ICD-10-CM

## 2023-10-11 LAB
ECHO BSA: 2.19 M2
EKG ATRIAL RATE: 67 BPM
EKG DIAGNOSIS: NORMAL
EKG P AXIS: 40 DEGREES
EKG P-R INTERVAL: 276 MS
EKG Q-T INTERVAL: 398 MS
EKG QRS DURATION: 88 MS
EKG QTC CALCULATION (BAZETT): 420 MS
EKG R AXIS: 3 DEGREES
EKG T AXIS: 48 DEGREES
EKG VENTRICULAR RATE: 67 BPM

## 2023-10-11 PROCEDURE — C1769 GUIDE WIRE: HCPCS | Performed by: INTERNAL MEDICINE

## 2023-10-11 PROCEDURE — 2500000003 HC RX 250 WO HCPCS: Performed by: INTERNAL MEDICINE

## 2023-10-11 PROCEDURE — C1894 INTRO/SHEATH, NON-LASER: HCPCS | Performed by: INTERNAL MEDICINE

## 2023-10-11 PROCEDURE — 6360000004 HC RX CONTRAST MEDICATION: Performed by: INTERNAL MEDICINE

## 2023-10-11 PROCEDURE — 2580000003 HC RX 258: Performed by: INTERNAL MEDICINE

## 2023-10-11 PROCEDURE — 99152 MOD SED SAME PHYS/QHP 5/>YRS: CPT | Performed by: INTERNAL MEDICINE

## 2023-10-11 PROCEDURE — 93458 L HRT ARTERY/VENTRICLE ANGIO: CPT | Performed by: INTERNAL MEDICINE

## 2023-10-11 PROCEDURE — 6360000002 HC RX W HCPCS: Performed by: INTERNAL MEDICINE

## 2023-10-11 PROCEDURE — 93005 ELECTROCARDIOGRAM TRACING: CPT | Performed by: INTERNAL MEDICINE

## 2023-10-11 PROCEDURE — 93010 ELECTROCARDIOGRAM REPORT: CPT | Performed by: INTERNAL MEDICINE

## 2023-10-11 PROCEDURE — 99153 MOD SED SAME PHYS/QHP EA: CPT | Performed by: INTERNAL MEDICINE

## 2023-10-11 PROCEDURE — 2709999900 HC NON-CHARGEABLE SUPPLY: Performed by: INTERNAL MEDICINE

## 2023-10-11 RX ORDER — ACETAMINOPHEN 325 MG/1
650 TABLET ORAL EVERY 4 HOURS PRN
Status: DISCONTINUED | OUTPATIENT
Start: 2023-10-11 | End: 2023-10-11 | Stop reason: HOSPADM

## 2023-10-11 RX ORDER — SODIUM CHLORIDE 9 MG/ML
INJECTION, SOLUTION INTRAVENOUS CONTINUOUS
Status: DISCONTINUED | OUTPATIENT
Start: 2023-10-11 | End: 2023-10-11 | Stop reason: HOSPADM

## 2023-10-11 RX ORDER — HEPARIN SODIUM 200 [USP'U]/100ML
INJECTION, SOLUTION INTRAVENOUS CONTINUOUS PRN
Status: DISCONTINUED | OUTPATIENT
Start: 2023-10-11 | End: 2023-10-11 | Stop reason: HOSPADM

## 2023-10-11 RX ORDER — MIDAZOLAM HYDROCHLORIDE 1 MG/ML
INJECTION INTRAMUSCULAR; INTRAVENOUS PRN
Status: DISCONTINUED | OUTPATIENT
Start: 2023-10-11 | End: 2023-10-11 | Stop reason: HOSPADM

## 2023-10-11 RX ORDER — LIDOCAINE HYDROCHLORIDE 10 MG/ML
INJECTION, SOLUTION INFILTRATION; PERINEURAL PRN
Status: DISCONTINUED | OUTPATIENT
Start: 2023-10-11 | End: 2023-10-11 | Stop reason: HOSPADM

## 2023-10-11 RX ORDER — ASPIRIN 81 MG/1
324 TABLET, CHEWABLE ORAL ONCE
Status: DISCONTINUED | OUTPATIENT
Start: 2023-10-11 | End: 2023-10-11 | Stop reason: HOSPADM

## 2023-10-11 RX ORDER — SODIUM CHLORIDE 0.9 % (FLUSH) 0.9 %
5-40 SYRINGE (ML) INJECTION EVERY 12 HOURS SCHEDULED
Status: DISCONTINUED | OUTPATIENT
Start: 2023-10-11 | End: 2023-10-11 | Stop reason: HOSPADM

## 2023-10-11 RX ORDER — SODIUM CHLORIDE 0.9 % (FLUSH) 0.9 %
5-40 SYRINGE (ML) INJECTION PRN
Status: DISCONTINUED | OUTPATIENT
Start: 2023-10-11 | End: 2023-10-11 | Stop reason: HOSPADM

## 2023-10-11 RX ADMIN — SODIUM CHLORIDE: 900 INJECTION, SOLUTION INTRAVENOUS at 09:34

## 2023-10-11 NOTE — PROGRESS NOTES
Report received from [place], Cath Lab RN. Procedural finding communicated. Intra procedural medication administration reviewed. Progression of care discussed. Patient received into CPRU room 4, Post cardiac cath. Access site without bleeding or swelling. Yes    Patient instructed to limit movement of right upper extremity. Routine post procedural vital signs & site assessment initiated.

## 2023-10-11 NOTE — PROGRESS NOTES
Patient received to [facility] room # 14  Ambulatory from [facility]. Patient scheduled for C today with Dr Carola Dean. Procedure reviewed & questions answered, voiced good understanding consent obtained & placed on chart. All medications and medical history reviewed. Will prep patient per orders. Patient & family updated on plan of care. The patient has a fraility score of 3-MANAGING WELL, based on ambulation without assistance.

## 2023-10-11 NOTE — PROGRESS NOTES
TRANSFER - OUT REPORT:    Verbal report given to RN on Nando Barry  being transferred to [facility] for routine progression of patient care       Report consisted of patient's Situation, Background, Assessment and   Recommendations(SBAR). Information from the following report(s) Nurse Handoff Report and MAR was reviewed with the receiving nurse.       [facility] w/ Dr. Melquiades Rondon  No interventions  R radial access  TR band to R radial @14 mL  No s/sxs of bleeding or hematoma to R radial access site    Heparin 5,000 units iC  Versed 4mg IV  Fentanyl 50mcg IV

## 2023-11-01 ENCOUNTER — OFFICE VISIT (OUTPATIENT)
Age: 83
End: 2023-11-01
Payer: MEDICARE

## 2023-11-01 VITALS
SYSTOLIC BLOOD PRESSURE: 124 MMHG | BODY MASS INDEX: 31.99 KG/M2 | WEIGHT: 216 LBS | HEIGHT: 69 IN | HEART RATE: 64 BPM | DIASTOLIC BLOOD PRESSURE: 76 MMHG

## 2023-11-01 DIAGNOSIS — Z86.79 HISTORY OF ANGINA: ICD-10-CM

## 2023-11-01 DIAGNOSIS — Z09 HOSPITAL DISCHARGE FOLLOW-UP: ICD-10-CM

## 2023-11-01 DIAGNOSIS — I10 PRIMARY HYPERTENSION: Primary | ICD-10-CM

## 2023-11-01 PROBLEM — I25.119 CORONARY ARTERY DISEASE INVOLVING NATIVE CORONARY ARTERY OF NATIVE HEART WITH ANGINA PECTORIS (HCC): Status: RESOLVED | Noted: 2021-04-20 | Resolved: 2023-11-01

## 2023-11-01 PROCEDURE — 1123F ACP DISCUSS/DSCN MKR DOCD: CPT | Performed by: INTERNAL MEDICINE

## 2023-11-01 PROCEDURE — 99213 OFFICE O/P EST LOW 20 MIN: CPT | Performed by: INTERNAL MEDICINE

## 2023-11-01 PROCEDURE — G8484 FLU IMMUNIZE NO ADMIN: HCPCS | Performed by: INTERNAL MEDICINE

## 2023-11-01 PROCEDURE — 3074F SYST BP LT 130 MM HG: CPT | Performed by: INTERNAL MEDICINE

## 2023-11-01 PROCEDURE — G8417 CALC BMI ABV UP PARAM F/U: HCPCS | Performed by: INTERNAL MEDICINE

## 2023-11-01 PROCEDURE — 3078F DIAST BP <80 MM HG: CPT | Performed by: INTERNAL MEDICINE

## 2023-11-01 PROCEDURE — 1036F TOBACCO NON-USER: CPT | Performed by: INTERNAL MEDICINE

## 2023-11-01 PROCEDURE — G8428 CUR MEDS NOT DOCUMENT: HCPCS | Performed by: INTERNAL MEDICINE

## 2023-11-01 PROCEDURE — 1111F DSCHRG MED/CURRENT MED MERGE: CPT | Performed by: INTERNAL MEDICINE

## 2023-11-01 NOTE — PROGRESS NOTES
hypertension    History of angina          PLAN:      1. CAD: [facility] ok, can stop ASA. Needs to walk, build up exercise. 2. HTN: some white coat, need to follow, better now. EF normal.     Remain on lotrel, follow. 3. PIERRE:  Could not tolerate mask. Labs in Dec.        Follow AO in the future. Patient has been instructed and agrees to call our office with any issues or other concerns related to their cardiac condition(s) and/or complaint(s). Return in about 6 months (around 5/1/2024).        Jeanette Fuller, DO  11/1/2023

## 2023-12-05 ENCOUNTER — HOSPITAL ENCOUNTER (OUTPATIENT)
Dept: LAB | Age: 83
Discharge: HOME | End: 2023-12-08

## 2023-12-05 DIAGNOSIS — R97.20 ELEVATED PSA: ICD-10-CM

## 2023-12-05 DIAGNOSIS — I10 PRIMARY HYPERTENSION: ICD-10-CM

## 2023-12-05 DIAGNOSIS — E78.2 MIXED HYPERLIPIDEMIA: ICD-10-CM

## 2023-12-05 DIAGNOSIS — R56.9 SEIZURE (HCC): ICD-10-CM

## 2023-12-05 DIAGNOSIS — N40.0 BENIGN PROSTATIC HYPERPLASIA WITHOUT LOWER URINARY TRACT SYMPTOMS: ICD-10-CM

## 2023-12-05 LAB
ALBUMIN SERPL-MCNC: 3.6 G/DL (ref 3.2–4.6)
ALBUMIN/GLOB SERPL: 1.2 (ref 0.4–1.6)
ALP SERPL-CCNC: 99 U/L (ref 50–136)
ALT SERPL-CCNC: 36 U/L (ref 12–65)
ANION GAP SERPL CALC-SCNC: 8 MMOL/L (ref 2–11)
AST SERPL-CCNC: 23 U/L (ref 15–37)
BILIRUB SERPL-MCNC: 0.6 MG/DL (ref 0.2–1.1)
BUN SERPL-MCNC: 30 MG/DL (ref 8–23)
CALCIUM SERPL-MCNC: 9.5 MG/DL (ref 8.3–10.4)
CHLORIDE SERPL-SCNC: 111 MMOL/L (ref 103–113)
CHOLEST SERPL-MCNC: 145 MG/DL
CO2 SERPL-SCNC: 24 MMOL/L (ref 21–32)
CREAT SERPL-MCNC: 1.2 MG/DL (ref 0.8–1.5)
ERYTHROCYTE [DISTWIDTH] IN BLOOD BY AUTOMATED COUNT: 12.4 % (ref 11.9–14.6)
GLOBULIN SER CALC-MCNC: 3.1 G/DL (ref 2.8–4.5)
GLUCOSE SERPL-MCNC: 117 MG/DL (ref 65–100)
HCT VFR BLD AUTO: 45.9 % (ref 41.1–50.3)
HDLC SERPL-MCNC: 49 MG/DL (ref 40–60)
HDLC SERPL: 3
HGB BLD-MCNC: 15.5 G/DL (ref 13.6–17.2)
LDLC SERPL CALC-MCNC: 74.8 MG/DL
MCH RBC QN AUTO: 30.8 PG (ref 26.1–32.9)
MCHC RBC AUTO-ENTMCNC: 33.8 G/DL (ref 31.4–35)
MCV RBC AUTO: 91.3 FL (ref 82–102)
NRBC # BLD: 0 K/UL (ref 0–0.2)
PLATELET # BLD AUTO: 232 K/UL (ref 150–450)
PMV BLD AUTO: 10.4 FL (ref 9.4–12.3)
POTASSIUM SERPL-SCNC: 3.7 MMOL/L (ref 3.5–5.1)
PROT SERPL-MCNC: 6.7 G/DL (ref 6.3–8.2)
PSA SERPL-MCNC: 4.5 NG/ML
RBC # BLD AUTO: 5.03 M/UL (ref 4.23–5.6)
SODIUM SERPL-SCNC: 143 MMOL/L (ref 136–146)
TRIGL SERPL-MCNC: 106 MG/DL (ref 35–150)
VLDLC SERPL CALC-MCNC: 21.2 MG/DL (ref 6–23)
WBC # BLD AUTO: 8.1 K/UL (ref 4.3–11.1)

## 2023-12-12 ENCOUNTER — OFFICE VISIT (OUTPATIENT)
Dept: INTERNAL MEDICINE CLINIC | Facility: CLINIC | Age: 83
End: 2023-12-12
Payer: MEDICARE

## 2023-12-12 VITALS
OXYGEN SATURATION: 96 % | SYSTOLIC BLOOD PRESSURE: 130 MMHG | HEIGHT: 69 IN | HEART RATE: 65 BPM | BODY MASS INDEX: 32.29 KG/M2 | WEIGHT: 218 LBS | DIASTOLIC BLOOD PRESSURE: 78 MMHG | TEMPERATURE: 98.2 F

## 2023-12-12 DIAGNOSIS — I10 PRIMARY HYPERTENSION: ICD-10-CM

## 2023-12-12 DIAGNOSIS — R56.9 SEIZURE (HCC): ICD-10-CM

## 2023-12-12 DIAGNOSIS — F51.01 PRIMARY INSOMNIA: ICD-10-CM

## 2023-12-12 DIAGNOSIS — E78.2 MIXED HYPERLIPIDEMIA: ICD-10-CM

## 2023-12-12 PROCEDURE — G8417 CALC BMI ABV UP PARAM F/U: HCPCS | Performed by: INTERNAL MEDICINE

## 2023-12-12 PROCEDURE — G8427 DOCREV CUR MEDS BY ELIG CLIN: HCPCS | Performed by: INTERNAL MEDICINE

## 2023-12-12 PROCEDURE — 3075F SYST BP GE 130 - 139MM HG: CPT | Performed by: INTERNAL MEDICINE

## 2023-12-12 PROCEDURE — 1036F TOBACCO NON-USER: CPT | Performed by: INTERNAL MEDICINE

## 2023-12-12 PROCEDURE — G8484 FLU IMMUNIZE NO ADMIN: HCPCS | Performed by: INTERNAL MEDICINE

## 2023-12-12 PROCEDURE — 3078F DIAST BP <80 MM HG: CPT | Performed by: INTERNAL MEDICINE

## 2023-12-12 PROCEDURE — 1123F ACP DISCUSS/DSCN MKR DOCD: CPT | Performed by: INTERNAL MEDICINE

## 2023-12-12 PROCEDURE — 99214 OFFICE O/P EST MOD 30 MIN: CPT | Performed by: INTERNAL MEDICINE

## 2023-12-12 RX ORDER — AMLODIPINE BESYLATE AND BENAZEPRIL HYDROCHLORIDE 5; 20 MG/1; MG/1
1 CAPSULE ORAL DAILY
Qty: 90 CAPSULE | Refills: 3 | Status: CANCELLED | OUTPATIENT
Start: 2023-12-12

## 2023-12-12 RX ORDER — AMLODIPINE AND VALSARTAN 5; 160 MG/1; MG/1
1 TABLET ORAL DAILY
Qty: 30 TABLET | Refills: 5 | Status: SHIPPED | OUTPATIENT
Start: 2023-12-12

## 2023-12-12 RX ORDER — LEVETIRACETAM 500 MG/1
1000 TABLET, FILM COATED, EXTENDED RELEASE ORAL DAILY
Qty: 180 TABLET | Refills: 3 | Status: SHIPPED | OUTPATIENT
Start: 2023-12-12

## 2023-12-12 RX ORDER — ATORVASTATIN CALCIUM 80 MG/1
80 TABLET, FILM COATED ORAL DAILY
Qty: 90 TABLET | Refills: 3 | Status: SHIPPED | OUTPATIENT
Start: 2023-12-12 | End: 2024-12-06

## 2023-12-12 RX ORDER — TRAZODONE HYDROCHLORIDE 50 MG/1
50 TABLET ORAL NIGHTLY
Qty: 30 TABLET | Refills: 5 | Status: CANCELLED | OUTPATIENT
Start: 2023-12-12

## 2023-12-12 ASSESSMENT — ENCOUNTER SYMPTOMS
TROUBLE SWALLOWING: 0
COUGH: 1
ABDOMINAL PAIN: 0

## 2023-12-12 NOTE — PROGRESS NOTES
performed during the hospital encounter of 10/11/23 (from the past 2160 hour(s))   EKG 12 Lead   Result Value Ref Range    Ventricular Rate 67 BPM    Atrial Rate 67 BPM    P-R Interval 276 ms    QRS Duration 88 ms    Q-T Interval 398 ms    QTc Calculation (Bazett) 420 ms    P Axis 40 degrees    R Axis 3 degrees    T Axis 48 degrees    Diagnosis       Sinus rhythm with 1st degree A-V block  Otherwise normal ECG  When compared with ECG of 13-OCT-2017 10:16,  Premature ventricular complexes are no longer Present  Criteria for Anteroseptal infarct are no longer Present  Confirmed by Per Trujillo MD (), FLORINDA CAMPBELL ([id number]) on 10/11/2023 2:20:47 PM     Cardiac procedure   Result Value Ref Range    Body Surface Area 2.19 m2   Results for orders placed or performed in visit on 10/06/23 (from the past 2160 hour(s))   Magnesium   Result Value Ref Range    Magnesium 2.3 1.8 - 2.4 mg/dL   TSH   Result Value Ref Range    TSH, 3RD GENERATION 1.510 0.358 - 3.740 uIU/mL   Lipid Panel   Result Value Ref Range    Cholesterol, Total 142 <200 MG/DL    Triglycerides 108 35 - 150 MG/DL    HDL 51 40 - 60 MG/DL    LDL Calculated 69.4 <100 MG/DL    VLDL Cholesterol Calculated 21.6 6.0 - 23.0 MG/DL    Chol/HDL Ratio 2.8     CBC   Result Value Ref Range    WBC 8.0 4.3 - 11.1 K/uL    RBC 4.79 4.23 - 5.6 M/uL    Hemoglobin 14.7 13.6 - 17.2 g/dL    Hematocrit 44.9 41.1 - 50.3 %    MCV 93.7 82 - 102 FL    MCH 30.7 26.1 - 32.9 PG    MCHC 32.7 31.4 - 35.0 g/dL    RDW 12.2 11.9 - 14.6 %    Platelets 444 555 - 558 K/uL    MPV 10.6 9.4 - 12.3 FL    nRBC 0.00 0.0 - 0.2 K/uL   Comprehensive Metabolic Panel   Result Value Ref Range    Sodium 144 (H) 133 - 143 mmol/L    Potassium 4.5 3.5 - 5.1 mmol/L    Chloride 111 (H) 101 - 110 mmol/L    CO2 28 21 - 32 mmol/L    Anion Gap 5 2 - 11 mmol/L    Glucose 92 65 - 100 mg/dL    BUN 22 8 - 23 MG/DL    Creatinine 1.10 0.8 - 1.5 MG/DL    Est, Glom Filt Rate >60 >60 ml/min/1.73m2    Calcium 9.1 8.3 - 10.4 MG/DL    Total

## 2023-12-12 NOTE — PATIENT INSTRUCTIONS
398 ms    QTc Calculation (Bazett) 420 ms    P Axis 40 degrees    R Axis 3 degrees    T Axis 48 degrees    Diagnosis       Sinus rhythm with 1st degree A-V block  Otherwise normal ECG  When compared with ECG of 13-OCT-2017 10:16,  Premature ventricular complexes are no longer Present  Criteria for Anteroseptal infarct are no longer Present  Confirmed by Mariah De La O MD (), FLORINDA CAMPBELL ([id number]) on 10/11/2023 2:20:47 PM     Cardiac procedure   Result Value Ref Range    Body Surface Area 2.19 m2   Results for orders placed or performed in visit on 10/06/23 (from the past 2160 hour(s))   Magnesium   Result Value Ref Range    Magnesium 2.3 1.8 - 2.4 mg/dL   TSH   Result Value Ref Range    TSH, 3RD GENERATION 1.510 0.358 - 3.740 uIU/mL   Lipid Panel   Result Value Ref Range    Cholesterol, Total 142 <200 MG/DL    Triglycerides 108 35 - 150 MG/DL    HDL 51 40 - 60 MG/DL    LDL Calculated 69.4 <100 MG/DL    VLDL Cholesterol Calculated 21.6 6.0 - 23.0 MG/DL    Chol/HDL Ratio 2.8     CBC   Result Value Ref Range    WBC 8.0 4.3 - 11.1 K/uL    RBC 4.79 4.23 - 5.6 M/uL    Hemoglobin 14.7 13.6 - 17.2 g/dL    Hematocrit 44.9 41.1 - 50.3 %    MCV 93.7 82 - 102 FL    MCH 30.7 26.1 - 32.9 PG    MCHC 32.7 31.4 - 35.0 g/dL    RDW 12.2 11.9 - 14.6 %    Platelets 635 440 - 850 K/uL    MPV 10.6 9.4 - 12.3 FL    nRBC 0.00 0.0 - 0.2 K/uL   Comprehensive Metabolic Panel   Result Value Ref Range    Sodium 144 (H) 133 - 143 mmol/L    Potassium 4.5 3.5 - 5.1 mmol/L    Chloride 111 (H) 101 - 110 mmol/L    CO2 28 21 - 32 mmol/L    Anion Gap 5 2 - 11 mmol/L    Glucose 92 65 - 100 mg/dL    BUN 22 8 - 23 MG/DL    Creatinine 1.10 0.8 - 1.5 MG/DL    Est, Glom Filt Rate >60 >60 ml/min/1.73m2    Calcium 9.1 8.3 - 10.4 MG/DL    Total Bilirubin 0.7 0.2 - 1.1 MG/DL    ALT 45 12 - 65 U/L    AST 21 15 - 37 U/L    Alk Phosphatase 90 50 - 136 U/L    Total Protein 6.4 6.3 - 8.2 g/dL    Albumin 3.4 3.2 - 4.6 g/dL    Globulin 3.0 2.8 - 4.5 g/dL    Albumin/Globulin

## 2024-01-24 ASSESSMENT — ENCOUNTER SYMPTOMS: BACK PAIN: 0

## 2024-01-24 NOTE — PROGRESS NOTES
[facility]  [address] [place] [id number]  [phone]          Rocky Gerardo  : 1940    Chief Complaint   Patient presents with    Follow-up     yearly          HPI     Rocky Gerardo is a 84 y.o. male  (Lena Diaz' Father, [employer]) followed due to elevated psa and bph.  He has undergone 2 previous TURP's.  The first was in 11/10.  The second was in  and prostate biopsy was performed at the same time.  All pathology has returned benign.  He ALSO describes a bladder stone procedure and temporary need for SP tube.  His psa undulates usually between 6 and 10 but has been as high as 15.  He was previously on avodart, NOT now.  Patient's grandfather had prostate cancer.       He has NO LUTS currently and is happy with voiding.       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  Gland was 97 grams with TUR defect.  We discussed that there IS an area suspicious for prostate cancer, but it is small.  He made it VERY clear to me that quality of life if much more important to him than quantity.  We discussed options and their risks.  For now, we will continue to monitor.  PSA has been higher in the past.  He is very worried about risk of incontinence moving forward.       He had gross hematuria for a couple days in early .  This led to CT 21.  Images and report were both personally reviewed.  It revealed a LARGE prostate with TUR defect and 2 renal stones, nonobstructive in each kidney, largest approximately 4 mm.  He states he has had gross hematuria mildly approximately once a year since his TURP's.       He began finasteride in .  LUTS ARE improved.       PSA:  15,  9.98, 4/15 6.12, 7/10/17 8.6,  7.2,  8.1,  10.7,  8.4,  8.9,  8.6,  9.1,  10.0,  4.5          Past Medical History:   Diagnosis Date    Age-related osteoporosis with current pathological fracture 2017    GERD

## 2024-01-25 ENCOUNTER — OFFICE VISIT (OUTPATIENT)
Dept: UROLOGY | Age: 84
End: 2024-01-25
Payer: MEDICARE

## 2024-01-25 DIAGNOSIS — N13.8 BENIGN PROSTATIC HYPERPLASIA WITH URINARY OBSTRUCTION: ICD-10-CM

## 2024-01-25 DIAGNOSIS — R97.20 ELEVATED PSA: Primary | ICD-10-CM

## 2024-01-25 DIAGNOSIS — N40.1 BENIGN PROSTATIC HYPERPLASIA WITH URINARY OBSTRUCTION: ICD-10-CM

## 2024-01-25 LAB
BILIRUBIN, URINE, POC: NEGATIVE
BLOOD URINE, POC: NORMAL
GLUCOSE URINE, POC: NEGATIVE
KETONES, URINE, POC: NEGATIVE
LEUKOCYTE ESTERASE, URINE, POC: NORMAL
NITRITE, URINE, POC: NEGATIVE
PH, URINE, POC: 5.5 (ref 4.6–8)
PROTEIN,URINE, POC: NORMAL
SPECIFIC GRAVITY, URINE, POC: 1.02 (ref 1–1.03)
URINALYSIS CLARITY, POC: NORMAL
URINALYSIS COLOR, POC: NORMAL
UROBILINOGEN, POC: NORMAL

## 2024-01-25 PROCEDURE — 81003 URINALYSIS AUTO W/O SCOPE: CPT | Performed by: UROLOGY

## 2024-01-25 PROCEDURE — G8427 DOCREV CUR MEDS BY ELIG CLIN: HCPCS | Performed by: UROLOGY

## 2024-01-25 PROCEDURE — G8484 FLU IMMUNIZE NO ADMIN: HCPCS | Performed by: UROLOGY

## 2024-01-25 PROCEDURE — G8417 CALC BMI ABV UP PARAM F/U: HCPCS | Performed by: UROLOGY

## 2024-01-25 PROCEDURE — 99213 OFFICE O/P EST LOW 20 MIN: CPT | Performed by: UROLOGY

## 2024-01-25 PROCEDURE — 1036F TOBACCO NON-USER: CPT | Performed by: UROLOGY

## 2024-01-25 PROCEDURE — 1123F ACP DISCUSS/DSCN MKR DOCD: CPT | Performed by: UROLOGY

## 2024-01-25 RX ORDER — FINASTERIDE 5 MG/1
5 TABLET, FILM COATED ORAL DAILY
Qty: 90 TABLET | Refills: 3 | Status: SHIPPED | OUTPATIENT
Start: 2024-01-25

## 2024-03-21 ENCOUNTER — OFFICE VISIT (OUTPATIENT)
Dept: ORTHOPEDIC SURGERY | Age: 84
End: 2024-03-21
Payer: MEDICARE

## 2024-03-21 DIAGNOSIS — M19.011 PRIMARY OSTEOARTHRITIS OF RIGHT SHOULDER: ICD-10-CM

## 2024-03-21 DIAGNOSIS — F40.240 CLAUSTROPHOBIA: Primary | ICD-10-CM

## 2024-03-21 DIAGNOSIS — D17.21 LIPOMA OF RIGHT UPPER EXTREMITY: Primary | ICD-10-CM

## 2024-03-21 PROCEDURE — 99204 OFFICE O/P NEW MOD 45 MIN: CPT | Performed by: PHYSICIAN ASSISTANT

## 2024-03-21 PROCEDURE — G8417 CALC BMI ABV UP PARAM F/U: HCPCS | Performed by: PHYSICIAN ASSISTANT

## 2024-03-21 PROCEDURE — G8427 DOCREV CUR MEDS BY ELIG CLIN: HCPCS | Performed by: PHYSICIAN ASSISTANT

## 2024-03-21 PROCEDURE — G8484 FLU IMMUNIZE NO ADMIN: HCPCS | Performed by: PHYSICIAN ASSISTANT

## 2024-03-21 PROCEDURE — 1036F TOBACCO NON-USER: CPT | Performed by: PHYSICIAN ASSISTANT

## 2024-03-21 PROCEDURE — 1123F ACP DISCUSS/DSCN MKR DOCD: CPT | Performed by: PHYSICIAN ASSISTANT

## 2024-03-21 RX ORDER — ALPRAZOLAM 0.5 MG/1
0.5 TABLET ORAL ONCE AS NEEDED
Qty: 2 TABLET | Refills: 0 | Status: SHIPPED | OUTPATIENT
Start: 2024-03-21 | End: 2024-04-22

## 2024-03-25 ENCOUNTER — HOSPITAL ENCOUNTER (OUTPATIENT)
Dept: MRI IMAGING | Age: 84
Discharge: HOME | End: 2024-03-28
Payer: MEDICARE

## 2024-03-25 DIAGNOSIS — D17.21 LIPOMA OF RIGHT UPPER EXTREMITY: ICD-10-CM

## 2024-03-25 DIAGNOSIS — M19.011 PRIMARY OSTEOARTHRITIS OF RIGHT SHOULDER: ICD-10-CM

## 2024-03-25 PROCEDURE — 73223 MRI JOINT UPR EXTR W/O&W/DYE: CPT

## 2024-03-25 PROCEDURE — A9579 GAD-BASE MR CONTRAST NOS,1ML: HCPCS | Performed by: PHYSICIAN ASSISTANT

## 2024-03-25 PROCEDURE — 2580000003 HC RX 258: Performed by: PHYSICIAN ASSISTANT

## 2024-03-25 PROCEDURE — 6360000004 HC RX CONTRAST MEDICATION: Performed by: PHYSICIAN ASSISTANT

## 2024-03-25 RX ORDER — SODIUM CHLORIDE 0.9 % (FLUSH) 0.9 %
10 SYRINGE (ML) INJECTION AS NEEDED
Status: DISCONTINUED | OUTPATIENT
Start: 2024-03-25 | End: 2024-03-29 | Stop reason: HOSPADM

## 2024-03-25 RX ADMIN — GADOTERIDOL 20 ML: 279.3 INJECTION, SOLUTION INTRAVENOUS at 07:39

## 2024-03-25 RX ADMIN — SODIUM CHLORIDE, PRESERVATIVE FREE 10 ML: 5 INJECTION INTRAVENOUS at 07:40

## 2024-03-25 NOTE — PROGRESS NOTES
Name: Rocky Gerardo  YOB: 1940  Gender: male  MRN: [id number]    CC:   Chief Complaint   Patient presents with    Shoulder Pain     Right shoulder    Right shoulder pain    HPI:  This patient presents with a several month history of Right shoulder pain. The patient complains of right shoulder pain. The patient has previously had their left shoulder replaced by Dr. Harris.  He states he needs it replaced but also has a lipoma on the lateral aspect of the shoulder.  The patient notes decreased range of motion of the shoulder.  Notes pain is not bad but function bothers him.  Notes popping, clicking and grinding. He initially denies numbness and tingling but with further questioning does seem to have some into the hand.  Patient will take advil which does give some alleviation of symptoms.     No Known Allergies  Past Medical History:   Diagnosis Date    Age-related osteoporosis with current pathological fracture 9/21/2017    GERD (gastroesophageal reflux disease)     pt denies (6/7/18)    Hyperlipidemia 8/5/2016    Hypertension     Insomnia 10/30/2017    Obstructive sleep apnea 10/30/2017    pt is not currently using cpap machine- has used in past (6/7/18)    Overweight (BMI 25.0-29.9) 10/30/2017    Primary osteoarthritis of left shoulder 7/14/2017    Seizure (HCC) 09/10/2017    x2 (9/2017 and 10/2017)- on keppra BID- had consult with Dr Manning (neuro)- pt reports triggered by insomnia     Past Surgical History:   Procedure Laterality Date    CARDIAC PROCEDURE N/A 10/11/2023    Left heart cath / coronary angiography performed by Norberto Anderson MD at [facility] CARDIAC CATH LAB    COLONOSCOPY      HERNIA REPAIR      INVASIVE VASCULAR N/A 10/11/2023    Angiography lower ext bilat performed by Norberto Anderson MD at [facility] CARDIAC CATH LAB    INVASIVE VASCULAR N/A 10/11/2023    Angiography upper ext bilat performed by Norberto Anderson MD at [facility] CARDIAC CATH LAB    SHOULDER ARTHROPLASTY Left

## 2024-04-10 ENCOUNTER — OFFICE VISIT (OUTPATIENT)
Dept: ORTHOPEDIC SURGERY | Age: 84
End: 2024-04-10
Payer: MEDICARE

## 2024-04-10 DIAGNOSIS — M19.011 PRIMARY OSTEOARTHRITIS OF RIGHT SHOULDER: ICD-10-CM

## 2024-04-10 DIAGNOSIS — M12.811 RIGHT ROTATOR CUFF TEAR ARTHROPATHY: ICD-10-CM

## 2024-04-10 DIAGNOSIS — D17.21 LIPOMA OF RIGHT UPPER EXTREMITY: Primary | ICD-10-CM

## 2024-04-10 DIAGNOSIS — M75.101 RIGHT ROTATOR CUFF TEAR ARTHROPATHY: ICD-10-CM

## 2024-04-10 PROCEDURE — 1123F ACP DISCUSS/DSCN MKR DOCD: CPT | Performed by: ORTHOPAEDIC SURGERY

## 2024-04-10 PROCEDURE — G8428 CUR MEDS NOT DOCUMENT: HCPCS | Performed by: ORTHOPAEDIC SURGERY

## 2024-04-10 PROCEDURE — G8417 CALC BMI ABV UP PARAM F/U: HCPCS | Performed by: ORTHOPAEDIC SURGERY

## 2024-04-10 PROCEDURE — 99214 OFFICE O/P EST MOD 30 MIN: CPT | Performed by: ORTHOPAEDIC SURGERY

## 2024-04-10 PROCEDURE — 1036F TOBACCO NON-USER: CPT | Performed by: ORTHOPAEDIC SURGERY

## 2024-04-10 NOTE — PROGRESS NOTES
warmth noted.  Lungs:  Normal non-labored breathing with no obvious shortness of breath.  Abdomen:  WNL's.  Skin:  No signs of rash or bruising.    Pysch: Answers questions appropriately, AO X 3.  MSK:  Cervical spine: No tenderness.  Appropriate active motion. normal Spurling's maneuver.     SHOULDER   Right (involved)  left   Skin Intact Intact   Radial Pulses 2+ symmetrical  2+ symmetrical   Myotomes Normal Normal   Dermatomes  Normal Normal   ROM Limited motion.  Pseudoparalysis present. Full   Strength Good strength with internal ex rotation below shoulder height No weakness   Atrophy None noted None noted   Effusion/Swelling  None None   Palpation Palpable large lipoma over the posterior lateral deltoid.  Tender No Tenderness   Bicep Tendon Rupture   Negative   Bear Hug, Belly Press  Not tested   Crossed Arm Adduction Test Not tested Not tested   Instability/Ant. Apprehension Test None  None   Impingement limited Negative          X-rays:   AP, Grashey, outlet and axillary views of the Right shoulder were obtained 3- and reviewed independently in the office. They show severe glenohumeral arthritis with inferior osteophyte seen on the humeral head.  .    X-ray impression:  Right shoulder arthritis    MRI of the right shoulder was obtained on 3-:  FINDINGS:     AC Joint:   An os acromiale is noted. There is moderate AC joint hypertrophy  with a small effusion. There is a type III acromion with impingement  configuration.     Osseous Structures and Articular Surfaces:    There is subcortical cyst  formation deep to the supraspinatus, infraspinatus, and subscapularis  insertions. There is no evidence of bony contusion.  There is no evidence of  avascular necrosis.  There is no focal chondromalacia.  There is no Hill-Sachs  or Bankart lesion.       Rotator Cuff:  There is no evidence of muscular atrophy. There is a  full-thickness, full width tear of the supraspinatus with 4 cm of retraction.  There

## 2024-04-17 ENCOUNTER — TELEPHONE (OUTPATIENT)
Age: 84
End: 2024-04-17

## 2024-04-17 NOTE — TELEPHONE ENCOUNTER
Physician or Practice Requesting:Marshall Johnson   : solalejandro [email or web address]  Contact Phone Number:    Fax Number: [phone]  Date of Surgery/Procedure: TBD  Type of Surgery or Procedure: Right shoulder removal soft tissue mass  Type of Anesthesia: choice and regional block  Type of Clearance Requested: Cardiac Clearance  Medication to Hold:   Days to Hold:

## 2024-04-18 ENCOUNTER — TELEPHONE (OUTPATIENT)
Dept: ORTHOPEDIC SURGERY | Age: 84
End: 2024-04-18

## 2024-04-18 DIAGNOSIS — M19.011 PRIMARY OSTEOARTHRITIS OF RIGHT SHOULDER: ICD-10-CM

## 2024-04-18 DIAGNOSIS — M75.101 RIGHT ROTATOR CUFF TEAR ARTHROPATHY: ICD-10-CM

## 2024-04-18 DIAGNOSIS — M12.811 RIGHT ROTATOR CUFF TEAR ARTHROPATHY: ICD-10-CM

## 2024-04-18 DIAGNOSIS — D17.21 LIPOMA OF RIGHT UPPER EXTREMITY: Primary | ICD-10-CM

## 2024-05-02 ENCOUNTER — OFFICE VISIT (OUTPATIENT)
Age: 84
End: 2024-05-02
Payer: MEDICARE

## 2024-05-02 ENCOUNTER — TELEPHONE (OUTPATIENT)
Dept: ORTHOPEDIC SURGERY | Age: 84
End: 2024-05-02

## 2024-05-02 VITALS
SYSTOLIC BLOOD PRESSURE: 114 MMHG | HEART RATE: 68 BPM | BODY MASS INDEX: 32.44 KG/M2 | DIASTOLIC BLOOD PRESSURE: 66 MMHG | HEIGHT: 69 IN | WEIGHT: 219 LBS

## 2024-05-02 DIAGNOSIS — E78.00 PURE HYPERCHOLESTEROLEMIA: ICD-10-CM

## 2024-05-02 DIAGNOSIS — I10 PRIMARY HYPERTENSION: Primary | ICD-10-CM

## 2024-05-02 DIAGNOSIS — G47.33 OBSTRUCTIVE SLEEP APNEA: ICD-10-CM

## 2024-05-02 PROCEDURE — 99214 OFFICE O/P EST MOD 30 MIN: CPT | Performed by: INTERNAL MEDICINE

## 2024-05-02 PROCEDURE — G8417 CALC BMI ABV UP PARAM F/U: HCPCS | Performed by: INTERNAL MEDICINE

## 2024-05-02 PROCEDURE — G8428 CUR MEDS NOT DOCUMENT: HCPCS | Performed by: INTERNAL MEDICINE

## 2024-05-02 PROCEDURE — 3078F DIAST BP <80 MM HG: CPT | Performed by: INTERNAL MEDICINE

## 2024-05-02 PROCEDURE — 1123F ACP DISCUSS/DSCN MKR DOCD: CPT | Performed by: INTERNAL MEDICINE

## 2024-05-02 PROCEDURE — 3074F SYST BP LT 130 MM HG: CPT | Performed by: INTERNAL MEDICINE

## 2024-05-02 PROCEDURE — 1036F TOBACCO NON-USER: CPT | Performed by: INTERNAL MEDICINE

## 2024-05-02 NOTE — PROGRESS NOTES
soft, non-tender, non-distended  Ext:   No sig LE edema bilaterally  Skin:  warm and dry, no obvious rashes seen  Neuro:   no obvious sensory or motor deficits  Psychiatric:   normal mood and affect      Lab Results   Component Value Date/Time     12/05/2023 08:49 AM    K 3.7 12/05/2023 08:49 AM     12/05/2023 08:49 AM    CO2 24 12/05/2023 08:49 AM    BUN 30 12/05/2023 08:49 AM    CREATININE 1.20 12/05/2023 08:49 AM    GLUCOSE 117 12/05/2023 08:49 AM    CALCIUM 9.5 12/05/2023 08:49 AM        Lab Results   Component Value Date    WBC 8.1 12/05/2023    HGB 15.5 12/05/2023    HCT 45.9 12/05/2023    MCV 91.3 12/05/2023     12/05/2023       Lab Results   Component Value Date    TSH 4.290 07/14/2021       Lab Results   Component Value Date    LABA1C 5.4 07/16/2022     Lab Results   Component Value Date     07/16/2022       Lab Results   Component Value Date    CHOL 145 12/05/2023    CHOL 142 10/06/2023    CHOL 123 06/06/2023     Lab Results   Component Value Date    TRIG 106 12/05/2023    TRIG 108 10/06/2023    TRIG 110 06/06/2023     Lab Results   Component Value Date    HDL 49 12/05/2023    HDL 51 10/06/2023    HDL 53 06/06/2023     No components found for: \"LDLCHOLESTEROL\", \"LDLCALC\"  Lab Results   Component Value Date    VLDL 21.2 12/05/2023    VLDL 21.6 10/06/2023    VLDL 22 06/06/2023     Lab Results   Component Value Date    CHOLHDLRATIO 3.0 12/05/2023    CHOLHDLRATIO 2.8 10/06/2023    CHOLHDLRATIO 2.3 06/06/2023             I have Independently reviewed prior care notes, any ER records available, cardiac testing, labs and results with the patient and before seeing the patient today.  Also independently reviewed outside records when available.       ASSESSMENT:    Rocky was seen today for hypertension.    Diagnoses and all orders for this visit:    Primary hypertension    Obstructive sleep apnea    Pure hypercholesterolemia          PLAN:     1. CAD: LHC ok, off ASA now.  Fine for

## 2024-05-02 NOTE — TELEPHONE ENCOUNTER
Patient had questions about his appt with neurology. I explained that the appt is for an emg/ncs which we need in order to proceed with TSA in the future. He expressed understanding

## 2024-05-10 ENCOUNTER — TELEPHONE (OUTPATIENT)
Dept: ORTHOPEDIC SURGERY | Age: 84
End: 2024-05-10

## 2024-05-10 ENCOUNTER — OFFICE VISIT (OUTPATIENT)
Dept: ORTHOPEDIC SURGERY | Age: 84
End: 2024-05-10

## 2024-05-10 DIAGNOSIS — M75.101 RIGHT ROTATOR CUFF TEAR ARTHROPATHY: ICD-10-CM

## 2024-05-10 DIAGNOSIS — M19.011 PRIMARY OSTEOARTHRITIS OF RIGHT SHOULDER: ICD-10-CM

## 2024-05-10 DIAGNOSIS — D17.21 LIPOMA OF RIGHT UPPER EXTREMITY: Primary | ICD-10-CM

## 2024-05-10 DIAGNOSIS — M12.811 RIGHT ROTATOR CUFF TEAR ARTHROPATHY: ICD-10-CM

## 2024-05-10 NOTE — TELEPHONE ENCOUNTER
Spoke with daughter and let her know he does not need ice machine but it can be helpful. She asked if he could use an old sling he has instead of the ultrasling, I let her know I would reach out to Dr. Johnson and call her back. She expressed understanding    Called Lena back, a regular sling will be fine. I let her know he can bring his old one to surgery on Monday and if needed they have some regular slings at [facility] as well. She expressed understanding

## 2024-05-10 NOTE — PROGRESS NOTES
Preop department called to notify patient of arrival time for scheduled procedure. Instructions given to   - Arrive at [facility] Entr[facility] [address].  - Remain NPO after midnight, unless otherwise indicated, including gum, mints, and ice chips.   - Have a responsible adult to drive patient to the hospital, stay during surgery, and patient will need supervision 24 hours after anesthesia.   - Use antibacterial soap in shower the night before surgery and on the morning of surgery.       Was patient contacted: yes, self  Voicemail left: n/a  Numbers contacted: [phone]   Arrival time: 0730

## 2024-05-10 NOTE — PROGRESS NOTES
Patient was prescribed a Ultrasling for the right shoulder and against medical advice the patient declined to receive/purchase the DME.   Patient understands they may take their prescription elsewhere if desired.     His grandson just had the same type of surgery with Dr. Johnson and he is able to use his ultrasling and iceman from that surgery.

## 2024-05-10 NOTE — TELEPHONE ENCOUNTER
His daughter is calling about the Lipoma surgery on Monday. She is wondering if he really needs to get the ice machine and a sling. She wants to discuss and clarify what he's having done on Monday and make sure there wasn't a misunderstanding with a language barrier.

## 2024-05-12 DIAGNOSIS — D17.21 LIPOMA OF RIGHT UPPER EXTREMITY: Primary | ICD-10-CM

## 2024-05-12 RX ORDER — ASPIRIN 325 MG
325 TABLET ORAL DAILY
Qty: 7 TABLET | Refills: 0 | Status: SHIPPED | OUTPATIENT
Start: 2024-05-12 | End: 2024-05-19

## 2024-05-12 RX ORDER — ONDANSETRON 8 MG/1
4 TABLET, ORALLY DISINTEGRATING ORAL EVERY 6 HOURS
Qty: 16 TABLET | Refills: 0 | Status: SHIPPED | OUTPATIENT
Start: 2024-05-12

## 2024-05-12 RX ORDER — AMOXICILLIN 250 MG
1 CAPSULE ORAL DAILY
Qty: 21 TABLET | Refills: 0 | Status: SHIPPED | OUTPATIENT
Start: 2024-05-12

## 2024-05-12 RX ORDER — OXYCODONE HYDROCHLORIDE 5 MG/1
5 TABLET ORAL EVERY 6 HOURS PRN
Qty: 20 TABLET | Refills: 0 | Status: SHIPPED | OUTPATIENT
Start: 2024-05-12 | End: 2024-05-17

## 2024-05-13 ENCOUNTER — ANESTHESIA EVENT (OUTPATIENT)
Dept: SURGERY | Age: 84
End: 2024-05-13
Payer: MEDICARE

## 2024-05-13 ENCOUNTER — HOSPITAL ENCOUNTER (OUTPATIENT)
Age: 84
Setting detail: OUTPATIENT SURGERY
Discharge: HOME | End: 2024-05-13
Attending: ORTHOPAEDIC SURGERY | Admitting: ORTHOPAEDIC SURGERY
Payer: MEDICARE

## 2024-05-13 ENCOUNTER — ANESTHESIA (OUTPATIENT)
Dept: SURGERY | Age: 84
End: 2024-05-13
Payer: MEDICARE

## 2024-05-13 VITALS
WEIGHT: 215 LBS | HEART RATE: 61 BPM | DIASTOLIC BLOOD PRESSURE: 73 MMHG | SYSTOLIC BLOOD PRESSURE: 135 MMHG | RESPIRATION RATE: 18 BRPM | BODY MASS INDEX: 29.12 KG/M2 | TEMPERATURE: 97.1 F | OXYGEN SATURATION: 93 % | HEIGHT: 72 IN

## 2024-05-13 PROCEDURE — 88304 TISSUE EXAM BY PATHOLOGIST: CPT

## 2024-05-13 PROCEDURE — 6360000002 HC RX W HCPCS: Performed by: PHYSICIAN ASSISTANT

## 2024-05-13 PROCEDURE — 7100000011 HC PHASE II RECOVERY - ADDTL 15 MIN: Performed by: ORTHOPAEDIC SURGERY

## 2024-05-13 PROCEDURE — 3700000001 HC ADD 15 MINUTES (ANESTHESIA): Performed by: ORTHOPAEDIC SURGERY

## 2024-05-13 PROCEDURE — 2580000003 HC RX 258: Performed by: ANESTHESIOLOGY

## 2024-05-13 PROCEDURE — 6360000002 HC RX W HCPCS: Performed by: NURSE ANESTHETIST, CERTIFIED REGISTERED

## 2024-05-13 PROCEDURE — 2500000003 HC RX 250 WO HCPCS: Performed by: NURSE ANESTHETIST, CERTIFIED REGISTERED

## 2024-05-13 PROCEDURE — 7100000000 HC PACU RECOVERY - FIRST 15 MIN: Performed by: ORTHOPAEDIC SURGERY

## 2024-05-13 PROCEDURE — 6370000000 HC RX 637 (ALT 250 FOR IP): Performed by: ANESTHESIOLOGY

## 2024-05-13 PROCEDURE — 7100000010 HC PHASE II RECOVERY - FIRST 15 MIN: Performed by: ORTHOPAEDIC SURGERY

## 2024-05-13 PROCEDURE — 6360000002 HC RX W HCPCS: Performed by: ORTHOPAEDIC SURGERY

## 2024-05-13 PROCEDURE — 7100000001 HC PACU RECOVERY - ADDTL 15 MIN: Performed by: ORTHOPAEDIC SURGERY

## 2024-05-13 PROCEDURE — 3600000002 HC SURGERY LEVEL 2 BASE: Performed by: ORTHOPAEDIC SURGERY

## 2024-05-13 PROCEDURE — 3700000000 HC ANESTHESIA ATTENDED CARE: Performed by: ORTHOPAEDIC SURGERY

## 2024-05-13 PROCEDURE — 2709999900 HC NON-CHARGEABLE SUPPLY: Performed by: ORTHOPAEDIC SURGERY

## 2024-05-13 PROCEDURE — 3600000012 HC SURGERY LEVEL 2 ADDTL 15MIN: Performed by: ORTHOPAEDIC SURGERY

## 2024-05-13 RX ORDER — PROCHLORPERAZINE EDISYLATE 5 MG/ML
5 INJECTION INTRAMUSCULAR; INTRAVENOUS
Status: DISCONTINUED | OUTPATIENT
Start: 2024-05-13 | End: 2024-05-13 | Stop reason: HOSPADM

## 2024-05-13 RX ORDER — DEXAMETHASONE SODIUM PHOSPHATE 4 MG/ML
INJECTION, SOLUTION INTRA-ARTICULAR; INTRALESIONAL; INTRAMUSCULAR; INTRAVENOUS; SOFT TISSUE PRN
Status: DISCONTINUED | OUTPATIENT
Start: 2024-05-13 | End: 2024-05-13 | Stop reason: SDUPTHER

## 2024-05-13 RX ORDER — HYDROMORPHONE HYDROCHLORIDE 2 MG/ML
0.5 INJECTION, SOLUTION INTRAMUSCULAR; INTRAVENOUS; SUBCUTANEOUS EVERY 5 MIN PRN
Status: DISCONTINUED | OUTPATIENT
Start: 2024-05-13 | End: 2024-05-13 | Stop reason: HOSPADM

## 2024-05-13 RX ORDER — OXYCODONE HYDROCHLORIDE 5 MG/1
5 TABLET ORAL ONCE
Status: DISCONTINUED | OUTPATIENT
Start: 2024-05-13 | End: 2024-05-13 | Stop reason: HOSPADM

## 2024-05-13 RX ORDER — DIPHENHYDRAMINE HYDROCHLORIDE 50 MG/ML
12.5 INJECTION INTRAMUSCULAR; INTRAVENOUS
Status: DISCONTINUED | OUTPATIENT
Start: 2024-05-13 | End: 2024-05-13 | Stop reason: HOSPADM

## 2024-05-13 RX ORDER — SODIUM CHLORIDE 0.9 % (FLUSH) 0.9 %
5-40 SYRINGE (ML) INJECTION PRN
Status: DISCONTINUED | OUTPATIENT
Start: 2024-05-13 | End: 2024-05-13 | Stop reason: HOSPADM

## 2024-05-13 RX ORDER — ONDANSETRON 2 MG/ML
INJECTION INTRAMUSCULAR; INTRAVENOUS PRN
Status: DISCONTINUED | OUTPATIENT
Start: 2024-05-13 | End: 2024-05-13 | Stop reason: SDUPTHER

## 2024-05-13 RX ORDER — SODIUM CHLORIDE 0.9 % (FLUSH) 0.9 %
5-40 SYRINGE (ML) INJECTION EVERY 12 HOURS SCHEDULED
Status: DISCONTINUED | OUTPATIENT
Start: 2024-05-13 | End: 2024-05-13 | Stop reason: HOSPADM

## 2024-05-13 RX ORDER — FAMOTIDINE 20 MG/1
20 TABLET, FILM COATED ORAL ONCE
Status: COMPLETED | OUTPATIENT
Start: 2024-05-13 | End: 2024-05-13

## 2024-05-13 RX ORDER — SODIUM CHLORIDE, SODIUM LACTATE, POTASSIUM CHLORIDE, CALCIUM CHLORIDE 600; 310; 30; 20 MG/100ML; MG/100ML; MG/100ML; MG/100ML
INJECTION, SOLUTION INTRAVENOUS CONTINUOUS
Status: DISCONTINUED | OUTPATIENT
Start: 2024-05-13 | End: 2024-05-13 | Stop reason: HOSPADM

## 2024-05-13 RX ORDER — OXYCODONE HYDROCHLORIDE 5 MG/1
10 TABLET ORAL ONCE
Status: DISCONTINUED | OUTPATIENT
Start: 2024-05-13 | End: 2024-05-13 | Stop reason: HOSPADM

## 2024-05-13 RX ORDER — PROPOFOL 10 MG/ML
INJECTION, EMULSION INTRAVENOUS PRN
Status: DISCONTINUED | OUTPATIENT
Start: 2024-05-13 | End: 2024-05-13 | Stop reason: SDUPTHER

## 2024-05-13 RX ORDER — GLYCOPYRROLATE 0.2 MG/ML
INJECTION INTRAMUSCULAR; INTRAVENOUS PRN
Status: DISCONTINUED | OUTPATIENT
Start: 2024-05-13 | End: 2024-05-13 | Stop reason: SDUPTHER

## 2024-05-13 RX ORDER — SODIUM CHLORIDE 9 MG/ML
INJECTION, SOLUTION INTRAVENOUS PRN
Status: DISCONTINUED | OUTPATIENT
Start: 2024-05-13 | End: 2024-05-13 | Stop reason: HOSPADM

## 2024-05-13 RX ORDER — FENTANYL CITRATE 50 UG/ML
100 INJECTION, SOLUTION INTRAMUSCULAR; INTRAVENOUS
Status: DISCONTINUED | OUTPATIENT
Start: 2024-05-13 | End: 2024-05-13 | Stop reason: HOSPADM

## 2024-05-13 RX ORDER — ESMOLOL HYDROCHLORIDE 10 MG/ML
INJECTION INTRAVENOUS PRN
Status: DISCONTINUED | OUTPATIENT
Start: 2024-05-13 | End: 2024-05-13 | Stop reason: SDUPTHER

## 2024-05-13 RX ORDER — NALOXONE HYDROCHLORIDE 0.4 MG/ML
INJECTION, SOLUTION INTRAMUSCULAR; INTRAVENOUS; SUBCUTANEOUS PRN
Status: DISCONTINUED | OUTPATIENT
Start: 2024-05-13 | End: 2024-05-13 | Stop reason: HOSPADM

## 2024-05-13 RX ORDER — LIDOCAINE HYDROCHLORIDE 10 MG/ML
1 INJECTION, SOLUTION INFILTRATION; PERINEURAL
Status: DISCONTINUED | OUTPATIENT
Start: 2024-05-13 | End: 2024-05-13 | Stop reason: HOSPADM

## 2024-05-13 RX ORDER — ROPIVACAINE HYDROCHLORIDE 5 MG/ML
INJECTION, SOLUTION EPIDURAL; INFILTRATION; PERINEURAL PRN
Status: DISCONTINUED | OUTPATIENT
Start: 2024-05-13 | End: 2024-05-13 | Stop reason: ALTCHOICE

## 2024-05-13 RX ORDER — LIDOCAINE HYDROCHLORIDE 20 MG/ML
INJECTION, SOLUTION EPIDURAL; INFILTRATION; INTRACAUDAL; PERINEURAL PRN
Status: DISCONTINUED | OUTPATIENT
Start: 2024-05-13 | End: 2024-05-13 | Stop reason: SDUPTHER

## 2024-05-13 RX ORDER — SODIUM CHLORIDE 9 MG/ML
INJECTION, SOLUTION INTRAVENOUS CONTINUOUS
Status: DISCONTINUED | OUTPATIENT
Start: 2024-05-13 | End: 2024-05-13 | Stop reason: HOSPADM

## 2024-05-13 RX ORDER — TRANEXAMIC ACID 100 MG/ML
INJECTION, SOLUTION INTRAVENOUS PRN
Status: DISCONTINUED | OUTPATIENT
Start: 2024-05-13 | End: 2024-05-13 | Stop reason: SDUPTHER

## 2024-05-13 RX ORDER — ACETAMINOPHEN 500 MG
1000 TABLET ORAL ONCE
Status: COMPLETED | OUTPATIENT
Start: 2024-05-13 | End: 2024-05-13

## 2024-05-13 RX ORDER — EPHEDRINE SULFATE 5 MG/ML
INJECTION INTRAVENOUS PRN
Status: DISCONTINUED | OUTPATIENT
Start: 2024-05-13 | End: 2024-05-13 | Stop reason: SDUPTHER

## 2024-05-13 RX ORDER — FENTANYL CITRATE 50 UG/ML
INJECTION, SOLUTION INTRAMUSCULAR; INTRAVENOUS PRN
Status: DISCONTINUED | OUTPATIENT
Start: 2024-05-13 | End: 2024-05-13 | Stop reason: SDUPTHER

## 2024-05-13 RX ORDER — MIDAZOLAM HYDROCHLORIDE 2 MG/2ML
2 INJECTION, SOLUTION INTRAMUSCULAR; INTRAVENOUS
Status: DISCONTINUED | OUTPATIENT
Start: 2024-05-13 | End: 2024-05-13 | Stop reason: HOSPADM

## 2024-05-13 RX ADMIN — FENTANYL CITRATE 12.5 MCG: 50 INJECTION, SOLUTION INTRAMUSCULAR; INTRAVENOUS at 10:01

## 2024-05-13 RX ADMIN — Medication 2000 MG: at 09:52

## 2024-05-13 RX ADMIN — TRANEXAMIC ACID 1000 MG: 100 INJECTION, SOLUTION INTRAVENOUS at 09:52

## 2024-05-13 RX ADMIN — FENTANYL CITRATE 25 MCG: 50 INJECTION, SOLUTION INTRAMUSCULAR; INTRAVENOUS at 09:40

## 2024-05-13 RX ADMIN — FENTANYL CITRATE 12.5 MCG: 50 INJECTION, SOLUTION INTRAMUSCULAR; INTRAVENOUS at 09:54

## 2024-05-13 RX ADMIN — DEXAMETHASONE SODIUM PHOSPHATE 4 MG: 4 INJECTION, SOLUTION INTRAMUSCULAR; INTRAVENOUS at 09:54

## 2024-05-13 RX ADMIN — ONDANSETRON 4 MG: 2 INJECTION INTRAMUSCULAR; INTRAVENOUS at 09:54

## 2024-05-13 RX ADMIN — PROPOFOL 130 MG: 10 INJECTION, EMULSION INTRAVENOUS at 09:45

## 2024-05-13 RX ADMIN — LIDOCAINE HYDROCHLORIDE 100 MG: 20 INJECTION, SOLUTION EPIDURAL; INFILTRATION; INTRACAUDAL; PERINEURAL at 09:45

## 2024-05-13 RX ADMIN — ESMOLOL HYDROCHLORIDE 10 MG: 10 INJECTION INTRAVENOUS at 10:27

## 2024-05-13 RX ADMIN — EPHEDRINE SULFATE 10 MG: 5 INJECTION INTRAVENOUS at 09:47

## 2024-05-13 RX ADMIN — GLYCOPYRROLATE 0.2 MG: 0.2 INJECTION INTRAMUSCULAR; INTRAVENOUS at 09:55

## 2024-05-13 RX ADMIN — ACETAMINOPHEN 1000 MG: 500 TABLET, FILM COATED ORAL at 08:48

## 2024-05-13 RX ADMIN — ESMOLOL HYDROCHLORIDE 20 MG: 10 INJECTION INTRAVENOUS at 10:26

## 2024-05-13 RX ADMIN — SODIUM CHLORIDE, POTASSIUM CHLORIDE, SODIUM LACTATE AND CALCIUM CHLORIDE: 600; 310; 30; 20 INJECTION, SOLUTION INTRAVENOUS at 08:47

## 2024-05-13 RX ADMIN — FAMOTIDINE 20 MG: 20 TABLET, FILM COATED ORAL at 08:47

## 2024-05-13 NOTE — OP NOTE
Operative Note    5/13/2024     Preoperative diagnosis:  Lipoma of right upper extremity [D17.21]  Primary osteoarthritis of right shoulder [M19.011]  Right rotator cuff tear arthropathy [M75.101, M12.811]    Postoperative diagnosis: Right posterior shoulder lipoma    Surgeon(s) and Role:     * AZALIA Johnson MD - Primary     Assistant: None  Anesthesia: Choice,  regional block    Antibiotics: Ancef 2 grams IV    Procedures:  Procedure(s):  RIGHT SHOULDER REMOVAL OF LIPOMA       Findings:  1.  Right arm 5.2 x 4.4 x 2.9 cm  lipoma just underneath subcu superficial to most of the deltoid muscle    Indications / Consent:  This is a patient who has persistent pain with weakness in the shoulder but also with notable prominence of the posterior arm..    After previous discussions and treatments using both conservative and/or non-operative treatment options the patient elected to proceed with surgery due to continued symptoms.  A review of the risks and benefits, including but not limited to infection, stiffness, injury to nerves and vessels, DVT, PE, MI, need for further operations and other anesthesia related risks was performed with the patient. After this review and the review of the likely outcome and potential complications of the procedure, preoperative verbal and written consents were obtained.  The operative procedure and postoperative course were discussed with the patient in detail and the extremity was marked by the patient and myself.      Procedure:    The patient was given an anesthetic, placed semi sitting, the head was held in a neutral position, the legs were flexed and pillows were placed under the legs.  An EUA was performed and noted above. They were then padded and the operative shoulder was prepped with Etoh and ChloraPrep, then draped in the usual fashion.  Prior to the beginning of the procedure, a time-out was performed for correct surgical site identification as was marked during the pre-operative

## 2024-05-13 NOTE — H&P
Outpatient Surgery History and Physical:  Tone Gerardo was seen and examined.    CHIEF COMPLAINT:   Right shoulder mass.     PE:   BP (!) 159/74   Pulse 60   Temp 97.7 °F (36.5 °C) (Oral)   Resp 18   Ht 1.829 m (6')   Wt 97.5 kg (215 lb)   SpO2 95%   BMI 29.16 kg/m²     Heart:   Regular rhythm, regular pulses.    Lungs:  Are clear, non-labored respirations.     Principal Problem:    Lipoma of right upper extremity  Active Problems:    Primary osteoarthritis of right shoulder    Right rotator cuff tear arthropathy  Resolved Problems:    * No resolved hospital problems. *      Past Medical History:    Past Medical History:   Diagnosis Date    Age-related osteoporosis with current pathological fracture 9/21/2017    GERD (gastroesophageal reflux disease)     pt denies (6/7/18)    Hyperlipidemia 8/5/2016    Hypertension     Insomnia 10/30/2017    Obstructive sleep apnea 10/30/2017    pt is not currently using cpap machine- has used in past (6/7/18)    Overweight (BMI 25.0-29.9) 10/30/2017    Primary osteoarthritis of left shoulder 7/14/2017    Seizure (HCC) 09/10/2017    x2 (9/2017 and 10/2017)- on keppra BID- had consult with Dr Manning (neuro)- pt reports triggered by insomnia       Surgical History:   Past Surgical History:   Procedure Laterality Date    CARDIAC PROCEDURE N/A 10/11/2023    Left heart cath / coronary angiography performed by Norberto Anderson MD at [facility] CARDIAC CATH LAB    COLONOSCOPY      HERNIA REPAIR      INVASIVE VASCULAR N/A 10/11/2023    Angiography lower ext bilat performed by Norbreto Anderson MD at [facility] CARDIAC CATH LAB    INVASIVE VASCULAR N/A 10/11/2023    Angiography upper ext bilat performed by Norberto Anderson MD at [facility] CARDIAC CATH LAB    SHOULDER ARTHROPLASTY Left     TOTAL HIP ARTHROPLASTY Bilateral        Social History: Patient  reports that he has never smoked. He has never been exposed to tobacco smoke. He has never used smokeless tobacco. He reports that he

## 2024-05-13 NOTE — DISCHARGE INSTRUCTIONS
clear the lungs, and walking. However, fevers, increasing pain, and swelling at the incisions should be reported immediately.    Showering:  Until your sutures are removed, you should consider covering your shoulder in saran wrap with tape for showering.   A plastic chair in your shower will allow you to sit.  Sponge bathing is also an option.  In general, after your sutures have been removed you may allow your incisions to get wet in the shower.      Post-Operative Pain Management  ANESTHESIA: You will meet with an anesthesiologist on the day of surgery to discuss your anesthesia. You will have general anesthesia and often will have a nerve block.     MEDICATIONS: You will be given a prescription for medications. Please take them as directed on the label and with food.    Certain pain medications may contain Tylenol(Acetaminophen). It is important not to take any additional Tylenol while on these pain medications.  Do not mix your pain medications with alcohol.  You should not drive while taking pain medications as they increase your liability and delay your responses.  Your physician will most likely talk with you about taking Aspirin 81 mg or 325 mg (ECASA) once daily for two-three weeks after surgery. This is done in order to help minimize the risk for a blood clot from developing, which is a possible complication after any surgery. If you have Ulcers or stomach irritation do not take this medicine. Be careful taking aspirin and other NSAID's as it can increase your risk of gastric irritation and other side effects.  If you have any questions or concerns regarding your medications, please call the office.  Common side effects of the narcotics include nausea, vomiting, drowsiness, constipation, and difficulty urinating.  If you experience constipation, drink lots of water/Gatorade, avoid soda and diet drinks. Eat plenty of fiber. You may take a stool softener: Colace 100mg twice a day for the first week. For

## 2024-05-13 NOTE — ANESTHESIA PRE PROCEDURE
BETZAIDA Coker MD       • fentaNYL (SUBLIMAZE) injection 100 mcg  100 mcg IntraVENous Once PRN BETZAIDA Coker MD       • famotidine (PEPCID) tablet 20 mg  20 mg Oral Once BETZAIDA Coker MD       • lactated ringers IV soln infusion   IntraVENous Continuous BETZAIDA Coker MD       • sodium chloride flush 0.9 % injection 5-40 mL  5-40 mL IntraVENous 2 times per day BETZAIDA Coker MD       • sodium chloride flush 0.9 % injection 5-40 mL  5-40 mL IntraVENous PRN BETZAIDA Coker MD       • 0.9 % sodium chloride infusion   IntraVENous PRN BETZAIDA Coker MD       • midazolam PF (VERSED) injection 2 mg  2 mg IntraVENous Once PRN BETZAIDA Coker MD       • ceFAZolin (ANCEF) 2000 mg in sterile water 20 mL IV syringe  2,000 mg IntraVENous On Call to OR Aakash White PA       • sodium chloride flush 0.9 % injection 5-40 mL  5-40 mL IntraVENous 2 times per day Aakash White PA       • sodium chloride flush 0.9 % injection 5-40 mL  5-40 mL IntraVENous PRN Aakash White PA       • 0.9 % sodium chloride infusion   IntraVENous PRN Aakash White PA       • 0.9 % sodium chloride infusion   IntraVENous Continuous Aakash White PA           Allergies:  No Known Allergies    Problem List:    Patient Active Problem List   Diagnosis Code   • Obstructive sleep apnea G47.33   • Need for hepatitis C screening test Z11.59   • Chronic shoulder bursitis M75.50   • History of gastroesophageal reflux (GERD) Z87.19   • Disorder of bone density and structure, unspecified M85.9   • Elevated PSA R97.20   • Hyperlipidemia E78.5   • Benign prostatic hyperplasia without lower urinary tract symptoms N40.0   • Seizure (HCC) R56.9   • Arthritis of shoulder region, left, degenerative M19.012   • Insomnia G47.00   • Hypertension I10   • Amnesia R41.3   • 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

## 2024-05-13 NOTE — ANESTHESIA POSTPROCEDURE EVALUATION
Department of Anesthesiology  Postprocedure Note    Patient: Tone Gerardo  MRN: [id number]  YOB: 1940  Date of evaluation: 5/13/2024    Procedure Summary       Date: 05/13/24 Room / Location: [facility] OP OR 02 / [facility]    Anesthesia Start: 0935 Anesthesia Stop: 1039    Procedure: RIGHT SHOULDER REMOVAL OF LIPOMA (Right: Arm Upper) Diagnosis:       Lipoma of right upper extremity      Primary osteoarthritis of right shoulder      Right rotator cuff tear arthropathy      (Lipoma of right upper extremity [D17.21])      (Primary osteoarthritis of right shoulder [M19.011])      (Right rotator cuff tear arthropathy [M75.101, M12.811])    Surgeons: AZALIA Johnson MD Responsible Provider: BETZAIDA Coker MD    Anesthesia Type: general ASA Status: 3            Anesthesia Type: No value filed.    Marilee Phase I: Marilee Score: 8    Marilee Phase II: Marilee Score: 10    Anesthesia Post Evaluation    Patient location during evaluation: PACU  Patient participation: complete - patient participated  Level of consciousness: awake and alert  Airway patency: patent  Nausea & Vomiting: no nausea and no vomiting  Cardiovascular status: hemodynamically stable  Respiratory status: acceptable, nonlabored ventilation and spontaneous ventilation  Hydration status: euvolemic  Comments: /73   Pulse 61   Temp 97.1 °F (36.2 °C) (Oral)   Resp 18   Ht 1.829 m (6')   Wt 97.5 kg (215 lb)   SpO2 93%   BMI 29.16 kg/m²     Multimodal analgesia pain management approach  Pain management: adequate and satisfactory to patient    No notable events documented.

## 2024-05-24 ENCOUNTER — OFFICE VISIT (OUTPATIENT)
Dept: ORTHOPEDIC SURGERY | Age: 84
End: 2024-05-24

## 2024-05-24 DIAGNOSIS — M12.811 RIGHT ROTATOR CUFF TEAR ARTHROPATHY: ICD-10-CM

## 2024-05-24 DIAGNOSIS — M75.101 RIGHT ROTATOR CUFF TEAR ARTHROPATHY: ICD-10-CM

## 2024-05-24 DIAGNOSIS — M19.011 PRIMARY OSTEOARTHRITIS OF RIGHT SHOULDER: Primary | ICD-10-CM

## 2024-05-24 PROCEDURE — 99024 POSTOP FOLLOW-UP VISIT: CPT | Performed by: PHYSICIAN ASSISTANT

## 2024-05-24 NOTE — PROGRESS NOTES
Known Problems Father      Social History     Socioeconomic History    Marital status:      Spouse name: Not on file    Number of children: Not on file    Years of education: Not on file    Highest education level: Not on file   Occupational History    Not on file   Tobacco Use    Smoking status: Never     Passive exposure: Never    Smokeless tobacco: Never   Vaping Use    Vaping Use: Never used   Substance and Sexual Activity    Alcohol use: No    Drug use: No    Sexual activity: Not Currently   Other Topics Concern    Not on file   Social History Narrative    Not on file     Social Determinants of Health     Financial Resource Strain: Not on file   Food Insecurity: Not on file   Transportation Needs: Not on file   Physical Activity: Inactive (8/1/2022)    Exercise Vital Sign     Days of Exercise per Week: 0 days     Minutes of Exercise per Session: 0 min   Stress: Not on file   Social Connections: Not on file   Intimate Partner Violence: Not on file   Housing Stability: Not on file               No data to display                  Review of Systems  Non-contributory     PE:    Incisions all appear to be healing well with no signs of erythema, drainage, or other abnormality.  There is increased range of motion of the shoulder in comparison to where he was preoperatively.  He is able to touch the back of his head which she could not do in the past.  Distal motor function, station, pulse intact    Xray: Not indicated    A/Plan:     ICD-10-CM    1. Primary osteoarthritis of right shoulder  M19.011 Ambulatory referral to Physical Therapy      2. Right rotator cuff tear arthropathy  M75.101 Ambulatory referral to Physical Therapy    M12.811           I discussed the patient I am very pleased in regards to his shoulder today.  Will send him to physical therapy.  Although he is moving his shoulder better I would like him to strengthen and potentially even get more range of motion.  Discussed with the patient if he

## 2024-06-06 ENCOUNTER — OFFICE VISIT (OUTPATIENT)
Dept: ORTHOPEDIC SURGERY | Age: 84
End: 2024-06-06
Payer: MEDICARE

## 2024-06-06 DIAGNOSIS — Z09 SURGERY FOLLOW-UP: Primary | ICD-10-CM

## 2024-06-06 PROCEDURE — 20610 DRAIN/INJ JOINT/BURSA W/O US: CPT | Performed by: PHYSICIAN ASSISTANT

## 2024-06-06 PROCEDURE — 99213 OFFICE O/P EST LOW 20 MIN: CPT | Performed by: PHYSICIAN ASSISTANT

## 2024-06-06 PROCEDURE — 1123F ACP DISCUSS/DSCN MKR DOCD: CPT | Performed by: PHYSICIAN ASSISTANT

## 2024-06-06 PROCEDURE — G8417 CALC BMI ABV UP PARAM F/U: HCPCS | Performed by: PHYSICIAN ASSISTANT

## 2024-06-06 PROCEDURE — 1036F TOBACCO NON-USER: CPT | Performed by: PHYSICIAN ASSISTANT

## 2024-06-06 PROCEDURE — G8427 DOCREV CUR MEDS BY ELIG CLIN: HCPCS | Performed by: PHYSICIAN ASSISTANT

## 2024-06-06 RX ORDER — METHYLPREDNISOLONE ACETATE 40 MG/ML
80 INJECTION, SUSPENSION INTRA-ARTICULAR; INTRALESIONAL; INTRAMUSCULAR; SOFT TISSUE ONCE
Status: COMPLETED | OUTPATIENT
Start: 2024-06-06 | End: 2024-06-06

## 2024-06-06 RX ADMIN — METHYLPREDNISOLONE ACETATE 80 MG: 40 INJECTION, SUSPENSION INTRA-ARTICULAR; INTRALESIONAL; INTRAMUSCULAR; SOFT TISSUE at 16:02

## 2024-06-07 NOTE — PROGRESS NOTES
proceeding with a corticosteroid injection today.  He is aware he can get these every 3 months.  I like to see how this does and will reach back out to us dependent on continuation of symptoms.  Procedure note: After discussion of risks and benefits including, but not limited to pain, infection, steroid flare, increased blood sugar, fat necrosis, skin discoloration, and injury to blood vessels or nerves, the patient verbally consented to proceed with a glenohumeral joint injection.  They understand that we are using this is an alternative method of treatment and the decision to not proceed with elective major surgery.  We have discussed this decision in detail.  The affected right shoulder was sterilely prepped in standard fashion and injected with 2 cc of depo medrol (40mg/ml), 2 cc of 1% Lidocaine, and 2 cc of 0.5% Marcaine into the joint space.  The patient tolerated the injection well.   Return for As needed.     GAVIN Mojica  06/07/24

## 2024-06-11 ENCOUNTER — HOSPITAL ENCOUNTER (OUTPATIENT)
Dept: PHYSICAL THERAPY | Age: 84
Setting detail: RECURRING SERIES
Discharge: HOME | End: 2024-06-14
Payer: MEDICARE

## 2024-06-11 DIAGNOSIS — R29.898 WEAKNESS OF RIGHT SHOULDER: ICD-10-CM

## 2024-06-11 DIAGNOSIS — M25.511 ACUTE PAIN OF RIGHT SHOULDER: Primary | ICD-10-CM

## 2024-06-11 PROCEDURE — 97110 THERAPEUTIC EXERCISES: CPT

## 2024-06-11 PROCEDURE — 97162 PT EVAL MOD COMPLEX 30 MIN: CPT

## 2024-06-11 ASSESSMENT — PAIN SCALES - GENERAL: PAINLEVEL_OUTOF10: 2

## 2024-06-11 NOTE — THERAPY EVALUATION
PROM much better than AROM R shoulder indicating significant weakness rather than capsular restriction.   Some crepitus noted with MMT.   ASSESSMENT   Initial Assessment:  Pt presents with R shoulder pain s/p lipoma removal on 5/14/24. He demonstrates R shoulder weakness and decreased ROM, affecting his ability to perform normal daily tasks such as reaching, combing his hair and lifting.  Therapy Problem List: (Impacting functional limitations):    Increased Pain, Decreased Strength, Decreased ROM, Decreased Functional Mobility, Decreased [place] with Home Exercise Program, Decreased Posture, Decreased Body Mechanics, Difficulty Sleeping, and Decreased Activity Tolerance/Endurance*   Therapy Prognosis:   Good     Initial Assessment Complexity:   Moderate Complexity       PLAN   Effective Dates: 6/11/2024 TO Plan of Care/Certification Expiration Date: 09/09/24     Frequency/Duration: Plan Frequency: 2x/wk for 90 days      Interventions Planned (Treatment may consist of any combination of the following):    Home Exercise Program (HEP), Manual Therapy, Neuromuscular Re-education/Strengthening, Pain Management, Range of Motion (ROM), Therapeutic Activites, Therapeutic Exercise/Strengthening, Patient/Caregiver Education & Training, and Dry Needling     GOALS: (Goals have been discussed and agreed upon with patient.)    SHORT-TERM FUNCTIONAL GOALS: Time Frame: 30 days  1. Pt will report <=2/10 pain with don/doffing clothing as well as minimal/no difficulty.  2. Pt will demonstrate improvement in active R shoulder flexion to >100 degrees to increase UE function and participation in ADLs.  3. Pt will demonstrate improvement in active shoulder ER to 10 degrees to increase UE function and participation in ADLs.  4. Pt will be compliant in all HEP.     DISCHARGE GOALS: Time Frame: 90 days    1. Pt will demonstrate improvement in active shoulder abduction to >105 degrees to increase UE function and participation in

## 2024-06-12 NOTE — PROGRESS NOTES
Tone Gerardo  : 1940  Primary: Medicare Part A And B (Medicare)  Secondary: GENERIC COMMERCIAL [facility] @ [place] [address] [place] [place] [address]  Phone: [phone]  Fax: [phone] Plan Frequency: 2x/wk for 90 days    Plan of Care/Certification Expiration Date: 24        Plan of Care/Certification Expiration Date:  Plan of Care/Certification Expiration Date: 24    Frequency/Duration:   Plan Frequency: 2x/wk for 90 days      Time In/Out:   Time In: 1600  Time Out: 1640      PT Visit Info:         Visit Count:  1    OUTPATIENT PHYSICAL THERAPY:   Treatment Note 2024       Episode  (R shoulder pain)               Treatment Diagnosis:    Acute pain of right shoulder  Weakness of right shoulder  Medical/Referring Diagnosis:    Primary osteoarthritis of right shoulder  Right rotator cuff tear arthropathy      Referring Physician:  Aakash White PA MD Orders:  PT Eval and Treat   Return MD Appt:  unknown   Date of Onset:  Onset Date: 24     Allergies:   Patient has no known allergies.  Restrictions/Precautions:   None      Interventions Planned (Treatment may consist of any combination of the following):     See Assessment Note    Subjective Comments:   See eval  Initial Pain Level::     2/10  Post Session Pain Level:       2/10  Medications Last Reviewed:  2024  Updated Objective Findings:  See Evaluation Note from today  Treatment   THERAPEUTIC EXERCISE: (25 minutes):    Exercises per grid below to improve mobility and strength.  Required moderate visual, verbal, manual, and tactile cues to promote proper body alignment, promote proper body posture, and promote proper body mechanics.  Progressed resistance, range, repetitions, and complexity of movement as indicated.   Date:  24   Date:   Date:     Activity/Exercise Parameters Parameters Parameters   UBE used for R shoulder strength, ROM and endurance L2 2F, 2B     UE ranger

## 2024-06-18 ENCOUNTER — HOSPITAL ENCOUNTER (OUTPATIENT)
Dept: PHYSICAL THERAPY | Age: 84
Setting detail: RECURRING SERIES
Discharge: HOME | End: 2024-06-21
Payer: MEDICARE

## 2024-06-18 PROCEDURE — 97110 THERAPEUTIC EXERCISES: CPT

## 2024-06-18 ASSESSMENT — PAIN SCALES - GENERAL: PAINLEVEL_OUTOF10: 2

## 2024-06-18 NOTE — PROGRESS NOTES
Tone Gerardo  : 1940  Primary: Medicare Part A And B (Medicare)  Secondary: GENERIC COMMERCIAL [facility] @ [place] [address] [place] [address]  Phone: [phone]  Fax: [phone] Plan Frequency: 2x/wk for 90 days    Plan of Care/Certification Expiration Date: 24        Plan of Care/Certification Expiration Date:  Plan of Care/Certification Expiration Date: 24    Frequency/Duration:   Plan Frequency: 2x/wk for 90 days      Time In/Out:   Time In: 1342  Time Out: 1425      PT Visit Info:    Progress Note Counter: 2      Visit Count:  2    OUTPATIENT PHYSICAL THERAPY:   Treatment Note 2024       Episode  (R shoulder pain)               Treatment Diagnosis:    Acute pain of right shoulder  Weakness of right shoulder  Medical/Referring Diagnosis:    Primary osteoarthritis of right shoulder  Right rotator cuff tear arthropathy      Referring Physician:  Aakash White PA MD Orders:  PT Eval and Treat   Return MD Appt:  unknown   Date of Onset:  Onset Date: 24     Allergies:   Patient has no known allergies.  Restrictions/Precautions:   None      Interventions Planned (Treatment may consist of any combination of the following):     See Assessment Note    Subjective Comments: Pt feels crunchiness in his shoulder when moving overhead.     Initial Pain Level::     2/10  Post Session Pain Level:       1/10  Medications Last Reviewed:  2024  Updated Objective Findings:  None Today  Treatment   THERAPEUTIC EXERCISE: (38 minutes):    Exercises per grid below to improve mobility and strength.  Required moderate visual, verbal, manual, and tactile cues to promote proper body alignment, promote proper body posture, and promote proper body mechanics.  Progressed resistance, range, repetitions, and complexity of movement as indicated.   Date:  24     Activity/Exercise Parameters   UBE used for R shoulder strength, ROM and endurance L3 2F, 2B

## 2024-06-19 DIAGNOSIS — I10 PRIMARY HYPERTENSION: Primary | ICD-10-CM

## 2024-06-19 DIAGNOSIS — E78.2 MIXED HYPERLIPIDEMIA: ICD-10-CM

## 2024-06-20 ENCOUNTER — HOSPITAL ENCOUNTER (OUTPATIENT)
Dept: PHYSICAL THERAPY | Age: 84
Setting detail: RECURRING SERIES
Discharge: HOME | End: 2024-06-23
Payer: MEDICARE

## 2024-06-20 PROCEDURE — 97110 THERAPEUTIC EXERCISES: CPT

## 2024-06-20 PROCEDURE — 97140 MANUAL THERAPY 1/> REGIONS: CPT

## 2024-06-20 ASSESSMENT — PAIN SCALES - GENERAL: PAINLEVEL_OUTOF10: 1

## 2024-06-20 NOTE — PROGRESS NOTES
Tone Gerardo  : 1940  Primary: Medicare Part A And B (Medicare)  Secondary: GENERIC COMMERCIAL [facility] @ [place] [address] [place] [address]  Phone: [phone]  Fax: [phone] Plan Frequency: 2x/wk for 90 days    Plan of Care/Certification Expiration Date: 24        Plan of Care/Certification Expiration Date:  Plan of Care/Certification Expiration Date: 24    Frequency/Duration:   Plan Frequency: 2x/wk for 90 days      Time In/Out:   Time In: 1500  Time Out: 1544      PT Visit Info:    Progress Note Counter: 3      Visit Count:  3    OUTPATIENT PHYSICAL THERAPY:   Treatment Note 2024       Episode  (R shoulder pain)               Treatment Diagnosis:    Acute pain of right shoulder  Weakness of right shoulder  Medical/Referring Diagnosis:    Primary osteoarthritis of right shoulder  Right rotator cuff tear arthropathy      Referring Physician:  Aakash White PA MD Orders:  PT Eval and Treat   Return MD Appt:  unknown   Date of Onset:  Onset Date: 24     Allergies:   Patient has no known allergies.  Restrictions/Precautions:   None      Interventions Planned (Treatment may consist of any combination of the following):     See Assessment Note    Subjective Comments: Pt feels his shoulder is improving. He was a little sore after last PT appt but not bad and he doesn't feel as painful as he did at last appt.     Initial Pain Level::     1/10  Post Session Pain Level:       1/10  Medications Last Reviewed:  2024  Updated Objective Findings:  None Today  Treatment   THERAPEUTIC EXERCISE: (34 minutes):    Exercises per grid below to improve mobility and strength.  Required moderate visual, verbal, manual, and tactile cues to promote proper body alignment, promote proper body posture, and promote proper body mechanics.  Progressed resistance, range, repetitions, and complexity of movement as indicated.   Date:  24

## 2024-06-26 ENCOUNTER — NURSE ONLY (OUTPATIENT)
Dept: INTERNAL MEDICINE CLINIC | Facility: CLINIC | Age: 84
End: 2024-06-26

## 2024-06-26 DIAGNOSIS — E78.2 MIXED HYPERLIPIDEMIA: ICD-10-CM

## 2024-06-26 DIAGNOSIS — I10 PRIMARY HYPERTENSION: ICD-10-CM

## 2024-06-26 LAB
ALBUMIN SERPL-MCNC: 3.7 G/DL (ref 3.2–4.6)
ALBUMIN/GLOB SERPL: 1.4 (ref 1–1.9)
ALP SERPL-CCNC: 91 U/L (ref 40–129)
ALT SERPL-CCNC: 35 U/L (ref 12–65)
ANION GAP SERPL CALC-SCNC: 9 MMOL/L (ref 9–18)
AST SERPL-CCNC: 24 U/L (ref 15–37)
BASOPHILS # BLD: 0.1 K/UL (ref 0–0.2)
BASOPHILS NFR BLD: 1 % (ref 0–2)
BILIRUB SERPL-MCNC: 0.7 MG/DL (ref 0–1.2)
BUN SERPL-MCNC: 19 MG/DL (ref 8–23)
CALCIUM SERPL-MCNC: 9.2 MG/DL (ref 8.8–10.2)
CHLORIDE SERPL-SCNC: 106 MMOL/L (ref 98–107)
CHOLEST SERPL-MCNC: 145 MG/DL (ref 0–200)
CO2 SERPL-SCNC: 26 MMOL/L (ref 20–28)
CREAT SERPL-MCNC: 1.12 MG/DL (ref 0.8–1.3)
DIFFERENTIAL METHOD BLD: NORMAL
EOSINOPHIL # BLD: 0.3 K/UL (ref 0–0.8)
EOSINOPHIL NFR BLD: 4 % (ref 0.5–7.8)
ERYTHROCYTE [DISTWIDTH] IN BLOOD BY AUTOMATED COUNT: 12.5 % (ref 11.9–14.6)
GLOBULIN SER CALC-MCNC: 2.6 G/DL (ref 2.3–3.5)
GLUCOSE SERPL-MCNC: 101 MG/DL (ref 70–99)
HCT VFR BLD AUTO: 45.1 % (ref 41.1–50.3)
HDLC SERPL-MCNC: 50 MG/DL (ref 40–60)
HDLC SERPL: 2.9 (ref 0–5)
HGB BLD-MCNC: 15 G/DL (ref 13.6–17.2)
IMM GRANULOCYTES # BLD AUTO: 0 K/UL (ref 0–0.5)
IMM GRANULOCYTES NFR BLD AUTO: 0 % (ref 0–5)
LDLC SERPL CALC-MCNC: 73 MG/DL (ref 0–100)
LYMPHOCYTES # BLD: 2.3 K/UL (ref 0.5–4.6)
LYMPHOCYTES NFR BLD: 29 % (ref 13–44)
MCH RBC QN AUTO: 30.7 PG (ref 26.1–32.9)
MCHC RBC AUTO-ENTMCNC: 33.3 G/DL (ref 31.4–35)
MCV RBC AUTO: 92.2 FL (ref 82–102)
MONOCYTES # BLD: 0.8 K/UL (ref 0.1–1.3)
MONOCYTES NFR BLD: 10 % (ref 4–12)
NEUTS SEG # BLD: 4.5 K/UL (ref 1.7–8.2)
NEUTS SEG NFR BLD: 56 % (ref 43–78)
NRBC # BLD: 0 K/UL (ref 0–0.2)
PLATELET # BLD AUTO: 211 K/UL (ref 150–450)
PMV BLD AUTO: 10.3 FL (ref 9.4–12.3)
POTASSIUM SERPL-SCNC: 3.9 MMOL/L (ref 3.5–5.1)
PROT SERPL-MCNC: 6.3 G/DL (ref 6.3–8.2)
RBC # BLD AUTO: 4.89 M/UL (ref 4.23–5.6)
SODIUM SERPL-SCNC: 141 MMOL/L (ref 136–145)
TRIGL SERPL-MCNC: 111 MG/DL (ref 0–150)
VLDLC SERPL CALC-MCNC: 22 MG/DL (ref 6–23)
WBC # BLD AUTO: 8.1 K/UL (ref 4.3–11.1)

## 2024-07-01 ENCOUNTER — HOSPITAL ENCOUNTER (OUTPATIENT)
Dept: PHYSICAL THERAPY | Age: 84
Setting detail: RECURRING SERIES
Discharge: HOME | End: 2024-07-04
Payer: MEDICARE

## 2024-07-01 PROCEDURE — 97140 MANUAL THERAPY 1/> REGIONS: CPT

## 2024-07-01 ASSESSMENT — PAIN SCALES - GENERAL: PAINLEVEL_OUTOF10: 7

## 2024-07-01 NOTE — PROGRESS NOTES
Parameters   UBE used for R shoulder strength, ROM and endurance L4 2F, 2B   UE ranger flex x20   Supine stick chest press x20   Supine stick flex X20 standing   Flex and abd isometrics at wall HEP    GTB Row  HEP x30   PROM all directions  Done    R/S at 45 deg abd 6 x 1min   RTB shoulder ext 2x15   ABCs at 90 deg flex 2# 2 rounds    Serratus punches 2# x30   Stick IR and ext  X20 ea   Sidelying abd  2# x20   Supine flexion  2# x20   Stick IR and ext  X20 ea   Supine ER and IR  2# x20     MANUAL THERAPY (25 minutes)To promote tissue length and joint mobility for return to previous level of function.   STM to R shoulder  Posterior, inferior and distraction glides grade 3-4      Treatment/Session Summary:    Treatment Assessment: Pt discharged     Communication/Consultation:  None today  Equipment provided today:  HEP  Recommendations/Intent for next treatment session: Next visit will focus on R shoulder strength and ROM.    >Total Treatment Billable Duration: 25 minutes   Time In: 1430  Time Out: 1455    Winnie Sewell, PT         Charge Capture  Events  [facility] Portal  Appt Desk  Attendance Report     Future Appointments   Date Time Provider Department Center   7/3/2024  2:00 PM Magan Moncada MD [facility] [place] [facility]   7/5/2024 12:15 PM Winnie Sewell, PT [facility]   7/8/2024  4:00 PM Winnie Sewell, PT [facility]   7/10/2024  3:45 PM Winnie Sewell, PT [facility]   7/15/2024  4:00 PM Winnie Sewell, PT [facility]   7/17/2024  3:45 PM Winnie Sewell, PT [facility]   9/3/2024  1:30 PM Stuart Mclean MD [facility]   11/4/2024  1:15 PM Kal Garza DO [facility]   1/27/2025  1:30 PM Gold Gill MD [id number] [facility]

## 2024-07-01 NOTE — THERAPY DISCHARGE
Rocky Gerardo  : 1940  Primary: Medicare Part A And B (Medicare)  Secondary: GENERIC COMMERCIAL [facility] @ [place] [address] [place] [address]  Phone: [phone]  Fax: [phone] Plan Frequency: 2x/wk for 90 days    Plan of Care/Certification Expiration Date: 24        Plan of Care/Certification Expiration Date:  Plan of Care/Certification Expiration Date: 24          Time In/Out:   Time In: 1430  Time Out: 1455      PT Visit Info:    Progress Note Counter: 4      Visit Count:  4                OUTPATIENT PHYSICAL THERAPY:             Discharge Summary 2024               Episode (R shoulder pain)         Treatment Diagnosis:     No data found  Medical/Referring Diagnosis:    Primary osteoarthritis of right shoulder [M19.011]  Right rotator cuff tear arthropathy [M75.101, M12.811]       Referring Physician:  Aakash White PA MD Orders:  PT Eval and Treat   Return MD Appt:  unknown  Date of Onset:  Onset Date: 24     Allergies:  Patient has no known allergies.  Restrictions/Precautions:    None      Medications Last Reviewed:  2024     SUBJECTIVE   Pt reports his shoulder is feeling really bad and he wishes to self discharge and pursue a shoulder replacement instead.        OBJECTIVE        ASSESSMENT   Discharge Assessment:  Pt attended PT for 4 sessions to address R shoulder pain s/p lipoma removal on 24. He wishes to self discharge due to wanting to pursue a shoulder replacement.       PLAN   Effective Dates: 2024 TO Plan of Care/Certification Expiration Date: 24         GOALS: (Goals have been discussed and agreed upon with patient.)    SHORT-TERM FUNCTIONAL GOALS: Time Frame: 30 days  1. Pt will report <=2/10 pain with don/doffing clothing as well as minimal/no difficulty. Not met  2. Pt will demonstrate improvement in active R shoulder flexion to >100 degrees to increase UE function and participation in

## 2024-07-03 ENCOUNTER — OFFICE VISIT (OUTPATIENT)
Dept: INTERNAL MEDICINE CLINIC | Facility: CLINIC | Age: 84
End: 2024-07-03
Payer: MEDICARE

## 2024-07-03 VITALS
HEART RATE: 61 BPM | WEIGHT: 223 LBS | DIASTOLIC BLOOD PRESSURE: 72 MMHG | TEMPERATURE: 97.9 F | BODY MASS INDEX: 30.2 KG/M2 | OXYGEN SATURATION: 96 % | SYSTOLIC BLOOD PRESSURE: 128 MMHG | HEIGHT: 72 IN

## 2024-07-03 DIAGNOSIS — Z00.00 MEDICARE ANNUAL WELLNESS VISIT, SUBSEQUENT: Primary | ICD-10-CM

## 2024-07-03 PROCEDURE — 3078F DIAST BP <80 MM HG: CPT | Performed by: INTERNAL MEDICINE

## 2024-07-03 PROCEDURE — 3074F SYST BP LT 130 MM HG: CPT | Performed by: INTERNAL MEDICINE

## 2024-07-03 PROCEDURE — G0439 PPPS, SUBSEQ VISIT: HCPCS | Performed by: INTERNAL MEDICINE

## 2024-07-03 PROCEDURE — 1123F ACP DISCUSS/DSCN MKR DOCD: CPT | Performed by: INTERNAL MEDICINE

## 2024-07-03 ASSESSMENT — PATIENT HEALTH QUESTIONNAIRE - PHQ9
SUM OF ALL RESPONSES TO PHQ QUESTIONS 1-9: 2
10. IF YOU CHECKED OFF ANY PROBLEMS, HOW DIFFICULT HAVE THESE PROBLEMS MADE IT FOR YOU TO DO YOUR WORK, TAKE CARE OF THINGS AT HOME, OR GET ALONG WITH OTHER PEOPLE: NOT DIFFICULT AT ALL
SUM OF ALL RESPONSES TO PHQ QUESTIONS 1-9: 2
7. TROUBLE CONCENTRATING ON THINGS, SUCH AS READING THE NEWSPAPER OR WATCHING TELEVISION: NOT AT ALL
SUM OF ALL RESPONSES TO PHQ9 QUESTIONS 1 & 2: 0
9. THOUGHTS THAT YOU WOULD BE BETTER OFF DEAD, OR OF HURTING YOURSELF: NOT AT ALL
5. POOR APPETITE OR OVEREATING: NOT AT ALL
6. FEELING BAD ABOUT YOURSELF - OR THAT YOU ARE A FAILURE OR HAVE LET YOURSELF OR YOUR FAMILY DOWN: NOT AT ALL
SUM OF ALL RESPONSES TO PHQ QUESTIONS 1-9: 2
3. TROUBLE FALLING OR STAYING ASLEEP: MORE THAN HALF THE DAYS
SUM OF ALL RESPONSES TO PHQ QUESTIONS 1-9: 2
8. MOVING OR SPEAKING SO SLOWLY THAT OTHER PEOPLE COULD HAVE NOTICED. OR THE OPPOSITE, BEING SO FIGETY OR RESTLESS THAT YOU HAVE BEEN MOVING AROUND A LOT MORE THAN USUAL: NOT AT ALL
4. FEELING TIRED OR HAVING LITTLE ENERGY: NOT AT ALL
1. LITTLE INTEREST OR PLEASURE IN DOING THINGS: NOT AT ALL
2. FEELING DOWN, DEPRESSED OR HOPELESS: NOT AT ALL

## 2024-07-03 ASSESSMENT — LIFESTYLE VARIABLES
HOW OFTEN DO YOU HAVE A DRINK CONTAINING ALCOHOL: MONTHLY OR LESS
HOW MANY STANDARD DRINKS CONTAINING ALCOHOL DO YOU HAVE ON A TYPICAL DAY: 1 OR 2

## 2024-07-03 NOTE — PROGRESS NOTES
Medicare Annual Wellness Visit    Rocky Childersdayanas is here for Medicare AWV (Lab review), Hypertension, and Hyperlipidemia    Assessment & Plan   Medicare annual wellness visit, subsequent  Recommendations for Preventive Services Due: see orders and patient instructions/AVS.  Recommended screening schedule for the next 5-10 years is provided to the patient in written form: see Patient Instructions/AVS.     Return in 6 months (on 1/3/2025).     Subjective   The following acute and/or chronic problems were also addressed today:  Feeling well.  Labs reviewed and discussed.      Results for orders placed or performed in visit on 06/26/24 (from the past 2160 hour(s))   Lipid Panel   Result Value Ref Range    Cholesterol, Total 145 0 - 200 MG/DL    Triglycerides 111 0 - 150 MG/DL    HDL 50 40 - 60 MG/DL    LDL Cholesterol 73 0 - 100 MG/DL    VLDL Cholesterol Calculated 22 6 - 23 MG/DL    Chol/HDL Ratio 2.9 0.0 - 5.0     Comprehensive Metabolic Panel   Result Value Ref Range    Sodium 141 136 - 145 mmol/L    Potassium 3.9 3.5 - 5.1 mmol/L    Chloride 106 98 - 107 mmol/L    CO2 26 20 - 28 mmol/L    Anion Gap 9 9 - 18 mmol/L    Glucose 101 (H) 70 - 99 mg/dL    BUN 19 8 - 23 MG/DL    Creatinine 1.12 0.80 - 1.30 MG/DL    Est, Glom Filt Rate 66 >60 ml/min/1.73m2    Calcium 9.2 8.8 - 10.2 MG/DL    Total Bilirubin 0.7 0.0 - 1.2 MG/DL    ALT 35 12 - 65 U/L    AST 24 15 - 37 U/L    Alk Phosphatase 91 40 - 129 U/L    Total Protein 6.3 6.3 - 8.2 g/dL    Albumin 3.7 3.2 - 4.6 g/dL    Globulin 2.6 2.3 - 3.5 g/dL    Albumin/Globulin Ratio 1.4 1.0 - 1.9     CBC with Auto Differential   Result Value Ref Range    WBC 8.1 4.3 - 11.1 K/uL    RBC 4.89 4.23 - 5.6 M/uL    Hemoglobin 15.0 13.6 - 17.2 g/dL    Hematocrit 45.1 41.1 - 50.3 %    MCV 92.2 82 - 102 FL    MCH 30.7 26.1 - 32.9 PG    MCHC 33.3 31.4 - 35.0 g/dL    RDW 12.5 11.9 - 14.6 %    Platelets 211 150 - 450 K/uL    MPV 10.3 9.4 - 12.3 FL    nRBC 0.00 0.0 - 0.2 K/uL

## 2024-07-03 NOTE — PATIENT INSTRUCTIONS
good choice. Or you may want to swim, bike, or do other activities. Bit by bit, increase the time you're active every day. Try for at least 30 minutes on most days of the week.     Try to quit or cut back on using tobacco and other nicotine products. This includes smoking and vaping. If you need help quitting, talk to your doctor about stop-smoking programs and medicines. These can increase your chances of quitting for good. Quitting is one of the most important things you can do to protect your heart. It is never too late to quit. Try to avoid secondhand smoke too.     Stay at a weight that's healthy for you. Talk to your doctor if you need help losing weight.     Try to get 7 to 9 hours of sleep each night.     Limit alcohol to 2 drinks a day for men and 1 drink a day for women. Too much alcohol can cause health problems.     Manage other health problems such as diabetes, high blood pressure, and high cholesterol. If you think you may have a problem with alcohol or drug use, talk to your doctor.   Medicines    Take your medicines exactly as prescribed. Call your doctor if you think you are having a problem with your medicine.     If your doctor recommends aspirin, take the amount directed each day. Make sure you take aspirin and not another kind of pain reliever, such as acetaminophen (Tylenol).   When should you call for help?   Call 911 if you have symptoms of a heart attack. These may include:    Chest pain or pressure, or a strange feeling in the chest.     Sweating.     Shortness of breath.     Pain, pressure, or a strange feeling in the back, neck, jaw, or upper belly or in one or both shoulders or arms.     Lightheadedness or sudden weakness.     A fast or irregular heartbeat.   After you call 911, the  may tell you to chew 1 adult-strength or 2 to 4 low-dose aspirin. Wait for an ambulance. Do not try to drive yourself.  Watch closely for changes in your health, and be sure to contact your doctor if

## 2024-07-05 ENCOUNTER — APPOINTMENT (OUTPATIENT)
Dept: PHYSICAL THERAPY | Age: 84
End: 2024-07-05
Payer: MEDICARE

## 2024-07-08 ENCOUNTER — APPOINTMENT (OUTPATIENT)
Dept: PHYSICAL THERAPY | Age: 84
End: 2024-07-08
Payer: MEDICARE

## 2024-07-10 ENCOUNTER — APPOINTMENT (OUTPATIENT)
Dept: PHYSICAL THERAPY | Age: 84
End: 2024-07-10
Payer: MEDICARE

## 2024-07-15 ENCOUNTER — APPOINTMENT (OUTPATIENT)
Dept: PHYSICAL THERAPY | Age: 84
End: 2024-07-15
Payer: MEDICARE

## 2024-07-17 ENCOUNTER — TELEPHONE (OUTPATIENT)
Dept: INTERNAL MEDICINE CLINIC | Facility: CLINIC | Age: 84
End: 2024-07-17

## 2024-07-17 ENCOUNTER — APPOINTMENT (OUTPATIENT)
Dept: PHYSICAL THERAPY | Age: 84
End: 2024-07-17
Payer: MEDICARE

## 2024-07-17 DIAGNOSIS — R56.9 SEIZURE (HCC): ICD-10-CM

## 2024-07-17 DIAGNOSIS — I10 PRIMARY HYPERTENSION: ICD-10-CM

## 2024-07-17 DIAGNOSIS — E78.2 MIXED HYPERLIPIDEMIA: ICD-10-CM

## 2024-07-17 RX ORDER — LEVETIRACETAM 500 MG/1
1000 TABLET, FILM COATED, EXTENDED RELEASE ORAL DAILY
Qty: 180 TABLET | Refills: 1 | Status: CANCELLED | OUTPATIENT
Start: 2024-07-17

## 2024-07-17 RX ORDER — ATORVASTATIN CALCIUM 80 MG/1
80 TABLET, FILM COATED ORAL
Qty: 90 TABLET | Refills: 1 | Status: CANCELLED | OUTPATIENT
Start: 2024-07-17 | End: 2025-07-12

## 2024-07-17 RX ORDER — FINASTERIDE 5 MG/1
5 TABLET, FILM COATED ORAL DAILY
Qty: 90 TABLET | Refills: 1 | Status: CANCELLED | OUTPATIENT
Start: 2024-07-17

## 2024-07-17 RX ORDER — AMLODIPINE AND VALSARTAN 5; 160 MG/1; MG/1
1 TABLET ORAL DAILY
Qty: 90 TABLET | Refills: 1 | Status: SHIPPED | OUTPATIENT
Start: 2024-07-17

## 2024-07-17 NOTE — TELEPHONE ENCOUNTER
Medication Refill Request    Name of Medication : Keppra XR     Strength of Medication: 500 MG    Directions: 2 tablets by mouth daily    30 day or 90 day supply: 180    Preferred Pharmacy: [facility] [place], [place]    Last Appt. Date: 07/3/24    Next Appt. Date: NONE    Additional Information For Provider: Pt is completley out  Medication Refill Request    Name of Medication : Finasteride    Strength of Medication: 5MG    Directions:  1 tablet by mouth daily    30 day or 90 day supply: 90    Preferred Pharmacy:     Last Appt. Date:     Next Appt. Date:     Additional Information For Provider:  No pills left.  Medication Refill Request    Name of Medication : Atorvastatin    Strength of Medication: 80    Directions:  1 tablet by mouth nightly    30 day or 90 day supply: 90    Preferred Pharmacy:     Last Appt. Date:     Next Appt. Date:     Additional Information For Provider:  No pills left  Medication Refill Request    Name of Medication : Amlodipine    Strength of Medication: 5-160    Directions:  1 tablet by mouth daily    30 day or 90 day supply: 30    Preferred Pharmacy:     Last Appt. Date:     Next Appt. Date:     Additional Information For Provider: No more pills left

## 2024-07-17 NOTE — TELEPHONE ENCOUNTER
Finasteride is prescribed by Dr. Gill and Keppra XR and atorvastatin have 3 refills remaining on file at [facility].     180 day supple of amlodipine-valsartan sent to [facility].

## 2024-09-03 ENCOUNTER — PROCEDURE VISIT (OUTPATIENT)
Dept: NEUROLOGY | Age: 84
End: 2024-09-03
Payer: MEDICARE

## 2024-09-03 VITALS — OXYGEN SATURATION: 96 % | WEIGHT: 225 LBS | HEART RATE: 90 BPM | BODY MASS INDEX: 30.48 KG/M2 | HEIGHT: 72 IN

## 2024-09-03 DIAGNOSIS — R29.898 WEAKNESS OF RIGHT SHOULDER: Primary | ICD-10-CM

## 2024-09-03 DIAGNOSIS — R20.2 RIGHT HAND PARESTHESIA: ICD-10-CM

## 2024-09-03 PROCEDURE — 95886 MUSC TEST DONE W/N TEST COMP: CPT | Performed by: PSYCHIATRY & NEUROLOGY

## 2024-09-03 PROCEDURE — 95910 NRV CNDJ TEST 7-8 STUDIES: CPT | Performed by: PSYCHIATRY & NEUROLOGY

## 2024-09-03 NOTE — PROGRESS NOTES
EMG/Nerve Conduction Study Procedure Note  [address]    [address]  [place]  [address]   [phone]      Hx:    Exam:     84 y.o.y.o. male    EMG right upper extremity for shoulder and arm complaints.  No atrophy.  No fasciculations.  No Javan.  Some deafness.  Referring: GAVIN Mojica  Technologist: Manuel Schilling  Height: 6 foot 0 inches       Summary     needle EMG right upper extremity with CV.                 Controlled environmental factors / EMG lab.  Temperature.   NCV : sensory segments:    Abnormal = slowed right median SCV with attenuated SNAP.  Normal right ulnar right radial SCV.  NCV transcarpal sensory segments:     Abnormal = slowed right median transcarpal segments with normal transcarpal ulnar and a peak latency difference of 0.52 ms (UL = 0.20 ms).        NCV Motor MCV segments:     Normal right median and ulnar MCV.  F-wave studies:     Normal right median and ulnar F waves.   NEEDLE EMG:   Tested muscles::    Normal right FDI APB ADM EDC triceps deltoid biceps muscles = no fibrillation positive sharp waves or fasciculations or myotonia.  Normal MUP and recruitment.      INTERPRETATION: THESE FINDINGS ARE ELECTROPHYSIOLOGIC EVIDENCE OF ENTRAPPED MEDIAN NERVE AT THE WRIST THAT IS MILD AND EARLY.  NO OTHER ENTRAPMENTS.  NO AXONAL DENERVATION.                CONCLUSION:      Early right carpal tunnel syndrome only.      Procedure Details:      Correlates with carpal tunnel syndrome.  Early degree of severity.    Patient is made aware.              Please Note::     Data and waveforms * filed under Procedure category [facility].    See Procedure Files for complete data pages.       [ *NOTE:  parts or all of this consultation are produced using artificial voice recognition software.  Some speech errors are inherent in such software and may be included in the produced record. ]           Stuart Mclean MD  Consultative  Neurodiagnostics   [facility]

## 2024-09-10 ENCOUNTER — OFFICE VISIT (OUTPATIENT)
Dept: ORTHOPEDIC SURGERY | Age: 84
End: 2024-09-10
Payer: MEDICARE

## 2024-09-10 DIAGNOSIS — M19.011 PRIMARY OSTEOARTHRITIS OF RIGHT SHOULDER: Primary | ICD-10-CM

## 2024-09-10 DIAGNOSIS — M75.101 RIGHT ROTATOR CUFF TEAR ARTHROPATHY: ICD-10-CM

## 2024-09-10 DIAGNOSIS — M12.811 RIGHT ROTATOR CUFF TEAR ARTHROPATHY: ICD-10-CM

## 2024-09-10 PROCEDURE — 1036F TOBACCO NON-USER: CPT | Performed by: ORTHOPAEDIC SURGERY

## 2024-09-10 PROCEDURE — G8427 DOCREV CUR MEDS BY ELIG CLIN: HCPCS | Performed by: ORTHOPAEDIC SURGERY

## 2024-09-10 PROCEDURE — 99214 OFFICE O/P EST MOD 30 MIN: CPT | Performed by: ORTHOPAEDIC SURGERY

## 2024-09-10 PROCEDURE — G8417 CALC BMI ABV UP PARAM F/U: HCPCS | Performed by: ORTHOPAEDIC SURGERY

## 2024-09-10 PROCEDURE — 1123F ACP DISCUSS/DSCN MKR DOCD: CPT | Performed by: ORTHOPAEDIC SURGERY

## 2024-09-10 RX ORDER — TRAMADOL HYDROCHLORIDE 50 MG/1
50-100 TABLET ORAL NIGHTLY PRN
Qty: 30 TABLET | Refills: 0 | Status: SHIPPED | OUTPATIENT
Start: 2024-09-10 | End: 2024-09-25

## 2024-10-04 ENCOUNTER — OFFICE VISIT (OUTPATIENT)
Dept: ORTHOPEDIC SURGERY | Age: 84
End: 2024-10-04

## 2024-10-04 DIAGNOSIS — M12.811 RIGHT ROTATOR CUFF TEAR ARTHROPATHY: ICD-10-CM

## 2024-10-04 DIAGNOSIS — M75.101 RIGHT ROTATOR CUFF TEAR ARTHROPATHY: ICD-10-CM

## 2024-10-04 DIAGNOSIS — M19.011 PRIMARY OSTEOARTHRITIS OF RIGHT SHOULDER: Primary | ICD-10-CM

## 2024-10-04 NOTE — PROGRESS NOTES
Crossed Arm Adduction Test Not tested Not tested   Instability/Ant. Apprehension Test None  None   Impingement limited Negative                Xray:    CT right shoulder from 9-11-24:  FINDINGS:  BONES: No fracture or dislocation of the right shoulder. The humeral head abuts  the acromial undersurface indicating a chronic full-thickness rotator cuff tear  and correlating with results on March 2024 MRI. Moderate glenohumeral joint  effusion, chronic and similar to prior MRI. Small inferomedial humeral head  osteophyte measuring 1 cm. Glenohumeral joint space narrowing with mild  broadening/remodel of the glenoid fossa. Os acromiale is noted. Minor  osteoarthritis of the AC joint. Bone mineralization appears normal. No  suspicious bone lesion.  JOINTS: The right glenohumeral joint appears maintained. The right AC joint  appears maintained.  MUSCULATURE: At least moderate atrophy and partial fatty replacement of the  supraspinatus and infraspinatus muscles.  SOFT TISSUES: No focal soft tissue edema or suspicious soft tissue mass. The  imaged right lung is clear.  Incidental neuroforaminal stenoses on the right of the imaged lower cervical  spine     IMPRESSION:  Chronic rotator cuff tear with the humeral head abutting the acromial  undersurface. At least moderate atrophy of the supraspinatus infraspinatus  muscles. Overall probable no change from March 2024 MRI    Independently reviewed by myself today: Equinox planning amparo shows posterior superior baseplate with size 42 glenosphere and size 14 standard press-fit stem.  Lengthening 36 mm and lateralization of 2 mm.    A/Plan:     ICD-10-CM    1. Primary osteoarthritis of right shoulder  M19.011       2. Right rotator cuff tear arthropathy  M75.101     M12.811            The patient desires a right reverse total shoulder replacement.  I talked with them extensively about the risks, benefits, reasonable expectations and expected recovery time as well as possible

## 2024-10-08 DIAGNOSIS — M12.811 RIGHT ROTATOR CUFF TEAR ARTHROPATHY: ICD-10-CM

## 2024-10-08 DIAGNOSIS — M75.101 RIGHT ROTATOR CUFF TEAR ARTHROPATHY: ICD-10-CM

## 2024-10-08 DIAGNOSIS — M19.011 PRIMARY OSTEOARTHRITIS OF RIGHT SHOULDER: Primary | ICD-10-CM

## 2024-10-09 DIAGNOSIS — M19.011 PRIMARY OSTEOARTHRITIS OF RIGHT SHOULDER: Primary | ICD-10-CM

## 2024-10-09 DIAGNOSIS — M12.811 RIGHT ROTATOR CUFF TEAR ARTHROPATHY: ICD-10-CM

## 2024-10-09 DIAGNOSIS — M75.101 RIGHT ROTATOR CUFF TEAR ARTHROPATHY: ICD-10-CM

## 2024-10-14 ENCOUNTER — HOSPITAL ENCOUNTER (OUTPATIENT)
Dept: SURGERY | Age: 84
Discharge: HOME | End: 2024-10-17
Payer: MEDICARE

## 2024-10-14 VITALS
DIASTOLIC BLOOD PRESSURE: 71 MMHG | OXYGEN SATURATION: 98 % | HEART RATE: 70 BPM | RESPIRATION RATE: 18 BRPM | HEIGHT: 72 IN | SYSTOLIC BLOOD PRESSURE: 115 MMHG | BODY MASS INDEX: 30.66 KG/M2 | TEMPERATURE: 97.3 F | WEIGHT: 226.4 LBS

## 2024-10-14 LAB
ANION GAP SERPL CALC-SCNC: 13 MMOL/L (ref 9–18)
BUN SERPL-MCNC: 22 MG/DL (ref 8–23)
CALCIUM SERPL-MCNC: 9.4 MG/DL (ref 8.8–10.2)
CHLORIDE SERPL-SCNC: 105 MMOL/L (ref 98–107)
CO2 SERPL-SCNC: 22 MMOL/L (ref 20–28)
CREAT SERPL-MCNC: 1.08 MG/DL (ref 0.8–1.3)
ERYTHROCYTE [DISTWIDTH] IN BLOOD BY AUTOMATED COUNT: 12 % (ref 11.9–14.6)
GLUCOSE SERPL-MCNC: 108 MG/DL (ref 70–99)
HCT VFR BLD AUTO: 46.2 % (ref 41.1–50.3)
HGB BLD-MCNC: 15.9 G/DL (ref 13.6–17.2)
MCH RBC QN AUTO: 30.8 PG (ref 26.1–32.9)
MCHC RBC AUTO-ENTMCNC: 34.4 G/DL (ref 31.4–35)
MCV RBC AUTO: 89.5 FL (ref 82–102)
MRSA DNA SPEC QL NAA+PROBE: NOT DETECTED
NRBC # BLD: 0 K/UL (ref 0–0.2)
PLATELET # BLD AUTO: 218 K/UL (ref 150–450)
PMV BLD AUTO: 9.9 FL (ref 9.4–12.3)
POTASSIUM SERPL-SCNC: 4.4 MMOL/L (ref 3.5–5.1)
RBC # BLD AUTO: 5.16 M/UL (ref 4.23–5.6)
S AUREUS CPE NOSE QL NAA+PROBE: NOT DETECTED
SODIUM SERPL-SCNC: 140 MMOL/L (ref 136–145)
WBC # BLD AUTO: 8 K/UL (ref 4.3–11.1)

## 2024-10-14 PROCEDURE — 80048 BASIC METABOLIC PNL TOTAL CA: CPT

## 2024-10-14 PROCEDURE — 85027 COMPLETE CBC AUTOMATED: CPT

## 2024-10-14 PROCEDURE — 87641 MR-STAPH DNA AMP PROBE: CPT

## 2024-10-14 NOTE — PROGRESS NOTES
PLEASE CONTINUE TAKING ALL PRESCRIPTION MEDICATIONS UP TO THE DAY OF SURGERY UNLESS OTHERWISE DIRECTED BELOW. You may take Tylenol, allergy,  and/or indigestion medications.     TAKE ONLY THESE MEDICATIONS ON THE DAY OF SURGERY    Finasteride Keppra            DISCONTINUE all vitamins and supplements  days prior to surgery. DISCONTINUE Non-Steroidal Anti-Inflammatory (NSAIDS) such as Advil and Aleve 5 days prior to surgery.     Home Medications to Hold- please continue all other medications except these.    No Amlodipine-Valsartan Morning of surgery only         Comments      On the day before surgery please take 2 Tylenol in the morning and then again before bed. You may use either regular or extra strength 10/20/24 .           Please do not bring home medications with you on the day of surgery unless otherwise directed by your nurse.  If you are instructed to bring home medications, please give them to your nurse as they will be administered by the nursing staff.    If you have any questions, please call [facility] [phone].    A copy of this note was provided to the patient for reference.    
  Patient verified name and     Order for consent not found in EHR and matches case posting; patient verified.     Type 3 surgery, phone assessment complete.    Labs per surgeon: none;   Labs per anesthesia protocol: CBC,BMP;MRSA T/S  results pending  EKG: 10/11/23, No CAD, DM or CVA         Patient provided with and instructed on educational handouts including Guide to Surgery, Preventing Surgical Site Infections, Pain Management, and [facility] Brochure.    Patient answered medical/surgical history questions at their best of ability. All prior to admission medications documented in EPIC. Original medication prescription bottle not visualized during patient appointment.     Patient instructed to hold all vitamins 7 days prior to surgery and NSAIDS 5 days prior to surgery, patient verbalized understanding.     Patient teach back successful and patient demonstrates knowledge of instructions.    
CBC,BMP;MRSA  within anesthesia guidelines, no follow-up required. Labs automatically routed to ordering provider via Epic documentation.   
  Social History     Tobacco Use    Smoking status: Never     Passive exposure: Never    Smokeless tobacco: Never   Substance Use Topics    Alcohol use: No       Prior to Admission medications    Medication Sig Start Date End Date Taking? Authorizing Provider   amLODIPine-valsartan (EXFORGE) 5-160 MG per tablet Take 1 tablet by mouth daily 7/17/24   Magan Moncada MD   psyllium (KONSYL) 28.3 % PACK Take 1 packet by mouth daily    Provider, MD Anthony   finasteride (PROSCAR) 5 MG tablet Take 1 tablet by mouth daily 1/25/24   Gold Gill MD   atorvastatin (LIPITOR) 80 MG tablet Take 1 tablet by mouth daily  Patient taking differently: Take 1 tablet by mouth nightly 12/12/23 12/6/24  Magan Moncada MD   levETIRAcetam (KEPPRA XR) 500 MG TB24 extended release tablet Take 2 tablets by mouth daily 12/12/23   Magan Moncada MD     No Known Allergies       Objective:     Physical Exam:   No data found.      ECG:    No results found for this or any previous visit (from the past 4464 hour(s)).    Data Review:   Labs:   No results found for this or any previous visit (from the past 24 hour(s)).      Problem List:  )  Patient Active Problem List   Diagnosis    Obstructive sleep apnea    Need for hepatitis C screening test    Chronic shoulder bursitis    History of gastroesophageal reflux (GERD)    Disorder of bone density and structure, unspecified    Elevated PSA    Hyperlipidemia    Benign prostatic hyperplasia without lower urinary tract symptoms    Seizure (HCC)    Arthritis of shoulder region, left, degenerative    Insomnia    Hypertension    Amnesia    Transient global amnesia    Stroke-like symptoms    History of angina    Lipoma of right upper extremity    Primary osteoarthritis of right shoulder    Right rotator cuff tear arthropathy       Total Joint Surgery Pre-Assessment Recommendations:           Continuous saturation monitoring during hospitalization. O2 prn per respiratory

## 2024-10-18 NOTE — PERIOP NOTE
Preop department called to notify patient of arrival time for scheduled procedure. Instructions given to   - Arrive at [facility] En[facility] [address].  - No solid food after midnight & Please drink 32 ounces of water 2 hours prior to your arrival to avoid dehydration unless otherwise indicated. No gum, mints, or ice chips.   - Have a responsible adult to drive patient to the hospital, stay during surgery, and patient will need supervision 24 hours after anesthesia.   - Use antibacterial soap in shower the night before surgery and on the morning of surgery.       Was patient contacted: yes  Voicemail left:   Numbers contacted: [phone]   Arrival time: 0530  Time to complete 32 ounces of water: as much as possible before bedtime

## 2024-10-18 NOTE — H&P
the procedure were discussed. The patient  wishes to proceed with the procedure at this time.        Nigel Johnson MD  10/21/24

## 2024-10-21 ENCOUNTER — ANESTHESIA (OUTPATIENT)
Dept: SURGERY | Age: 84
End: 2024-10-21
Payer: MEDICARE

## 2024-10-21 ENCOUNTER — ANESTHESIA EVENT (OUTPATIENT)
Dept: SURGERY | Age: 84
End: 2024-10-21
Payer: MEDICARE

## 2024-10-21 ENCOUNTER — APPOINTMENT (OUTPATIENT)
Dept: GENERAL RADIOLOGY | Age: 84
End: 2024-10-21
Attending: ORTHOPAEDIC SURGERY
Payer: MEDICARE

## 2024-10-21 ENCOUNTER — HOSPITAL ENCOUNTER (OUTPATIENT)
Age: 84
Discharge: HOME | End: 2024-10-23
Attending: ORTHOPAEDIC SURGERY | Admitting: ORTHOPAEDIC SURGERY
Payer: MEDICARE

## 2024-10-21 DIAGNOSIS — Z96.611 STATUS POST REVERSE ARTHROPLASTY OF RIGHT SHOULDER: Primary | ICD-10-CM

## 2024-10-21 PROBLEM — M19.011 ARTHRITIS OF RIGHT SHOULDER REGION: Status: ACTIVE | Noted: 2024-10-21

## 2024-10-21 LAB
ABO + RH BLD: NORMAL
BLOOD GROUP ANTIBODIES SERPL: NORMAL
SPECIMEN EXP DATE BLD: NORMAL

## 2024-10-21 PROCEDURE — 6360000002 HC RX W HCPCS: Performed by: ANESTHESIOLOGY

## 2024-10-21 PROCEDURE — 6360000002 HC RX W HCPCS: Performed by: ORTHOPAEDIC SURGERY

## 2024-10-21 PROCEDURE — 23472 RECONSTRUCT SHOULDER JOINT: CPT | Performed by: ORTHOPAEDIC SURGERY

## 2024-10-21 PROCEDURE — 64415 NJX AA&/STRD BRCH PLXS IMG: CPT | Performed by: ANESTHESIOLOGY

## 2024-10-21 PROCEDURE — 6370000000 HC RX 637 (ALT 250 FOR IP): Performed by: ORTHOPAEDIC SURGERY

## 2024-10-21 PROCEDURE — 86901 BLOOD TYPING SEROLOGIC RH(D): CPT

## 2024-10-21 PROCEDURE — 73020 X-RAY EXAM OF SHOULDER: CPT

## 2024-10-21 PROCEDURE — 94760 N-INVAS EAR/PLS OXIMETRY 1: CPT

## 2024-10-21 PROCEDURE — 2700000000 HC OXYGEN THERAPY PER DAY

## 2024-10-21 PROCEDURE — 86900 BLOOD TYPING SEROLOGIC ABO: CPT

## 2024-10-21 PROCEDURE — 86850 RBC ANTIBODY SCREEN: CPT

## 2024-10-21 PROCEDURE — 2580000003 HC RX 258: Performed by: PHYSICIAN ASSISTANT

## 2024-10-21 PROCEDURE — 2580000003 HC RX 258: Performed by: ORTHOPAEDIC SURGERY

## 2024-10-21 PROCEDURE — 6370000000 HC RX 637 (ALT 250 FOR IP): Performed by: PHYSICIAN ASSISTANT

## 2024-10-21 PROCEDURE — 20985 CPTR-ASST DIR MS PX: CPT | Performed by: ORTHOPAEDIC SURGERY

## 2024-10-21 PROCEDURE — 2500000003 HC RX 250 WO HCPCS: Performed by: NURSE ANESTHETIST, CERTIFIED REGISTERED

## 2024-10-21 PROCEDURE — 6360000002 HC RX W HCPCS: Performed by: NURSE ANESTHETIST, CERTIFIED REGISTERED

## 2024-10-21 PROCEDURE — 2500000003 HC RX 250 WO HCPCS: Performed by: ORTHOPAEDIC SURGERY

## 2024-10-21 PROCEDURE — 6360000002 HC RX W HCPCS: Performed by: PHYSICIAN ASSISTANT

## 2024-10-21 RX ORDER — GLYCOPYRROLATE 0.2 MG/ML
INJECTION INTRAMUSCULAR; INTRAVENOUS
Status: DISCONTINUED | OUTPATIENT
Start: 2024-10-21 | End: 2024-10-21 | Stop reason: SDUPTHER

## 2024-10-21 RX ORDER — SODIUM CHLORIDE 0.9 % (FLUSH) 0.9 %
5-40 SYRINGE (ML) INJECTION PRN
Status: DISCONTINUED | OUTPATIENT
Start: 2024-10-21 | End: 2024-10-21 | Stop reason: HOSPADM

## 2024-10-21 RX ORDER — SODIUM CHLORIDE 9 MG/ML
INJECTION, SOLUTION INTRAVENOUS PRN
Status: DISCONTINUED | OUTPATIENT
Start: 2024-10-21 | End: 2024-10-21 | Stop reason: HOSPADM

## 2024-10-21 RX ORDER — ONDANSETRON 4 MG/1
4 TABLET, ORALLY DISINTEGRATING ORAL EVERY 8 HOURS PRN
Status: DISCONTINUED | OUTPATIENT
Start: 2024-10-21 | End: 2024-10-23 | Stop reason: HOSPADM

## 2024-10-21 RX ORDER — SODIUM CHLORIDE 9 MG/ML
INJECTION, SOLUTION INTRAVENOUS CONTINUOUS
Status: DISCONTINUED | OUTPATIENT
Start: 2024-10-21 | End: 2024-10-23 | Stop reason: HOSPADM

## 2024-10-21 RX ORDER — ROPIVACAINE HYDROCHLORIDE 5 MG/ML
INJECTION, SOLUTION EPIDURAL; INFILTRATION; PERINEURAL PRN
Status: DISCONTINUED | OUTPATIENT
Start: 2024-10-21 | End: 2024-10-21 | Stop reason: ALTCHOICE

## 2024-10-21 RX ORDER — EPINEPHRINE 1 MG/ML(1)
AMPUL (ML) INJECTION PRN
Status: DISCONTINUED | OUTPATIENT
Start: 2024-10-21 | End: 2024-10-21 | Stop reason: ALTCHOICE

## 2024-10-21 RX ORDER — OXYCODONE HYDROCHLORIDE 5 MG/1
5 TABLET ORAL
Status: DISCONTINUED | OUTPATIENT
Start: 2024-10-21 | End: 2024-10-21 | Stop reason: HOSPADM

## 2024-10-21 RX ORDER — DEXAMETHASONE SODIUM PHOSPHATE 4 MG/ML
INJECTION, SOLUTION INTRA-ARTICULAR; INTRALESIONAL; INTRAMUSCULAR; INTRAVENOUS; SOFT TISSUE
Status: COMPLETED | OUTPATIENT
Start: 2024-10-21 | End: 2024-10-21

## 2024-10-21 RX ORDER — ROPIVACAINE HYDROCHLORIDE 5 MG/ML
INJECTION, SOLUTION EPIDURAL; INFILTRATION; PERINEURAL
Status: COMPLETED | OUTPATIENT
Start: 2024-10-21 | End: 2024-10-21

## 2024-10-21 RX ORDER — LEVETIRACETAM 500 MG/1
500 TABLET ORAL 2 TIMES DAILY
Status: DISCONTINUED | OUTPATIENT
Start: 2024-10-21 | End: 2024-10-23 | Stop reason: HOSPADM

## 2024-10-21 RX ORDER — NALOXONE HYDROCHLORIDE 0.4 MG/ML
INJECTION, SOLUTION INTRAMUSCULAR; INTRAVENOUS; SUBCUTANEOUS PRN
Status: DISCONTINUED | OUTPATIENT
Start: 2024-10-21 | End: 2024-10-21 | Stop reason: HOSPADM

## 2024-10-21 RX ORDER — MIDAZOLAM HYDROCHLORIDE 2 MG/2ML
2 INJECTION, SOLUTION INTRAMUSCULAR; INTRAVENOUS
Status: COMPLETED | OUTPATIENT
Start: 2024-10-21 | End: 2024-10-21

## 2024-10-21 RX ORDER — ROCURONIUM BROMIDE 10 MG/ML
INJECTION, SOLUTION INTRAVENOUS
Status: DISCONTINUED | OUTPATIENT
Start: 2024-10-21 | End: 2024-10-21 | Stop reason: SDUPTHER

## 2024-10-21 RX ORDER — DIPHENHYDRAMINE HCL 25 MG
25 CAPSULE ORAL EVERY 6 HOURS PRN
Status: DISCONTINUED | OUTPATIENT
Start: 2024-10-21 | End: 2024-10-23 | Stop reason: HOSPADM

## 2024-10-21 RX ORDER — AMLODIPINE BESYLATE 5 MG/1
5 TABLET ORAL DAILY
Status: DISCONTINUED | OUTPATIENT
Start: 2024-10-21 | End: 2024-10-23 | Stop reason: HOSPADM

## 2024-10-21 RX ORDER — EPHEDRINE SULFATE 5 MG/ML
INJECTION INTRAVENOUS
Status: DISCONTINUED | OUTPATIENT
Start: 2024-10-21 | End: 2024-10-21 | Stop reason: SDUPTHER

## 2024-10-21 RX ORDER — SODIUM CHLORIDE 0.9 % (FLUSH) 0.9 %
5-40 SYRINGE (ML) INJECTION EVERY 12 HOURS SCHEDULED
Status: DISCONTINUED | OUTPATIENT
Start: 2024-10-21 | End: 2024-10-23 | Stop reason: HOSPADM

## 2024-10-21 RX ORDER — AMLODIPINE AND VALSARTAN 5; 160 MG/1; MG/1
1 TABLET ORAL DAILY
Status: DISCONTINUED | OUTPATIENT
Start: 2024-10-21 | End: 2024-10-21 | Stop reason: SDUPTHER

## 2024-10-21 RX ORDER — ASPIRIN 325 MG
325 TABLET, DELAYED RELEASE (ENTERIC COATED) ORAL 2 TIMES DAILY
Status: DISCONTINUED | OUTPATIENT
Start: 2024-10-21 | End: 2024-10-23 | Stop reason: HOSPADM

## 2024-10-21 RX ORDER — DIPHENHYDRAMINE HYDROCHLORIDE 50 MG/ML
25 INJECTION INTRAMUSCULAR; INTRAVENOUS EVERY 6 HOURS PRN
Status: DISCONTINUED | OUTPATIENT
Start: 2024-10-21 | End: 2024-10-23 | Stop reason: HOSPADM

## 2024-10-21 RX ORDER — SODIUM CHLORIDE 0.9 % (FLUSH) 0.9 %
5-40 SYRINGE (ML) INJECTION EVERY 12 HOURS SCHEDULED
Status: DISCONTINUED | OUTPATIENT
Start: 2024-10-21 | End: 2024-10-21 | Stop reason: HOSPADM

## 2024-10-21 RX ORDER — OXYCODONE AND ACETAMINOPHEN 7.5; 325 MG/1; MG/1
2 TABLET ORAL EVERY 4 HOURS PRN
Status: DISCONTINUED | OUTPATIENT
Start: 2024-10-21 | End: 2024-10-22

## 2024-10-21 RX ORDER — PROCHLORPERAZINE EDISYLATE 5 MG/ML
5 INJECTION INTRAMUSCULAR; INTRAVENOUS
Status: DISCONTINUED | OUTPATIENT
Start: 2024-10-21 | End: 2024-10-21 | Stop reason: HOSPADM

## 2024-10-21 RX ORDER — LIDOCAINE HYDROCHLORIDE 20 MG/ML
INJECTION, SOLUTION EPIDURAL; INFILTRATION; INTRACAUDAL; PERINEURAL
Status: DISCONTINUED | OUTPATIENT
Start: 2024-10-21 | End: 2024-10-21 | Stop reason: SDUPTHER

## 2024-10-21 RX ORDER — HYDROMORPHONE HYDROCHLORIDE 2 MG/ML
0.5 INJECTION, SOLUTION INTRAMUSCULAR; INTRAVENOUS; SUBCUTANEOUS EVERY 5 MIN PRN
Status: DISCONTINUED | OUTPATIENT
Start: 2024-10-21 | End: 2024-10-21 | Stop reason: HOSPADM

## 2024-10-21 RX ORDER — HYDROMORPHONE HYDROCHLORIDE 1 MG/ML
1 INJECTION, SOLUTION INTRAMUSCULAR; INTRAVENOUS; SUBCUTANEOUS
Status: DISCONTINUED | OUTPATIENT
Start: 2024-10-21 | End: 2024-10-23 | Stop reason: HOSPADM

## 2024-10-21 RX ORDER — NEOSTIGMINE METHYLSULFATE 1 MG/ML
INJECTION INTRAVENOUS
Status: DISCONTINUED | OUTPATIENT
Start: 2024-10-21 | End: 2024-10-21 | Stop reason: SDUPTHER

## 2024-10-21 RX ORDER — OXYCODONE AND ACETAMINOPHEN 7.5; 325 MG/1; MG/1
1 TABLET ORAL EVERY 4 HOURS PRN
Status: DISCONTINUED | OUTPATIENT
Start: 2024-10-21 | End: 2024-10-22

## 2024-10-21 RX ORDER — ALBUTEROL SULFATE 0.83 MG/ML
2.5 SOLUTION RESPIRATORY (INHALATION) EVERY 6 HOURS PRN
Status: DISCONTINUED | OUTPATIENT
Start: 2024-10-21 | End: 2024-10-23 | Stop reason: HOSPADM

## 2024-10-21 RX ORDER — SODIUM CHLORIDE 9 MG/ML
INJECTION, SOLUTION INTRAVENOUS PRN
Status: DISCONTINUED | OUTPATIENT
Start: 2024-10-21 | End: 2024-10-23 | Stop reason: HOSPADM

## 2024-10-21 RX ORDER — VALSARTAN 160 MG/1
160 TABLET ORAL DAILY
Status: DISCONTINUED | OUTPATIENT
Start: 2024-10-21 | End: 2024-10-23 | Stop reason: HOSPADM

## 2024-10-21 RX ORDER — FINASTERIDE 5 MG/1
5 TABLET, FILM COATED ORAL DAILY
Status: DISCONTINUED | OUTPATIENT
Start: 2024-10-21 | End: 2024-10-23 | Stop reason: HOSPADM

## 2024-10-21 RX ORDER — TRANEXAMIC ACID 100 MG/ML
INJECTION, SOLUTION INTRAVENOUS PRN
Status: DISCONTINUED | OUTPATIENT
Start: 2024-10-21 | End: 2024-10-21 | Stop reason: ALTCHOICE

## 2024-10-21 RX ORDER — SODIUM CHLORIDE 0.9 % (FLUSH) 0.9 %
5-40 SYRINGE (ML) INJECTION PRN
Status: DISCONTINUED | OUTPATIENT
Start: 2024-10-21 | End: 2024-10-23 | Stop reason: HOSPADM

## 2024-10-21 RX ORDER — ATORVASTATIN CALCIUM 80 MG/1
80 TABLET, FILM COATED ORAL
Status: DISCONTINUED | OUTPATIENT
Start: 2024-10-21 | End: 2024-10-23 | Stop reason: HOSPADM

## 2024-10-21 RX ORDER — HYDROMORPHONE HYDROCHLORIDE 1 MG/ML
0.5 INJECTION, SOLUTION INTRAMUSCULAR; INTRAVENOUS; SUBCUTANEOUS
Status: DISCONTINUED | OUTPATIENT
Start: 2024-10-21 | End: 2024-10-23 | Stop reason: HOSPADM

## 2024-10-21 RX ORDER — LEVETIRACETAM 500 MG/1
1000 TABLET, FILM COATED, EXTENDED RELEASE ORAL DAILY
Status: DISCONTINUED | OUTPATIENT
Start: 2024-10-21 | End: 2024-10-21 | Stop reason: SDUPTHER

## 2024-10-21 RX ORDER — ONDANSETRON 2 MG/ML
INJECTION INTRAMUSCULAR; INTRAVENOUS
Status: DISCONTINUED | OUTPATIENT
Start: 2024-10-21 | End: 2024-10-21 | Stop reason: SDUPTHER

## 2024-10-21 RX ORDER — VANCOMYCIN HYDROCHLORIDE 1 G/20ML
INJECTION, POWDER, LYOPHILIZED, FOR SOLUTION INTRAVENOUS PRN
Status: DISCONTINUED | OUTPATIENT
Start: 2024-10-21 | End: 2024-10-21 | Stop reason: ALTCHOICE

## 2024-10-21 RX ORDER — PROPOFOL 10 MG/ML
INJECTION, EMULSION INTRAVENOUS
Status: DISCONTINUED | OUTPATIENT
Start: 2024-10-21 | End: 2024-10-21 | Stop reason: SDUPTHER

## 2024-10-21 RX ORDER — POLYETHYLENE GLYCOL 3350 17 G/17G
17 POWDER, FOR SOLUTION ORAL DAILY
Status: COMPLETED | OUTPATIENT
Start: 2024-10-21 | End: 2024-10-22

## 2024-10-21 RX ORDER — ONDANSETRON 2 MG/ML
4 INJECTION INTRAMUSCULAR; INTRAVENOUS EVERY 6 HOURS PRN
Status: DISCONTINUED | OUTPATIENT
Start: 2024-10-21 | End: 2024-10-23 | Stop reason: HOSPADM

## 2024-10-21 RX ADMIN — ATORVASTATIN CALCIUM 80 MG: 80 TABLET, FILM COATED ORAL at 21:03

## 2024-10-21 RX ADMIN — SODIUM CHLORIDE: 9 INJECTION, SOLUTION INTRAVENOUS at 16:22

## 2024-10-21 RX ADMIN — VANCOMYCIN HYDROCHLORIDE 500 MG: 500 INJECTION, POWDER, LYOPHILIZED, FOR SOLUTION INTRAVENOUS at 07:40

## 2024-10-21 RX ADMIN — EPHEDRINE SULFATE 15 MG: 5 INJECTION INTRAVENOUS at 09:58

## 2024-10-21 RX ADMIN — ASPIRIN 325 MG: 325 TABLET, COATED ORAL at 21:03

## 2024-10-21 RX ADMIN — DEXAMETHASONE SODIUM PHOSPHATE 4 MG: 4 INJECTION INTRA-ARTICULAR; INTRALESIONAL; INTRAMUSCULAR; INTRAVENOUS; SOFT TISSUE at 08:08

## 2024-10-21 RX ADMIN — HYDROMORPHONE HYDROCHLORIDE 0.5 MG: 2 INJECTION INTRAMUSCULAR; INTRAVENOUS; SUBCUTANEOUS at 12:57

## 2024-10-21 RX ADMIN — ROCURONIUM BROMIDE 10 MG: 10 INJECTION, SOLUTION INTRAVENOUS at 11:11

## 2024-10-21 RX ADMIN — EPHEDRINE SULFATE 10 MG: 5 INJECTION INTRAVENOUS at 11:47

## 2024-10-21 RX ADMIN — AMLODIPINE BESYLATE 5 MG: 5 TABLET ORAL at 16:06

## 2024-10-21 RX ADMIN — EPHEDRINE SULFATE 10 MG: 5 INJECTION INTRAVENOUS at 11:51

## 2024-10-21 RX ADMIN — DEXAMETHASONE SODIUM PHOSPHATE 4 MG: 4 INJECTION INTRA-ARTICULAR; INTRALESIONAL; INTRAMUSCULAR; INTRAVENOUS; SOFT TISSUE at 11:32

## 2024-10-21 RX ADMIN — HYDROMORPHONE HYDROCHLORIDE 0.5 MG: 2 INJECTION INTRAMUSCULAR; INTRAVENOUS; SUBCUTANEOUS at 13:02

## 2024-10-21 RX ADMIN — ROPIVACAINE HYDROCHLORIDE 20 ML: 5 INJECTION, SOLUTION EPIDURAL; INFILTRATION; PERINEURAL at 08:08

## 2024-10-21 RX ADMIN — VANCOMYCIN HYDROCHLORIDE 500 MG: 500 INJECTION, POWDER, LYOPHILIZED, FOR SOLUTION INTRAVENOUS at 22:59

## 2024-10-21 RX ADMIN — POLYETHYLENE GLYCOL 3350 17 G: 17 POWDER, FOR SOLUTION ORAL at 18:39

## 2024-10-21 RX ADMIN — Medication 1000 MG: at 09:44

## 2024-10-21 RX ADMIN — VALSARTAN 160 MG: 160 TABLET, FILM COATED ORAL at 16:07

## 2024-10-21 RX ADMIN — FINASTERIDE 5 MG: 5 TABLET, FILM COATED ORAL at 16:07

## 2024-10-21 RX ADMIN — LEVETIRACETAM 500 MG: 500 TABLET, FILM COATED ORAL at 16:08

## 2024-10-21 RX ADMIN — GLYCOPYRROLATE 0.4 MG: 0.2 INJECTION INTRAMUSCULAR; INTRAVENOUS at 12:10

## 2024-10-21 RX ADMIN — ROCURONIUM BROMIDE 20 MG: 10 INJECTION, SOLUTION INTRAVENOUS at 10:37

## 2024-10-21 RX ADMIN — VANCOMYCIN HYDROCHLORIDE 1000 MG: 1 INJECTION, POWDER, LYOPHILIZED, FOR SOLUTION INTRAVENOUS at 21:13

## 2024-10-21 RX ADMIN — ROCURONIUM BROMIDE 50 MG: 10 INJECTION, SOLUTION INTRAVENOUS at 09:39

## 2024-10-21 RX ADMIN — LIDOCAINE HYDROCHLORIDE 100 MG: 20 INJECTION, SOLUTION EPIDURAL; INFILTRATION; INTRACAUDAL; PERINEURAL at 09:39

## 2024-10-21 RX ADMIN — SODIUM CHLORIDE, PRESERVATIVE FREE 5 ML: 5 INJECTION INTRAVENOUS at 21:04

## 2024-10-21 RX ADMIN — PROPOFOL 150 MG: 10 INJECTION, EMULSION INTRAVENOUS at 09:39

## 2024-10-21 RX ADMIN — Medication 3 AMPULE: at 08:08

## 2024-10-21 RX ADMIN — Medication 2 G: at 09:44

## 2024-10-21 RX ADMIN — MIDAZOLAM 2 MG: 1 INJECTION INTRAMUSCULAR; INTRAVENOUS at 08:08

## 2024-10-21 RX ADMIN — CEFAZOLIN 2000 MG: 2 INJECTION, POWDER, FOR SOLUTION INTRAMUSCULAR; INTRAVENOUS at 16:09

## 2024-10-21 RX ADMIN — ONDANSETRON 4 MG: 2 INJECTION INTRAMUSCULAR; INTRAVENOUS at 11:32

## 2024-10-21 RX ADMIN — NEOSTIGMINE METHYLSULFATE 3 MG: 1.02 INJECTION INTRAVENOUS at 12:10

## 2024-10-21 RX ADMIN — HYDROMORPHONE HYDROCHLORIDE 0.5 MG: 2 INJECTION INTRAMUSCULAR; INTRAVENOUS; SUBCUTANEOUS at 13:26

## 2024-10-21 RX ADMIN — EPHEDRINE SULFATE 10 MG: 5 INJECTION INTRAVENOUS at 10:36

## 2024-10-21 ASSESSMENT — PAIN DESCRIPTION - ORIENTATION
ORIENTATION: RIGHT
ORIENTATION: RIGHT

## 2024-10-21 ASSESSMENT — PAIN DESCRIPTION - LOCATION
LOCATION: SHOULDER
LOCATION: SHOULDER

## 2024-10-21 ASSESSMENT — PAIN SCALES - GENERAL
PAINLEVEL_OUTOF10: 8
PAINLEVEL_OUTOF10: 0
PAINLEVEL_OUTOF10: 8
PAINLEVEL_OUTOF10: 7
PAINLEVEL_OUTOF10: 5

## 2024-10-21 NOTE — ANESTHESIA POSTPROCEDURE EVALUATION
Department of Anesthesiology  Postprocedure Note    Patient: Rocky Gerardo  MRN: [id number]  YOB: 1940  Date of evaluation: 10/21/2024    Procedure Summary       Date: 10/21/24 Room / Location: [facility] OP OR 02 / [facility]    Anesthesia Start: 0920 Anesthesia Stop: 1224    Procedure: RIGHT SHOULDER REVERSE TOTAL ARTHROPLASTY (Right: Shoulder) Diagnosis:       Primary osteoarthritis of right shoulder      Right rotator cuff tear arthropathy      (Primary osteoarthritis of right shoulder [M19.011])      (Right rotator cuff tear arthropathy [M75.101, M12.811])    Surgeons: AZALIA Johnson MD Responsible Provider: Tiffany Lara MD    Anesthesia Type: general ASA Status: 3            Anesthesia Type: No value filed.    Marilee Phase I: Marilee Score: 7    Marilee Phase II:      Anesthesia Post Evaluation    Patient location during evaluation: PACU  Patient participation: complete - patient participated  Level of consciousness: awake and alert  Airway patency: patent  Nausea & Vomiting: no nausea and no vomiting  Cardiovascular status: hemodynamically stable  Respiratory status: acceptable, nonlabored ventilation and spontaneous ventilation  Hydration status: euvolemic  Comments: BP (!) 102/58   Pulse 78   Temp 97.7 °F (36.5 °C) (Temporal)   Resp 16   Wt 102.5 kg (226 lb)   SpO2 91%   BMI 30.65 kg/m²     Multimodal analgesia pain management approach  Pain management: adequate and satisfactory to patient    No notable events documented.

## 2024-10-21 NOTE — PERIOP NOTE
TRANSFER - OUT REPORT:    Verbal report given to COLEEN Childers on Rocky Gerardo  being transferred to room 709 for routine post-op       Report consisted of patient's Situation, Background, Assessment and   Recommendations(SBAR).     Information from the following report(s) Nurse Handoff Report, Adult Overview, Surgery Report, and MAR was reviewed with the receiving nurse.           Lines:   Peripheral IV 10/21/24 Left;Anterior Forearm (Active)   Site Assessment Clean, dry & intact 10/21/24 1224   Line Status Normal saline locked 10/21/24 1224   Phlebitis Assessment No symptoms 10/21/24 1224   Infiltration Assessment 0 10/21/24 1224   Alcohol Cap Used No 10/21/24 1224   Dressing Status Clean, dry & intact 10/21/24 1224   Dressing Type Transparent 10/21/24 1224        Opportunity for questions and clarification was provided.      Patient transported with:  O2 @ 2lpm and Tech

## 2024-10-21 NOTE — ANESTHESIA PRE PROCEDURE
Department of Anesthesiology  Preprocedure Note       Name:  Rocky Gerardo   Age:  84 y.o.  :  1940                                          MRN:  [id number]         Date:  10/21/2024      Surgeon: Surgeon(s):  AZALIA Johnson MD    Procedure: Procedure(s):  RIGHT SHOULDER REVERSE TOTAL ARTHROPLASTY   BEACH CHAIR   REGIONAL BLOCK   23 HRS    Medications prior to admission:   Prior to Admission medications    Medication Sig Start Date End Date Taking? Authorizing Provider   amLODIPine-valsartan (EXFORGE) 5-160 MG per tablet Take 1 tablet by mouth daily 24   Magan Moncada MD   psyllium (KONSYL) 28.3 % PACK Take 1 packet by mouth daily    Provider, MD Anthony   finasteride (PROSCAR) 5 MG tablet Take 1 tablet by mouth daily 24   Gold Gill MD   atorvastatin (LIPITOR) 80 MG tablet Take 1 tablet by mouth daily  Patient taking differently: Take 1 tablet by mouth nightly 23  Magan Moncada MD   levETIRAcetam (KEPPRA XR) 500 MG TB24 extended release tablet Take 2 tablets by mouth daily 23   Magan Moncada MD       Current medications:    Current Facility-Administered Medications   Medication Dose Route Frequency Provider Last Rate Last Admin    ceFAZolin (ANCEF) 2,000 mg in sterile water 20 mL IV syringe  2,000 mg IntraVENous On Call to OR Aakash White PA        sodium chloride flush 0.9 % injection 5-40 mL  5-40 mL IntraVENous 2 times per day Aakash White PA        sodium chloride flush 0.9 % injection 5-40 mL  5-40 mL IntraVENous PRN Aakash White PA        0.9 % sodium chloride infusion   IntraVENous PRN Aakash White PA        alcohol 62% (NOZIN) nasal  3 ampule  3 ampule Nasal Once Aakash White PA        sodium chloride flush 0.9 % injection 5-40 mL  5-40 mL IntraVENous 2 times per day Tiffany Lara MD        sodium chloride flush 0.9 % injection 5-40 mL  5-40 mL IntraVENous PRN Tiffany Lara MD

## 2024-10-21 NOTE — ANESTHESIA PROCEDURE NOTES
Airway  Date/Time: 10/21/2024 9:41 AM  Urgency: elective    Airway not difficult    General Information and Staff    Patient location during procedure: OR  Resident/CRNA: Tommy Campos APRN - CRNA  Performed: resident/CRNA/CAA   Performed by: Tommy Campos APRN - CRNA  Authorized by: Tiffany Lara MD      Indications and Patient Condition  Indications for airway management: anesthesia  Spontaneous Ventilation: absent  Sedation level: deep  Preoxygenated: yes  Patient position: sniffing  MILS maintained throughout  Mask difficulty assessment: vent by bag mask + OA or adjuvant +/- NMBA    Final Airway Details  Final airway type: endotracheal airway      Successful airway: ETT  Cuffed: yes   Successful intubation technique: direct laryngoscopy  Facilitating devices/methods: intubating stylet and cricoid pressure  Endotracheal tube insertion site: oral  Blade: Ulises  Blade size: #4  ETT size (mm): 7.5  Cormack-Lehane Classification: grade I - full view of glottis  Placement verified by: chest auscultation and capnometry   Measured from: teeth  ETT to teeth (cm): 23  Number of attempts at approach: 1  Ventilation between attempts: bag mask  Number of other approaches attempted: 0    no      
Ultrasound imaged printed and placed on chart.      Medications Administered  ropivacaine (NAROPIN) injection 0.5% - Perineural   20 mL - 10/21/2024 8:08:00 AM  dexAMETHasone (DECADRON) injection 4 mg/mL - Perineural   4 mg - 10/21/2024 8:08:00 AM

## 2024-10-21 NOTE — OP NOTE
appropriate tension was obtained at this point while still maintaining good range of motion of the shoulder without impinging anywhere. The wound was irrigated well.  The soft tissues were injected with a mixture of epi and Naropin along the posterior capsule and along the subcutaneous tissue.     The interval was loosely approximated with 2-0 ethibond and then Txa was injected into the empty space.  The subcutaneous tissue was closed with 2-0 Monocryl, skin was closed with subcuticular 2-0 monocryl.  Derma bond and a sterile dressing was applied on the shoulder.  The patient was put in a slight abduction sling and returned to the recovery room in satisfactory condition.  There were no known intraoperative complications. All counts were correct.     Post-operative plan:Will begin reverse TSA post op protocol.     Estimated Blood Loss:   85 ml    Fluids:    see anesthesia notes.    Implant:   Implant Name Type Inv. Item Serial No.  Lot No. LRB No. Used Action   SCREW BNE GLENOSPHERE SHLDR BARRON ESTEBAN REV EQUINOXE - [id number]  SCREW BNE GLENOSPHERE SHLDR BARRON ESTEBAN REV EQUINOXE [id number] [facility] [facility]  Right 1 Implanted   KIT BNE SCR TORQ DEFINING SHLDR BARRON FIX ANG REV EQUINOXE - [id number]  KIT BNE SCR TORQ DEFINING SHLDR BARRON FIX ANG REV EQUINOXE [id number] [employer] [facility]  Right 1 Implanted   STEM HUM [id number] SHLDR BARRON PRESSFIT EQUINOXE - [id number]  STEM HUM [id number] SHLDR BARRON PRESSFIT EQUINOXE [id number] [employer] [facility]  Right 1 Implanted   PLATE IZAIAH R SUP POST REV AUG - [id number]  PLATE IZAIAH R SUP POST REV AUG [id number] [employer] [facility]  Right 1 Implanted   KIT BONE SCR L38MM DIA4.5MM IZAIAH SHLDR GRN COMPR LCK CAP RVS - [id number]  KIT BONE SCR L38MM DIA4.5MM IZAIAH SHLDR GRN COMPR LCK CAP RVS [id number] [employer] [facility]  Right 1 Implanted   KIT BONE SCR L38MM DIA4.5MM IZAIAH SHLDR GRN COMPR LCK CAP RVS -

## 2024-10-22 PROBLEM — Z96.611 STATUS POST REVERSE ARTHROPLASTY OF RIGHT SHOULDER: Status: ACTIVE | Noted: 2024-10-22

## 2024-10-22 LAB
HCT VFR BLD AUTO: 44.6 % (ref 41.1–50.3)
HGB BLD-MCNC: 15.4 G/DL (ref 13.6–17.2)

## 2024-10-22 PROCEDURE — 97530 THERAPEUTIC ACTIVITIES: CPT

## 2024-10-22 PROCEDURE — 6370000000 HC RX 637 (ALT 250 FOR IP): Performed by: ORTHOPAEDIC SURGERY

## 2024-10-22 PROCEDURE — 97535 SELF CARE MNGMENT TRAINING: CPT

## 2024-10-22 PROCEDURE — 36415 COLL VENOUS BLD VENIPUNCTURE: CPT

## 2024-10-22 PROCEDURE — 85014 HEMATOCRIT: CPT

## 2024-10-22 PROCEDURE — 97161 PT EVAL LOW COMPLEX 20 MIN: CPT

## 2024-10-22 PROCEDURE — 94760 N-INVAS EAR/PLS OXIMETRY 1: CPT

## 2024-10-22 PROCEDURE — 6360000002 HC RX W HCPCS: Performed by: PHYSICIAN ASSISTANT

## 2024-10-22 PROCEDURE — 97165 OT EVAL LOW COMPLEX 30 MIN: CPT

## 2024-10-22 PROCEDURE — 51702 INSERT TEMP BLADDER CATH: CPT

## 2024-10-22 PROCEDURE — 6370000000 HC RX 637 (ALT 250 FOR IP): Performed by: PHYSICIAN ASSISTANT

## 2024-10-22 PROCEDURE — 97110 THERAPEUTIC EXERCISES: CPT

## 2024-10-22 PROCEDURE — 85018 HEMOGLOBIN: CPT

## 2024-10-22 PROCEDURE — 51798 US URINE CAPACITY MEASURE: CPT

## 2024-10-22 PROCEDURE — 2700000000 HC OXYGEN THERAPY PER DAY

## 2024-10-22 PROCEDURE — 2580000003 HC RX 258: Performed by: PHYSICIAN ASSISTANT

## 2024-10-22 RX ORDER — TAMSULOSIN HYDROCHLORIDE 0.4 MG/1
0.4 CAPSULE ORAL DAILY
Status: DISCONTINUED | OUTPATIENT
Start: 2024-10-22 | End: 2024-10-23 | Stop reason: HOSPADM

## 2024-10-22 RX ORDER — HYDROMORPHONE HYDROCHLORIDE 4 MG/1
2 TABLET ORAL EVERY 4 HOURS PRN
Status: DISCONTINUED | OUTPATIENT
Start: 2024-10-22 | End: 2024-10-23 | Stop reason: HOSPADM

## 2024-10-22 RX ORDER — PANTOPRAZOLE SODIUM 40 MG/1
40 TABLET, DELAYED RELEASE ORAL
Status: DISCONTINUED | OUTPATIENT
Start: 2024-10-22 | End: 2024-10-23 | Stop reason: HOSPADM

## 2024-10-22 RX ORDER — ACETAMINOPHEN 500 MG
1000 TABLET ORAL EVERY 8 HOURS SCHEDULED
Status: DISCONTINUED | OUTPATIENT
Start: 2024-10-22 | End: 2024-10-23 | Stop reason: HOSPADM

## 2024-10-22 RX ORDER — HYDROMORPHONE HYDROCHLORIDE 4 MG/1
4 TABLET ORAL EVERY 4 HOURS PRN
Status: DISCONTINUED | OUTPATIENT
Start: 2024-10-22 | End: 2024-10-23 | Stop reason: HOSPADM

## 2024-10-22 RX ORDER — CALCIUM CARBONATE 500 MG/1
500 TABLET, CHEWABLE ORAL 3 TIMES DAILY PRN
Status: DISCONTINUED | OUTPATIENT
Start: 2024-10-22 | End: 2024-10-23 | Stop reason: HOSPADM

## 2024-10-22 RX ADMIN — VALSARTAN 160 MG: 160 TABLET, FILM COATED ORAL at 07:50

## 2024-10-22 RX ADMIN — ATORVASTATIN CALCIUM 80 MG: 80 TABLET, FILM COATED ORAL at 19:49

## 2024-10-22 RX ADMIN — ASPIRIN 325 MG: 325 TABLET, COATED ORAL at 19:49

## 2024-10-22 RX ADMIN — LEVETIRACETAM 500 MG: 500 TABLET, FILM COATED ORAL at 19:49

## 2024-10-22 RX ADMIN — LEVETIRACETAM 500 MG: 500 TABLET, FILM COATED ORAL at 07:50

## 2024-10-22 RX ADMIN — SODIUM CHLORIDE: 9 INJECTION, SOLUTION INTRAVENOUS at 04:48

## 2024-10-22 RX ADMIN — TAMSULOSIN HYDROCHLORIDE 0.4 MG: 0.4 CAPSULE ORAL at 10:39

## 2024-10-22 RX ADMIN — PANTOPRAZOLE SODIUM 40 MG: 40 TABLET, DELAYED RELEASE ORAL at 07:55

## 2024-10-22 RX ADMIN — CALCIUM CARBONATE (ANTACID) CHEW TAB 500 MG 500 MG: 500 CHEW TAB at 07:51

## 2024-10-22 RX ADMIN — HYDROMORPHONE HYDROCHLORIDE 2 MG: 4 TABLET ORAL at 08:58

## 2024-10-22 RX ADMIN — OXYCODONE AND ACETAMINOPHEN 2 TABLET: 7.5; 325 TABLET ORAL at 00:53

## 2024-10-22 RX ADMIN — OXYCODONE AND ACETAMINOPHEN 2 TABLET: 7.5; 325 TABLET ORAL at 04:55

## 2024-10-22 RX ADMIN — ACETAMINOPHEN 1000 MG: 500 TABLET, FILM COATED ORAL at 13:34

## 2024-10-22 RX ADMIN — AMLODIPINE BESYLATE 5 MG: 5 TABLET ORAL at 07:50

## 2024-10-22 RX ADMIN — SODIUM CHLORIDE, PRESERVATIVE FREE 5 ML: 5 INJECTION INTRAVENOUS at 21:24

## 2024-10-22 RX ADMIN — ACETAMINOPHEN 1000 MG: 500 TABLET, FILM COATED ORAL at 07:54

## 2024-10-22 RX ADMIN — ACETAMINOPHEN 1000 MG: 500 TABLET, FILM COATED ORAL at 21:57

## 2024-10-22 RX ADMIN — HYDROMORPHONE HYDROCHLORIDE 2 MG: 4 TABLET ORAL at 19:49

## 2024-10-22 RX ADMIN — HYDROMORPHONE HYDROCHLORIDE 0.5 MG: 1 INJECTION, SOLUTION INTRAMUSCULAR; INTRAVENOUS; SUBCUTANEOUS at 03:07

## 2024-10-22 RX ADMIN — ASPIRIN 325 MG: 325 TABLET, COATED ORAL at 07:51

## 2024-10-22 RX ADMIN — FINASTERIDE 5 MG: 5 TABLET, FILM COATED ORAL at 07:51

## 2024-10-22 RX ADMIN — POLYETHYLENE GLYCOL 3350 17 G: 17 POWDER, FOR SOLUTION ORAL at 07:51

## 2024-10-22 RX ADMIN — CEFAZOLIN 2000 MG: 2 INJECTION, POWDER, FOR SOLUTION INTRAMUSCULAR; INTRAVENOUS at 00:53

## 2024-10-22 ASSESSMENT — PAIN DESCRIPTION - ONSET
ONSET: GRADUAL
ONSET: GRADUAL
ONSET: ON-GOING
ONSET: ON-GOING

## 2024-10-22 ASSESSMENT — PAIN SCALES - WONG BAKER
WONGBAKER_NUMERICALRESPONSE: NO HURT

## 2024-10-22 ASSESSMENT — PAIN DESCRIPTION - PAIN TYPE
TYPE: SURGICAL PAIN

## 2024-10-22 ASSESSMENT — PAIN - FUNCTIONAL ASSESSMENT
PAIN_FUNCTIONAL_ASSESSMENT: ACTIVITIES ARE NOT PREVENTED

## 2024-10-22 ASSESSMENT — PAIN DESCRIPTION - DESCRIPTORS
DESCRIPTORS: THROBBING;STABBING
DESCRIPTORS: SORE;ACHING
DESCRIPTORS: ACHING
DESCRIPTORS: ACHING;SORE
DESCRIPTORS: ACHING;SORE

## 2024-10-22 ASSESSMENT — PAIN DESCRIPTION - FREQUENCY
FREQUENCY: INTERMITTENT

## 2024-10-22 ASSESSMENT — PAIN DESCRIPTION - LOCATION
LOCATION: SHOULDER

## 2024-10-22 ASSESSMENT — PAIN SCALES - GENERAL
PAINLEVEL_OUTOF10: 0
PAINLEVEL_OUTOF10: 6
PAINLEVEL_OUTOF10: 8
PAINLEVEL_OUTOF10: 0
PAINLEVEL_OUTOF10: 0
PAINLEVEL_OUTOF10: 6
PAINLEVEL_OUTOF10: 0
PAINLEVEL_OUTOF10: 7
PAINLEVEL_OUTOF10: 0
PAINLEVEL_OUTOF10: 5
PAINLEVEL_OUTOF10: 0

## 2024-10-22 ASSESSMENT — PAIN DESCRIPTION - ORIENTATION
ORIENTATION: RIGHT

## 2024-10-22 NOTE — PROGRESS NOTES
ACUTE PHYSICAL THERAPY GOALS:   (Developed with and agreed upon by patient and/or caregiver.)  Pt will ambulate 500 ft independently with use of breaks as needed in 7 therapy sessions.  Pt will tolerate 23 minutes of standing activity with LRAD/no device in 7 therapy sessions.  Pt will negotiate up and down 10x steps Shyann with use of a handrail in 7 therapy sessions.  Pt will perform standing dynamic balance activities with minimal postural sway in 7 therapy sessions.  Pt will tolerate multiple sets and reps of BLE exercises in 7 therapy sessions.     PHYSICAL THERAPY Initial Assessment and PM  (Link to Caseload Tracking: PT Visit Days : 1  Acknowledge Orders  Time In/Out  PT Charge Capture  Rehab Caseload Tracker    NWB RUE, Immobilizer     Rocky Gerardo is a 84 y.o. male   PRIMARY DIAGNOSIS: Status post reverse arthroplasty of right shoulder  Primary osteoarthritis of right shoulder [M19.011]  Right rotator cuff tear arthropathy [M75.101, M12.811]  Arthritis of right shoulder region [M19.011]  Procedure(s) (LRB):  RIGHT SHOULDER REVERSE TOTAL ARTHROPLASTY (Right)  1 Day Post-Op  Reason for Referral: Other abnormalities of gait and mobility (R26.89)  Outpatient in a bed: Payor: MEDICARE / Plan: MEDICARE PART A AND B / Product Type: *No Product type* /     ASSESSMENT:     REHAB RECOMMENDATIONS:   Recommendation to date pending progress:  Setting:  Continued physical therapy recommended at discharge.    Equipment:    To Be Determined     ASSESSMENT:  Mr. Gerardo is a 84 y.o. male presenting to PT POD #1 from the above procedure. Per ortho, pt is NWB RUE and in a RUE immobilizer. At baseline, pt ambulates independently and lives with his spouse in a multi story home (first floor living) with 1 THOMAS. At time of initial evaluation, pt presents below baseline with deficits in bed mobility, transfers, balance, gait and activity tolerance limiting overall functional mobility. Pt moves from supine to seated EOB

## 2024-10-22 NOTE — PROGRESS NOTES
I was called to the bedside for urinary retention.  The patient was prepped in usual fashion for a catheter placement and I will proceed with pretreatment catheter.  Once within the bladder a large amount of clear urine drained.  The balloon was inflated with 10 cc of sterile water and looked gravity drainage.  I recommend that he have a catheter for at least 72 hours and I would continue Flomax.

## 2024-10-22 NOTE — PROGRESS NOTES
2024         Post Op day: 1 Day Post-Op     Admit Date: 10/21/2024  Admit Diagnosis: Primary osteoarthritis of right shoulder [M19.011]  Right rotator cuff tear arthropathy [M75.101, M12.811]  Arthritis of right shoulder region [M19.011]  Right Shoulder Removal Of Lipoma - Right  2024    Subjective: Doing well, No SOB, No Chest Pain, No Nausea or Vomitting.  Pain control issues this am.  PT/OT:                             Weight Bearing Status: nwb RUE  BMP:  No results for input(s): \"BUN\", \"NA\", \"K\", \"CL\", \"CO2\", \"GLU\" in the last 72 hours.    Invalid input(s): \"CREA\", \"AGAP\"  Patient Vitals for the past 8 hrs:   BP Temp Temp src Pulse Resp SpO2   10/22/24 0451 117/74 -- -- 74 18 --   10/22/24 0405 126/73 97.7 °F (36.5 °C) Oral 79 17 90 %   10/21/24 2335 (!) 144/75 97.7 °F (36.5 °C) Oral 96 18 91 %     Temp (24hrs), Av.5 °F (36.4 °C), Min:97.2 °F (36.2 °C), Max:97.7 °F (36.5 °C)    CBC:  Recent Labs     10/22/24  0449   HGB 15.4   HCT 44.6       Microbiology:     Recent Results (from the past 72 hour(s))   TYPE AND SCREEN    Collection Time: 10/21/24  7:49 AM   Result Value Ref Range    Crossmatch expiration date 10/24/2024,4149     ABO/Rh O POSITIVE     Antibody Screen NEG    Hemoglobin and Hematocrit    Collection Time: 10/22/24  4:49 AM   Result Value Ref Range    Hemoglobin 15.4 13.6 - 17.2 g/dL    Hematocrit 44.6 41.1 - 50.3 %       Objective: Vital Signs are Stable, No Acute Distress, Alert and Oriented  Dressing is Dry,  Neurovascular exam is normal.    Assessment:  Patient Active Problem List   Diagnosis    Obstructive sleep apnea    Need for hepatitis C screening test    Chronic shoulder bursitis    History of gastroesophageal reflux (GERD)    Disorder of bone density and structure, unspecified    Elevated PSA    Hyperlipidemia    Benign prostatic hyperplasia without lower urinary tract symptoms    Seizure (HCC)    Arthritis of shoulder region, left, degenerative    Insomnia

## 2024-10-22 NOTE — PLAN OF CARE
Problem: Safety - Adult  Goal: Free from fall injury  10/21/2024 2241 by Cherelle Oneill GN  Outcome: Progressing  Flowsheets (Taken 10/21/2024 1931)  Free From Fall Injury: Instruct family/caregiver on patient safety  10/21/2024 1746 by Daniel Guerrero, RN  Outcome: Progressing     Problem: Discharge Planning  Goal: Discharge to home or other facility with appropriate resources  10/21/2024 2241 by Cherelle Oneill GN  Outcome: Progressing  Flowsheets (Taken 10/21/2024 1931)  Discharge to home or other facility with appropriate resources:   Identify barriers to discharge with patient and caregiver   Arrange for needed discharge resources and transportation as appropriate   Identify discharge learning needs (meds, wound care, etc)   Refer to discharge planning if patient needs post-hospital services based on physician order or complex needs related to functional status, cognitive ability or social support system  10/21/2024 1746 by Daniel Guerrero, RN  Outcome: Progressing

## 2024-10-22 NOTE — THERAPY EVALUATION
PCT, PT/ PTA, and OT/ GARCIA    EDUCATION:  Education Given To: Patient  Education Provided: Role of Therapy;Plan of Care;Precautions;Transfer Training  Education Method: Verbal;Demonstration  Education Outcome: Verbalized understanding;Demonstrated understanding    OT educated patient  on weight bearing restrictions, fall prevention strategies , proper body mechanics, and adaptive ADL strategies for upper body dressing and sling application/wear schedule . Patient verbalized and demonstrated understanding. May need continued education at next session.      TOTAL TREATMENT DURATION AND TIME:  Time In: 0920  Time Out: 1004  Minutes: 44    MAXIMO TRISTAN, OT

## 2024-10-22 NOTE — PROGRESS NOTES
Bladder scan after voiding at 0755 showed 535 mls. Pt able to void 100ml on their own. Pt requesting to try flomax before straight cath attempt.

## 2024-10-23 VITALS
OXYGEN SATURATION: 91 % | BODY MASS INDEX: 30.61 KG/M2 | HEART RATE: 69 BPM | WEIGHT: 226 LBS | HEIGHT: 72 IN | DIASTOLIC BLOOD PRESSURE: 72 MMHG | TEMPERATURE: 97.5 F | SYSTOLIC BLOOD PRESSURE: 141 MMHG | RESPIRATION RATE: 16 BRPM

## 2024-10-23 LAB
HCT VFR BLD AUTO: 41.5 % (ref 41.1–50.3)
HGB BLD-MCNC: 13.7 G/DL (ref 13.6–17.2)

## 2024-10-23 PROCEDURE — 6370000000 HC RX 637 (ALT 250 FOR IP): Performed by: PHYSICIAN ASSISTANT

## 2024-10-23 PROCEDURE — 6370000000 HC RX 637 (ALT 250 FOR IP): Performed by: ORTHOPAEDIC SURGERY

## 2024-10-23 PROCEDURE — 36415 COLL VENOUS BLD VENIPUNCTURE: CPT

## 2024-10-23 PROCEDURE — 85018 HEMOGLOBIN: CPT

## 2024-10-23 PROCEDURE — 85014 HEMATOCRIT: CPT

## 2024-10-23 RX ORDER — HYDROMORPHONE HYDROCHLORIDE 2 MG/1
2 TABLET ORAL EVERY 4 HOURS PRN
Qty: 24 TABLET | Refills: 0 | Status: SHIPPED | OUTPATIENT
Start: 2024-10-23 | End: 2024-10-27

## 2024-10-23 RX ORDER — LEVETIRACETAM 500 MG/1
500 TABLET ORAL 2 TIMES DAILY
Qty: 60 TABLET | Refills: 3 | Status: SHIPPED | OUTPATIENT
Start: 2024-10-23

## 2024-10-23 RX ORDER — ASPIRIN 325 MG
325 TABLET, DELAYED RELEASE (ENTERIC COATED) ORAL 2 TIMES DAILY
Qty: 30 TABLET | Refills: 3 | Status: SHIPPED | OUTPATIENT
Start: 2024-10-23

## 2024-10-23 RX ADMIN — TAMSULOSIN HYDROCHLORIDE 0.4 MG: 0.4 CAPSULE ORAL at 08:30

## 2024-10-23 RX ADMIN — PANTOPRAZOLE SODIUM 40 MG: 40 TABLET, DELAYED RELEASE ORAL at 05:32

## 2024-10-23 RX ADMIN — AMLODIPINE BESYLATE 5 MG: 5 TABLET ORAL at 08:30

## 2024-10-23 RX ADMIN — FINASTERIDE 5 MG: 5 TABLET, FILM COATED ORAL at 08:30

## 2024-10-23 RX ADMIN — VALSARTAN 160 MG: 160 TABLET, FILM COATED ORAL at 08:30

## 2024-10-23 RX ADMIN — ACETAMINOPHEN 1000 MG: 500 TABLET, FILM COATED ORAL at 05:32

## 2024-10-23 RX ADMIN — ASPIRIN 325 MG: 325 TABLET, COATED ORAL at 08:30

## 2024-10-23 RX ADMIN — LEVETIRACETAM 500 MG: 500 TABLET, FILM COATED ORAL at 08:30

## 2024-10-23 NOTE — DISCHARGE SUMMARY
Associated Diagnoses: Primary hypertension      psyllium (KONSYL) 28.3 % PACK Take 1 packet by mouth daily      finasteride (PROSCAR) 5 MG tablet Take 1 tablet by mouth daily  Qty: 90 tablet, Refills: 3      atorvastatin (LIPITOR) 80 MG tablet Take 1 tablet by mouth daily  Qty: 90 tablet, Refills: 3    Associated Diagnoses: Mixed hyperlipidemia      levETIRAcetam (KEPPRA XR) 500 MG TB24 extended release tablet Take 2 tablets by mouth daily  Qty: 180 tablet, Refills: 3    Associated Diagnoses: Seizure (HCC)             Diet: Reference my discharge instructions.    Activity: Reference my discharge instructions.  Patient will be allowed pendulums, elbow range of motion and shoulder shrugs daily.  When seated and resting can unstrapped the neck portion of the sling but do not remove so that when he gets up this is easily reattached.    Signed:  Nigel Johnson MD  October 23, 2024  9:37 AM

## 2024-10-23 NOTE — CARE COORDINATION
Chart reviewed by  for potential transition of care (ANIL) needs or concerns.  Pt is insured with pharmacy benefits and is established with a PCP.  Therapy evals complete with the recommendation for continued therapy at dc.  Pt will have outpatient PT/OT services as arranged by the surgeon's office per his protocol.  Pt is medically cleared for dc to home today.  No other ANIL needs or concerns identified or reported. Please notify/consult  if ANIL needs arise.       10/23/24 1035   Service Assessment   Patient Orientation Alert and Oriented   Cognition Alert   History Provided By Patient;Child/Family;Medical Record   Primary Caregiver Self   Accompanied By/Relationship dtr   Support Systems Spouse/Significant Other;Family Members;Children   Patient's Healthcare Decision Maker is: Legal Next of Kin   PCP Verified by CM Yes   Last Visit to PCP Within last 6 months  (7/3/2024)   Prior Functional Level Independent in ADLs/IADLs   Current Functional Level Assistance with the following:;Bathing;Dressing   Can patient return to prior living arrangement Yes   Ability to make needs known: Good   Family able to assist with home care needs: Yes   Would you like for me to discuss the discharge plan with any other family members/significant others, and if so, who? Yes  (dtr)   Financial Resources Medicare   Community Resources None   Social/Functional History   Lives With Spouse   Type of Home House   Bathroom Shower/Tub Walk-in shower   Bathroom Equipment Built-in shower seat   Receives Help From Family  (Daughter and son-in-law live nearby)   ADL Assistance Independent   Homemaking Assistance Independent   Ambulation Assistance Independent   Transfer Assistance Independent   Occupation Retired   Discharge Planning   Type of Residence House   Living Arrangements Spouse/Significant Other   Current Services Prior To Admission None   Potential Assistance Needed Outpatient PT/OT   DME Ordered? No   Potential

## 2024-10-23 NOTE — PROGRESS NOTES
2024         Post Op day: 2 Days Post-Op     Admit Date: 10/21/2024  Admit Diagnosis: Primary osteoarthritis of right shoulder [M19.011]  Right rotator cuff tear arthropathy [M75.101, M12.811]  Arthritis of right shoulder region [M19.011]  Right Shoulder Removal Of Lipoma - Right  2024    Subjective: Doing well, No complaints, No SOB, No Chest Pain, No Nausea or Vomitting. Pain better controlled.  Berrios in place  PT/OT:                             Weight Bearing Status: Nonweightbearing right upper extremity   BMP:  No results for input(s): \"BUN\", \"NA\", \"K\", \"CL\", \"CO2\", \"GLU\" in the last 72 hours.    Invalid input(s): \"CREA\", \"AGAP\"  Patient Vitals for the past 8 hrs:   BP Temp Pulse Resp SpO2   10/23/24 0748 (!) 141/72 97.5 °F (36.4 °C) 69 16 91 %     Temp (24hrs), Av.8 °F (36.6 °C), Min:97.5 °F (36.4 °C), Max:98.1 °F (36.7 °C)    CBC:  Recent Labs     10/22/24  0449 10/23/24  0500   HGB 15.4 13.7   HCT 44.6 41.5       Microbiology:     Recent Results (from the past 72 hour(s))   TYPE AND SCREEN    Collection Time: 10/21/24  7:49 AM   Result Value Ref Range    Crossmatch expiration date 10/24/2024,2359     ABO/Rh O POSITIVE     Antibody Screen NEG    Hemoglobin and Hematocrit    Collection Time: 10/22/24  4:49 AM   Result Value Ref Range    Hemoglobin 15.4 13.6 - 17.2 g/dL    Hematocrit 44.6 41.1 - 50.3 %   Hemoglobin and Hematocrit    Collection Time: 10/23/24  5:00 AM   Result Value Ref Range    Hemoglobin 13.7 13.6 - 17.2 g/dL    Hematocrit 41.5 41.1 - 50.3 %       Objective: Vital Signs are Stable, No Acute Distress, Alert and Oriented  Dressing is Dry,  Neurovascular exam is normal.    Assessment:  Patient Active Problem List   Diagnosis    Obstructive sleep apnea    Need for hepatitis C screening test    Chronic shoulder bursitis    History of gastroesophageal reflux (GERD)    Disorder of bone density and structure, unspecified    Elevated PSA    Hyperlipidemia    Benign prostatic

## 2024-10-23 NOTE — PLAN OF CARE
Problem: Safety - Adult  Goal: Free from fall injury  10/23/2024 0006 by Cherelle Oneill GN  Outcome: Progressing  Flowsheets (Taken 10/22/2024 1949)  Free From Fall Injury: Instruct family/caregiver on patient safety  10/22/2024 1010 by Ayanna Arreola, RN  Outcome: Progressing     Problem: Discharge Planning  Goal: Discharge to home or other facility with appropriate resources  10/23/2024 0006 by Cherelle Oneill GN  Outcome: Progressing  Flowsheets (Taken 10/22/2024 1949)  Discharge to home or other facility with appropriate resources:   Arrange for needed discharge resources and transportation as appropriate   Refer to discharge planning if patient needs post-hospital services based on physician order or complex needs related to functional status, cognitive ability or social support system   Identify barriers to discharge with patient and caregiver   Identify discharge learning needs (meds, wound care, etc)  10/22/2024 1010 by Ayanna Arreola, RN  Outcome: Progressing     Problem: Pain  Goal: Verbalizes/displays adequate comfort level or baseline comfort level  10/23/2024 0006 by Cherelle Oneill GN  Outcome: Progressing  Flowsheets (Taken 10/22/2024 1949)  Verbalizes/displays adequate comfort level or baseline comfort level: Encourage patient to monitor pain and request assistance  10/22/2024 1010 by Ayanna Arreola, RN  Outcome: Progressing

## 2024-10-24 DIAGNOSIS — Z09 SURGERY FOLLOW-UP: ICD-10-CM

## 2024-10-24 DIAGNOSIS — M12.811 RIGHT ROTATOR CUFF TEAR ARTHROPATHY: ICD-10-CM

## 2024-10-24 DIAGNOSIS — M19.011 PRIMARY OSTEOARTHRITIS OF RIGHT SHOULDER: Primary | ICD-10-CM

## 2024-10-24 DIAGNOSIS — M75.101 RIGHT ROTATOR CUFF TEAR ARTHROPATHY: ICD-10-CM

## 2024-10-28 ENCOUNTER — NURSE ONLY (OUTPATIENT)
Dept: UROLOGY | Age: 84
End: 2024-10-28

## 2024-10-28 DIAGNOSIS — R33.9 URINARY RETENTION: Primary | ICD-10-CM

## 2024-10-28 NOTE — PROGRESS NOTES
Pt came in for catheter removal. 16f wahl catheter removed without incident. Pt instructed to push fluids, 6-8 glasses of water and if he has not voided on his own by 3:00p to call us and come in this afternoon for reinsertion.

## 2024-10-30 ENCOUNTER — OFFICE VISIT (OUTPATIENT)
Dept: UROLOGY | Age: 84
End: 2024-10-30
Payer: MEDICARE

## 2024-10-30 DIAGNOSIS — N48.1 BALANITIS: ICD-10-CM

## 2024-10-30 DIAGNOSIS — N34.2 INFECTIVE URETHRITIS: ICD-10-CM

## 2024-10-30 DIAGNOSIS — N30.01 ACUTE CYSTITIS WITH HEMATURIA: ICD-10-CM

## 2024-10-30 DIAGNOSIS — N13.8 BENIGN PROSTATIC HYPERPLASIA WITH URINARY OBSTRUCTION: Primary | ICD-10-CM

## 2024-10-30 DIAGNOSIS — N40.1 BENIGN PROSTATIC HYPERPLASIA WITH URINARY OBSTRUCTION: Primary | ICD-10-CM

## 2024-10-30 LAB
BILIRUBIN, URINE, POC: NEGATIVE
BLOOD URINE, POC: NORMAL
GLUCOSE URINE, POC: NEGATIVE MG/DL
KETONES, URINE, POC: NEGATIVE MG/DL
LEUKOCYTE ESTERASE, URINE, POC: NORMAL
NITRITE, URINE, POC: NEGATIVE
PH, URINE, POC: 6 (ref 4.6–8)
PROTEIN,URINE, POC: 30 MG/DL
SPECIFIC GRAVITY, URINE, POC: 1.01 (ref 1–1.03)
URINALYSIS CLARITY, POC: NORMAL
URINALYSIS COLOR, POC: NORMAL
UROBILINOGEN, POC: NORMAL MG/DL

## 2024-10-30 PROCEDURE — 1036F TOBACCO NON-USER: CPT | Performed by: UROLOGY

## 2024-10-30 PROCEDURE — 81003 URINALYSIS AUTO W/O SCOPE: CPT | Performed by: UROLOGY

## 2024-10-30 PROCEDURE — 99213 OFFICE O/P EST LOW 20 MIN: CPT | Performed by: UROLOGY

## 2024-10-30 PROCEDURE — G8484 FLU IMMUNIZE NO ADMIN: HCPCS | Performed by: UROLOGY

## 2024-10-30 PROCEDURE — G8417 CALC BMI ABV UP PARAM F/U: HCPCS | Performed by: UROLOGY

## 2024-10-30 PROCEDURE — 1123F ACP DISCUSS/DSCN MKR DOCD: CPT | Performed by: UROLOGY

## 2024-10-30 PROCEDURE — 1159F MED LIST DOCD IN RCRD: CPT | Performed by: UROLOGY

## 2024-10-30 PROCEDURE — 1160F RVW MEDS BY RX/DR IN RCRD: CPT | Performed by: UROLOGY

## 2024-10-30 PROCEDURE — G8427 DOCREV CUR MEDS BY ELIG CLIN: HCPCS | Performed by: UROLOGY

## 2024-10-30 RX ORDER — CLOTRIMAZOLE AND BETAMETHASONE DIPROPIONATE 10; .64 MG/G; MG/G
CREAM TOPICAL
Qty: 1 EACH | Refills: 2 | Status: SHIPPED | OUTPATIENT
Start: 2024-10-30

## 2024-10-30 RX ORDER — TAMSULOSIN HYDROCHLORIDE 0.4 MG/1
CAPSULE ORAL
COMMUNITY
Start: 2024-10-26

## 2024-10-30 ASSESSMENT — ENCOUNTER SYMPTOMS
NAUSEA: 0
BACK PAIN: 0

## 2024-10-30 NOTE — PROGRESS NOTES
[facility]  [address] [place] [id number]  [phone]          Rocky Gerardo  : 1940    Chief Complaint   Patient presents with    Penis Pain    Follow-up          HPI     Rocky Gerardo is a 84 y.o. male  (Lena Diaz' Father, [employer]) followed due to elevated psa and bph.  He has undergone 2 previous TURP's.  The first was in 11/10.  The second was in  and prostate biopsy was performed at the same time.  All pathology has returned benign.  He ALSO describes a bladder stone procedure and temporary need for SP tube.  His psa undulates usually between 6 and 10 but has been as high as 15.  He was previously on avodart, NOT now.  Patient's grandfather had prostate cancer.       He has NO LUTS currently and is happy with voiding.       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  Gland was 97 grams with TUR defect.  We discussed that there IS an area suspicious for prostate cancer, but it is small.  He made it VERY clear to me that quality of life if much more important to him than quantity.  We discussed options and their risks.  For now, we will continue to monitor.  PSA has been higher in the past.  He is very worried about risk of incontinence moving forward.       He had gross hematuria for a couple days in early .  This led to CT 21.  Images and report were both personally reviewed.  It revealed a LARGE prostate with TUR defect and 2 renal stones, nonobstructive in each kidney, largest approximately 4 mm.  He states he has had gross hematuria mildly approximately once a year since his TURP's.       He began finasteride in .  LUTS ARE improved.      He had a catheter last week temporarily after shoulder surgery.  He complains today of penile pain.  It HURTS to touch.      PSA:  15,  9.98, 4/15 6.12, 7/10/17 8.6,  7.2,  8.1,  10.7,  8.4,  8.9,  8.6,  9.1,  10.0,

## 2024-11-01 ENCOUNTER — OFFICE VISIT (OUTPATIENT)
Dept: ORTHOPEDIC SURGERY | Age: 84
End: 2024-11-01

## 2024-11-01 DIAGNOSIS — Z96.611 S/P REVERSE TOTAL SHOULDER ARTHROPLASTY, RIGHT: ICD-10-CM

## 2024-11-01 DIAGNOSIS — Z09 SURGERY FOLLOW-UP: Primary | ICD-10-CM

## 2024-11-01 LAB
BACTERIA SPEC CULT: NORMAL
SERVICE CMNT-IMP: NORMAL

## 2024-11-01 NOTE — PROGRESS NOTES
The patient was prescribed and given a shoulder pulley for the right shoulder.     Patient read and signed documenting they understand and agree to [facility]'s current DME return policy.

## 2024-11-01 NOTE — PROGRESS NOTES
Name: Rocky Gerardo  YOB: 1940  Gender: male  MRN: [id number]    CC:   Chief Complaint   Patient presents with    Follow-up     1 PO Right reverse TSA DOS 10/21/24   , Patient is here to follow-up one week status post TSA.  Right Shoulder Reverse Total Arthroplasty - Right  10/21/2024    HPI: The patient is doing well and as expected. The patient has been following protocol and comes into the office today with use of the sling.  He did have a rough time with urology and the Berrios placement.    Review of Systems  Noncontributory    PE surgical shoulder: Operative shoulder exam:  The incisions are clean, dry and intact. There is no sign of infection. The axillary sensation is intact. The deltoid muscle has good firing. The shoulder is supple. They are neurovascularly intact. Range of Motion was not tested today.    X-ray:  AP and Axillary of the right shoulder views show intact prosthesis and the components in place.  No fractures are evident.    AP:     ICD-10-CM    1. Surgery follow-up  Z09 XR SHOULDER RIGHT (MIN 2 VIEWS)     Ambulatory Referral to DME      2. S/P reverse total shoulder arthroplasty, right  Z96.611 XR SHOULDER RIGHT (MIN 2 VIEWS)     Ambulatory Referral to DME        The patient is doing well one week status post TSA.   They were given a pulley system with exercises to work on ROM at home.  They will begin formal therapy after next visit   We will start therapy.  He will go to [address] [place].  Discussed possible home assistance through comfort care.    Return in about 3 weeks (around 11/22/2024) for TSA x-ray fu Grashey / Axillary. They need to return to the clinic in 4 weeks time for re-evaluation and repeat xrays.     Nigel Johnson MD  11/01/24

## 2024-11-13 ENCOUNTER — HOSPITAL ENCOUNTER (OUTPATIENT)
Dept: PHYSICAL THERAPY | Age: 84
Setting detail: RECURRING SERIES
Discharge: HOME | End: 2024-11-16
Attending: ORTHOPAEDIC SURGERY
Payer: MEDICARE

## 2024-11-13 DIAGNOSIS — M25.511 ACUTE PAIN OF RIGHT SHOULDER: Primary | ICD-10-CM

## 2024-11-13 DIAGNOSIS — M25.611 STIFFNESS OF RIGHT SHOULDER, NOT ELSEWHERE CLASSIFIED: ICD-10-CM

## 2024-11-13 PROCEDURE — 97162 PT EVAL MOD COMPLEX 30 MIN: CPT

## 2024-11-13 PROCEDURE — 97110 THERAPEUTIC EXERCISES: CPT

## 2024-11-13 ASSESSMENT — PAIN SCALES - GENERAL: PAINLEVEL_OUTOF10: 0

## 2024-11-13 NOTE — PROGRESS NOTES
Rocky Gerardo  : 1940  Primary: Medicare Part A And B (Medicare)  Secondary: GENERIC COMMERCIAL [facility] @ [place] [address] [place] [address]  Phone: [phone]  Fax: [phone] Plan Frequency: 2x/wk for 90 days    Plan of Care/Certification Expiration Date: 25        Plan of Care/Certification Expiration Date:  Plan of Care/Certification Expiration Date: 25    Frequency/Duration:   Plan Frequency: 2x/wk for 90 days      Time In/Out:   Time In: 1240  Time Out: 1320      PT Visit Info:         Visit Count:  1    OUTPATIENT PHYSICAL THERAPY:   Treatment Note 2024       Episode  (R reverse TSA)               Treatment Diagnosis:    Acute pain of right shoulder  Stiffness of right shoulder, not elsewhere classified  Medical/Referring Diagnosis:    Surgery follow-up  S/P reverse total shoulder arthroplasty, right      Referring Physician:  AZALIA Johnson MD MD Orders:  PT Eval and Treat   Return MD Appt:  24   Date of Onset:  Onset Date: 10/21/24     Allergies:   Patient has no known allergies.  Restrictions/Precautions:   Protocol: no ext beyond the body, no active ER behind back or neck, no excessive horizontal abd, no behind back IR (all for 12 weeks) see reverse TSA protocol for Dr. Johnson       Interventions Planned (Treatment may consist of any combination of the following):     See Assessment Note    Subjective Comments:   See eval  Initial Pain Level::     0/10  Post Session Pain Level:       0/10  Medications Last Reviewed:  2024  Updated Objective Findings:  See Evaluation Note from today  Treatment   THERAPEUTIC EXERCISE: (25 minutes):    Exercises per grid below to improve mobility and strength.  Required moderate visual, verbal, manual, and tactile cues to promote proper body alignment, promote proper body posture, promote proper body mechanics, and promote proper body breathing techniques.  Progressed resistance, range,

## 2024-11-13 NOTE — THERAPY EVALUATION
Overweight (BMI 25.0-29.9), Primary osteoarthritis of left shoulder, and Seizure (HCC).  Mr. Gerardo  has a past surgical history that includes Total hip arthroplasty (Bilateral); hernia repair; Colonoscopy; Total shoulder arthroplasty (Left); Cardiac procedure (N/A, 10/11/2023); invasive vascular (N/A, 10/11/2023); invasive vascular (N/A, 10/11/2023); shoulder surgery (Right, 5/13/2024); and Shoulder Arthroplasty (Right, 10/21/2024).  Social History/Living Environment:   Patient lives with their spouse  Type of Home: House: One Story  Level of Assistance: Rehab: Independent    Prior Level of Function/Work/Activity:   Prior Level of Function: Independent   Current Level of Function: Independent   Employment Status: Retired     Learning:   Does the patient/guardian have any barriers to learning?: No barriers  Will there be a co-learner?: No  What is the preferred language of the patient/guardian?: English  Is an  required?: No  How does the patient/guardian prefer to learn new concepts?: Listening; Reading; Demonstration    Fall Risk Scale:   Whiting Total Score: 0         OBJECTIVE     UPPER EXTREMITY      Left  Right    Shoulder Flexion   Shoulder ABD  Shoulder ER  Shoulder IR  Elbow Flexion  Elbow Extension All WNL Shoulder Flexion   Shoulder ABD  Shoulder ER  Shoulder IR  Elbow Flexion  Elbow Extension   90 Degrees  80 Degrees  27 Degrees  32 Degrees  WNL Degrees  -3 Degrees      Strength not tested due to protocol post surgical restrictions.   Outcome Measure:   Tool Used: Disabilities of the Arm, Shoulder and Hand (DASH) Questionnaire - Quick Version  Score:  Initial: 36/55  Most Recent: X/55 (Date: -- )   Interpretation of Score: The DASH is designed to measure the activities of daily living in person's with upper extremity dysfunction or pain.  Each section is scored on a 1-5 scale, 5 representing the greatest disability.  The scores of each section are added together for a total score of 55.

## 2024-11-15 ENCOUNTER — HOSPITAL ENCOUNTER (OUTPATIENT)
Dept: PHYSICAL THERAPY | Age: 84
Setting detail: RECURRING SERIES
Discharge: HOME | End: 2024-11-18
Attending: ORTHOPAEDIC SURGERY
Payer: MEDICARE

## 2024-11-15 PROCEDURE — 97110 THERAPEUTIC EXERCISES: CPT

## 2024-11-15 ASSESSMENT — PAIN SCALES - GENERAL: PAINLEVEL_OUTOF10: 0

## 2024-11-15 NOTE — PROGRESS NOTES
Rocky Gerardo  : 1940  Primary: Medicare Part A And B (Medicare)  Secondary: GENERIC COMMERCIAL [facility] @ [place] [address] [place] [address]  Phone: [phone]  Fax: [phone] Plan Frequency: 2x/wk for 90 days    Plan of Care/Certification Expiration Date: 25        Plan of Care/Certification Expiration Date:  Plan of Care/Certification Expiration Date: 25    Frequency/Duration:   Plan Frequency: 2x/wk for 90 days      Time In/Out:   Time In: 0945  Time Out: 1029      PT Visit Info:         Visit Count:  2    OUTPATIENT PHYSICAL THERAPY:   Treatment Note 11/15/2024       Episode  (R reverse TSA)               Treatment Diagnosis:    Acute pain of right shoulder  Stiffness of right shoulder, not elsewhere classified  Medical/Referring Diagnosis:    Surgery follow-up  S/P reverse total shoulder arthroplasty, right      Referring Physician:  AZALIA Johnson MD MD Orders:  PT Eval and Treat   Return MD Appt:  24   Date of Onset:  Onset Date: 10/21/24     Allergies:   Patient has no known allergies.  Restrictions/Precautions:   Protocol: no ext beyond the body, no active ER behind back or neck, no excessive horizontal abd, no behind back IR (all for 12 weeks); see reverse TSA protocol for Dr. Johnson       Interventions Planned (Treatment may consist of any combination of the following):     See Assessment Note    Subjective Comments: Pt reports no pain and doing well.     Initial Pain Level::     0/10  Post Session Pain Level:       0/10  Medications Last Reviewed:  11/15/2024  Updated Objective Findings:  None Today  Treatment   THERAPEUTIC EXERCISE: (44 minutes):    Exercises per grid below to improve mobility and strength.  Required moderate visual, verbal, manual, and tactile cues to promote proper body alignment, promote proper body posture, promote proper body mechanics, and promote proper body breathing techniques.  Progressed

## 2024-11-19 ENCOUNTER — HOSPITAL ENCOUNTER (OUTPATIENT)
Dept: PHYSICAL THERAPY | Age: 84
Setting detail: RECURRING SERIES
Discharge: HOME | End: 2024-11-22
Attending: ORTHOPAEDIC SURGERY
Payer: MEDICARE

## 2024-11-19 PROCEDURE — 97110 THERAPEUTIC EXERCISES: CPT

## 2024-11-19 ASSESSMENT — PAIN SCALES - GENERAL: PAINLEVEL_OUTOF10: 0

## 2024-11-19 NOTE — PROGRESS NOTES
Rocky Gerardo  : 1940  Primary: Medicare Part A And B (Medicare)  Secondary: GENERIC COMMERCIAL [facility] @ [place] [address] [place] [address]  Phone: [phone]  Fax: [phone] Plan Frequency: 2x/wk for 90 days    Plan of Care/Certification Expiration Date: 25        Plan of Care/Certification Expiration Date:  Plan of Care/Certification Expiration Date: 25    Frequency/Duration:   Plan Frequency: 2x/wk for 90 days      Time In/Out:   Time In: 930  Time Out: 1015      PT Visit Info:         Visit Count:  3    OUTPATIENT PHYSICAL THERAPY:   Treatment Note 2024       Episode  (R reverse TSA)               Treatment Diagnosis:    Acute pain of right shoulder  Stiffness of right shoulder, not elsewhere classified  Medical/Referring Diagnosis:    Surgery follow-up  S/P reverse total shoulder arthroplasty, right      Referring Physician:  AZALIA Johnson MD MD Orders:  PT Eval and Treat   Return MD Appt:  24   Date of Onset:  Onset Date: 10/21/24     Allergies:   Patient has no known allergies.  Restrictions/Precautions:   Protocol: no ext beyond the body, no active ER behind back or neck, no excessive horizontal abd, no behind back IR (all for 12 weeks); see reverse TSA protocol for Dr. Johnson       Interventions Planned (Treatment may consist of any combination of the following):     See Assessment Note    Subjective Comments: Patient reports he didn't sleep good last night.      Initial Pain Level::     0/10  Post Session Pain Level:       0/10  Medications Last Reviewed:  2024  Updated Objective Findings:  None Today  Treatment   THERAPEUTIC EXERCISE: (40 minutes):    Exercises per grid below to improve mobility and strength.  Required moderate visual, verbal, manual, and tactile cues to promote proper body alignment, promote proper body posture, promote proper body mechanics, and promote proper body breathing techniques.

## 2024-11-22 ENCOUNTER — HOSPITAL ENCOUNTER (OUTPATIENT)
Dept: PHYSICAL THERAPY | Age: 84
Setting detail: RECURRING SERIES
Discharge: HOME | End: 2024-11-25
Attending: ORTHOPAEDIC SURGERY
Payer: MEDICARE

## 2024-11-22 PROCEDURE — 97110 THERAPEUTIC EXERCISES: CPT

## 2024-11-22 ASSESSMENT — PAIN SCALES - GENERAL: PAINLEVEL_OUTOF10: 0

## 2024-11-22 NOTE — PROGRESS NOTES
Rocky Gerardo  : 1940  Primary: Medicare Part A And B (Medicare)  Secondary: GENERIC COMMERCIAL [facility] @ [place] [address] [place] [address]  Phone: [phone]  Fax: [phone] Plan Frequency: 2x/wk for 90 days    Plan of Care/Certification Expiration Date: 25        Plan of Care/Certification Expiration Date:  Plan of Care/Certification Expiration Date: 25    Frequency/Duration:   Plan Frequency: 2x/wk for 90 days      Time In/Out:   Time In: 1100  Time Out: 1140      PT Visit Info:         Visit Count:  4    OUTPATIENT PHYSICAL THERAPY:   Treatment Note 2024       Episode  (R reverse TSA)               Treatment Diagnosis:    Acute pain of right shoulder  Stiffness of right shoulder, not elsewhere classified  Medical/Referring Diagnosis:    Surgery follow-up  S/P reverse total shoulder arthroplasty, right      Referring Physician:  AZALIA Johnson MD MD Orders:  PT Eval and Treat   Return MD Appt:  24   Date of Onset:  Onset Date: 10/21/24     Allergies:   Patient has no known allergies.  Restrictions/Precautions:   Protocol: no ext beyond the body, no active ER behind back or neck, no excessive horizontal abd, no behind back IR (all for 12 weeks); see reverse TSA protocol for Dr. Johnson       Interventions Planned (Treatment may consist of any combination of the following):     See Assessment Note    Subjective Comments: Patient reports he feels like he can do a little more every day.    Initial Pain Level::     0/10  Post Session Pain Level:       0/10  Medications Last Reviewed:  2024  Updated Objective Findings:  None Today  Treatment   THERAPEUTIC EXERCISE: (40 minutes):    Exercises per grid below to improve mobility and strength.  Required moderate visual, verbal, manual, and tactile cues to promote proper body alignment, promote proper body posture, promote proper body mechanics, and promote proper body breathing

## 2024-11-26 ENCOUNTER — OFFICE VISIT (OUTPATIENT)
Dept: ORTHOPEDIC SURGERY | Age: 84
End: 2024-11-26

## 2024-11-26 DIAGNOSIS — Z96.611 S/P REVERSE TOTAL SHOULDER ARTHROPLASTY, RIGHT: ICD-10-CM

## 2024-11-26 DIAGNOSIS — Z09 SURGERY FOLLOW-UP: Primary | ICD-10-CM

## 2024-11-26 PROCEDURE — 99024 POSTOP FOLLOW-UP VISIT: CPT | Performed by: ORTHOPAEDIC SURGERY

## 2024-11-26 NOTE — PROGRESS NOTES
Name: Rocky Gerardo  YOB: 1940  Gender: male  MRN: [id number]    CC:   Chief Complaint   Patient presents with    Follow-up     2nd P/o R Reverse TSA DOS 10/21/24    The patient comes in today 5 weeks status post TSA.  Right Shoulder Reverse Total Arthroplasty - Right  10/21/2024    HPI: The patient is doing well today. Their pain has decreased. They are attending physical therapy.  Comes in without sling.  Seems he may be doing more than we typically want at this time but not significant tasks. They feel as if they are progressing as expected. They deny use of narcotic pain medications.    Review of Systems  Noncontributory    PE surgical shoulder : Their wounds are clean, dry, and intact and there is no sign of infection. They are neurovascularly intact. Their deltoid is firing well.   Their Passive Shoulder Range of Motion is:  Forward elevation: 140  Abduction: 120  External Rotation: 15-20    X-ray: Grashey and axillary lateral views of the operative right shoulder were obtained and reviewed today in the office. There has been no change in the hardware's alignment and the glenohumeral position is appropriate on all the films.    AP:     ICD-10-CM    1. Surgery follow-up  Z09 XR SHOULDER RIGHT (MIN 2 VIEWS)      2. S/P reverse total shoulder arthroplasty, right  Z96.611 XR SHOULDER RIGHT (MIN 2 VIEWS)        The patient is doing well status post the above mentioned procedure.   They need to continue with Physical Therapy per the protocol.   They will follow-up with us in clinic in 4-6 weeks for repeat evaluation.      Return in about 6 weeks (around 1/7/2025).     Nigel Johnson MD  11/26/24

## 2024-11-27 ENCOUNTER — APPOINTMENT (OUTPATIENT)
Dept: PHYSICAL THERAPY | Age: 84
End: 2024-11-27
Attending: ORTHOPAEDIC SURGERY
Payer: MEDICARE

## 2024-12-03 ENCOUNTER — HOSPITAL ENCOUNTER (OUTPATIENT)
Dept: PHYSICAL THERAPY | Age: 84
Setting detail: RECURRING SERIES
Discharge: HOME | End: 2024-12-06
Attending: ORTHOPAEDIC SURGERY
Payer: MEDICARE

## 2024-12-03 PROCEDURE — 97110 THERAPEUTIC EXERCISES: CPT

## 2024-12-03 ASSESSMENT — PAIN SCALES - GENERAL: PAINLEVEL_OUTOF10: 0

## 2024-12-03 NOTE — PROGRESS NOTES
Rocky Gerardo  : 1940  Primary: Medicare Part A And B (Medicare)  Secondary: GENERIC COMMERCIAL [facility] @ [place] [address] [place] [address]  Phone: [phone]  Fax: [phone] Plan Frequency: 2x/wk for 90 days    Plan of Care/Certification Expiration Date: 25        Plan of Care/Certification Expiration Date:  Plan of Care/Certification Expiration Date: 25    Frequency/Duration:   Plan Frequency: 2x/wk for 90 days      Time In/Out:   Time In: 1545  Time Out: 1629      PT Visit Info:         Visit Count:  5    OUTPATIENT PHYSICAL THERAPY:   Treatment Note 12/3/2024       Episode  (R reverse TSA)               Treatment Diagnosis:    Acute pain of right shoulder  Stiffness of right shoulder, not elsewhere classified  Medical/Referring Diagnosis:    Surgery follow-up  S/P reverse total shoulder arthroplasty, right      Referring Physician:  AZALIA Johnson MD MD Orders:  PT Eval and Treat   Return MD Appt:  24   Date of Onset:  Onset Date: 10/21/24     Allergies:   Patient has no known allergies.  Restrictions/Precautions:   Protocol: no ext beyond the body, no active ER behind back or neck, no excessive horizontal abd, no behind back IR (all for 12 weeks); see reverse TSA protocol for Dr. Johnson       Interventions Planned (Treatment may consist of any combination of the following):     See Assessment Note    Subjective Comments: Patient reports he got a good report from the MD this week and his shoulder continues to be pain free.    Initial Pain Level::     0/10  Post Session Pain Level:       0/10  Medications Last Reviewed:  12/3/2024  Updated Objective Findings:  None Today  Treatment   THERAPEUTIC EXERCISE: (40 minutes):    Exercises per grid below to improve mobility and strength.  Required moderate visual, verbal, manual, and tactile cues to promote proper body alignment, promote proper body posture, promote proper body mechanics, and

## 2024-12-05 ENCOUNTER — HOSPITAL ENCOUNTER (OUTPATIENT)
Dept: PHYSICAL THERAPY | Age: 84
Setting detail: RECURRING SERIES
Discharge: HOME | End: 2024-12-08
Attending: ORTHOPAEDIC SURGERY
Payer: MEDICARE

## 2024-12-05 PROCEDURE — 97110 THERAPEUTIC EXERCISES: CPT

## 2024-12-05 ASSESSMENT — PAIN SCALES - GENERAL: PAINLEVEL_OUTOF10: 0

## 2024-12-05 NOTE — PROGRESS NOTES
Rocky Gerardo  : 1940  Primary: Medicare Part A And B (Medicare)  Secondary: GENERIC COMMERCIAL [facility] @ [place] [address] [place] [address]  Phone: [phone]  Fax: [phone] Plan Frequency: 2x/wk for 90 days    Plan of Care/Certification Expiration Date: 25        Plan of Care/Certification Expiration Date:  Plan of Care/Certification Expiration Date: 25    Frequency/Duration:   Plan Frequency: 2x/wk for 90 days      Time In/Out:   Time In: 930  Time Out: 1013      PT Visit Info:         Visit Count:  6    OUTPATIENT PHYSICAL THERAPY:   Treatment Note 2024       Episode  (R reverse TSA)               Treatment Diagnosis:    Acute pain of right shoulder  Stiffness of right shoulder, not elsewhere classified  Medical/Referring Diagnosis:    Surgery follow-up  S/P reverse total shoulder arthroplasty, right      Referring Physician:  AZALIA Johnson MD MD Orders:  PT Eval and Treat   Return MD Appt:  24   Date of Onset:  Onset Date: 10/21/24     Allergies:   Patient has no known allergies.  Restrictions/Precautions:   Protocol: no ext beyond the body, no active ER behind back or neck, no excessive horizontal abd, no behind back IR (all for 12 weeks); see reverse TSA protocol for Dr. Johnson       Interventions Planned (Treatment may consist of any combination of the following):     See Assessment Note    Subjective Comments: Patient reports he continues to not have pain in his shoulder    Initial Pain Level::     0/10  Post Session Pain Level:       0/10  Medications Last Reviewed:  2024  Updated Objective Findings:  None Today  Treatment   THERAPEUTIC EXERCISE: (40 minutes):    Exercises per grid below to improve mobility and strength.  Required moderate visual, verbal, manual, and tactile cues to promote proper body alignment, promote proper body posture, promote proper body mechanics, and promote proper body breathing

## 2024-12-10 ENCOUNTER — HOSPITAL ENCOUNTER (OUTPATIENT)
Dept: PHYSICAL THERAPY | Age: 84
Setting detail: RECURRING SERIES
Discharge: HOME | End: 2024-12-13
Attending: ORTHOPAEDIC SURGERY
Payer: MEDICARE

## 2024-12-10 PROCEDURE — 97110 THERAPEUTIC EXERCISES: CPT

## 2024-12-10 ASSESSMENT — PAIN SCALES - GENERAL: PAINLEVEL_OUTOF10: 0

## 2024-12-10 NOTE — PROGRESS NOTES
Rocky Gerardo  : 1940  Primary: Medicare Part A And B (Medicare)  Secondary: GENERIC COMMERCIAL [facility] @ [place] [address] [place] [address]  Phone: [phone]  Fax: [phone] Plan Frequency: 2x/wk for 90 days    Plan of Care/Certification Expiration Date: 25        Plan of Care/Certification Expiration Date:  Plan of Care/Certification Expiration Date: 25    Frequency/Duration:   Plan Frequency: 2x/wk for 90 days      Time In/Out:   Time In: 1245  Time Out: 1329      PT Visit Info:         Visit Count:  7    OUTPATIENT PHYSICAL THERAPY:   Treatment Note 12/10/2024       Episode  (R reverse TSA)               Treatment Diagnosis:    Acute pain of right shoulder  Stiffness of right shoulder, not elsewhere classified  Medical/Referring Diagnosis:    Surgery follow-up  S/P reverse total shoulder arthroplasty, right      Referring Physician:  AZALIA Johnson MD MD Orders:  PT Eval and Treat   Return MD Appt:  24   Date of Onset:  Onset Date: 10/21/24     Allergies:   Patient has no known allergies.  Restrictions/Precautions:   Protocol: no ext beyond the body, no active ER behind back or neck, no excessive horizontal abd, no behind back IR (all for 12 weeks); see reverse TSA protocol for Dr. Johnson       Interventions Planned (Treatment may consist of any combination of the following):     See Assessment Note    Subjective Comments: Patient reports no pain.    Initial Pain Level::     0/10  Post Session Pain Level:       0/10  Medications Last Reviewed:  12/10/2024  Updated Objective Findings:  None Today  Treatment   THERAPEUTIC EXERCISE: (40 minutes):    Exercises per grid below to improve mobility and strength.  Required moderate visual, verbal, manual, and tactile cues to promote proper body alignment, promote proper body posture, promote proper body mechanics, and promote proper body breathing techniques.  Progressed resistance, range,

## 2024-12-12 ENCOUNTER — HOSPITAL ENCOUNTER (OUTPATIENT)
Dept: PHYSICAL THERAPY | Age: 84
Setting detail: RECURRING SERIES
Discharge: HOME | End: 2024-12-15
Attending: ORTHOPAEDIC SURGERY
Payer: MEDICARE

## 2024-12-12 PROCEDURE — 97110 THERAPEUTIC EXERCISES: CPT

## 2024-12-12 ASSESSMENT — PAIN SCALES - GENERAL: PAINLEVEL_OUTOF10: 0

## 2024-12-12 NOTE — PROGRESS NOTES
Rocky Gerardo  : 1940  Primary: Medicare Part A And B (Medicare)  Secondary: GENERIC COMMERCIAL [facility] @ [place] [address] [place] [address]  Phone: [phone]  Fax: [phone] Plan Frequency: 2x/wk for 90 days    Plan of Care/Certification Expiration Date: 25        Plan of Care/Certification Expiration Date:  Plan of Care/Certification Expiration Date: 25    Frequency/Duration:   Plan Frequency: 2x/wk for 90 days      Time In/Out:   Time In: 1245  Time Out: 1329      PT Visit Info:         Visit Count:  8    OUTPATIENT PHYSICAL THERAPY:   Treatment Note 2024       Episode  (R reverse TSA)               Treatment Diagnosis:    Acute pain of right shoulder  Stiffness of right shoulder, not elsewhere classified  Medical/Referring Diagnosis:    Surgery follow-up  S/P reverse total shoulder arthroplasty, right      Referring Physician:  AZALIA Johnson MD MD Orders:  PT Eval and Treat   Return MD Appt:  24   Date of Onset:  Onset Date: 10/21/24     Allergies:   Patient has no known allergies.  Restrictions/Precautions:   Protocol: no ext beyond the body, no active ER behind back or neck, no excessive horizontal abd, no behind back IR (all for 12 weeks); see reverse TSA protocol for Dr. Johnson       Interventions Planned (Treatment may consist of any combination of the following):     See Assessment Note    Subjective Comments: Patient reports his shoulder continues to feel good.    Initial Pain Level::     0/10  Post Session Pain Level:       0/10  Medications Last Reviewed:  2024  Updated Objective Findings:  None Today  Treatment   THERAPEUTIC EXERCISE: (40 minutes):    Exercises per grid below to improve mobility and strength.  Required moderate visual, verbal, manual, and tactile cues to promote proper body alignment, promote proper body posture, promote proper body mechanics, and promote proper body breathing techniques.

## 2024-12-17 ENCOUNTER — HOSPITAL ENCOUNTER (OUTPATIENT)
Dept: PHYSICAL THERAPY | Age: 84
Setting detail: RECURRING SERIES
Discharge: HOME | End: 2024-12-20
Attending: ORTHOPAEDIC SURGERY
Payer: MEDICARE

## 2024-12-17 PROCEDURE — 97110 THERAPEUTIC EXERCISES: CPT

## 2024-12-17 ASSESSMENT — PAIN SCALES - GENERAL: PAINLEVEL_OUTOF10: 0

## 2024-12-17 NOTE — PROGRESS NOTES
Rocky Gerardo  : 1940  Primary: Medicare Part A And B (Medicare)  Secondary: GENERIC COMMERCIAL [facility] @ [place] [address] [place] [address]  Phone: [phone]  Fax: [phone] Plan Frequency: 2x/wk for 90 days    Plan of Care/Certification Expiration Date: 25        Plan of Care/Certification Expiration Date:  Plan of Care/Certification Expiration Date: 25    Frequency/Duration:   Plan Frequency: 2x/wk for 90 days      Time In/Out:   Time In: 1545  Time Out: 1630      PT Visit Info:         Visit Count:  9    OUTPATIENT PHYSICAL THERAPY:   Treatment Note 2024       Episode  (R reverse TSA)               Treatment Diagnosis:    Acute pain of right shoulder  Stiffness of right shoulder, not elsewhere classified  Medical/Referring Diagnosis:    Surgery follow-up  S/P reverse total shoulder arthroplasty, right      Referring Physician:  AZALIA Johnson MD MD Orders:  PT Eval and Treat   Return MD Appt:  24   Date of Onset:  Onset Date: 10/21/24     Allergies:   Patient has no known allergies.  Restrictions/Precautions:   Protocol: no ext beyond the body, no active ER behind back or neck, no excessive horizontal abd, no behind back IR (all for 12 weeks); see reverse TSA protocol for Dr. Johnson       Interventions Planned (Treatment may consist of any combination of the following):     See Assessment Note    Subjective Comments: Patient reports his shoulder is good.    Initial Pain Level::     0/10  Post Session Pain Level:       0/10  Medications Last Reviewed:  2024  Updated Objective Findings:  None Today  Treatment   THERAPEUTIC EXERCISE: (40 minutes):    Exercises per grid below to improve mobility and strength.  Required moderate visual, verbal, manual, and tactile cues to promote proper body alignment, promote proper body posture, promote proper body mechanics, and promote proper body breathing techniques.  Progressed

## 2024-12-19 ENCOUNTER — HOSPITAL ENCOUNTER (OUTPATIENT)
Dept: PHYSICAL THERAPY | Age: 84
Setting detail: RECURRING SERIES
Discharge: HOME | End: 2024-12-22
Attending: ORTHOPAEDIC SURGERY
Payer: MEDICARE

## 2024-12-19 PROCEDURE — 97110 THERAPEUTIC EXERCISES: CPT

## 2024-12-19 ASSESSMENT — PAIN SCALES - GENERAL: PAINLEVEL_OUTOF10: 0

## 2024-12-19 NOTE — PROGRESS NOTES
Rocky Gerardo  : 1940  Primary: Medicare Part A And B (Medicare)  Secondary: GENERIC COMMERCIAL [facility] @ [place] [address] [place] [address]  Phone: [phone]  Fax: [phone] Plan Frequency: 2x/wk for 90 days    Plan of Care/Certification Expiration Date: 25        Plan of Care/Certification Expiration Date:  Plan of Care/Certification Expiration Date: 25    Frequency/Duration: Plan Frequency: 2x/wk for 90 days      Time In/Out:   Time In: 1435  Time Out: 1517      PT Visit Info:         Visit Count:  10                OUTPATIENT PHYSICAL THERAPY:             Progress Report 2024               Episode (R reverse TSA)         Treatment Diagnosis:     No data found  Medical/Referring Diagnosis:    Surgery follow-up  S/P reverse total shoulder arthroplasty, right      Referring Physician:  AZALIA Johnson MD MD Orders:  PT Eval and Treat   Return MD Appt:  24  Date of Onset:  Onset Date: 10/21/24     Allergies:  Patient has no known allergies.  Restrictions/Precautions:    Protocol: no ext beyond the body, no active ER behind back or neck, no excessive horizontal abd, no behind back IR (all for 12 weeks) see reverse TSA protocol for Dr. Johnson       Medications Last Reviewed:  2024     SUBJECTIVE   History of Injury/Illness (Reason for Referral):  Pt presents s/p R reverse TSA performed 10/21/24. He is doing very well and not taking any pain meds. He is compliant with sling use and HEP.   Patient Stated Goal(s):  \"get back to normal\"  Initial Pain Level:      0/10   Post Session Pain Level:     0/10  Past Medical History/Comorbidities:   Mr. Gerardo  has a past medical history of Age-related osteoporosis with current pathological fracture, GERD (gastroesophageal reflux disease), Hyperlipidemia, Hypertension, Insomnia, Obstructive sleep apnea, Overweight (BMI 25.0-29.9), Primary osteoarthritis of left shoulder, and Seizure

## 2024-12-19 NOTE — PROGRESS NOTES
Rocky Gerardo  : 1940  Primary: Medicare Part A And B (Medicare)  Secondary: GENERIC COMMERCIAL [facility] @ [place] [address] [place] [address]  Phone: [phone]  Fax: [phone] Plan Frequency: 2x/wk for 90 days    Plan of Care/Certification Expiration Date: 25        Plan of Care/Certification Expiration Date:  Plan of Care/Certification Expiration Date: 25    Frequency/Duration:   Plan Frequency: 2x/wk for 90 days      Time In/Out:   Time In: 1435  Time Out: 1517      PT Visit Info:         Visit Count:  10    OUTPATIENT PHYSICAL THERAPY:   Treatment Note 2024       Episode  (R reverse TSA)               Treatment Diagnosis:    Acute pain of right shoulder  Stiffness of right shoulder, not elsewhere classified  Medical/Referring Diagnosis:    Surgery follow-up  S/P reverse total shoulder arthroplasty, right      Referring Physician:  AZALIA Johnson MD MD Orders:  PT Eval and Treat   Return MD Appt:  24   Date of Onset:  Onset Date: 10/21/24     Allergies:   Patient has no known allergies.  Restrictions/Precautions:   Protocol: no ext beyond the body, no active ER behind back or neck, no excessive horizontal abd, no behind back IR (all for 12 weeks); see reverse TSA protocol for Dr. Johnson       Interventions Planned (Treatment may consist of any combination of the following):     See Assessment Note    Subjective Comments: Patient reports his shoulder is feeling good, able to sleep some on R shoulder    Initial Pain Level::     0/10  Post Session Pain Level:       0/10  Medications Last Reviewed:  2024  Updated Objective Findings:  See Progress Note from today  Treatment   THERAPEUTIC EXERCISE: (40 minutes):    Exercises per grid below to improve mobility and strength.  Required moderate visual, verbal, manual, and tactile cues to promote proper body alignment, promote proper body posture, promote proper body mechanics, and

## 2024-12-23 ENCOUNTER — HOSPITAL ENCOUNTER (OUTPATIENT)
Dept: PHYSICAL THERAPY | Age: 84
Setting detail: RECURRING SERIES
Discharge: HOME | End: 2024-12-26
Attending: ORTHOPAEDIC SURGERY
Payer: MEDICARE

## 2024-12-23 PROCEDURE — 97110 THERAPEUTIC EXERCISES: CPT

## 2024-12-23 PROCEDURE — 97140 MANUAL THERAPY 1/> REGIONS: CPT

## 2024-12-23 ASSESSMENT — PAIN SCALES - GENERAL: PAINLEVEL_OUTOF10: 0

## 2024-12-23 NOTE — PROGRESS NOTES
Rocky Gerardo  : 1940  Primary: Medicare Part A And B (Medicare)  Secondary: GENERIC COMMERCIAL [facility] @ [place] [address] [place] [address]  Phone: [phone]  Fax: [phone] Plan Frequency: 2x/wk for 90 days    Plan of Care/Certification Expiration Date: 25        Plan of Care/Certification Expiration Date:  Plan of Care/Certification Expiration Date: 25    Frequency/Duration:   Plan Frequency: 2x/wk for 90 days      Time In/Out:   Time In: 1230  Time Out: 1315      PT Visit Info:    Progress Note Counter: 1      Visit Count:  11    OUTPATIENT PHYSICAL THERAPY:   Treatment Note 2024       Episode  (R reverse TSA)               Treatment Diagnosis:    Acute pain of right shoulder  Stiffness of right shoulder, not elsewhere classified  Medical/Referring Diagnosis:    Surgery follow-up  S/P reverse total shoulder arthroplasty, right      Referring Physician:  AZALIA Johnson MD MD Orders:  PT Eval and Treat   Return MD Appt:  24   Date of Onset:  Onset Date: 10/21/24     Allergies:   Patient has no known allergies.  Restrictions/Precautions:   Protocol: no ext beyond the body, no active ER behind back or neck, no excessive horizontal abd, no behind back IR (all for 12 weeks); see reverse TSA protocol for Dr. Johnson       Interventions Planned (Treatment may consist of any combination of the following):     See Assessment Note    Subjective Comments: Patient notes he may have slept too long on R shoulder because it is sore today.    Initial Pain Level::     0/10  Post Session Pain Level:       0/10  Medications Last Reviewed:  2024  Updated Objective Findings:  None Today  Treatment   THERAPEUTIC EXERCISE: (35 minutes):    Exercises per grid below to improve mobility and strength.  Required moderate visual, verbal, manual, and tactile cues to promote proper body alignment, promote proper body posture, promote proper body

## 2024-12-27 ENCOUNTER — HOSPITAL ENCOUNTER (OUTPATIENT)
Dept: PHYSICAL THERAPY | Age: 84
Setting detail: RECURRING SERIES
Discharge: HOME | End: 2024-12-30
Attending: ORTHOPAEDIC SURGERY
Payer: MEDICARE

## 2024-12-27 PROCEDURE — 97140 MANUAL THERAPY 1/> REGIONS: CPT

## 2024-12-27 PROCEDURE — 97110 THERAPEUTIC EXERCISES: CPT

## 2024-12-27 ASSESSMENT — PAIN SCALES - GENERAL: PAINLEVEL_OUTOF10: 0

## 2025-01-02 ENCOUNTER — HOSPITAL ENCOUNTER (OUTPATIENT)
Dept: PHYSICAL THERAPY | Age: 85
Setting detail: RECURRING SERIES
Discharge: HOME | End: 2025-01-05
Attending: ORTHOPAEDIC SURGERY
Payer: MEDICARE

## 2025-01-02 PROCEDURE — 97110 THERAPEUTIC EXERCISES: CPT

## 2025-01-02 ASSESSMENT — PAIN SCALES - GENERAL: PAINLEVEL_OUTOF10: 5

## 2025-01-02 NOTE — PROGRESS NOTES
Rocky Gerardo  : 1940  Primary: Medicare Part A And B (Medicare)  Secondary: GENERIC COMMERCIAL [facility] @ [place] [address] [place] [address]  Phone: [phone]  Fax: [phone] Plan Frequency: 2x/wk for 90 days    Plan of Care/Certification Expiration Date: 25        Plan of Care/Certification Expiration Date:  Plan of Care/Certification Expiration Date: 25    Frequency/Duration:   Plan Frequency: 2x/wk for 90 days      Time In/Out:   Time In: 0930  Time Out: 1012      PT Visit Info:    Progress Note Counter: 3      Visit Count:  13    OUTPATIENT PHYSICAL THERAPY:   Treatment Note 2025       Episode  (R reverse TSA)               Treatment Diagnosis:    Acute pain of right shoulder  Stiffness of right shoulder, not elsewhere classified  Medical/Referring Diagnosis:    Surgery follow-up  S/P reverse total shoulder arthroplasty, right      Referring Physician:  AZALIA Johnson MD MD Orders:  PT Eval and Treat   Return MD Appt:  24   Date of Onset:  Onset Date: 10/21/24     Allergies:   Patient has no known allergies.  Restrictions/Precautions:   Protocol: no ext beyond the body, no active ER behind back or neck, no excessive horizontal abd, no behind back IR (all for 12 weeks); see reverse TSA protocol for Dr. Johnson       Interventions Planned (Treatment may consist of any combination of the following):     See Assessment Note    Subjective Comments: Patient reports his shoulder felt good after his last session but the next day it was painful and he has had a hard time moving it since then.  Initial Pain Level::     5/10  Post Session Pain Level:       2/10  Medications Last Reviewed:  2025  Updated Objective Findings:  None Today  Treatment   THERAPEUTIC EXERCISE: (40 minutes):    Exercises per grid below to improve mobility and strength.  Required moderate visual, verbal, manual, and tactile cues to promote proper body alignment,

## 2025-01-06 ENCOUNTER — HOSPITAL ENCOUNTER (OUTPATIENT)
Dept: PHYSICAL THERAPY | Age: 85
Setting detail: RECURRING SERIES
Discharge: HOME | End: 2025-01-09
Attending: ORTHOPAEDIC SURGERY
Payer: MEDICARE

## 2025-01-06 PROCEDURE — 97110 THERAPEUTIC EXERCISES: CPT

## 2025-01-06 ASSESSMENT — PAIN SCALES - GENERAL: PAINLEVEL_OUTOF10: 0

## 2025-01-06 NOTE — PROGRESS NOTES
at home. He has plenty of ER PROM but lacking in AROM indicating strength issue.  Communication/Consultation:  None today  Equipment provided today:  None  Recommendations/Intent for next treatment session: Next visit will focus on R shoulder AROM against gravity and strength.    >Total Treatment Billable Duration:  40 minutes   Time In: 1200  Time Out: 1244    Winnie Sewell, PT         Charge Capture  Events  [employer] Portal  Appt Desk  Attendance Report     Future Appointments   Date Time Provider Department Center   1/9/2025  3:00 PM Jose Turner, PT [facility]   1/13/2025  3:00 PM Winnie Sewell, PT [facility]   1/14/2025  1:15 PM AZALIA Johnson MD [facility] [place] [facility]   1/15/2025  1:30 PM Genevieve Jean, PT [facility]   1/27/2025  1:30 PM Gold Gill MD [id number] [place] AM[facility]   3/3/2025  4:15 PM Kal Garza DO [facility] [place] AM[facility]

## 2025-01-07 NOTE — PROGRESS NOTES
Surgical incisions appear to be healing well and there is no sign of infection.  Mild bruising upper biceps  Psychiatric: Normal mood and affect. Answers questions appropriately.    Musculoskeletal: The patient's Range of Motion in their operative shoulder today was measured at:  Forward Elevation of 150.  Abduction of 105.  External Rotation of 30.  The swelling is minimal. They are neurovascularly intact.    A/P:     ICD-10-CM    1. Surgery follow-up  Z09       2. S/P reverse total shoulder arthroplasty, right  Z96.611         The patient is doing well status post the above mentioned procedure.   They need to continue with home exercise program.  He is now at a point where he is glad he did the procedure.  If they have any questions or concerns the patient knows that they can call our office at any time.      Return in about 2 months (around 3/14/2025) for TSA x-ray iram Feliciano / Marilu.     Nigel Johnson MD  01/14/25

## 2025-01-09 ENCOUNTER — HOSPITAL ENCOUNTER (OUTPATIENT)
Dept: PHYSICAL THERAPY | Age: 85
Setting detail: RECURRING SERIES
Discharge: HOME | End: 2025-01-12
Attending: ORTHOPAEDIC SURGERY
Payer: MEDICARE

## 2025-01-09 PROCEDURE — 97110 THERAPEUTIC EXERCISES: CPT

## 2025-01-09 NOTE — PROGRESS NOTES
Rocky Gerardo  : 1940  Primary: Medicare Part A And B (Medicare)  Secondary: GENERIC COMMERCIAL [facility] @ [place] [address] [place] [address]  Phone: [phone]  Fax: [phone] Plan Frequency: 2x/wk for 90 days    Plan of Care/Certification Expiration Date: 25        Plan of Care/Certification Expiration Date:  Plan of Care/Certification Expiration Date: 25    Frequency/Duration:   Plan Frequency: 2x/wk for 90 days      Time In/Out:   Time In: 1500  Time Out: 1545      PT Visit Info:    Progress Note Counter: 5      Visit Count:  15    OUTPATIENT PHYSICAL THERAPY:   Treatment Note 2025       Episode  (R reverse TSA)               Treatment Diagnosis:    Acute pain of right shoulder  Stiffness of right shoulder, not elsewhere classified  Medical/Referring Diagnosis:    Surgery follow-up  S/P reverse total shoulder arthroplasty, right      Referring Physician:  AZALIA Johnson MD MD Orders:  PT Eval and Treat   Return MD Appt:  24   Date of Onset:  Onset Date: 10/21/24     Allergies:   Patient has no known allergies.  Restrictions/Precautions:   Protocol: no ext beyond the body, no active ER behind back or neck, no excessive horizontal abd, no behind back IR (all for 12 weeks); see reverse TSA protocol for Dr. Johnson       Interventions Planned (Treatment may consist of any combination of the following):     See Assessment Note    Subjective Comments: Patient reports his shoulder is doing well. Still a little sore but getting better. He says \"I can do everything that an old man should be able to do.\"  Initial Pain Level::     1/10  Post Session Pain Level:       1/10  Medications Last Reviewed:  2025  Updated Objective Findings:  None Today  Treatment   THERAPEUTIC EXERCISE: (40 minutes):    Exercises per grid below to improve mobility and strength.  Required moderate visual, verbal, manual, and tactile cues to promote proper body

## 2025-01-10 ASSESSMENT — PAIN SCALES - GENERAL: PAINLEVEL_OUTOF10: 1

## 2025-01-13 ENCOUNTER — HOSPITAL ENCOUNTER (OUTPATIENT)
Dept: PHYSICAL THERAPY | Age: 85
Setting detail: RECURRING SERIES
Discharge: HOME | End: 2025-01-16
Attending: ORTHOPAEDIC SURGERY
Payer: MEDICARE

## 2025-01-13 PROCEDURE — 97110 THERAPEUTIC EXERCISES: CPT

## 2025-01-13 ASSESSMENT — PAIN SCALES - GENERAL: PAINLEVEL_OUTOF10: 1

## 2025-01-13 NOTE — THERAPY DISCHARGE
Rocky Gerardo  : 1940  Primary: Medicare Part A And B (Medicare)  Secondary: GENERIC COMMERCIAL [facility] @ [place] [address] [place] [address]  Phone: [phone]  Fax: [phone] Plan Frequency: 2x/wk for 90 days    Plan of Care/Certification Expiration Date: 25        Plan of Care/Certification Expiration Date:  Plan of Care/Certification Expiration Date: 25    Frequency/Duration: Plan Frequency: 2x/wk for 90 days      Time In/Out:   Time In: 1500  Time Out: 1525      PT Visit Info:    Progress Note Counter: 6      Visit Count:  16                OUTPATIENT PHYSICAL THERAPY:             Discharge Summary 2025               Episode (R reverse TSA)         Treatment Diagnosis:     No data found  Medical/Referring Diagnosis:    Surgery follow-up  S/P reverse total shoulder arthroplasty, right      Referring Physician:  AZALIA Johnson MD MD Orders:  PT Eval and Treat   Return MD Appt:  24  Date of Onset:  Onset Date: 10/21/24     Allergies:  Patient has no known allergies.  Restrictions/Precautions:    Protocol: no ext beyond the body, no active ER behind back or neck, no excessive horizontal abd, no behind back IR (all for 12 weeks) see reverse TSA protocol for Dr. Johnson       Medications Last Reviewed:  2025     SUBJECTIVE   Pt requests shorter session and discharge today due to his back hurting.       OBJECTIVE     UPPER EXTREMITY      Left  Right    Shoulder Flexion   Shoulder ABD  Shoulder ER  Shoulder IR  Elbow Flexion  Elbow Extension All WNL Shoulder Flexion   Shoulder ABD  Shoulder ER  Shoulder IR  Elbow Flexion  Elbow Extension   110 active standing; 145 (passive)  95 Degrees  40 Degrees  40 Degrees  WNL Degrees  0 Degrees        STRENGTH: R shoulder strength 4/5      Outcome Measure:   Tool Used: Disabilities of the Arm, Shoulder and Hand (DASH) Questionnaire - Quick Version  Score:  Initial: 55  Most Recent:

## 2025-01-13 NOTE — PROGRESS NOTES
today  Equipment provided today:  None  Recommendations/Intent for next treatment session: Next visit will focus on R shoulder AROM against gravity and strength.    >Total Treatment Billable Duration:  25 minutes   Time In: 1500  Time Out: 1525    Winnie Sewell, PT         Charge Capture  Events  [employer] Portal  Appt Desk  Attendance Report     Future Appointments   Date Time Provider Department Center   1/14/2025  1:15 PM AZALIA Johnson MD [facility] [place] [facility]   1/27/2025  1:30 PM Gold Gill MD [id number] [place] [facility]   3/3/2025  4:15 PM Kal Garza DO [facility] [place] [facility]

## 2025-01-14 ENCOUNTER — OFFICE VISIT (OUTPATIENT)
Dept: ORTHOPEDIC SURGERY | Age: 85
End: 2025-01-14

## 2025-01-14 DIAGNOSIS — Z09 SURGERY FOLLOW-UP: Primary | ICD-10-CM

## 2025-01-14 DIAGNOSIS — Z96.611 S/P REVERSE TOTAL SHOULDER ARTHROPLASTY, RIGHT: ICD-10-CM

## 2025-01-14 PROCEDURE — 99024 POSTOP FOLLOW-UP VISIT: CPT | Performed by: ORTHOPAEDIC SURGERY

## 2025-01-15 ENCOUNTER — APPOINTMENT (OUTPATIENT)
Dept: PHYSICAL THERAPY | Age: 85
End: 2025-01-15
Attending: ORTHOPAEDIC SURGERY
Payer: MEDICARE

## 2025-01-15 DIAGNOSIS — E78.2 MIXED HYPERLIPIDEMIA: ICD-10-CM

## 2025-01-16 RX ORDER — ATORVASTATIN CALCIUM 80 MG/1
80 TABLET, FILM COATED ORAL DAILY
Qty: 90 TABLET | Refills: 3 | OUTPATIENT
Start: 2025-01-16

## 2025-01-20 ENCOUNTER — APPOINTMENT (OUTPATIENT)
Dept: PHYSICAL THERAPY | Age: 85
End: 2025-01-20
Attending: ORTHOPAEDIC SURGERY
Payer: MEDICARE

## 2025-01-20 DIAGNOSIS — I10 PRIMARY HYPERTENSION: ICD-10-CM

## 2025-01-20 DIAGNOSIS — E78.2 MIXED HYPERLIPIDEMIA: ICD-10-CM

## 2025-01-20 RX ORDER — ATORVASTATIN CALCIUM 80 MG/1
80 TABLET, FILM COATED ORAL DAILY
Qty: 90 TABLET | Refills: 3 | OUTPATIENT
Start: 2025-01-20

## 2025-01-20 RX ORDER — AMLODIPINE AND VALSARTAN 5; 160 MG/1; MG/1
1 TABLET ORAL DAILY
Qty: 90 TABLET | Refills: 1 | OUTPATIENT
Start: 2025-01-20

## 2025-01-21 ENCOUNTER — TELEPHONE (OUTPATIENT)
Dept: INTERNAL MEDICINE CLINIC | Facility: CLINIC | Age: 85
End: 2025-01-21

## 2025-01-21 DIAGNOSIS — R56.9 SEIZURE (HCC): ICD-10-CM

## 2025-01-21 DIAGNOSIS — E78.2 MIXED HYPERLIPIDEMIA: ICD-10-CM

## 2025-01-21 DIAGNOSIS — I10 PRIMARY HYPERTENSION: ICD-10-CM

## 2025-01-21 NOTE — TELEPHONE ENCOUNTER
Spoke to pt spouse and let her know I do not have any appointments to be able to move his appt to the same day, voiced understanding

## 2025-01-21 NOTE — TELEPHONE ENCOUNTER
Medication Refill Request    Name of Medication : levothyroxine    Strength of Medication: 500 mg    Directions: 2 daily    30 day or 90 day supply: 90 day    Preferred Pharmacy: [facility]    Last Appt. Date: 7/3/24    Next Appt. Date: 4/29/25    Additional Information For Provider: pt wife states pt is all out of all meds      Medication Refill Request    Name of Medication : atorvastatin    Strength of Medication: 80 mg    Directions: 1 daily    30 day or 90 day supply: 90    [facility]    Last Appt. Date: 7/3/24    Next Appt. Date: 4/29/25    Additional Information For Provider: pt wife states pt is all out of all meds      Medication Refill Request    Name of Medication : amlodipine    Strength of Medication: 5-160 mg    Directions: once daily    30 day or 90 day supply: 90    [facility]    Last Appt. Date: 7/3/24    Next Appt. Date: 4/29/25    Additional Information For Provider: pt wife states pt is all out of all meds

## 2025-01-21 NOTE — TELEPHONE ENCOUNTER
----- Message from Karlos BROWNING sent at 1/21/2025 10:32 AM EST -----  Regarding: [facility] Appointment Request  [facility] Appointment Request    Patient needs appointment for [facility] Appointment Type: Existing Condition Follow Up.    Patient Requested Dates(s): 3/6/2025  Patient Requested Time: afternoon  Provider Name:  Magan Moncada MD    Reason for Appointment Request: Established Patient - Available appointments did not meet patient need/ prescription renewal appointment  --------------------------------------------------------------------------------------------------------------------------    Relationship to Patient: Spouse/Partner  Zenobia Gerardo - wife    Call Back Information: OK to leave message on voicemail  Preferred Call Back Number: Phone  [phone]

## 2025-01-22 RX ORDER — LEVETIRACETAM 500 MG/1
1000 TABLET, FILM COATED, EXTENDED RELEASE ORAL DAILY
Qty: 180 TABLET | Refills: 3 | Status: SHIPPED | OUTPATIENT
Start: 2025-01-22

## 2025-01-22 RX ORDER — ATORVASTATIN CALCIUM 80 MG/1
80 TABLET, FILM COATED ORAL
Qty: 90 TABLET | Refills: 3 | Status: SHIPPED | OUTPATIENT
Start: 2025-01-22 | End: 2026-01-17

## 2025-01-22 RX ORDER — AMLODIPINE AND VALSARTAN 5; 160 MG/1; MG/1
1 TABLET ORAL DAILY
Qty: 90 TABLET | Refills: 1 | Status: SHIPPED | OUTPATIENT
Start: 2025-01-22

## 2025-01-22 NOTE — TELEPHONE ENCOUNTER
Clarification from patient ,refills needed are Levetiracetam   mg ,Amlodipine-Valsartan 5/160 mg and Atorvastatin 80 mg    Patient does not take Levothyroxine

## 2025-01-23 ENCOUNTER — APPOINTMENT (OUTPATIENT)
Dept: PHYSICAL THERAPY | Age: 85
End: 2025-01-23
Attending: ORTHOPAEDIC SURGERY
Payer: MEDICARE

## 2025-01-27 ENCOUNTER — OFFICE VISIT (OUTPATIENT)
Dept: UROLOGY | Age: 85
End: 2025-01-27
Payer: MEDICARE

## 2025-01-27 DIAGNOSIS — N13.8 BENIGN PROSTATIC HYPERPLASIA WITH URINARY OBSTRUCTION: Primary | ICD-10-CM

## 2025-01-27 DIAGNOSIS — R97.20 ELEVATED PSA: ICD-10-CM

## 2025-01-27 DIAGNOSIS — N40.1 BENIGN PROSTATIC HYPERPLASIA WITH URINARY OBSTRUCTION: Primary | ICD-10-CM

## 2025-01-27 LAB
BILIRUBIN, URINE, POC: NEGATIVE
BLOOD URINE, POC: NORMAL
GLUCOSE URINE, POC: NEGATIVE MG/DL
KETONES, URINE, POC: NEGATIVE MG/DL
LEUKOCYTE ESTERASE, URINE, POC: NORMAL
NITRITE, URINE, POC: NEGATIVE
PH, URINE, POC: 6 (ref 4.6–8)
PROTEIN,URINE, POC: 30 MG/DL
SPECIFIC GRAVITY, URINE, POC: 1.02 (ref 1–1.03)
URINALYSIS CLARITY, POC: NORMAL
URINALYSIS COLOR, POC: NORMAL
UROBILINOGEN, POC: NORMAL MG/DL

## 2025-01-27 PROCEDURE — 1159F MED LIST DOCD IN RCRD: CPT | Performed by: UROLOGY

## 2025-01-27 PROCEDURE — 99214 OFFICE O/P EST MOD 30 MIN: CPT | Performed by: UROLOGY

## 2025-01-27 PROCEDURE — G8427 DOCREV CUR MEDS BY ELIG CLIN: HCPCS | Performed by: UROLOGY

## 2025-01-27 PROCEDURE — 1160F RVW MEDS BY RX/DR IN RCRD: CPT | Performed by: UROLOGY

## 2025-01-27 PROCEDURE — 1036F TOBACCO NON-USER: CPT | Performed by: UROLOGY

## 2025-01-27 PROCEDURE — 81003 URINALYSIS AUTO W/O SCOPE: CPT | Performed by: UROLOGY

## 2025-01-27 PROCEDURE — G8417 CALC BMI ABV UP PARAM F/U: HCPCS | Performed by: UROLOGY

## 2025-01-27 PROCEDURE — 1123F ACP DISCUSS/DSCN MKR DOCD: CPT | Performed by: UROLOGY

## 2025-01-27 RX ORDER — FINASTERIDE 5 MG/1
5 TABLET, FILM COATED ORAL DAILY
Qty: 90 TABLET | Refills: 3 | Status: SHIPPED | OUTPATIENT
Start: 2025-01-27

## 2025-01-27 ASSESSMENT — ENCOUNTER SYMPTOMS: BACK PAIN: 0

## 2025-01-27 NOTE — PROGRESS NOTES
[facility]  [address] [place] [address]  [phone]          Rocky Gerardo  : 1940    Chief Complaint   Patient presents with    Follow-up     yearly          HPI     Rocky Gerardo is a 85 y.o. male (Lena Diaz' Father, [employer]) followed due to elevated psa and bph.  He has undergone 2 previous TURP's.  The first was in 11/10.  The second was in  and prostate biopsy was performed at the same time.  All pathology has returned benign.  He ALSO describes a bladder stone procedure and temporary need for SP tube.  His psa undulates usually between 6 and 10 but has been as high as 15.  He was previously on avodart, NOT now.  Patient's grandfather had prostate cancer.       He has NO LUTS currently and is happy with voiding.       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  Gland was 97 grams with TUR defect.  We discussed that there IS an area suspicious for prostate cancer, but it is small.  He made it VERY clear to me that quality of life if much more important to him than quantity.  We discussed options and their risks.  For now, we will continue to monitor.  PSA has been higher in the past.  He is very worried about risk of incontinence moving forward.       He had gross hematuria for a couple days in early .  This led to CT 21.  Images and report were both personally reviewed.  It revealed a LARGE prostate with TUR defect and 2 renal stones, nonobstructive in each kidney, largest approximately 4 mm.  He states he has had gross hematuria mildly approximately once a year since his TURP's.       He began finasteride in .  LUTS ARE improved.       He had a catheter in 10/24 temporarily after shoulder surgery.         PSA:  15,  9.98, 4/15 6.12, 7/10/17 8.6,  7.2,  8.1,  10.7,  8.4,  8.9,  8.6,  9.1,  10.0,  4.5          Past Medical History:   Diagnosis Date

## 2025-02-03 ENCOUNTER — OFFICE VISIT (OUTPATIENT)
Dept: INTERNAL MEDICINE CLINIC | Facility: CLINIC | Age: 85
End: 2025-02-03
Payer: MEDICARE

## 2025-02-03 VITALS
WEIGHT: 225.8 LBS | SYSTOLIC BLOOD PRESSURE: 138 MMHG | HEART RATE: 68 BPM | TEMPERATURE: 97.2 F | OXYGEN SATURATION: 97 % | HEIGHT: 72 IN | DIASTOLIC BLOOD PRESSURE: 78 MMHG | BODY MASS INDEX: 30.58 KG/M2

## 2025-02-03 DIAGNOSIS — R09.A9 FOREIGN BODY SENSATION IN EAR CANAL, RIGHT: Primary | ICD-10-CM

## 2025-02-03 DIAGNOSIS — H93.11 TINNITUS OF RIGHT EAR: ICD-10-CM

## 2025-02-03 PROCEDURE — 3078F DIAST BP <80 MM HG: CPT | Performed by: PHYSICIAN ASSISTANT

## 2025-02-03 PROCEDURE — 3075F SYST BP GE 130 - 139MM HG: CPT | Performed by: PHYSICIAN ASSISTANT

## 2025-02-03 PROCEDURE — 1123F ACP DISCUSS/DSCN MKR DOCD: CPT | Performed by: PHYSICIAN ASSISTANT

## 2025-02-03 PROCEDURE — 1126F AMNT PAIN NOTED NONE PRSNT: CPT | Performed by: PHYSICIAN ASSISTANT

## 2025-02-03 PROCEDURE — G8417 CALC BMI ABV UP PARAM F/U: HCPCS | Performed by: PHYSICIAN ASSISTANT

## 2025-02-03 PROCEDURE — 99213 OFFICE O/P EST LOW 20 MIN: CPT | Performed by: PHYSICIAN ASSISTANT

## 2025-02-03 PROCEDURE — 1036F TOBACCO NON-USER: CPT | Performed by: PHYSICIAN ASSISTANT

## 2025-02-03 PROCEDURE — G8428 CUR MEDS NOT DOCUMENT: HCPCS | Performed by: PHYSICIAN ASSISTANT

## 2025-02-03 NOTE — PROGRESS NOTES
Chief Complaint   Patient presents with    Other     Patient may have a live bug in his right ear that started this morning.  Has a humming in there.         HISTORY OF PRESENT ILLNESS:  Rocky Gerardo is a very pleasant 85 y.o. male who presents with a complaint of a possible FB in his right ear. He is experiencing an intermittent tinnitus described as a rustling sound since this AM. He denies ear pain, drainage, recent URI. He does normally wear hearing aids but did not put in this morning due to his concerns.       PAST MEDICAL HISTORY:   Current Outpatient Medications   Medication Sig    finasteride (PROSCAR) 5 MG tablet Take 1 tablet by mouth daily    amLODIPine-valsartan (EXFORGE) 5-160 MG per tablet Take 1 tablet by mouth daily    atorvastatin (LIPITOR) 80 MG tablet Take 1 tablet by mouth nightly    levETIRAcetam (KEPPRA XR) 500 MG TB24 extended release tablet Take 2 tablets by mouth daily    clotrimazole-betamethasone (LOTRISONE) 1-0.05 % cream Apply topically once daily as directed    tamsulosin (FLOMAX) 0.4 MG capsule     levETIRAcetam (KEPPRA) 500 MG tablet Take 1 tablet by mouth in the morning and at bedtime    psyllium (KONSYL) 28.3 % PACK Take 1 packet by mouth daily     No current facility-administered medications for this visit.      No Known Allergies   Past Medical History:   Diagnosis Date    Age-related osteoporosis with current pathological fracture 9/21/2017    GERD (gastroesophageal reflux disease)     pt denies (6/7/18)    Hyperlipidemia 8/5/2016    Hypertension     Insomnia 10/30/2017    Obstructive sleep apnea 10/30/2017    pt is not currently using cpap machine- has used in past (6/7/18)    Overweight (BMI 25.0-29.9) 10/30/2017    Primary osteoarthritis of left shoulder 7/14/2017    Seizure (HCC) 09/10/2017    x2 (9/2017 and 10/2017)- on keppra BID- had consult with Dr Manning (neuro)- pt reports triggered by insomnia     Family History   Problem Relation Age of Onset    No

## 2025-03-03 ENCOUNTER — OFFICE VISIT (OUTPATIENT)
Age: 85
End: 2025-03-03
Payer: MEDICARE

## 2025-03-03 VITALS
SYSTOLIC BLOOD PRESSURE: 120 MMHG | WEIGHT: 224 LBS | DIASTOLIC BLOOD PRESSURE: 72 MMHG | BODY MASS INDEX: 30.34 KG/M2 | HEART RATE: 65 BPM | HEIGHT: 72 IN

## 2025-03-03 DIAGNOSIS — I10 PRIMARY HYPERTENSION: Primary | ICD-10-CM

## 2025-03-03 DIAGNOSIS — E78.00 PURE HYPERCHOLESTEROLEMIA: ICD-10-CM

## 2025-03-03 PROCEDURE — 93000 ELECTROCARDIOGRAM COMPLETE: CPT | Performed by: INTERNAL MEDICINE

## 2025-03-03 PROCEDURE — 3078F DIAST BP <80 MM HG: CPT | Performed by: INTERNAL MEDICINE

## 2025-03-03 PROCEDURE — 1126F AMNT PAIN NOTED NONE PRSNT: CPT | Performed by: INTERNAL MEDICINE

## 2025-03-03 PROCEDURE — 1123F ACP DISCUSS/DSCN MKR DOCD: CPT | Performed by: INTERNAL MEDICINE

## 2025-03-03 PROCEDURE — 3074F SYST BP LT 130 MM HG: CPT | Performed by: INTERNAL MEDICINE

## 2025-03-03 PROCEDURE — G8417 CALC BMI ABV UP PARAM F/U: HCPCS | Performed by: INTERNAL MEDICINE

## 2025-03-03 PROCEDURE — 99214 OFFICE O/P EST MOD 30 MIN: CPT | Performed by: INTERNAL MEDICINE

## 2025-03-03 PROCEDURE — 1036F TOBACCO NON-USER: CPT | Performed by: INTERNAL MEDICINE

## 2025-03-03 PROCEDURE — G8428 CUR MEDS NOT DOCUMENT: HCPCS | Performed by: INTERNAL MEDICINE

## 2025-03-03 NOTE — PROGRESS NOTES
3. PIERRE:  Could not tolerate mask.      4. HPL: remain on statin         Follow AO in the future.         Patient has been instructed and agrees to call our office with any issues or other concerns related to their cardiac condition(s) and/or complaint(s).        Return in about 6 months (around 9/3/2025).       Kal Garza, DO  3/3/2025

## 2025-04-29 ENCOUNTER — OFFICE VISIT (OUTPATIENT)
Dept: INTERNAL MEDICINE CLINIC | Facility: CLINIC | Age: 85
End: 2025-04-29
Payer: MEDICARE

## 2025-04-29 VITALS
HEART RATE: 64 BPM | OXYGEN SATURATION: 95 % | TEMPERATURE: 97.2 F | BODY MASS INDEX: 30.34 KG/M2 | HEIGHT: 72 IN | DIASTOLIC BLOOD PRESSURE: 84 MMHG | WEIGHT: 224 LBS | SYSTOLIC BLOOD PRESSURE: 136 MMHG

## 2025-04-29 DIAGNOSIS — E78.00 PURE HYPERCHOLESTEROLEMIA: ICD-10-CM

## 2025-04-29 DIAGNOSIS — Z96.611 STATUS POST REVERSE ARTHROPLASTY OF RIGHT SHOULDER: ICD-10-CM

## 2025-04-29 DIAGNOSIS — I10 PRIMARY HYPERTENSION: Primary | ICD-10-CM

## 2025-04-29 DIAGNOSIS — N40.0 BENIGN PROSTATIC HYPERPLASIA WITHOUT LOWER URINARY TRACT SYMPTOMS: ICD-10-CM

## 2025-04-29 DIAGNOSIS — R56.9 SEIZURE (HCC): ICD-10-CM

## 2025-04-29 DIAGNOSIS — G47.33 OBSTRUCTIVE SLEEP APNEA: ICD-10-CM

## 2025-04-29 PROBLEM — R41.3 AMNESIA: Status: RESOLVED | Noted: 2022-07-15 | Resolved: 2025-04-29

## 2025-04-29 PROBLEM — Z11.59 NEED FOR HEPATITIS C SCREENING TEST: Status: RESOLVED | Noted: 2021-07-21 | Resolved: 2025-04-29

## 2025-04-29 PROBLEM — M75.50 CHRONIC SHOULDER BURSITIS: Status: RESOLVED | Noted: 2017-01-09 | Resolved: 2025-04-29

## 2025-04-29 PROBLEM — M85.9 DISORDER OF BONE DENSITY AND STRUCTURE, UNSPECIFIED: Status: RESOLVED | Noted: 2017-09-21 | Resolved: 2025-04-29

## 2025-04-29 PROBLEM — R29.90 STROKE-LIKE SYMPTOMS: Status: RESOLVED | Noted: 2022-07-15 | Resolved: 2025-04-29

## 2025-04-29 PROBLEM — M12.811 RIGHT ROTATOR CUFF TEAR ARTHROPATHY: Status: RESOLVED | Noted: 2024-04-18 | Resolved: 2025-04-29

## 2025-04-29 PROBLEM — G45.4 TRANSIENT GLOBAL AMNESIA: Status: RESOLVED | Noted: 2022-07-15 | Resolved: 2025-04-29

## 2025-04-29 PROBLEM — M75.101 RIGHT ROTATOR CUFF TEAR ARTHROPATHY: Status: RESOLVED | Noted: 2024-04-18 | Resolved: 2025-04-29

## 2025-04-29 LAB
ALBUMIN SERPL-MCNC: 3.6 G/DL (ref 3.2–4.6)
ALBUMIN/GLOB SERPL: 1 (ref 1–1.9)
ALP SERPL-CCNC: 99 U/L (ref 40–129)
ALT SERPL-CCNC: 26 U/L (ref 8–55)
ANION GAP SERPL CALC-SCNC: 11 MMOL/L (ref 7–16)
AST SERPL-CCNC: 22 U/L (ref 15–37)
BILIRUB SERPL-MCNC: 0.5 MG/DL (ref 0–1.2)
BUN SERPL-MCNC: 24 MG/DL (ref 8–23)
CALCIUM SERPL-MCNC: 9.8 MG/DL (ref 8.8–10.2)
CHLORIDE SERPL-SCNC: 107 MMOL/L (ref 98–107)
CHOLEST SERPL-MCNC: 168 MG/DL (ref 0–200)
CO2 SERPL-SCNC: 21 MMOL/L (ref 20–29)
CREAT SERPL-MCNC: 1.06 MG/DL (ref 0.8–1.3)
ERYTHROCYTE [DISTWIDTH] IN BLOOD BY AUTOMATED COUNT: 13 % (ref 11.9–14.6)
GLOBULIN SER CALC-MCNC: 3.5 G/DL (ref 2.3–3.5)
GLUCOSE SERPL-MCNC: 99 MG/DL (ref 70–99)
HCT VFR BLD AUTO: 48.2 % (ref 41.1–50.3)
HDLC SERPL-MCNC: 48 MG/DL (ref 40–60)
HDLC SERPL: 3.5 (ref 0–5)
HGB BLD-MCNC: 16.5 G/DL (ref 13.6–17.2)
LDLC SERPL CALC-MCNC: 92 MG/DL (ref 0–100)
MCH RBC QN AUTO: 30.6 PG (ref 26.1–32.9)
MCHC RBC AUTO-ENTMCNC: 34.2 G/DL (ref 31.4–35)
MCV RBC AUTO: 89.4 FL (ref 82–102)
NRBC # BLD: 0 K/UL (ref 0–0.2)
PLATELET # BLD AUTO: 238 K/UL (ref 150–450)
PMV BLD AUTO: 10.5 FL (ref 9.4–12.3)
POTASSIUM SERPL-SCNC: 4.2 MMOL/L (ref 3.5–5.1)
PROT SERPL-MCNC: 7.1 G/DL (ref 6.3–8.2)
RBC # BLD AUTO: 5.39 M/UL (ref 4.23–5.6)
SODIUM SERPL-SCNC: 140 MMOL/L (ref 136–145)
TRIGL SERPL-MCNC: 139 MG/DL (ref 0–150)
VLDLC SERPL CALC-MCNC: 28 MG/DL (ref 6–23)
WBC # BLD AUTO: 9 K/UL (ref 4.3–11.1)

## 2025-04-29 PROCEDURE — G8427 DOCREV CUR MEDS BY ELIG CLIN: HCPCS | Performed by: INTERNAL MEDICINE

## 2025-04-29 PROCEDURE — 1123F ACP DISCUSS/DSCN MKR DOCD: CPT | Performed by: INTERNAL MEDICINE

## 2025-04-29 PROCEDURE — 3079F DIAST BP 80-89 MM HG: CPT | Performed by: INTERNAL MEDICINE

## 2025-04-29 PROCEDURE — 3075F SYST BP GE 130 - 139MM HG: CPT | Performed by: INTERNAL MEDICINE

## 2025-04-29 PROCEDURE — 1036F TOBACCO NON-USER: CPT | Performed by: INTERNAL MEDICINE

## 2025-04-29 PROCEDURE — G8417 CALC BMI ABV UP PARAM F/U: HCPCS | Performed by: INTERNAL MEDICINE

## 2025-04-29 PROCEDURE — 99214 OFFICE O/P EST MOD 30 MIN: CPT | Performed by: INTERNAL MEDICINE

## 2025-04-29 PROCEDURE — 1159F MED LIST DOCD IN RCRD: CPT | Performed by: INTERNAL MEDICINE

## 2025-04-29 PROCEDURE — G2211 COMPLEX E/M VISIT ADD ON: HCPCS | Performed by: INTERNAL MEDICINE

## 2025-04-29 PROCEDURE — 1126F AMNT PAIN NOTED NONE PRSNT: CPT | Performed by: INTERNAL MEDICINE

## 2025-04-29 PROCEDURE — 1160F RVW MEDS BY RX/DR IN RCRD: CPT | Performed by: INTERNAL MEDICINE

## 2025-04-29 RX ORDER — AMLODIPINE AND VALSARTAN 5; 160 MG/1; MG/1
1 TABLET ORAL DAILY
Qty: 90 TABLET | Refills: 1 | Status: SHIPPED | OUTPATIENT
Start: 2025-04-29

## 2025-04-29 SDOH — ECONOMIC STABILITY: FOOD INSECURITY: WITHIN THE PAST 12 MONTHS, THE FOOD YOU BOUGHT JUST DIDN'T LAST AND YOU DIDN'T HAVE MONEY TO GET MORE.: NEVER TRUE

## 2025-04-29 SDOH — ECONOMIC STABILITY: FOOD INSECURITY: WITHIN THE PAST 12 MONTHS, YOU WORRIED THAT YOUR FOOD WOULD RUN OUT BEFORE YOU GOT MONEY TO BUY MORE.: NEVER TRUE

## 2025-04-29 ASSESSMENT — PATIENT HEALTH QUESTIONNAIRE - PHQ9
1. LITTLE INTEREST OR PLEASURE IN DOING THINGS: NOT AT ALL
SUM OF ALL RESPONSES TO PHQ QUESTIONS 1-9: 0
2. FEELING DOWN, DEPRESSED OR HOPELESS: NOT AT ALL
SUM OF ALL RESPONSES TO PHQ QUESTIONS 1-9: 0

## 2025-04-29 NOTE — PROGRESS NOTES
ASSESSMENT/PLAN:  Assessment & Plan    1. Blood pressure management.  - Blood pressure appears to be well-regulated at present.  - No significant issues reported with current blood pressure control.  - Blood sample will be collected today for further analysis.  - Monitoring of blood pressure continues.  -labs today.      2. Sleep apnea.  - Reports difficulty sleeping and inconsistent sleep patterns, with some nights sleeping well and others not at all.  - Not currently using CPAP therapy.  - Potential use of an implantable device for sleep apnea management discussed as an alternative to CPAP.  - Counseling provided regarding sleep apnea management options.      3. Right shoulder status post surgery.  - Recently had surgery on the right shoulder.  - Completed physical therapy.  - Reports satisfactory results with improved mobility and no significant pain.  - Continued monitoring of shoulder function and pain levels.    4. BPH  - PSA is being followed by Dr. Gill, urologist.  - No immediate concerns reported regarding prostate health.  - Continued follow-up with urologist for PSA monitoring.  - Blood sample collection planned for today to assist in ongoing evaluation.    5.  History of Seizure.    -No seizure issues.   -medication stable.  On Keppra 500 mg BID    6.HLD  -check lipids.  See orders.     1. Primary hypertension  -     amLODIPine-valsartan (EXFORGE) 5-160 MG per tablet; Take 1 tablet by mouth daily, Disp-90 tablet, R-1Normal  -     CBC  -     Lipid Panel  -     Comprehensive Metabolic Panel  2. Obstructive sleep apnea  3. Seizure (HCC)  4. Benign prostatic hyperplasia without lower urinary tract symptoms  5. Status post reverse arthroplasty of right shoulder  6. Pure hypercholesterolemia  -     Lipid Panel  -     Comprehensive Metabolic Panel      Evaluation and management of the chronic condition(s) delineated.  No negative side effects reported.  I have reviewed all the lab results. There are

## 2025-05-05 NOTE — PROGRESS NOTES
Name: Rocky Gerardo  YOB: 1940  Gender: male  MRN: [id number]    CC:   Chief Complaint   Patient presents with    Follow-up     Right reverse TSA follow up DOS 10/21/24   , The patient follows up 6 months status post TSA.  Right Shoulder Reverse Total Arthroplasty - Right  10/21/2024    HPI: The patient is doing well.   They feel as if their pain has decreased.   History of Present Illness  The patient is an 85-year-old individual who came in for a follow-up on their right shoulder.    They mention that they sometimes feel pain in the front part of their right shoulder, which has been going on for a little over two weeks. This pain is especially noticeable when they try to fasten their seatbelt or put their belt around their waist. They also say that the pain gets worse when they lie on their right side while sleeping.    SOCIAL HISTORY  Marital Status:         No Known Allergies  Past Medical History:   Diagnosis Date    Age-related osteoporosis with current pathological fracture 9/21/2017    GERD (gastroesophageal reflux disease)     pt denies (6/7/18)    Hyperlipidemia 8/5/2016    Hypertension     Insomnia 10/30/2017    Obstructive sleep apnea 10/30/2017    pt is not currently using cpap machine- has used in past (6/7/18)    Overweight (BMI 25.0-29.9) 10/30/2017    Primary osteoarthritis of left shoulder 7/14/2017    Seizure (HCC) 09/10/2017    x2 (9/2017 and 10/2017)- on keppra BID- had consult with Dr Manning (neuro)- pt reports triggered by insomnia    Stroke-like symptoms 07/15/2022    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 CARDIAC CATH LAB    COLONOSCOPY      HERNIA REPAIR      INVASIVE VASCULAR N/A 10/11/2023    Angiography lower ext bilat performed by Norberto Anderson MD at [facility] CARDIAC CATH LAB

## 2025-05-06 ENCOUNTER — OFFICE VISIT (OUTPATIENT)
Dept: ORTHOPEDIC SURGERY | Age: 85
End: 2025-05-06
Payer: MEDICARE

## 2025-05-06 ENCOUNTER — RESULTS FOLLOW-UP (OUTPATIENT)
Dept: INTERNAL MEDICINE CLINIC | Facility: CLINIC | Age: 85
End: 2025-05-06

## 2025-05-06 DIAGNOSIS — Z96.611 S/P REVERSE TOTAL SHOULDER ARTHROPLASTY, RIGHT: ICD-10-CM

## 2025-05-06 DIAGNOSIS — Z09 SURGERY FOLLOW-UP: Primary | ICD-10-CM

## 2025-05-06 PROCEDURE — G8417 CALC BMI ABV UP PARAM F/U: HCPCS | Performed by: ORTHOPAEDIC SURGERY

## 2025-05-06 PROCEDURE — G8428 CUR MEDS NOT DOCUMENT: HCPCS | Performed by: ORTHOPAEDIC SURGERY

## 2025-05-06 PROCEDURE — 1123F ACP DISCUSS/DSCN MKR DOCD: CPT | Performed by: ORTHOPAEDIC SURGERY

## 2025-05-06 PROCEDURE — 1036F TOBACCO NON-USER: CPT | Performed by: ORTHOPAEDIC SURGERY

## 2025-05-06 PROCEDURE — 99213 OFFICE O/P EST LOW 20 MIN: CPT | Performed by: ORTHOPAEDIC SURGERY

## 2025-08-04 ENCOUNTER — OFFICE VISIT (OUTPATIENT)
Age: 85
End: 2025-08-04
Payer: MEDICARE

## 2025-08-04 VITALS
BODY MASS INDEX: 30.34 KG/M2 | SYSTOLIC BLOOD PRESSURE: 108 MMHG | DIASTOLIC BLOOD PRESSURE: 70 MMHG | HEIGHT: 72 IN | WEIGHT: 224 LBS | HEART RATE: 68 BPM

## 2025-08-04 DIAGNOSIS — E78.00 PURE HYPERCHOLESTEROLEMIA: ICD-10-CM

## 2025-08-04 DIAGNOSIS — I10 PRIMARY HYPERTENSION: Primary | ICD-10-CM

## 2025-08-04 DIAGNOSIS — G47.33 OBSTRUCTIVE SLEEP APNEA: ICD-10-CM

## 2025-08-04 DIAGNOSIS — R06.02 SHORTNESS OF BREATH: ICD-10-CM

## 2025-08-04 PROCEDURE — 1036F TOBACCO NON-USER: CPT | Performed by: INTERNAL MEDICINE

## 2025-08-04 PROCEDURE — 3078F DIAST BP <80 MM HG: CPT | Performed by: INTERNAL MEDICINE

## 2025-08-04 PROCEDURE — 99214 OFFICE O/P EST MOD 30 MIN: CPT | Performed by: INTERNAL MEDICINE

## 2025-08-04 PROCEDURE — 1123F ACP DISCUSS/DSCN MKR DOCD: CPT | Performed by: INTERNAL MEDICINE

## 2025-08-04 PROCEDURE — G8428 CUR MEDS NOT DOCUMENT: HCPCS | Performed by: INTERNAL MEDICINE

## 2025-08-04 PROCEDURE — G8417 CALC BMI ABV UP PARAM F/U: HCPCS | Performed by: INTERNAL MEDICINE

## 2025-08-04 PROCEDURE — 1126F AMNT PAIN NOTED NONE PRSNT: CPT | Performed by: INTERNAL MEDICINE

## 2025-08-04 PROCEDURE — 3074F SYST BP LT 130 MM HG: CPT | Performed by: INTERNAL MEDICINE

## (undated) DEVICE — GLOVE SURG SZ 7 L12IN FNGR THK79MIL GRN LTX FREE

## (undated) DEVICE — DEVICE COMPR LNG 27 CM VASC BND

## (undated) DEVICE — NEEDLE HYPO 21GA L1.5IN INTRAMUSCULAR S STL LATCH BVL UP

## (undated) DEVICE — (D)PREP SKN CHLRAPRP APPL 26ML -- CONVERT TO ITEM 371833

## (undated) DEVICE — SUTURE VCRL SZ 0 L27IN ABSRB UD L36MM CP-1 1/2 CIR REV CUT J267H

## (undated) DEVICE — SYR 10ML LUER LOK 1/5ML GRAD --

## (undated) DEVICE — KENDALL SCD EXPRESS SLEEVES, KNEE LENGTH, MEDIUM: Brand: KENDALL SCD

## (undated) DEVICE — SPONGE LAP 18X18IN STRL -- 5/PK

## (undated) DEVICE — DRAPE,SHOULDER,BEACH CHAIR,STERILE: Brand: MEDLINE

## (undated) DEVICE — GUIDEWIRE 035IN 210CM PTFE COAT FIX COR J TIP 15MM FIRM BODY

## (undated) DEVICE — SYRINGE IRRIG 60ML SFT PLIABLE BLB EZ TO GRP 1 HND USE W/

## (undated) DEVICE — BANDAGE,GAUZE,BULKEE II,4.5"X4.1YD,STRL: Brand: MEDLINE

## (undated) DEVICE — SHEET,DRAPE,53X77,STERILE: Brand: MEDLINE

## (undated) DEVICE — PAD,NON-ADHERENT,2X3,STERILE,LF,1/PK: Brand: MEDLINE

## (undated) DEVICE — GARMENT,MEDLINE,DVT,INT,CALF,MED, GEN2: Brand: MEDLINE

## (undated) DEVICE — CARDINAL HEALTH FLEXIBLE LIGHT HANDLE COVER: Brand: CARDINAL HEALTH

## (undated) DEVICE — SYR BULB 60ML IRRIGATION -- CONVERT TO ITEM 116413

## (undated) DEVICE — INTENDED FOR TISSUE SEPARATION, AND OTHER PROCEDURES THAT REQUIRE A SHARP SURGICAL BLADE TO PUNCTURE OR CUT.: Brand: BARD-PARKER ® STAINLESS STEEL BLADES

## (undated) DEVICE — DRAPE XR C ARM 41X74IN LF --

## (undated) DEVICE — SOLUTION IV 1000ML 0.9% SOD CHL

## (undated) DEVICE — MEDI-VAC YANKAUER SUCTION HANDLE W/BULBOUS TIP: Brand: CARDINAL HEALTH

## (undated) DEVICE — SUTURE MONOCRYL SZ 3-0 L27IN ABSRB UD L19MM PS-2 3/8 CIR PRIM Y427H

## (undated) DEVICE — CATHETER ANGIO 4FR L125CM 0.038IN NYL HYDRPHLC COAT VERT

## (undated) DEVICE — 1010 S-DRAPE TOWEL DRAPE 10/BX: Brand: STERI-DRAPE™

## (undated) DEVICE — 10 GAUGE 15MM AVAFLEX VERTEBRAL BALLOON SYSTEM: Brand: AVAFLEX

## (undated) DEVICE — SUTURE ETHBND EXCEL SZ 2 L27IN NONABSORBABLE GRN WHT MO-7 D7485

## (undated) DEVICE — SLING ARM AD ULT

## (undated) DEVICE — SUT ETHLN 3-0 18IN PS1 BLK --

## (undated) DEVICE — 3000CC GUARDIAN II: Brand: GUARDIAN

## (undated) DEVICE — DRAPE TWL SURG 16X26IN BLU ORB04] ALLCARE INC]

## (undated) DEVICE — GLIDESHEATH SLENDER STAINLESS STEEL KIT: Brand: GLIDESHEATH SLENDER

## (undated) DEVICE — COVER,TABLE,HEAVY DUTY,79"X110",STRL: Brand: MEDLINE

## (undated) DEVICE — SUTURE MCRYL SZ 2-0 L27IN ABSRB UD CP-1 1 L36MM 1/2 CIR REV Y266H

## (undated) DEVICE — COVER,MAYO STAND,STERILE: Brand: MEDLINE

## (undated) DEVICE — SURGICAL PROCEDURE PACK MIN CV CDS

## (undated) DEVICE — SHOULDER ARTHRO DR KOCH: Brand: MEDLINE INDUSTRIES, INC.

## (undated) DEVICE — UTILITY MARKER,BLACK WITH LABELS: Brand: DEVON

## (undated) DEVICE — LIQUIBAND RAPID ADHESIVE 36/CS 0.8ML: Brand: MEDLINE

## (undated) DEVICE — CATHETER COR DIAG 4.0 6FR 110CM 2 SIDE H

## (undated) DEVICE — SURGICAL PROCEDURE PACK BASIC ST FRANCIS

## (undated) DEVICE — CATHETER DIAG AD 5FR L125CM COR NYL MP AMPLATZ 2 W/ 2 SIDE

## (undated) DEVICE — PENCIL ES L3M BTTN SWCH HOLSTER W/ BLDE ELECTRD EDGE

## (undated) DEVICE — 3M™ IOBAN™ 2 ANTIMICROBIAL INCISE DRAPE 6650EZ: Brand: IOBAN™ 2

## (undated) DEVICE — INTENDED FOR TISSUE SEPARATION, AND OTHER PROCEDURES THAT REQUIRE A SHARP SURGICAL BLADE TO PUNCTURE OR CUT.: Brand: BARD-PARKER SAFETY BLADES SIZE 15, STERILE

## (undated) DEVICE — OSCILLATING SAW BLDE 13X1.4X70MM

## (undated) DEVICE — SUTURE MCRYL SZ 3-0 L27IN ABSRB UD L24MM PS-1 3/8 CIR PRIM Y936H

## (undated) DEVICE — BUTTON SWITCH PENCIL BLADE ELECTRODE, HOLSTER: Brand: EDGE

## (undated) DEVICE — STOCKINETTE TUBE 9X48 -- MEDICHOICE

## (undated) DEVICE — NEEDLE HYPO 25GA L5/8IN ORNG HUB S STL LATCH BVL UP

## (undated) DEVICE — NEEDLE HYPO 25GA L1.5IN BLU POLYPR HUB S STL REG BVL STR

## (undated) DEVICE — SOLUTION IRRIG 1000ML 0.9% SOD CHL USP POUR PLAS BTL

## (undated) DEVICE — GUIDEWIRE VASC L260CM DIA0.035IN RAD 3MM J TIP L7CM PTFE

## (undated) DEVICE — BANDAGE COMPR SELF ADH 5 YDX4 IN TAN STRL PREMIERPRO LF

## (undated) DEVICE — CATHETER DIAG 6FR L110CM PIGTAILS CRV STYL PIG145 DXTERITY

## (undated) DEVICE — REM POLYHESIVE ADULT PATIENT RETURN ELECTRODE: Brand: VALLEYLAB

## (undated) DEVICE — GOWN,REINF,POLY,ECL,PP SLV,XL: Brand: MEDLINE

## (undated) DEVICE — ELECTRODE PT RET AD L9FT HI MOIST COND ADH HYDRGEL CORDED

## (undated) DEVICE — GLOVE ORANGE PI 7   MSG9070

## (undated) DEVICE — SUTURE MONOCRYL SZ 2-0 L27IN ABSRB UD CP-1 1 L36MM 1/2 CIR REV Y266H

## (undated) DEVICE — SHOULDER STABILIZATION KIT,                                    DISPOSABLE 12 PER BOX

## (undated) DEVICE — BASIC SINGLE BASIN BTC-LF: Brand: MEDLINE INDUSTRIES, INC.

## (undated) DEVICE — APPLICATOR BNDG 1MM ADH PREMIERPRO EXOFIN